# Patient Record
Sex: FEMALE | Race: OTHER | HISPANIC OR LATINO
[De-identification: names, ages, dates, MRNs, and addresses within clinical notes are randomized per-mention and may not be internally consistent; named-entity substitution may affect disease eponyms.]

---

## 2017-03-01 ENCOUNTER — LABORATORY RESULT (OUTPATIENT)
Age: 52
End: 2017-03-01

## 2017-03-01 ENCOUNTER — APPOINTMENT (OUTPATIENT)
Dept: INTERNAL MEDICINE | Facility: CLINIC | Age: 52
End: 2017-03-01

## 2017-03-01 VITALS
HEIGHT: 68 IN | DIASTOLIC BLOOD PRESSURE: 90 MMHG | BODY MASS INDEX: 20 KG/M2 | SYSTOLIC BLOOD PRESSURE: 160 MMHG | WEIGHT: 132 LBS | HEART RATE: 78 BPM | OXYGEN SATURATION: 98 %

## 2017-03-01 DIAGNOSIS — Z87.891 PERSONAL HISTORY OF NICOTINE DEPENDENCE: ICD-10-CM

## 2017-03-01 DIAGNOSIS — Z23 ENCOUNTER FOR IMMUNIZATION: ICD-10-CM

## 2017-03-01 DIAGNOSIS — R23.8 OTHER SKIN CHANGES: ICD-10-CM

## 2017-03-02 LAB
ALBUMIN SERPL ELPH-MCNC: 4.1 G/DL
ALP BLD-CCNC: 110 U/L
ALT SERPL-CCNC: 13 U/L
ANION GAP SERPL CALC-SCNC: 16 MMOL/L
AST SERPL-CCNC: 17 U/L
BILIRUB SERPL-MCNC: 0.3 MG/DL
BUN SERPL-MCNC: 13 MG/DL
CALCIUM SERPL-MCNC: 10 MG/DL
CHLORIDE SERPL-SCNC: 98 MMOL/L
CHOLEST SERPL-MCNC: 145 MG/DL
CHOLEST/HDLC SERPL: 2.6 RATIO
CO2 SERPL-SCNC: 25 MMOL/L
CREAT SERPL-MCNC: 0.63 MG/DL
CREAT SPEC-SCNC: 58 MG/DL
GLUCOSE SERPL-MCNC: 90 MG/DL
HBA1C MFR BLD HPLC: 8.3 %
HDLC SERPL-MCNC: 55 MG/DL
LDLC SERPL CALC-MCNC: 65 MG/DL
MICROALBUMIN 24H UR DL<=1MG/L-MCNC: 3.1 MG/DL
MICROALBUMIN/CREAT 24H UR-RTO: 53 UG/MG
POTASSIUM SERPL-SCNC: 4.3 MMOL/L
PROT SERPL-MCNC: 7.8 G/DL
SODIUM SERPL-SCNC: 139 MMOL/L
TRIGL SERPL-MCNC: 123 MG/DL
TSH SERPL-ACNC: <0.01 UIU/ML

## 2017-04-12 ENCOUNTER — FORM ENCOUNTER (OUTPATIENT)
Age: 52
End: 2017-04-12

## 2017-04-13 ENCOUNTER — APPOINTMENT (OUTPATIENT)
Dept: RHEUMATOLOGY | Facility: CLINIC | Age: 52
End: 2017-04-13

## 2017-04-13 VITALS
TEMPERATURE: 97.9 F | DIASTOLIC BLOOD PRESSURE: 88 MMHG | RESPIRATION RATE: 16 BRPM | HEIGHT: 68 IN | SYSTOLIC BLOOD PRESSURE: 158 MMHG | OXYGEN SATURATION: 98 % | HEART RATE: 76 BPM | BODY MASS INDEX: 19.7 KG/M2 | WEIGHT: 130 LBS

## 2017-04-13 DIAGNOSIS — R21 RASH AND OTHER NONSPECIFIC SKIN ERUPTION: ICD-10-CM

## 2017-04-13 LAB
ALBUMIN SERPL ELPH-MCNC: 4.1 G/DL
ALP BLD-CCNC: 118 U/L
ALT SERPL-CCNC: 20 U/L
ANION GAP SERPL CALC-SCNC: 18 MMOL/L
AST SERPL-CCNC: 13 U/L
BASOPHILS # BLD AUTO: 0.04 K/UL
BASOPHILS NFR BLD AUTO: 0.6 %
BILIRUB SERPL-MCNC: 0.2 MG/DL
BUN SERPL-MCNC: 16 MG/DL
CALCIUM SERPL-MCNC: 10 MG/DL
CHLORIDE SERPL-SCNC: 96 MMOL/L
CO2 SERPL-SCNC: 23 MMOL/L
CREAT SERPL-MCNC: 0.59 MG/DL
CRP SERPL-MCNC: 0.36 MG/DL
EOSINOPHIL # BLD AUTO: 0.12 K/UL
EOSINOPHIL NFR BLD AUTO: 1.9 %
ERYTHROCYTE [SEDIMENTATION RATE] IN BLOOD BY WESTERGREN METHOD: 25 MM/HR
GLUCOSE SERPL-MCNC: 153 MG/DL
HAV IGM SER QL: NONREACTIVE
HBV CORE IGG+IGM SER QL: NONREACTIVE
HBV CORE IGM SER QL: NONREACTIVE
HBV SURFACE AG SER QL: NONREACTIVE
HCT VFR BLD CALC: 39.4 %
HCV AB SER QL: NONREACTIVE
HCV S/CO RATIO: 0.21 S/CO
HGB BLD-MCNC: 13.1 G/DL
IGA SER QL IEP: 443 MG/DL
IGG SER QL IEP: 1060 MG/DL
IGM SER QL IEP: 164 MG/DL
IMM GRANULOCYTES NFR BLD AUTO: 0.2 %
LYMPHOCYTES # BLD AUTO: 2.51 K/UL
LYMPHOCYTES NFR BLD AUTO: 38.7 %
MAN DIFF?: NORMAL
MCHC RBC-ENTMCNC: 28.1 PG
MCHC RBC-ENTMCNC: 33.2 GM/DL
MCV RBC AUTO: 84.5 FL
MONOCYTES # BLD AUTO: 0.43 K/UL
MONOCYTES NFR BLD AUTO: 6.6 %
NEUTROPHILS # BLD AUTO: 3.37 K/UL
NEUTROPHILS NFR BLD AUTO: 52 %
PLATELET # BLD AUTO: 289 K/UL
POTASSIUM SERPL-SCNC: 4.2 MMOL/L
PROT SERPL-MCNC: 7.6 G/DL
RBC # BLD: 4.66 M/UL
RBC # FLD: 13.4 %
SODIUM SERPL-SCNC: 137 MMOL/L
WBC # FLD AUTO: 6.48 K/UL

## 2017-04-17 LAB
ADJUSTED MITOGEN: >10 IU/ML
ADJUSTED TB AG: -0.02 IU/ML
M TB IFN-G BLD-IMP: NEGATIVE
QUANTIFERON GOLD NIL: 0.11 IU/ML

## 2017-04-23 ENCOUNTER — EMERGENCY (EMERGENCY)
Facility: HOSPITAL | Age: 52
LOS: 0 days | Discharge: HOME | End: 2017-04-23
Attending: EMERGENCY MEDICINE
Payer: COMMERCIAL

## 2017-04-23 VITALS
WEIGHT: 132.06 LBS | RESPIRATION RATE: 21 BRPM | OXYGEN SATURATION: 100 % | TEMPERATURE: 103 F | DIASTOLIC BLOOD PRESSURE: 89 MMHG | SYSTOLIC BLOOD PRESSURE: 171 MMHG | HEIGHT: 68 IN | HEART RATE: 108 BPM

## 2017-04-23 VITALS — TEMPERATURE: 99 F

## 2017-04-23 DIAGNOSIS — I21.3 ST ELEVATION (STEMI) MYOCARDIAL INFARCTION OF UNSPECIFIED SITE: ICD-10-CM

## 2017-04-23 DIAGNOSIS — I10 ESSENTIAL (PRIMARY) HYPERTENSION: ICD-10-CM

## 2017-04-23 DIAGNOSIS — J02.0 STREPTOCOCCAL PHARYNGITIS: ICD-10-CM

## 2017-04-23 DIAGNOSIS — Z79.82 LONG TERM (CURRENT) USE OF ASPIRIN: ICD-10-CM

## 2017-04-23 DIAGNOSIS — Z79.4 LONG TERM (CURRENT) USE OF INSULIN: ICD-10-CM

## 2017-04-23 DIAGNOSIS — E11.9 TYPE 2 DIABETES MELLITUS WITHOUT COMPLICATIONS: ICD-10-CM

## 2017-04-23 DIAGNOSIS — Z95.5 PRESENCE OF CORONARY ANGIOPLASTY IMPLANT AND GRAFT: Chronic | ICD-10-CM

## 2017-04-23 DIAGNOSIS — R51 HEADACHE: ICD-10-CM

## 2017-04-23 LAB
ALBUMIN SERPL ELPH-MCNC: 3.2 G/DL — LOW (ref 3.3–5)
ALP SERPL-CCNC: 116 U/L — SIGNIFICANT CHANGE UP (ref 40–120)
ALT FLD-CCNC: 15 U/L — SIGNIFICANT CHANGE UP (ref 12–78)
ANION GAP SERPL CALC-SCNC: 17 MMOL/L — SIGNIFICANT CHANGE UP (ref 5–17)
AST SERPL-CCNC: 12 U/L — LOW (ref 15–37)
BASOPHILS # BLD AUTO: 0.1 K/UL — SIGNIFICANT CHANGE UP (ref 0–0.2)
BASOPHILS NFR BLD AUTO: 0.8 % — SIGNIFICANT CHANGE UP (ref 0–2)
BILIRUB SERPL-MCNC: 0.3 MG/DL — SIGNIFICANT CHANGE UP (ref 0.2–1.2)
BUN SERPL-MCNC: 11 MG/DL — SIGNIFICANT CHANGE UP (ref 7–23)
CALCIUM SERPL-MCNC: 8.8 MG/DL — SIGNIFICANT CHANGE UP (ref 8.5–10.1)
CHLORIDE SERPL-SCNC: 98 MMOL/L — SIGNIFICANT CHANGE UP (ref 96–108)
CO2 SERPL-SCNC: 21 MMOL/L — LOW (ref 22–31)
CREAT SERPL-MCNC: 0.8 MG/DL — SIGNIFICANT CHANGE UP (ref 0.5–1.3)
EOSINOPHIL # BLD AUTO: 0 K/UL — SIGNIFICANT CHANGE UP (ref 0–0.5)
EOSINOPHIL NFR BLD AUTO: 0 % — SIGNIFICANT CHANGE UP (ref 0–6)
GLUCOSE SERPL-MCNC: 149 MG/DL — HIGH (ref 70–99)
HCT VFR BLD CALC: 35.4 % — SIGNIFICANT CHANGE UP (ref 34.5–45)
HGB BLD-MCNC: 12.7 G/DL — SIGNIFICANT CHANGE UP (ref 11.5–15.5)
LYMPHOCYTES # BLD AUTO: 18.9 % — SIGNIFICANT CHANGE UP (ref 13–44)
LYMPHOCYTES # BLD AUTO: 2.8 K/UL — SIGNIFICANT CHANGE UP (ref 1–3.3)
MCHC RBC-ENTMCNC: 28.5 PG — SIGNIFICANT CHANGE UP (ref 27–34)
MCHC RBC-ENTMCNC: 36 GM/DL — SIGNIFICANT CHANGE UP (ref 32–36)
MCV RBC AUTO: 79.2 FL — LOW (ref 80–100)
MONOCYTES # BLD AUTO: 0.7 K/UL — SIGNIFICANT CHANGE UP (ref 0–0.9)
MONOCYTES NFR BLD AUTO: 4.5 % — SIGNIFICANT CHANGE UP (ref 2–14)
NEUTROPHILS # BLD AUTO: 11.4 K/UL — HIGH (ref 1.8–7.4)
NEUTROPHILS NFR BLD AUTO: 75.8 % — SIGNIFICANT CHANGE UP (ref 43–77)
PLATELET # BLD AUTO: 296 K/UL — SIGNIFICANT CHANGE UP (ref 150–400)
POTASSIUM SERPL-MCNC: 3.6 MMOL/L — SIGNIFICANT CHANGE UP (ref 3.5–5.3)
POTASSIUM SERPL-SCNC: 3.6 MMOL/L — SIGNIFICANT CHANGE UP (ref 3.5–5.3)
PROT SERPL-MCNC: 7.6 GM/DL — SIGNIFICANT CHANGE UP (ref 6–8.3)
RBC # BLD: 4.48 M/UL — SIGNIFICANT CHANGE UP (ref 3.8–5.2)
RBC # FLD: 12 % — SIGNIFICANT CHANGE UP (ref 11–15)
SODIUM SERPL-SCNC: 136 MMOL/L — SIGNIFICANT CHANGE UP (ref 135–145)
WBC # BLD: 15 K/UL — HIGH (ref 3.8–10.5)
WBC # FLD AUTO: 15 K/UL — HIGH (ref 3.8–10.5)

## 2017-04-23 PROCEDURE — 99284 EMERGENCY DEPT VISIT MOD MDM: CPT

## 2017-04-23 PROCEDURE — 71010: CPT | Mod: 26

## 2017-04-23 RX ORDER — SODIUM CHLORIDE 9 MG/ML
1000 INJECTION INTRAMUSCULAR; INTRAVENOUS; SUBCUTANEOUS ONCE
Qty: 0 | Refills: 0 | Status: COMPLETED | OUTPATIENT
Start: 2017-04-23 | End: 2017-04-23

## 2017-04-23 RX ORDER — ACETAMINOPHEN 500 MG
975 TABLET ORAL ONCE
Qty: 0 | Refills: 0 | Status: COMPLETED | OUTPATIENT
Start: 2017-04-23 | End: 2017-04-23

## 2017-04-23 RX ORDER — ACETAMINOPHEN 500 MG
2 TABLET ORAL
Qty: 40 | Refills: 0
Start: 2017-04-23 | End: 2017-04-28

## 2017-04-23 RX ADMIN — SODIUM CHLORIDE 1000 MILLILITER(S): 9 INJECTION INTRAMUSCULAR; INTRAVENOUS; SUBCUTANEOUS at 16:18

## 2017-04-23 RX ADMIN — SODIUM CHLORIDE 1000 MILLILITER(S): 9 INJECTION INTRAMUSCULAR; INTRAVENOUS; SUBCUTANEOUS at 17:32

## 2017-04-23 RX ADMIN — Medication 975 MILLIGRAM(S): at 16:18

## 2017-04-23 RX ADMIN — Medication 1 TABLET(S): at 16:18

## 2017-04-23 NOTE — ED ADULT TRIAGE NOTE - CHIEF COMPLAINT QUOTE
pt states, " I have been having chills, and body aches with fever and headache , with cough for several days" pt almost fell in triage area c/o dizziness

## 2017-04-23 NOTE — ED ADULT NURSE NOTE - PMH
Diabetes    Heart attack    History of Tubal Ligation    Hypertension    RA (rheumatoid arthritis)    Tubal Ligation

## 2017-04-23 NOTE — ED PROVIDER NOTE - OBJECTIVE STATEMENT
52 year old female with PMH of DM II, HTN, RA, MI, presenting to ED due to fever, had sore throat body aches x2 days. Slight cough as well. Denies any sick contacts, able to swallow and talk no change in voice.

## 2017-04-23 NOTE — ED PROVIDER NOTE - ENMT, MLM
Airway patent, Nasal mucosa clear. Mouth with normal mucosa. Throat has no vesicles, + Left oropharyngeal exudate and uvula is midline.

## 2017-04-23 NOTE — ED ADULT NURSE NOTE - OBJECTIVE STATEMENT
Patient states that for the past couple of days she has had chills, fevers, feeling weak. Complaints of a headache and pain in her back. Took Advil at home, denies taking any medication at home to alleviate her symptoms. Patient is able to eat and drink, denies nausea/vomiting/diarrhea. Received the flu vacc this year in october.

## 2017-05-23 ENCOUNTER — RX RENEWAL (OUTPATIENT)
Age: 52
End: 2017-05-23

## 2017-06-01 ENCOUNTER — CLINICAL ADVICE (OUTPATIENT)
Age: 52
End: 2017-06-01

## 2017-06-07 ENCOUNTER — APPOINTMENT (OUTPATIENT)
Dept: RHEUMATOLOGY | Facility: CLINIC | Age: 52
End: 2017-06-07

## 2017-06-07 VITALS
DIASTOLIC BLOOD PRESSURE: 83 MMHG | SYSTOLIC BLOOD PRESSURE: 178 MMHG | WEIGHT: 128 LBS | BODY MASS INDEX: 19.4 KG/M2 | OXYGEN SATURATION: 97 % | HEART RATE: 80 BPM | HEIGHT: 68 IN

## 2017-06-07 RX ORDER — CLOBETASOL PROPIONATE 0.5 MG/G
0.05 OINTMENT TOPICAL TWICE DAILY
Qty: 1 | Refills: 0 | Status: DISCONTINUED | COMMUNITY
Start: 2017-04-13 | End: 2017-06-07

## 2017-06-08 LAB
ALBUMIN SERPL ELPH-MCNC: 4.2 G/DL
ALP BLD-CCNC: 101 U/L
ALT SERPL-CCNC: 18 U/L
ANION GAP SERPL CALC-SCNC: 13 MMOL/L
ASO AB SER LA-ACNC: 78 IU/ML
AST SERPL-CCNC: 15 U/L
BASOPHILS # BLD AUTO: 0.05 K/UL
BASOPHILS NFR BLD AUTO: 0.5 %
BILIRUB SERPL-MCNC: 0.3 MG/DL
BUN SERPL-MCNC: 14 MG/DL
CALCIUM SERPL-MCNC: 9.3 MG/DL
CHLORIDE SERPL-SCNC: 95 MMOL/L
CO2 SERPL-SCNC: 26 MMOL/L
CREAT SERPL-MCNC: 0.62 MG/DL
CRP SERPL-MCNC: 0.6 MG/DL
EOSINOPHIL # BLD AUTO: 0.24 K/UL
EOSINOPHIL NFR BLD AUTO: 2.6 %
ERYTHROCYTE [SEDIMENTATION RATE] IN BLOOD BY WESTERGREN METHOD: 26 MM/HR
GLUCOSE SERPL-MCNC: 225 MG/DL
HBA1C MFR BLD HPLC: 6.9 %
HCT VFR BLD CALC: 37.4 %
HGB BLD-MCNC: 11.9 G/DL
IMM GRANULOCYTES NFR BLD AUTO: 0.2 %
LYMPHOCYTES # BLD AUTO: 2.7 K/UL
LYMPHOCYTES NFR BLD AUTO: 28.9 %
MAN DIFF?: NORMAL
MCHC RBC-ENTMCNC: 27.1 PG
MCHC RBC-ENTMCNC: 31.8 GM/DL
MCV RBC AUTO: 85.2 FL
MONOCYTES # BLD AUTO: 0.4 K/UL
MONOCYTES NFR BLD AUTO: 4.3 %
NEUTROPHILS # BLD AUTO: 5.93 K/UL
NEUTROPHILS NFR BLD AUTO: 63.5 %
PLATELET # BLD AUTO: 384 K/UL
POTASSIUM SERPL-SCNC: 4.4 MMOL/L
PROT SERPL-MCNC: 7.7 G/DL
RBC # BLD: 4.39 M/UL
RBC # FLD: 14 %
SODIUM SERPL-SCNC: 134 MMOL/L
T4 FREE SERPL-MCNC: 1.6 NG/DL
TSH SERPL-ACNC: <0.01 UIU/ML
WBC # FLD AUTO: 9.34 K/UL

## 2017-06-29 ENCOUNTER — RX RENEWAL (OUTPATIENT)
Age: 52
End: 2017-06-29

## 2017-06-30 ENCOUNTER — RX RENEWAL (OUTPATIENT)
Age: 52
End: 2017-06-30

## 2017-07-07 ENCOUNTER — APPOINTMENT (OUTPATIENT)
Dept: RHEUMATOLOGY | Facility: CLINIC | Age: 52
End: 2017-07-07

## 2017-08-03 ENCOUNTER — RX RENEWAL (OUTPATIENT)
Age: 52
End: 2017-08-03

## 2017-08-06 ENCOUNTER — RX RENEWAL (OUTPATIENT)
Age: 52
End: 2017-08-06

## 2017-08-18 ENCOUNTER — APPOINTMENT (OUTPATIENT)
Dept: INTERNAL MEDICINE | Facility: CLINIC | Age: 52
End: 2017-08-18

## 2017-10-05 ENCOUNTER — RX RENEWAL (OUTPATIENT)
Age: 52
End: 2017-10-05

## 2017-10-27 ENCOUNTER — CLINICAL ADVICE (OUTPATIENT)
Age: 52
End: 2017-10-27

## 2017-11-01 ENCOUNTER — APPOINTMENT (OUTPATIENT)
Dept: ENDOCRINOLOGY | Facility: CLINIC | Age: 52
End: 2017-11-01
Payer: COMMERCIAL

## 2017-11-01 VITALS
SYSTOLIC BLOOD PRESSURE: 148 MMHG | DIASTOLIC BLOOD PRESSURE: 90 MMHG | WEIGHT: 128 LBS | HEIGHT: 68 IN | BODY MASS INDEX: 19.4 KG/M2

## 2017-11-01 LAB
GLUCOSE BLDC GLUCOMTR-MCNC: 74
HBA1C MFR BLD HPLC: 6.4

## 2017-11-01 PROCEDURE — 83036 HEMOGLOBIN GLYCOSYLATED A1C: CPT | Mod: QW

## 2017-11-01 PROCEDURE — 82962 GLUCOSE BLOOD TEST: CPT

## 2017-11-01 PROCEDURE — 99215 OFFICE O/P EST HI 40 MIN: CPT | Mod: 25

## 2017-11-08 LAB
T4 FREE SERPL-MCNC: 1.7 NG/DL
TSH SERPL-ACNC: 0.01 UIU/ML

## 2017-12-08 ENCOUNTER — APPOINTMENT (OUTPATIENT)
Dept: RHEUMATOLOGY | Facility: CLINIC | Age: 52
End: 2017-12-08

## 2017-12-14 ENCOUNTER — APPOINTMENT (OUTPATIENT)
Dept: NUCLEAR MEDICINE | Facility: HOSPITAL | Age: 52
End: 2017-12-14
Payer: COMMERCIAL

## 2017-12-14 ENCOUNTER — OUTPATIENT (OUTPATIENT)
Dept: OUTPATIENT SERVICES | Facility: HOSPITAL | Age: 52
LOS: 1 days | End: 2017-12-14

## 2017-12-14 ENCOUNTER — APPOINTMENT (OUTPATIENT)
Dept: RHEUMATOLOGY | Facility: CLINIC | Age: 52
End: 2017-12-14

## 2017-12-14 DIAGNOSIS — E04.2 NONTOXIC MULTINODULAR GOITER: ICD-10-CM

## 2017-12-14 DIAGNOSIS — Z95.5 PRESENCE OF CORONARY ANGIOPLASTY IMPLANT AND GRAFT: Chronic | ICD-10-CM

## 2017-12-15 ENCOUNTER — APPOINTMENT (OUTPATIENT)
Dept: NUCLEAR MEDICINE | Facility: HOSPITAL | Age: 52
End: 2017-12-15

## 2017-12-15 PROCEDURE — 78014 THYROID IMAGING W/BLOOD FLOW: CPT | Mod: 26

## 2018-01-01 ENCOUNTER — RX RENEWAL (OUTPATIENT)
Age: 53
End: 2018-01-01

## 2018-01-11 ENCOUNTER — RX RENEWAL (OUTPATIENT)
Age: 53
End: 2018-01-11

## 2018-01-12 ENCOUNTER — RX RENEWAL (OUTPATIENT)
Age: 53
End: 2018-01-12

## 2018-01-18 ENCOUNTER — APPOINTMENT (OUTPATIENT)
Dept: NUCLEAR MEDICINE | Facility: HOSPITAL | Age: 53
End: 2018-01-18

## 2018-01-18 ENCOUNTER — APPOINTMENT (OUTPATIENT)
Dept: NUCLEAR MEDICINE | Facility: HOSPITAL | Age: 53
End: 2018-01-18
Payer: COMMERCIAL

## 2018-01-18 ENCOUNTER — OUTPATIENT (OUTPATIENT)
Dept: OUTPATIENT SERVICES | Facility: HOSPITAL | Age: 53
LOS: 1 days | End: 2018-01-18

## 2018-01-18 DIAGNOSIS — E05.90 THYROTOXICOSIS, UNSPECIFIED WITHOUT THYROTOXIC CRISIS OR STORM: ICD-10-CM

## 2018-01-18 DIAGNOSIS — Z95.5 PRESENCE OF CORONARY ANGIOPLASTY IMPLANT AND GRAFT: Chronic | ICD-10-CM

## 2018-01-18 PROCEDURE — 79005 NUCLEAR RX ORAL ADMIN: CPT | Mod: 26

## 2018-01-19 ENCOUNTER — APPOINTMENT (OUTPATIENT)
Dept: NUCLEAR MEDICINE | Facility: HOSPITAL | Age: 53
End: 2018-01-19

## 2018-02-07 ENCOUNTER — APPOINTMENT (OUTPATIENT)
Dept: ENDOCRINOLOGY | Facility: CLINIC | Age: 53
End: 2018-02-07

## 2018-02-09 ENCOUNTER — APPOINTMENT (OUTPATIENT)
Dept: INTERNAL MEDICINE | Facility: CLINIC | Age: 53
End: 2018-02-09
Payer: COMMERCIAL

## 2018-02-09 VITALS
DIASTOLIC BLOOD PRESSURE: 80 MMHG | SYSTOLIC BLOOD PRESSURE: 140 MMHG | WEIGHT: 132 LBS | HEIGHT: 68 IN | BODY MASS INDEX: 20 KG/M2 | HEART RATE: 86 BPM | OXYGEN SATURATION: 99 %

## 2018-02-09 DIAGNOSIS — Z23 ENCOUNTER FOR IMMUNIZATION: ICD-10-CM

## 2018-02-09 DIAGNOSIS — Z87.891 PERSONAL HISTORY OF NICOTINE DEPENDENCE: ICD-10-CM

## 2018-02-09 PROCEDURE — 90686 IIV4 VACC NO PRSV 0.5 ML IM: CPT

## 2018-02-09 PROCEDURE — 99396 PREV VISIT EST AGE 40-64: CPT | Mod: 25

## 2018-02-09 PROCEDURE — G0008: CPT

## 2018-02-13 LAB
ALBUMIN SERPL ELPH-MCNC: 3.5 G/DL
ALP BLD-CCNC: 91 U/L
ALT SERPL-CCNC: 11 U/L
ANION GAP SERPL CALC-SCNC: 18 MMOL/L
AST SERPL-CCNC: 12 U/L
BASOPHILS # BLD AUTO: 0.06 K/UL
BASOPHILS NFR BLD AUTO: 0.4 %
BILIRUB SERPL-MCNC: 0.4 MG/DL
BUN SERPL-MCNC: 13 MG/DL
CALCIUM SERPL-MCNC: 9.7 MG/DL
CHLORIDE SERPL-SCNC: 93 MMOL/L
CHOLEST SERPL-MCNC: 120 MG/DL
CHOLEST/HDLC SERPL: 3.4 RATIO
CO2 SERPL-SCNC: 23 MMOL/L
CREAT SERPL-MCNC: 0.67 MG/DL
CREAT SPEC-SCNC: 227 MG/DL
EOSINOPHIL # BLD AUTO: 0.69 K/UL
EOSINOPHIL NFR BLD AUTO: 4.7 %
GLUCOSE SERPL-MCNC: 144 MG/DL
HBA1C MFR BLD HPLC: 7.1 %
HCT VFR BLD CALC: 34.8 %
HDLC SERPL-MCNC: 35 MG/DL
HGB BLD-MCNC: 11.5 G/DL
IMM GRANULOCYTES NFR BLD AUTO: 0.4 %
LDLC SERPL CALC-MCNC: 67 MG/DL
LYMPHOCYTES # BLD AUTO: 2.45 K/UL
LYMPHOCYTES NFR BLD AUTO: 16.8 %
MAN DIFF?: NORMAL
MCHC RBC-ENTMCNC: 26.9 PG
MCHC RBC-ENTMCNC: 33 GM/DL
MCV RBC AUTO: 81.5 FL
MICROALBUMIN 24H UR DL<=1MG/L-MCNC: 10 MG/DL
MICROALBUMIN/CREAT 24H UR-RTO: 44 MG/G
MONOCYTES # BLD AUTO: 1.1 K/UL
MONOCYTES NFR BLD AUTO: 7.6 %
NEUTROPHILS # BLD AUTO: 10.2 K/UL
NEUTROPHILS NFR BLD AUTO: 70.1 %
PLATELET # BLD AUTO: 389 K/UL
POTASSIUM SERPL-SCNC: 4.7 MMOL/L
PROT SERPL-MCNC: 8 G/DL
RBC # BLD: 4.27 M/UL
RBC # FLD: 13.5 %
SODIUM SERPL-SCNC: 134 MMOL/L
T3 SERPL-MCNC: 110 NG/DL
T4 FREE SERPL-MCNC: 1.1 NG/DL
TRIGL SERPL-MCNC: 89 MG/DL
TSH SERPL-ACNC: <0.01 UIU/ML
WBC # FLD AUTO: 14.56 K/UL

## 2018-02-26 ENCOUNTER — APPOINTMENT (OUTPATIENT)
Dept: ENDOCRINOLOGY | Facility: CLINIC | Age: 53
End: 2018-02-26
Payer: COMMERCIAL

## 2018-02-26 VITALS — DIASTOLIC BLOOD PRESSURE: 80 MMHG | SYSTOLIC BLOOD PRESSURE: 132 MMHG

## 2018-02-26 VITALS — HEIGHT: 68 IN | BODY MASS INDEX: 20.31 KG/M2 | HEART RATE: 87 BPM | WEIGHT: 134 LBS | OXYGEN SATURATION: 97 %

## 2018-02-26 PROCEDURE — 99214 OFFICE O/P EST MOD 30 MIN: CPT

## 2018-04-17 LAB
T4 FREE SERPL-MCNC: 1.1 NG/DL
TSH SERPL-ACNC: 0.71 UIU/ML

## 2018-04-25 ENCOUNTER — RX RENEWAL (OUTPATIENT)
Age: 53
End: 2018-04-25

## 2018-05-07 ENCOUNTER — APPOINTMENT (OUTPATIENT)
Dept: RHEUMATOLOGY | Facility: CLINIC | Age: 53
End: 2018-05-07
Payer: COMMERCIAL

## 2018-05-07 VITALS
HEART RATE: 80 BPM | BODY MASS INDEX: 20.61 KG/M2 | OXYGEN SATURATION: 96 % | TEMPERATURE: 97.8 F | SYSTOLIC BLOOD PRESSURE: 153 MMHG | RESPIRATION RATE: 16 BRPM | DIASTOLIC BLOOD PRESSURE: 99 MMHG | WEIGHT: 136 LBS | HEIGHT: 68 IN

## 2018-05-07 PROCEDURE — 99215 OFFICE O/P EST HI 40 MIN: CPT

## 2018-05-07 RX ORDER — NAPROXEN 500 MG/1
500 TABLET, DELAYED RELEASE ORAL
Qty: 60 | Refills: 1 | Status: DISCONTINUED | COMMUNITY
Start: 2017-06-07 | End: 2018-05-07

## 2018-05-07 RX ORDER — HYDROCORTISONE VALERATE 2 MG/G
0.2 OINTMENT TOPICAL
Qty: 1 | Refills: 0 | Status: DISCONTINUED | COMMUNITY
Start: 2017-06-07 | End: 2018-05-07

## 2018-05-07 RX ORDER — CLOBETASOL PROPIONATE 0.5 MG/G
0.05 OINTMENT TOPICAL
Refills: 0 | Status: ACTIVE | COMMUNITY
Start: 2018-05-07

## 2018-05-08 LAB
ALBUMIN SERPL ELPH-MCNC: 4.5 G/DL
ALP BLD-CCNC: 84 U/L
ALT SERPL-CCNC: 10 U/L
ANION GAP SERPL CALC-SCNC: 17 MMOL/L
AST SERPL-CCNC: 12 U/L
BASOPHILS # BLD AUTO: 0.04 K/UL
BASOPHILS NFR BLD AUTO: 0.3 %
BILIRUB SERPL-MCNC: 0.3 MG/DL
BUN SERPL-MCNC: 14 MG/DL
CALCIUM SERPL-MCNC: 9.9 MG/DL
CHLORIDE SERPL-SCNC: 98 MMOL/L
CO2 SERPL-SCNC: 22 MMOL/L
CREAT SERPL-MCNC: 0.76 MG/DL
CRP SERPL-MCNC: 1.7 MG/DL
EOSINOPHIL # BLD AUTO: 0.12 K/UL
EOSINOPHIL NFR BLD AUTO: 1 %
GLUCOSE SERPL-MCNC: 144 MG/DL
HAV IGM SER QL: NONREACTIVE
HBV CORE IGG+IGM SER QL: NONREACTIVE
HBV CORE IGM SER QL: NONREACTIVE
HBV SURFACE AG SER QL: NONREACTIVE
HCT VFR BLD CALC: 39.2 %
HCV AB SER QL: NONREACTIVE
HCV S/CO RATIO: 0.29 S/CO
HGB BLD-MCNC: 12.7 G/DL
IMM GRANULOCYTES NFR BLD AUTO: 0.2 %
LYMPHOCYTES # BLD AUTO: 2.56 K/UL
LYMPHOCYTES NFR BLD AUTO: 20.4 %
MAN DIFF?: NORMAL
MCHC RBC-ENTMCNC: 28 PG
MCHC RBC-ENTMCNC: 32.4 GM/DL
MCV RBC AUTO: 86.5 FL
MONOCYTES # BLD AUTO: 0.58 K/UL
MONOCYTES NFR BLD AUTO: 4.6 %
NEUTROPHILS # BLD AUTO: 9.21 K/UL
NEUTROPHILS NFR BLD AUTO: 73.5 %
PLATELET # BLD AUTO: 347 K/UL
POTASSIUM SERPL-SCNC: 4.5 MMOL/L
PROT SERPL-MCNC: 8 G/DL
RBC # BLD: 4.53 M/UL
RBC # FLD: 15.9 %
SODIUM SERPL-SCNC: 137 MMOL/L
T4 FREE SERPL-MCNC: 1.1 NG/DL
TSH SERPL-ACNC: 0.51 UIU/ML
WBC # FLD AUTO: 12.53 K/UL

## 2018-05-09 LAB
ADJUSTED MITOGEN: 1.74 IU/ML
ADJUSTED TB AG: 0.04 IU/ML
M TB IFN-G BLD-IMP: NEGATIVE
QUANTIFERON GOLD NIL: 0.42 IU/ML

## 2018-05-10 ENCOUNTER — EMERGENCY (EMERGENCY)
Facility: HOSPITAL | Age: 53
LOS: 1 days | Discharge: HOME | End: 2018-05-10
Attending: EMERGENCY MEDICINE | Admitting: EMERGENCY MEDICINE
Payer: COMMERCIAL

## 2018-05-10 VITALS
DIASTOLIC BLOOD PRESSURE: 96 MMHG | TEMPERATURE: 98 F | SYSTOLIC BLOOD PRESSURE: 155 MMHG | HEART RATE: 85 BPM | OXYGEN SATURATION: 100 % | RESPIRATION RATE: 20 BRPM

## 2018-05-10 VITALS
SYSTOLIC BLOOD PRESSURE: 150 MMHG | OXYGEN SATURATION: 100 % | DIASTOLIC BLOOD PRESSURE: 95 MMHG | TEMPERATURE: 99 F | RESPIRATION RATE: 16 BRPM

## 2018-05-10 DIAGNOSIS — Z95.5 PRESENCE OF CORONARY ANGIOPLASTY IMPLANT AND GRAFT: Chronic | ICD-10-CM

## 2018-05-10 PROCEDURE — 99284 EMERGENCY DEPT VISIT MOD MDM: CPT

## 2018-05-10 PROCEDURE — 73030 X-RAY EXAM OF SHOULDER: CPT | Mod: 26,LT

## 2018-05-10 RX ADMIN — Medication 20 MILLIGRAM(S): at 13:12

## 2018-05-10 NOTE — ED PROVIDER NOTE - ATTENDING CONTRIBUTION TO CARE
I performed a face-to-face evaluation of the patient and performed a history and physical examination. I agree with the history and physical examination.    Aki: RA flare. Multiple joints. Start steroids. Monitor BG. F/u w/ her Rheum.

## 2018-05-10 NOTE — ED PROVIDER NOTE - OBJECTIVE STATEMENT
Aki: 53 F, RA, DM, h/o MI, p/w progressive RA flare. Per pt.'s Rheum with whom I spoke (Dr. Anabelle Humphries), pt. was on a DMARD, but didn't reliably refill the meds. Her RA flares. She's been put on steroids in the past, which increases her BG, and she's hesitant to take long-term or high-dose steroids. Pt. describes pain and limited ROM in (B) hands, L knee, and now L shoulder. Dr. Humphries prescribed diclofenac a few days ago; pt. got better 1 day, then worse. No F. Dr. Humphries agreed with a suggestion for steroid taper: solumedrol 20 mg IV now, then PO prednisone 7.5 mg daily x 1 week, then 5 mg daily x 1 week, then 2.5 mg daily x 1 week.

## 2018-05-10 NOTE — ED ADULT TRIAGE NOTE - CHIEF COMPLAINT QUOTE
Pt with HX of RA co increased pain 2 days ago with swelling to hands was seen by her doctor given medication, pt states had relief x one day then yesterday evening developed left shoulder pain with difficulty  moving arm. Pt with elevated b/p in triage but states took her medication this morning.

## 2018-05-10 NOTE — ED PROVIDER NOTE - MUSCULOSKELETAL, MLM
L shoulder limited ROM; not red/hot. (B) wrists and multiple MCPs w/ synovitis. L knee w/ synovitis.

## 2018-05-11 NOTE — ED POST DISCHARGE NOTE - REASON FOR FOLLOW-UP
Other pharmacy called to confirm prednisone directions. per Dr. Prabhakar prednisone 2.5 mg 3 tablets once a day for 1 week, then prednisone 2.5 mg 2 tablet once a day for 1 week and then prednisone  2.5 mg 1 tablet once a day for 1 week.

## 2018-05-17 ENCOUNTER — APPOINTMENT (OUTPATIENT)
Dept: ENDOCRINOLOGY | Facility: CLINIC | Age: 53
End: 2018-05-17

## 2018-05-22 ENCOUNTER — RX RENEWAL (OUTPATIENT)
Age: 53
End: 2018-05-22

## 2018-06-15 ENCOUNTER — APPOINTMENT (OUTPATIENT)
Dept: RHEUMATOLOGY | Facility: CLINIC | Age: 53
End: 2018-06-15

## 2018-06-26 ENCOUNTER — MEDICATION RENEWAL (OUTPATIENT)
Age: 53
End: 2018-06-26

## 2018-07-10 ENCOUNTER — RX RENEWAL (OUTPATIENT)
Age: 53
End: 2018-07-10

## 2018-07-11 ENCOUNTER — MEDICATION RENEWAL (OUTPATIENT)
Age: 53
End: 2018-07-11

## 2018-07-31 ENCOUNTER — RX RENEWAL (OUTPATIENT)
Age: 53
End: 2018-07-31

## 2018-08-09 ENCOUNTER — RX RENEWAL (OUTPATIENT)
Age: 53
End: 2018-08-09

## 2018-10-05 ENCOUNTER — APPOINTMENT (OUTPATIENT)
Dept: ENDOCRINOLOGY | Facility: CLINIC | Age: 53
End: 2018-10-05
Payer: COMMERCIAL

## 2018-10-05 VITALS
HEIGHT: 68 IN | OXYGEN SATURATION: 98 % | SYSTOLIC BLOOD PRESSURE: 142 MMHG | HEART RATE: 88 BPM | WEIGHT: 138 LBS | BODY MASS INDEX: 20.92 KG/M2 | DIASTOLIC BLOOD PRESSURE: 88 MMHG

## 2018-10-05 PROBLEM — M06.9 RHEUMATOID ARTHRITIS, UNSPECIFIED: Chronic | Status: ACTIVE | Noted: 2017-04-23

## 2018-10-05 PROBLEM — I21.3 ST ELEVATION (STEMI) MYOCARDIAL INFARCTION OF UNSPECIFIED SITE: Chronic | Status: ACTIVE | Noted: 2017-04-23

## 2018-10-05 PROCEDURE — 99214 OFFICE O/P EST MOD 30 MIN: CPT

## 2018-10-05 PROCEDURE — 83036 HEMOGLOBIN GLYCOSYLATED A1C: CPT | Mod: QW

## 2018-10-11 LAB
CREAT SPEC-SCNC: 133 MG/DL
HBA1C MFR BLD HPLC: 6.9
MICROALBUMIN 24H UR DL<=1MG/L-MCNC: 4.4 MG/DL
MICROALBUMIN/CREAT 24H UR-RTO: 33 MG/G
T4 FREE SERPL-MCNC: 1.1 NG/DL
TSH SERPL-ACNC: 0.68 UIU/ML

## 2018-11-05 ENCOUNTER — RX RENEWAL (OUTPATIENT)
Age: 53
End: 2018-11-05

## 2018-12-14 ENCOUNTER — APPOINTMENT (OUTPATIENT)
Dept: RHEUMATOLOGY | Facility: CLINIC | Age: 53
End: 2018-12-14
Payer: COMMERCIAL

## 2018-12-14 VITALS
HEART RATE: 68 BPM | DIASTOLIC BLOOD PRESSURE: 70 MMHG | HEIGHT: 68 IN | WEIGHT: 139 LBS | SYSTOLIC BLOOD PRESSURE: 130 MMHG | BODY MASS INDEX: 21.07 KG/M2

## 2018-12-14 DIAGNOSIS — L40.3 PUSTULOSIS PALMARIS ET PLANTARIS: ICD-10-CM

## 2018-12-14 DIAGNOSIS — M06.9 RHEUMATOID ARTHRITIS, UNSPECIFIED: ICD-10-CM

## 2018-12-14 PROCEDURE — 99215 OFFICE O/P EST HI 40 MIN: CPT | Mod: 25

## 2018-12-14 PROCEDURE — 36415 COLL VENOUS BLD VENIPUNCTURE: CPT

## 2018-12-17 LAB
25(OH)D3 SERPL-MCNC: 26.7 NG/ML
ALBUMIN SERPL ELPH-MCNC: 4.5 G/DL
ALP BLD-CCNC: 75 U/L
ALT SERPL-CCNC: 11 U/L
ANION GAP SERPL CALC-SCNC: 13 MMOL/L
AST SERPL-CCNC: 23 U/L
BASOPHILS # BLD AUTO: 0.04 K/UL
BASOPHILS NFR BLD AUTO: 0.3 %
BILIRUB SERPL-MCNC: 0.3 MG/DL
BUN SERPL-MCNC: 12 MG/DL
CALCIUM SERPL-MCNC: 10.1 MG/DL
CHLORIDE SERPL-SCNC: 95 MMOL/L
CO2 SERPL-SCNC: 27 MMOL/L
CREAT SERPL-MCNC: 0.87 MG/DL
CRP SERPL-MCNC: 0.71 MG/DL
EOSINOPHIL # BLD AUTO: 0.2 K/UL
EOSINOPHIL NFR BLD AUTO: 1.7 %
ERYTHROCYTE [SEDIMENTATION RATE] IN BLOOD BY WESTERGREN METHOD: 31 MM/HR
GLUCOSE SERPL-MCNC: 159 MG/DL
HCT VFR BLD CALC: 39.5 %
HGB BLD-MCNC: 12.2 G/DL
IMM GRANULOCYTES NFR BLD AUTO: 0.2 %
LYMPHOCYTES # BLD AUTO: 2.17 K/UL
LYMPHOCYTES NFR BLD AUTO: 18.5 %
MAN DIFF?: NORMAL
MCHC RBC-ENTMCNC: 27.8 PG
MCHC RBC-ENTMCNC: 30.9 GM/DL
MCV RBC AUTO: 90 FL
MONOCYTES # BLD AUTO: 0.43 K/UL
MONOCYTES NFR BLD AUTO: 3.7 %
NEUTROPHILS # BLD AUTO: 8.9 K/UL
NEUTROPHILS NFR BLD AUTO: 75.6 %
PLATELET # BLD AUTO: 370 K/UL
POTASSIUM SERPL-SCNC: 4.7 MMOL/L
PROT SERPL-MCNC: 7.5 G/DL
RBC # BLD: 4.39 M/UL
RBC # FLD: 14.2 %
SODIUM SERPL-SCNC: 135 MMOL/L
WBC # FLD AUTO: 11.76 K/UL

## 2019-01-22 ENCOUNTER — RX RENEWAL (OUTPATIENT)
Age: 54
End: 2019-01-22

## 2019-01-30 ENCOUNTER — FORM ENCOUNTER (OUTPATIENT)
Age: 54
End: 2019-01-30

## 2019-01-31 ENCOUNTER — APPOINTMENT (OUTPATIENT)
Dept: RADIOLOGY | Facility: CLINIC | Age: 54
End: 2019-01-31
Payer: COMMERCIAL

## 2019-01-31 ENCOUNTER — OUTPATIENT (OUTPATIENT)
Dept: OUTPATIENT SERVICES | Facility: HOSPITAL | Age: 54
LOS: 1 days | End: 2019-01-31
Payer: COMMERCIAL

## 2019-01-31 DIAGNOSIS — M05.79 RHEUMATOID ARTHRITIS WITH RHEUMATOID FACTOR OF MULTIPLE SITES WITHOUT ORGAN OR SYSTEMS INVOLVEMENT: ICD-10-CM

## 2019-01-31 DIAGNOSIS — Z95.5 PRESENCE OF CORONARY ANGIOPLASTY IMPLANT AND GRAFT: Chronic | ICD-10-CM

## 2019-01-31 DIAGNOSIS — Z00.8 ENCOUNTER FOR OTHER GENERAL EXAMINATION: ICD-10-CM

## 2019-01-31 DIAGNOSIS — E55.9 VITAMIN D DEFICIENCY, UNSPECIFIED: ICD-10-CM

## 2019-01-31 PROCEDURE — 73130 X-RAY EXAM OF HAND: CPT | Mod: 26,50

## 2019-01-31 PROCEDURE — 77080 DXA BONE DENSITY AXIAL: CPT | Mod: 26

## 2019-01-31 PROCEDURE — 73630 X-RAY EXAM OF FOOT: CPT | Mod: 26,50

## 2019-01-31 PROCEDURE — 73130 X-RAY EXAM OF HAND: CPT

## 2019-01-31 PROCEDURE — 73630 X-RAY EXAM OF FOOT: CPT

## 2019-01-31 PROCEDURE — 77080 DXA BONE DENSITY AXIAL: CPT

## 2019-02-08 ENCOUNTER — APPOINTMENT (OUTPATIENT)
Dept: ENDOCRINOLOGY | Facility: CLINIC | Age: 54
End: 2019-02-08
Payer: COMMERCIAL

## 2019-02-14 ENCOUNTER — EMERGENCY (EMERGENCY)
Facility: HOSPITAL | Age: 54
LOS: 0 days | Discharge: LEFT BEFORE DISCHARGE | End: 2019-02-14
Attending: EMERGENCY MEDICINE
Payer: COMMERCIAL

## 2019-02-14 VITALS
DIASTOLIC BLOOD PRESSURE: 120 MMHG | WEIGHT: 138.01 LBS | HEIGHT: 68 IN | RESPIRATION RATE: 19 BRPM | HEART RATE: 110 BPM | TEMPERATURE: 98 F | OXYGEN SATURATION: 100 % | SYSTOLIC BLOOD PRESSURE: 201 MMHG

## 2019-02-14 VITALS
SYSTOLIC BLOOD PRESSURE: 173 MMHG | DIASTOLIC BLOOD PRESSURE: 83 MMHG | OXYGEN SATURATION: 98 % | TEMPERATURE: 97 F | RESPIRATION RATE: 21 BRPM | HEART RATE: 89 BPM

## 2019-02-14 DIAGNOSIS — E78.5 HYPERLIPIDEMIA, UNSPECIFIED: ICD-10-CM
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
## 2019-02-14 DIAGNOSIS — Z79.84 LONG TERM (CURRENT) USE OF ORAL HYPOGLYCEMIC DRUGS: ICD-10-CM

## 2019-02-14 DIAGNOSIS — R51 HEADACHE: ICD-10-CM
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
## 2019-02-14 DIAGNOSIS — Z95.5 PRESENCE OF CORONARY ANGIOPLASTY IMPLANT AND GRAFT: Chronic | ICD-10-CM

## 2019-02-14 DIAGNOSIS — Z79.1 LONG TERM (CURRENT) USE OF NON-STEROIDAL ANTI-INFLAMMATORIES (NSAID): ICD-10-CM

## 2019-02-14 DIAGNOSIS — R55 SYNCOPE AND COLLAPSE: ICD-10-CM

## 2019-02-14 DIAGNOSIS — Z79.899 OTHER LONG TERM (CURRENT) DRUG THERAPY: ICD-10-CM

## 2019-02-14 DIAGNOSIS — I25.10 ATHEROSCLEROTIC HEART DISEASE OF NATIVE CORONARY ARTERY WITHOUT ANGINA PECTORIS: ICD-10-CM

## 2019-02-14 DIAGNOSIS — I10 ESSENTIAL (PRIMARY) HYPERTENSION: ICD-10-CM

## 2019-02-14 LAB
ACETONE SERPL-MCNC: NEGATIVE — SIGNIFICANT CHANGE UP
ALBUMIN SERPL ELPH-MCNC: 3.9 G/DL — SIGNIFICANT CHANGE UP (ref 3.3–5)
ALP SERPL-CCNC: 81 U/L — SIGNIFICANT CHANGE UP (ref 40–120)
ALT FLD-CCNC: 23 U/L — SIGNIFICANT CHANGE UP (ref 12–78)
ANION GAP SERPL CALC-SCNC: 11 MMOL/L — SIGNIFICANT CHANGE UP (ref 5–17)
APPEARANCE UR: CLEAR — SIGNIFICANT CHANGE UP
APTT BLD: 30.2 SEC — SIGNIFICANT CHANGE UP (ref 28.5–37)
AST SERPL-CCNC: 17 U/L — SIGNIFICANT CHANGE UP (ref 15–37)
BILIRUB SERPL-MCNC: 0.5 MG/DL — SIGNIFICANT CHANGE UP (ref 0.2–1.2)
BILIRUB UR-MCNC: NEGATIVE — SIGNIFICANT CHANGE UP
BUN SERPL-MCNC: 16 MG/DL — SIGNIFICANT CHANGE UP (ref 7–23)
CALCIUM SERPL-MCNC: 9 MG/DL — SIGNIFICANT CHANGE UP (ref 8.5–10.1)
CHLORIDE SERPL-SCNC: 99 MMOL/L — SIGNIFICANT CHANGE UP (ref 96–108)
CO2 SERPL-SCNC: 24 MMOL/L — SIGNIFICANT CHANGE UP (ref 22–31)
COLOR SPEC: YELLOW — SIGNIFICANT CHANGE UP
CREAT SERPL-MCNC: 0.76 MG/DL — SIGNIFICANT CHANGE UP (ref 0.5–1.3)
DIFF PNL FLD: NEGATIVE — SIGNIFICANT CHANGE UP
EPI CELLS # UR: SIGNIFICANT CHANGE UP
GLUCOSE BLDC GLUCOMTR-MCNC: 297 MG/DL — HIGH (ref 70–99)
GLUCOSE SERPL-MCNC: 275 MG/DL — HIGH (ref 70–99)
GLUCOSE UR QL: 1000 MG/DL
HCT VFR BLD CALC: 41.3 % — SIGNIFICANT CHANGE UP (ref 34.5–45)
HGB BLD-MCNC: 13.6 G/DL — SIGNIFICANT CHANGE UP (ref 11.5–15.5)
INR BLD: 0.9 RATIO — SIGNIFICANT CHANGE UP (ref 0.88–1.16)
KETONES UR-MCNC: ABNORMAL
LEUKOCYTE ESTERASE UR-ACNC: NEGATIVE — SIGNIFICANT CHANGE UP
MCHC RBC-ENTMCNC: 27.7 PG — SIGNIFICANT CHANGE UP (ref 27–34)
MCHC RBC-ENTMCNC: 32.9 GM/DL — SIGNIFICANT CHANGE UP (ref 32–36)
MCV RBC AUTO: 84.1 FL — SIGNIFICANT CHANGE UP (ref 80–100)
NITRITE UR-MCNC: NEGATIVE — SIGNIFICANT CHANGE UP
NRBC # BLD: 0 /100 WBCS — SIGNIFICANT CHANGE UP (ref 0–0)
PH UR: 6 — SIGNIFICANT CHANGE UP (ref 5–8)
PLATELET # BLD AUTO: 325 K/UL — SIGNIFICANT CHANGE UP (ref 150–400)
POTASSIUM SERPL-MCNC: 4 MMOL/L — SIGNIFICANT CHANGE UP (ref 3.5–5.3)
POTASSIUM SERPL-SCNC: 4 MMOL/L — SIGNIFICANT CHANGE UP (ref 3.5–5.3)
PROT SERPL-MCNC: 8.1 GM/DL — SIGNIFICANT CHANGE UP (ref 6–8.3)
PROT UR-MCNC: 100 MG/DL
PROTHROM AB SERPL-ACNC: 10 SEC — SIGNIFICANT CHANGE UP (ref 10–12.9)
RBC # BLD: 4.91 M/UL — SIGNIFICANT CHANGE UP (ref 3.8–5.2)
RBC # FLD: 13.6 % — SIGNIFICANT CHANGE UP (ref 10.3–14.5)
SODIUM SERPL-SCNC: 134 MMOL/L — LOW (ref 135–145)
SP GR SPEC: 1.01 — SIGNIFICANT CHANGE UP (ref 1.01–1.02)
TROPONIN I SERPL-MCNC: <.015 NG/ML — SIGNIFICANT CHANGE UP (ref 0.01–0.04)
UROBILINOGEN FLD QL: NEGATIVE MG/DL — SIGNIFICANT CHANGE UP
WBC # BLD: 12.35 K/UL — HIGH (ref 3.8–10.5)
WBC # FLD AUTO: 12.35 K/UL — HIGH (ref 3.8–10.5)
WBC UR QL: SIGNIFICANT CHANGE UP

## 2019-02-14 PROCEDURE — 99285 EMERGENCY DEPT VISIT HI MDM: CPT

## 2019-02-14 PROCEDURE — 72125 CT NECK SPINE W/O DYE: CPT | Mod: 26

## 2019-02-14 PROCEDURE — 76377 3D RENDER W/INTRP POSTPROCES: CPT | Mod: 26

## 2019-02-14 PROCEDURE — 70450 CT HEAD/BRAIN W/O DYE: CPT | Mod: 26

## 2019-02-14 PROCEDURE — 71045 X-RAY EXAM CHEST 1 VIEW: CPT | Mod: 26

## 2019-02-14 RX ORDER — SODIUM CHLORIDE 9 MG/ML
1000 INJECTION INTRAMUSCULAR; INTRAVENOUS; SUBCUTANEOUS ONCE
Qty: 0 | Refills: 0 | Status: COMPLETED | OUTPATIENT
Start: 2019-02-14 | End: 2019-02-14

## 2019-02-14 RX ORDER — ACETAMINOPHEN 500 MG
650 TABLET ORAL ONCE
Qty: 0 | Refills: 0 | Status: COMPLETED | OUTPATIENT
Start: 2019-02-14 | End: 2019-02-14

## 2019-02-14 RX ADMIN — SODIUM CHLORIDE 1000 MILLILITER(S): 9 INJECTION INTRAMUSCULAR; INTRAVENOUS; SUBCUTANEOUS at 13:47

## 2019-02-14 RX ADMIN — Medication 650 MILLIGRAM(S): at 13:47

## 2019-02-14 NOTE — ED PROVIDER NOTE - PHYSICAL EXAMINATION
Gen: Alert, Well appearing. NAD  , + ccollar  Head: NC, AT, PERRL, EOMI, normal lids/conjunctiva   ENT: Bilateral TM WNL, normal hearing, patent oropharynx without erythema/exudate, uvula midline  Neck: supple, no tenderness/meningismus/JVD   Pulm: Bilateral clear BS, normal resp effort, no wheeze/stridor/retractions  CV: RRR, no M/R/G, +dist pulses   Abd: soft, NT/ND, +BS, no guarding/rebound tenderness  Mskel: no edema/erythema/cyanosis   Skin: no rash   Neuro: AAOx3, no sensory/motor deficits, CN 2-12 intact

## 2019-02-14 NOTE — ED ADULT NURSE NOTE - NSIMPLEMENTINTERV_GEN_ALL_ED
Implemented All Universal Safety Interventions:  [place] to call system. Call bell, personal items and telephone within reach. Instruct patient to call for assistance. Room bathroom lighting operational. Non-slip footwear when patient is off stretcher. Physically safe environment: no spills, clutter or unnecessary equipment. Stretcher in lowest position, wheels locked, appropriate side rails in place.
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
## 2019-02-14 NOTE — ED PROVIDER NOTE - CLINICAL SUMMARY MEDICAL DECISION MAKING FREE TEXT BOX
Lab values do not require emergent intervention. EKG wnl. CT scan demonstrates no acute pathology. d/w pt she would need to be admitted to hospital for syncope and pt refused. Offered pt to at least stay for repeat CE and pt refuses. States she feels fine. Pt is AOx3, coherent and ambulatory. The patient has decided to leave against medical advice.  We discussed all risks, benefits, and alternatives to the progression of treatment and the potential dangers of leaving including but not limited to permanent disability, injury, and death.  The patient was instructed that they are welcome to change their decision to leave against medical advice and return to the emergency department at any time and for any reason in order to allow us to render care.

## 2019-02-15 LAB
CULTURE RESULTS: SIGNIFICANT CHANGE UP
SPECIMEN SOURCE: SIGNIFICANT CHANGE UP

## 2019-02-20 ENCOUNTER — APPOINTMENT (OUTPATIENT)
Dept: ENDOCRINOLOGY | Facility: CLINIC | Age: 54
End: 2019-02-20
Payer: COMMERCIAL

## 2019-02-20 VITALS
DIASTOLIC BLOOD PRESSURE: 72 MMHG | HEART RATE: 81 BPM | OXYGEN SATURATION: 99 % | SYSTOLIC BLOOD PRESSURE: 130 MMHG | WEIGHT: 137 LBS | BODY MASS INDEX: 20.76 KG/M2 | HEIGHT: 68 IN

## 2019-02-20 PROCEDURE — 83036 HEMOGLOBIN GLYCOSYLATED A1C: CPT | Mod: QW

## 2019-02-20 PROCEDURE — 99215 OFFICE O/P EST HI 40 MIN: CPT

## 2019-02-26 ENCOUNTER — RX RENEWAL (OUTPATIENT)
Age: 54
End: 2019-02-26

## 2019-02-26 LAB
HBA1C MFR BLD HPLC: 10.3
T4 FREE SERPL-MCNC: 1.3 NG/DL
TSH SERPL-ACNC: 0.81 UIU/ML

## 2019-03-08 ENCOUNTER — APPOINTMENT (OUTPATIENT)
Dept: RHEUMATOLOGY | Facility: CLINIC | Age: 54
End: 2019-03-08

## 2019-05-01 ENCOUNTER — APPOINTMENT (OUTPATIENT)
Dept: RHEUMATOLOGY | Facility: CLINIC | Age: 54
End: 2019-05-01
Payer: COMMERCIAL

## 2019-05-01 VITALS
DIASTOLIC BLOOD PRESSURE: 70 MMHG | WEIGHT: 141 LBS | SYSTOLIC BLOOD PRESSURE: 130 MMHG | HEIGHT: 68 IN | BODY MASS INDEX: 21.37 KG/M2 | HEART RATE: 78 BPM

## 2019-05-01 PROCEDURE — 99214 OFFICE O/P EST MOD 30 MIN: CPT | Mod: 25

## 2019-05-01 PROCEDURE — 36415 COLL VENOUS BLD VENIPUNCTURE: CPT

## 2019-05-01 NOTE — ASSESSMENT
[FreeTextEntry1] : 54 yo F w/ RA (+RF, +CCP) - has been well controlled on Arava, but w/ several recent flares after pt ran out of Arava several times in past year.\par 1) Rheumatoid Arthritis (+RF, +CCP) - currently w/ moderate disease activity.  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  \par   - derm f/u.\par 3)  Osteopenia:  Bisphosphonate not indicated based on FRAX (5.2/0.7%)\par   - Cont calcium / vit D.\par   - Discussed importance of weight-bearing exercise.

## 2019-05-01 NOTE — PHYSICAL EXAM
[General Appearance - Alert] : alert [General Appearance - In No Acute Distress] : in no acute distress [Sclera] : the sclera and conjunctiva were normal [Outer Ear] : the ears and nose were normal in appearance [Hearing Threshold Finger Rub Not [place]] : hearing was normal [Oropharynx] : the oropharynx was normal [Neck Appearance] : the appearance of the neck was normal [Neck Cervical Mass (___cm)] : no neck mass was observed [Jugular Venous Distention Increased] : there was no jugular-venous distention [Thyroid Diffuse Enlargement] : the thyroid was not enlarged [Thyroid Nodule] : there were no palpable thyroid nodules [Respiration, Rhythm And Depth] : normal respiratory rhythm and effort [Exaggerated Use Of Accessory Muscles For Inspiration] : no accessory muscle use [Auscultation Breath Sounds / Voice Sounds] : lungs were clear to auscultation bilaterally [Heart Sounds] : normal S1 and S2 [Heart Rate And Rhythm] : heart rate was normal and rhythm regular [Heart Sounds Gallop] : no gallops [Murmurs] : no murmurs [Heart Sounds Pericardial Friction Rub] : no pericardial rub [Edema] : there was no peripheral edema [Bowel Sounds] : normal bowel sounds [Abdomen Soft] : soft [Abdomen Tenderness] : non-tender [Cervical Lymph Nodes Enlarged Posterior Bilaterally] : posterior cervical [Abdomen Mass (___ Cm)] : no abdominal mass palpated [Supraclavicular Lymph Nodes Enlarged Bilaterally] : supraclavicular [Cervical Lymph Nodes Enlarged Anterior Bilaterally] : anterior cervical [No CVA Tenderness] : no ~M costovertebral angle tenderness [No Spinal Tenderness] : no spinal tenderness [Abnormal Walk] : normal gait [Nail Clubbing] : no clubbing  or cyanosis of the fingernails [Skin Color & Pigmentation] : normal skin color and pigmentation [Skin Turgor] : normal skin turgor [] : no rash [No Focal Deficits] : no focal deficits [Oriented To Time, Place, And Person] : oriented to person, place, and time [Affect] : the affect was normal [Impaired Insight] : insight and judgment were intact [Mood] : the mood was normal [FreeTextEntry1] : (+)swelling in B/L 2nd -5th PIP's and B/L 2nd-5th MCP's though no tenderness; (+)multiple B/L Heberden's nodes
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
## 2019-05-01 NOTE — REVIEW OF SYSTEMS
[Joint Pain] : joint pain [Joint Swelling] : joint swelling [Joint Stiffness] : joint stiffness [Eye Pain] : no eye pain [Chills] : no chills [Fever] : no fever [Discharge From Eyes] : no purulent discharge from the eyes [Earache] : no earache [Eyesight Problems] : no eyesight problems [Nosebleeds] : no nosebleeds [Chest Pain] : no chest pain [Palpitations] : no palpitations [Cough] : no cough [SOB on Exertion] : no shortness of breath during exertion [Abdominal Pain] : no abdominal pain [Constipation] : no constipation [Diarrhea] : no diarrhea [Dysuria] : no dysuria [Limb Pain] : no limb pain [Limb Swelling] : no limb swelling [Pelvic Pain] : no pelvic pain [Proptosis] : no proptosis [Easy Bleeding] : no tendency for easy bleeding [Easy Bruising] : no tendency for easy bruising

## 2019-05-02 LAB
ALBUMIN SERPL ELPH-MCNC: 4.6 G/DL
ALP BLD-CCNC: 78 U/L
ALT SERPL-CCNC: 17 U/L
ANION GAP SERPL CALC-SCNC: 16 MMOL/L
AST SERPL-CCNC: 24 U/L
BASOPHILS # BLD AUTO: 0.08 K/UL
BASOPHILS NFR BLD AUTO: 0.7 %
BILIRUB SERPL-MCNC: 0.3 MG/DL
BUN SERPL-MCNC: 16 MG/DL
CALCIUM SERPL-MCNC: 9.8 MG/DL
CHLORIDE SERPL-SCNC: 99 MMOL/L
CO2 SERPL-SCNC: 23 MMOL/L
CREAT SERPL-MCNC: 0.63 MG/DL
CRP SERPL-MCNC: 0.43 MG/DL
EOSINOPHIL # BLD AUTO: 0.23 K/UL
EOSINOPHIL NFR BLD AUTO: 2 %
ERYTHROCYTE [SEDIMENTATION RATE] IN BLOOD BY WESTERGREN METHOD: 40 MM/HR
GLUCOSE SERPL-MCNC: 72 MG/DL
HCT VFR BLD CALC: 40.7 %
HGB BLD-MCNC: 12.6 G/DL
IMM GRANULOCYTES NFR BLD AUTO: 0.2 %
LYMPHOCYTES # BLD AUTO: 3.26 K/UL
LYMPHOCYTES NFR BLD AUTO: 29 %
MAN DIFF?: NORMAL
MCHC RBC-ENTMCNC: 27.6 PG
MCHC RBC-ENTMCNC: 31 GM/DL
MCV RBC AUTO: 89.3 FL
MONOCYTES # BLD AUTO: 0.5 K/UL
MONOCYTES NFR BLD AUTO: 4.5 %
NEUTROPHILS # BLD AUTO: 7.14 K/UL
NEUTROPHILS NFR BLD AUTO: 63.6 %
PLATELET # BLD AUTO: 346 K/UL
POTASSIUM SERPL-SCNC: 4.5 MMOL/L
PROT SERPL-MCNC: 7.8 G/DL
RBC # BLD: 4.56 M/UL
RBC # FLD: 13.8 %
SODIUM SERPL-SCNC: 138 MMOL/L
WBC # FLD AUTO: 11.23 K/UL

## 2019-05-07 NOTE — ED ADULT NURSE NOTE - CAS EDN DISCHARGE INTERVENTIONS
Patient returns to the ED with concerns for worse abdominal pain, diarrhea and vomiting. She states diarrhea had blood in it this morning. Oneyda Field RN   IV discontinued, cath removed intact

## 2019-05-10 ENCOUNTER — RX RENEWAL (OUTPATIENT)
Age: 54
End: 2019-05-10

## 2019-05-13 ENCOUNTER — RX RENEWAL (OUTPATIENT)
Age: 54
End: 2019-05-13

## 2019-06-20 ENCOUNTER — APPOINTMENT (OUTPATIENT)
Dept: ENDOCRINOLOGY | Facility: CLINIC | Age: 54
End: 2019-06-20
Payer: COMMERCIAL

## 2019-06-20 VITALS
SYSTOLIC BLOOD PRESSURE: 160 MMHG | HEIGHT: 68 IN | DIASTOLIC BLOOD PRESSURE: 90 MMHG | OXYGEN SATURATION: 98 % | HEART RATE: 78 BPM | WEIGHT: 138 LBS | BODY MASS INDEX: 20.92 KG/M2

## 2019-06-20 LAB
GLUCOSE BLDC GLUCOMTR-MCNC: 77
HBA1C MFR BLD HPLC: 6.7

## 2019-06-20 PROCEDURE — 99214 OFFICE O/P EST MOD 30 MIN: CPT | Mod: 25

## 2019-06-20 PROCEDURE — 82962 GLUCOSE BLOOD TEST: CPT

## 2019-06-20 PROCEDURE — 83036 HEMOGLOBIN GLYCOSYLATED A1C: CPT | Mod: QW

## 2019-06-25 LAB
APPEARANCE: CLEAR
BACTERIA UR CULT: NORMAL
BACTERIA: NEGATIVE
BILIRUBIN URINE: NEGATIVE
BLOOD URINE: NEGATIVE
COLOR: NORMAL
GLUCOSE QUALITATIVE U: NEGATIVE
HYALINE CASTS: 0 /LPF
KETONES URINE: NEGATIVE
LEUKOCYTE ESTERASE URINE: NEGATIVE
MICROSCOPIC-UA: NORMAL
NITRITE URINE: NEGATIVE
PH URINE: 5.5
PROTEIN URINE: NEGATIVE
RED BLOOD CELLS URINE: 0 /HPF
SPECIFIC GRAVITY URINE: 1.02
SQUAMOUS EPITHELIAL CELLS: 1 /HPF
UROBILINOGEN URINE: NORMAL
WHITE BLOOD CELLS URINE: 0 /HPF

## 2019-07-22 ENCOUNTER — RX RENEWAL (OUTPATIENT)
Age: 54
End: 2019-07-22

## 2019-08-02 ENCOUNTER — APPOINTMENT (OUTPATIENT)
Dept: RHEUMATOLOGY | Facility: CLINIC | Age: 54
End: 2019-08-02
Payer: COMMERCIAL

## 2019-08-02 VITALS
WEIGHT: 139 LBS | SYSTOLIC BLOOD PRESSURE: 190 MMHG | BODY MASS INDEX: 21.07 KG/M2 | DIASTOLIC BLOOD PRESSURE: 90 MMHG | HEART RATE: 78 BPM | HEIGHT: 68 IN

## 2019-08-02 PROCEDURE — 36415 COLL VENOUS BLD VENIPUNCTURE: CPT

## 2019-08-02 PROCEDURE — 99214 OFFICE O/P EST MOD 30 MIN: CPT | Mod: 25

## 2019-08-02 NOTE — ASSESSMENT
[FreeTextEntry1] : 54 yo F w/ RA (+RF, +CCP) - has been well controlled on Arava, but w/ several recent flares after pt ran out of Arava several times in past year.\par 1) Rheumatoid Arthritis (+RF, +CCP) - currently well controlled.  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  \par   - derm f/u.\par 3)  Osteopenia:  Bisphosphonate not indicated based on FRAX (5.2/0.7%)\par   - Cont calcium / vit D.\par   - weight-bearing exercise.

## 2019-08-02 NOTE — PHYSICAL EXAM
[General Appearance - Alert] : alert [General Appearance - In No Acute Distress] : in no acute distress [Sclera] : the sclera and conjunctiva were normal [Outer Ear] : the ears and nose were normal in appearance [Hearing Threshold Finger Rub Not [place]] : hearing was normal [Oropharynx] : the oropharynx was normal [Neck Appearance] : the appearance of the neck was normal [Jugular Venous Distention Increased] : there was no jugular-venous distention [Neck Cervical Mass (___cm)] : no neck mass was observed [Thyroid Diffuse Enlargement] : the thyroid was not enlarged [Thyroid Nodule] : there were no palpable thyroid nodules [Respiration, Rhythm And Depth] : normal respiratory rhythm and effort [Exaggerated Use Of Accessory Muscles For Inspiration] : no accessory muscle use [Auscultation Breath Sounds / Voice Sounds] : lungs were clear to auscultation bilaterally [Heart Rate And Rhythm] : heart rate was normal and rhythm regular [Heart Sounds] : normal S1 and S2 [Murmurs] : no murmurs [Heart Sounds Gallop] : no gallops [Heart Sounds Pericardial Friction Rub] : no pericardial rub [Edema] : there was no peripheral edema [Bowel Sounds] : normal bowel sounds [Abdomen Soft] : soft [Abdomen Tenderness] : non-tender [Abdomen Mass (___ Cm)] : no abdominal mass palpated [Cervical Lymph Nodes Enlarged Posterior Bilaterally] : posterior cervical [Cervical Lymph Nodes Enlarged Anterior Bilaterally] : anterior cervical [Supraclavicular Lymph Nodes Enlarged Bilaterally] : supraclavicular [No CVA Tenderness] : no ~M costovertebral angle tenderness [No Spinal Tenderness] : no spinal tenderness [Abnormal Walk] : normal gait [Nail Clubbing] : no clubbing  or cyanosis of the fingernails [Skin Color & Pigmentation] : normal skin color and pigmentation [Skin Turgor] : normal skin turgor [] : no rash [No Focal Deficits] : no focal deficits [Oriented To Time, Place, And Person] : oriented to person, place, and time [Affect] : the affect was normal [Impaired Insight] : insight and judgment were intact [Mood] : the mood was normal [FreeTextEntry1] : (+)swelling in B/L 2nd -5th PIP's and B/L 2nd-5th MCP's though no tenderness; (+)multiple B/L Heberden's nodes

## 2019-08-02 NOTE — REVIEW OF SYSTEMS
[Joint Pain] : joint pain [Joint Swelling] : joint swelling [Joint Stiffness] : joint stiffness [Fever] : no fever [Chills] : no chills [Eye Pain] : no eye pain [Eyesight Problems] : no eyesight problems [Discharge From Eyes] : no purulent discharge from the eyes [Earache] : no earache [Nosebleeds] : no nosebleeds [Chest Pain] : no chest pain [Palpitations] : no palpitations [Cough] : no cough [SOB on Exertion] : no shortness of breath during exertion [Abdominal Pain] : no abdominal pain [Constipation] : no constipation [Diarrhea] : no diarrhea [Dysuria] : no dysuria [Pelvic Pain] : no pelvic pain [Limb Pain] : no limb pain [Limb Swelling] : no limb swelling [Proptosis] : no proptosis [Easy Bleeding] : no tendency for easy bleeding [Easy Bruising] : no tendency for easy bruising

## 2019-08-05 LAB
ALBUMIN SERPL ELPH-MCNC: 4.6 G/DL
ALP BLD-CCNC: 69 U/L
ALT SERPL-CCNC: 18 U/L
ANION GAP SERPL CALC-SCNC: 12 MMOL/L
AST SERPL-CCNC: 17 U/L
BASOPHILS # BLD AUTO: 0.06 K/UL
BASOPHILS NFR BLD AUTO: 0.6 %
BILIRUB SERPL-MCNC: 0.2 MG/DL
BUN SERPL-MCNC: 15 MG/DL
CALCIUM SERPL-MCNC: 9.5 MG/DL
CHLORIDE SERPL-SCNC: 99 MMOL/L
CO2 SERPL-SCNC: 26 MMOL/L
CREAT SERPL-MCNC: 0.62 MG/DL
CRP SERPL-MCNC: 0.29 MG/DL
EOSINOPHIL # BLD AUTO: 0.18 K/UL
EOSINOPHIL NFR BLD AUTO: 1.9 %
ERYTHROCYTE [SEDIMENTATION RATE] IN BLOOD BY WESTERGREN METHOD: 21 MM/HR
GLUCOSE SERPL-MCNC: 132 MG/DL
HCT VFR BLD CALC: 37 %
HGB BLD-MCNC: 11.8 G/DL
IMM GRANULOCYTES NFR BLD AUTO: 0.3 %
LYMPHOCYTES # BLD AUTO: 2.16 K/UL
LYMPHOCYTES NFR BLD AUTO: 22.7 %
MAN DIFF?: NORMAL
MCHC RBC-ENTMCNC: 28.2 PG
MCHC RBC-ENTMCNC: 31.9 GM/DL
MCV RBC AUTO: 88.5 FL
MONOCYTES # BLD AUTO: 0.47 K/UL
MONOCYTES NFR BLD AUTO: 4.9 %
NEUTROPHILS # BLD AUTO: 6.6 K/UL
NEUTROPHILS NFR BLD AUTO: 69.6 %
PLATELET # BLD AUTO: 269 K/UL
POTASSIUM SERPL-SCNC: 4.5 MMOL/L
PROT SERPL-MCNC: 7.3 G/DL
RBC # BLD: 4.18 M/UL
RBC # FLD: 14.1 %
SODIUM SERPL-SCNC: 137 MMOL/L
WBC # FLD AUTO: 9.5 K/UL

## 2019-08-26 ENCOUNTER — RX RENEWAL (OUTPATIENT)
Age: 54
End: 2019-08-26

## 2019-09-18 ENCOUNTER — APPOINTMENT (OUTPATIENT)
Dept: ENDOCRINOLOGY | Facility: CLINIC | Age: 54
End: 2019-09-18
Payer: COMMERCIAL

## 2019-09-18 VITALS
BODY MASS INDEX: 20.76 KG/M2 | HEART RATE: 84 BPM | WEIGHT: 137 LBS | DIASTOLIC BLOOD PRESSURE: 80 MMHG | OXYGEN SATURATION: 98 % | SYSTOLIC BLOOD PRESSURE: 150 MMHG | HEIGHT: 68 IN

## 2019-09-18 PROCEDURE — 83036 HEMOGLOBIN GLYCOSYLATED A1C: CPT | Mod: QW

## 2019-09-18 PROCEDURE — 99214 OFFICE O/P EST MOD 30 MIN: CPT

## 2019-09-26 LAB
CHOLEST SERPL-MCNC: 176 MG/DL
CHOLEST/HDLC SERPL: 3.2 RATIO
CREAT SPEC-SCNC: 342 MG/DL
HBA1C MFR BLD HPLC: 6.5
HDLC SERPL-MCNC: 55 MG/DL
LDLC SERPL CALC-MCNC: 85 MG/DL
MICROALBUMIN 24H UR DL<=1MG/L-MCNC: 17.1 MG/DL
MICROALBUMIN/CREAT 24H UR-RTO: 50 MG/G
T4 FREE SERPL-MCNC: 1.1 NG/DL
TRIGL SERPL-MCNC: 182 MG/DL
TSH SERPL-ACNC: 0.79 UIU/ML

## 2019-11-07 ENCOUNTER — MEDICATION RENEWAL (OUTPATIENT)
Age: 54
End: 2019-11-07

## 2019-11-15 ENCOUNTER — APPOINTMENT (OUTPATIENT)
Dept: RHEUMATOLOGY | Facility: CLINIC | Age: 54
End: 2019-11-15
Payer: COMMERCIAL

## 2019-11-15 VITALS
HEART RATE: 86 BPM | WEIGHT: 137 LBS | DIASTOLIC BLOOD PRESSURE: 90 MMHG | SYSTOLIC BLOOD PRESSURE: 140 MMHG | HEIGHT: 68 IN | BODY MASS INDEX: 20.76 KG/M2

## 2019-11-15 PROCEDURE — G0008: CPT

## 2019-11-15 PROCEDURE — 90686 IIV4 VACC NO PRSV 0.5 ML IM: CPT

## 2019-11-15 PROCEDURE — 20600 DRAIN/INJ JOINT/BURSA W/O US: CPT | Mod: LT

## 2019-11-15 PROCEDURE — 99214 OFFICE O/P EST MOD 30 MIN: CPT | Mod: 25

## 2019-11-15 NOTE — PROCEDURE
[Today's Date:] : Date: [unfilled] [Arthrocentesis] : arthrocentesis was performed [Patient] : the patient [Risks] : risks [Alternatives] : alternatives [Benefits] : benefits [Consent Obtained] : written consent was obtained prior to the procedure and is detailed in the patient's record [Therapeutic] : therapeutic [#1 Site: ______] : #1 site identified in the [unfilled] [Ethyl Chloride] : ethyl chloride [___ml of fluid] : [unfilled] ml of fluid was aspirated [Tolerated Well] : the patient tolerated the procedure well [No Complications] : there were no complications [Instructions Given] : handouts/patient instructions were given to patient [Patient Instructed to Call] : patient was instructed to call if redness at site, a decrease in range of motion or an increase in pain is noted after procedure. [de-identified] : 18 gauge 1.5 inch

## 2019-11-15 NOTE — HISTORY OF PRESENT ILLNESS
[CCP] : CCP [RF] : RF [Currently Experiencing] : currently experiencing [Joint Swelling] : joint swelling [Morning Stiffness] : morning stiffness [Joint Pain] : joint pain [Rash] : no rash [Ocular Symptoms] : no ocular symptoms [Chest Pain] : no chest pain [Dysphonia] : no dysphonia [Shortness of Breath] : no shortness of breath [FreeTextEntry1] : Continues to feel fine.  No joint pain/swelling/AM stiffness.  No F/C, no CP/SOB/cough, no rashes.  Only current complaint is pain due to a cyst over her left 2nd PIP joint. [Fatigue] : no fatigue [FreeTextEntry3] : 2014 [FreeTextEntry6] : pain and swelling in hands/wrist/elbow and knees. no relieved with advil. no chest pain, no shortness of breath.  [FreeTextEntry4] : methotrexate ([id number])  current: arava

## 2019-11-15 NOTE — ASSESSMENT
[FreeTextEntry1] : 54 yo F with:\par 1) Rheumatoid Arthritis (+RF, +CCP) - well controlled on Arava.  Also w/ concurrent OA, though currently asymptomatic\par   - Cont Arava 20mg daily.\par   - hepatitis panel negative 5/18.\par   - Check labs.\par   - Administered flu vaccine. 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  \par   - derm f/u.\par 3)  Osteopenia:  Bisphosphonate not indicated based on FRAX (5.2/0.7%)\par   - Cont calcium / vit D.\par   - weight-bearing exercise.\par 4)  Mucous cyst over left 2nd PIP:\par   - Attempted aspiration but no fluid was aspirated\par   - Refer to ortho/hand.

## 2019-11-15 NOTE — PHYSICAL EXAM
[General Appearance - Alert] : alert [General Appearance - In No Acute Distress] : in no acute distress [Outer Ear] : the ears and nose were normal in appearance [Sclera] : the sclera and conjunctiva were normal [Hearing Threshold Finger Rub Not [place]] : hearing was normal [Oropharynx] : the oropharynx was normal [Neck Appearance] : the appearance of the neck was normal [Neck Cervical Mass (___cm)] : no neck mass was observed [Jugular Venous Distention Increased] : there was no jugular-venous distention [Thyroid Nodule] : there were no palpable thyroid nodules [Thyroid Diffuse Enlargement] : the thyroid was not enlarged [Exaggerated Use Of Accessory Muscles For Inspiration] : no accessory muscle use [Respiration, Rhythm And Depth] : normal respiratory rhythm and effort [Auscultation Breath Sounds / Voice Sounds] : lungs were clear to auscultation bilaterally [Heart Rate And Rhythm] : heart rate was normal and rhythm regular [Heart Sounds] : normal S1 and S2 [Heart Sounds Gallop] : no gallops [Murmurs] : no murmurs [Edema] : there was no peripheral edema [Heart Sounds Pericardial Friction Rub] : no pericardial rub [Bowel Sounds] : normal bowel sounds [Abdomen Soft] : soft [Abdomen Mass (___ Cm)] : no abdominal mass palpated [Abdomen Tenderness] : non-tender [Cervical Lymph Nodes Enlarged Posterior Bilaterally] : posterior cervical [Cervical Lymph Nodes Enlarged Anterior Bilaterally] : anterior cervical [No CVA Tenderness] : no ~M costovertebral angle tenderness [Supraclavicular Lymph Nodes Enlarged Bilaterally] : supraclavicular [No Spinal Tenderness] : no spinal tenderness [Nail Clubbing] : no clubbing  or cyanosis of the fingernails [Abnormal Walk] : normal gait [Skin Color & Pigmentation] : normal skin color and pigmentation [Skin Turgor] : normal skin turgor [] : no rash [No Focal Deficits] : no focal deficits [Oriented To Time, Place, And Person] : oriented to person, place, and time [Impaired Insight] : insight and judgment were intact [Affect] : the affect was normal [Mood] : the mood was normal [FreeTextEntry1] : (+)multiple B/L Heberden's nodes;  mucoid cyst overlying left 2nd PIP

## 2019-11-15 NOTE — REVIEW OF SYSTEMS
[Joint Pain] : joint pain [Joint Swelling] : joint swelling [Joint Stiffness] : joint stiffness [Chills] : no chills [Fever] : no fever [Eye Pain] : no eye pain [Eyesight Problems] : no eyesight problems [Discharge From Eyes] : no purulent discharge from the eyes [Earache] : no earache [Chest Pain] : no chest pain [Nosebleeds] : no nosebleeds [Palpitations] : no palpitations [Cough] : no cough [SOB on Exertion] : no shortness of breath during exertion [Abdominal Pain] : no abdominal pain [Constipation] : no constipation [Dysuria] : no dysuria [Diarrhea] : no diarrhea [Pelvic Pain] : no pelvic pain [Limb Pain] : no limb pain [Limb Swelling] : no limb swelling [Proptosis] : no proptosis [Easy Bruising] : no tendency for easy bruising [Easy Bleeding] : no tendency for easy bleeding

## 2019-11-22 LAB
ALBUMIN SERPL ELPH-MCNC: 4.7 G/DL
ALP BLD-CCNC: 79 U/L
ALT SERPL-CCNC: 11 U/L
ANION GAP SERPL CALC-SCNC: 12 MMOL/L
AST SERPL-CCNC: 12 U/L
BASOPHILS # BLD AUTO: 0.1 K/UL
BASOPHILS NFR BLD AUTO: 1.2 %
BILIRUB SERPL-MCNC: 0.3 MG/DL
BUN SERPL-MCNC: 10 MG/DL
CALCIUM SERPL-MCNC: 9.9 MG/DL
CHLORIDE SERPL-SCNC: 98 MMOL/L
CO2 SERPL-SCNC: 27 MMOL/L
CREAT SERPL-MCNC: 0.58 MG/DL
CRP SERPL-MCNC: 0.41 MG/DL
EOSINOPHIL # BLD AUTO: 0.12 K/UL
EOSINOPHIL NFR BLD AUTO: 1.4 %
ERYTHROCYTE [SEDIMENTATION RATE] IN BLOOD BY WESTERGREN METHOD: 43 MM/HR
GLUCOSE SERPL-MCNC: 222 MG/DL
HCT VFR BLD CALC: 38 %
HGB BLD-MCNC: 12.3 G/DL
IMM GRANULOCYTES NFR BLD AUTO: 0.2 %
LYMPHOCYTES # BLD AUTO: 2.07 K/UL
LYMPHOCYTES NFR BLD AUTO: 23.9 %
MAN DIFF?: NORMAL
MCHC RBC-ENTMCNC: 27.7 PG
MCHC RBC-ENTMCNC: 32.4 GM/DL
MCV RBC AUTO: 85.6 FL
MONOCYTES # BLD AUTO: 0.4 K/UL
MONOCYTES NFR BLD AUTO: 4.6 %
NEUTROPHILS # BLD AUTO: 5.95 K/UL
NEUTROPHILS NFR BLD AUTO: 68.7 %
PLATELET # BLD AUTO: 284 K/UL
POTASSIUM SERPL-SCNC: 5 MMOL/L
PROT SERPL-MCNC: 7.2 G/DL
RBC # BLD: 4.44 M/UL
RBC # FLD: 13.2 %
SODIUM SERPL-SCNC: 137 MMOL/L
WBC # FLD AUTO: 8.66 K/UL

## 2020-02-12 ENCOUNTER — APPOINTMENT (OUTPATIENT)
Dept: ENDOCRINOLOGY | Facility: CLINIC | Age: 55
End: 2020-02-12
Payer: COMMERCIAL

## 2020-02-12 VITALS
SYSTOLIC BLOOD PRESSURE: 130 MMHG | OXYGEN SATURATION: 99 % | HEART RATE: 84 BPM | DIASTOLIC BLOOD PRESSURE: 82 MMHG | HEIGHT: 68 IN | BODY MASS INDEX: 20.31 KG/M2 | WEIGHT: 134 LBS

## 2020-02-12 PROCEDURE — 95250 CONT GLUC MNTR PHYS/QHP EQP: CPT

## 2020-02-12 PROCEDURE — 95251 CONT GLUC MNTR ANALYSIS I&R: CPT

## 2020-02-12 PROCEDURE — 83036 HEMOGLOBIN GLYCOSYLATED A1C: CPT | Mod: QW

## 2020-02-12 PROCEDURE — 99215 OFFICE O/P EST HI 40 MIN: CPT

## 2020-02-12 NOTE — HISTORY OF PRESENT ILLNESS
[FreeTextEntry1] : This is a 54 year old woman with Type 2 DM, MNG, and subclinical hyperthyroidism s/p CIFUENTES tx  here for f/u for DM2 and thyroid disease.\par \par DM: She is on Metformin 1000 mg BID.  Hba1c 6.5 last visit. It is 9.2 today.\par \par She checks FS's qam and they run b/w .\par \par She eats breakfast and a late dinner. She drinks mainly water. She eats bkfst around 10am. Sometimes she eats a sandwich at work. Sometimes she skips lunch. Dinner is usually her biggest meal. She drinks mainly water. Snack is usually a yogurt, trail mix, or fruit. Rarely she'll have a treat of a cake or cookie.\par \par She does a lot of walking at work. 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 She has to f/u. No sx of neuropathy. \par \par Microvascular complications: no known retinopathy, nephropathy or neuropathy\par Macrovascular complications: + CAD s/p MI and PCI, - CVA, - PVD.\par \par She has hypertension and hyperlipidemia\par \par The patient  also has Subclinical Hyperthyroidism and a Multinodular Goiter. She has TSH-R and TSI antibodies negative.\par \par Thyroid NM scan done 12/2017 showed hot nodule in the R lobe. She is s/p CIFUENTES tx 1/2018 w/ 26.2 mCI CIFUENTES.\par \par She has been feeling overall ok. No weight gain. No tremors or palpitations. No change in BM's. No hair or skin changes. No muscle weakness.  No temp intolerance. No mood changes.\par \par No obstructive sx.\par \par

## 2020-02-12 NOTE — ASSESSMENT
[FreeTextEntry1] : This is a 54 y.o. woman w/:\par  1. Type 2 DM uncontrolled: A1c 9.2, up from 6.5 last visit. She is on Metformin 1g PO BID. Reported FS's win goal, which doesn’t correlate w/ elevated Hba1c. She also reports diet and activity level  has not changed. Will place syed today for further eval. JES MCBRIDE was  counseled on lifestyle modification through diet and exercise. Will recheck Hba1c in 3-4 mths.\par Diabetic foot exam up-to-date (2/2020). Overdue for ophthalmology appointment, last one 7/2017. Microalbumin mildly positive 9/2019. Cont. ACEI. Recheck today.\par 2. Hypertension: BP w/in goal. Cont. enalapril  20mg PO daily.\par 3. Hypercholesterolemia:LDL 85 9/2019, she is on a statin. \par 4. Subclinical Hyperthyroidism/ Multinodular Goiter:  toxic Nodular Goiter. She is s/p CIFUENTES tx 1/2018. TFT's WNL 9/2019. Will recheck today. Reorder thyroid US.\par 5. Osteopenia: DEXA done 1/2019. She was advised to cont. Vit D and Ca supplementation. Weight bearing exercise.\par \par RTC 4 mths

## 2020-02-14 ENCOUNTER — APPOINTMENT (OUTPATIENT)
Dept: RHEUMATOLOGY | Facility: CLINIC | Age: 55
End: 2020-02-14

## 2020-02-20 LAB
CREAT SPEC-SCNC: 146 MG/DL
HBA1C MFR BLD HPLC: 9.2
MICROALBUMIN 24H UR DL<=1MG/L-MCNC: 18.8 MG/DL
MICROALBUMIN/CREAT 24H UR-RTO: 128 MG/G
T4 FREE SERPL-MCNC: 1.1 NG/DL
TSH SERPL-ACNC: 0.57 UIU/ML

## 2020-02-25 ENCOUNTER — RX RENEWAL (OUTPATIENT)
Age: 55
End: 2020-02-25

## 2020-03-09 RX ORDER — GLIPIZIDE 5 MG/1
5 TABLET, EXTENDED RELEASE ORAL
Qty: 180 | Refills: 1 | Status: DISCONTINUED | COMMUNITY
Start: 2017-05-23 | End: 2020-03-09

## 2020-03-18 ENCOUNTER — OUTPATIENT (OUTPATIENT)
Dept: OUTPATIENT SERVICES | Facility: HOSPITAL | Age: 55
LOS: 1 days | End: 2020-03-18
Payer: COMMERCIAL

## 2020-03-18 ENCOUNTER — RESULT REVIEW (OUTPATIENT)
Age: 55
End: 2020-03-18

## 2020-03-18 ENCOUNTER — APPOINTMENT (OUTPATIENT)
Dept: ULTRASOUND IMAGING | Facility: CLINIC | Age: 55
End: 2020-03-18
Payer: COMMERCIAL

## 2020-03-18 DIAGNOSIS — Z00.8 ENCOUNTER FOR OTHER GENERAL EXAMINATION: ICD-10-CM

## 2020-03-18 DIAGNOSIS — Z95.5 PRESENCE OF CORONARY ANGIOPLASTY IMPLANT AND GRAFT: Chronic | ICD-10-CM

## 2020-03-18 PROCEDURE — 76536 US EXAM OF HEAD AND NECK: CPT | Mod: 26

## 2020-03-18 PROCEDURE — 76536 US EXAM OF HEAD AND NECK: CPT

## 2020-05-11 ENCOUNTER — RX RENEWAL (OUTPATIENT)
Age: 55
End: 2020-05-11

## 2020-05-21 ENCOUNTER — APPOINTMENT (OUTPATIENT)
Dept: CARDIOLOGY | Facility: CLINIC | Age: 55
End: 2020-05-21
Payer: COMMERCIAL

## 2020-05-21 ENCOUNTER — NON-APPOINTMENT (OUTPATIENT)
Age: 55
End: 2020-05-21

## 2020-05-21 VITALS
HEIGHT: 68 IN | BODY MASS INDEX: 19.55 KG/M2 | HEART RATE: 83 BPM | OXYGEN SATURATION: 97 % | WEIGHT: 129 LBS | DIASTOLIC BLOOD PRESSURE: 82 MMHG | SYSTOLIC BLOOD PRESSURE: 136 MMHG

## 2020-05-21 PROCEDURE — 99205 OFFICE O/P NEW HI 60 MIN: CPT

## 2020-05-21 PROCEDURE — 93000 ELECTROCARDIOGRAM COMPLETE: CPT

## 2020-05-21 RX ORDER — DICLOFENAC SODIUM 100 MG/1
100 TABLET, FILM COATED, EXTENDED RELEASE ORAL DAILY
Qty: 90 | Refills: 1 | Status: DISCONTINUED | COMMUNITY
Start: 2018-05-07 | End: 2020-05-21

## 2020-05-21 RX ORDER — PREDNISONE 10 MG/1
10 TABLET ORAL
Qty: 30 | Refills: 1 | Status: DISCONTINUED | COMMUNITY
Start: 2018-12-14 | End: 2020-05-21

## 2020-05-21 NOTE — DISCUSSION/SUMMARY
[FreeTextEntry1] : She will have a pharmacological nuclear stress test and see me afterwards.\par Because of her longstanding diabetes it is conceivable that she has multivessel coronary disease

## 2020-05-21 NOTE — REASON FOR VISIT
[Follow-Up - From Hospitalization] : follow-up of a recent hospitalization for [Coronary Artery Disease] : coronary artery disease [Heart Failure] : congestive heart failure [Hyperlipidemia] : hyperlipidemia [Hypertension] : hypertension [FreeTextEntry1] : I saw this 55-year-old woman in cardiac consultation on  05/21/20\par 9 years ago she presented with an anterior wall myocardial infarction and had thrombectomy and stenting performed by me.\par She has a long history of insulin-dependent diabetes, hypertension and hyperlipidemia.\par After her MI she had an EF in the high 30s with mild to moderate mitral regurg.\par I have not seen her since her MI\par She has not seen a cardiologist since then\par Last week she was taken by ambulance for acute onset shortness of breath to [place] and ruled out, had a CT to rule out PE, had a mildly elevated BNP, and was discharged after 2 days.\par An echo performed in the hospital was unchanged

## 2020-05-21 NOTE — HISTORY OF PRESENT ILLNESS
[FreeTextEntry1] : She has no chest pain\par She has no more shortness of breath\par She has no palpitations\par She has no syncope\par She is neurologically intact\par She has no edema\par She has no skin rashes\par

## 2020-05-21 NOTE — CARDIOLOGY SUMMARY
[LVEF ___%] : LVEF [unfilled]% [Moderate] : moderate LV dysfunction [None] : no pulmonary hypertension [Mild] : mild mitral regurgitation [___] : [unfilled]

## 2020-05-27 ENCOUNTER — APPOINTMENT (OUTPATIENT)
Dept: RHEUMATOLOGY | Facility: CLINIC | Age: 55
End: 2020-05-27
Payer: COMMERCIAL

## 2020-05-27 PROCEDURE — 99214 OFFICE O/P EST MOD 30 MIN: CPT | Mod: 95

## 2020-05-27 RX ORDER — ASPIRIN ENTERIC COATED TABLETS 81 MG 81 MG/1
81 TABLET, DELAYED RELEASE ORAL DAILY
Refills: 0 | Status: ACTIVE | COMMUNITY
Start: 2020-05-27

## 2020-05-27 NOTE — HISTORY OF PRESENT ILLNESS
[Home] : at home, [unfilled] , at the time of the visit. [Medical Office: ([facility])___] : at the medical office located in  [Verbal consent obtained from patient] : the patient, [unfilled] [CCP] : CCP [RF] : RF [Currently Experiencing] : currently experiencing [Joint Swelling] : joint swelling [Joint Pain] : joint pain [Morning Stiffness] : morning stiffness [Rash] : no rash [Ocular Symptoms] : no ocular symptoms [Chest Pain] : no chest pain [Shortness of Breath] : no shortness of breath [Dysphonia] : no dysphonia [Fatigue] : no fatigue [FreeTextEntry1] : Pt reports that she was hospitalized 2 weeks ago for PNA.  She was also ruled out for an MI, but is being sent for further cardiac w/u as an outpatient.  No joint pain/swelling/AM stiffness.  No F/C, no CP/SOB/cough, no rashes.  Still w/ a cyst over her left 2nd PIP joint, unchanged.  Has not been able to see hand surgeon yet due to current COVID-19 pandemic.  No new complaints. [FreeTextEntry3] : 2014 [FreeTextEntry4] : methotrexate (3100-0651)  current: arava [FreeTextEntry6] : pain and swelling in hands/wrist/elbow and knees. no relieved with advil. no chest pain, no shortness of breath.

## 2020-05-27 NOTE — PHYSICAL EXAM
[General Appearance - Alert] : alert [General Appearance - In No Acute Distress] : in no acute distress [Sclera] : the sclera and conjunctiva were normal [FreeTextEntry1] : rash on feet [Oriented To Time, Place, And Person] : oriented to person, place, and time [Impaired Insight] : insight and judgment were intact [Affect] : the affect was normal

## 2020-05-27 NOTE — REVIEW OF SYSTEMS
[Fever] : no fever [Chills] : no chills [Eye Pain] : no eye pain [Discharge From Eyes] : no purulent discharge from the eyes [Eyesight Problems] : no eyesight problems [Earache] : no earache [Nosebleeds] : no nosebleeds [Palpitations] : no palpitations [Chest Pain] : no chest pain [SOB on Exertion] : no shortness of breath during exertion [Cough] : no cough [Constipation] : no constipation [Abdominal Pain] : no abdominal pain [Diarrhea] : no diarrhea [Dysuria] : no dysuria [Pelvic Pain] : no pelvic pain [Joint Pain] : joint pain [Joint Swelling] : joint swelling [Joint Stiffness] : joint stiffness [Limb Pain] : no limb pain [Limb Swelling] : no limb swelling [Proptosis] : no proptosis [Easy Bleeding] : no tendency for easy bleeding [Easy Bruising] : no tendency for easy bruising

## 2020-05-27 NOTE — ASSESSMENT
[FreeTextEntry1] : 52 yo F with:\par 1) Rheumatoid Arthritis (+RF, +CCP) - well controlled on Arava.  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  \par   - derm f/u.\par 3)  Osteopenia:  Bisphosphonate not indicated based on FRAX (5.2/0.7%)\par   - Cont calcium / vit D.\par   - weight-bearing exercise.\par 4)  Mucous cyst over left 2nd PIP:\par   - Previously attempted aspiration but no fluid was aspirated\par   - Awaiting ortho/hand eval.

## 2020-06-03 ENCOUNTER — APPOINTMENT (OUTPATIENT)
Dept: CARDIOLOGY | Facility: CLINIC | Age: 55
End: 2020-06-03
Payer: COMMERCIAL

## 2020-06-03 PROCEDURE — 93015 CV STRESS TEST SUPVJ I&R: CPT

## 2020-06-03 PROCEDURE — 78452 HT MUSCLE IMAGE SPECT MULT: CPT

## 2020-06-03 PROCEDURE — A9500: CPT

## 2020-06-19 ENCOUNTER — APPOINTMENT (OUTPATIENT)
Dept: ENDOCRINOLOGY | Facility: CLINIC | Age: 55
End: 2020-06-19
Payer: COMMERCIAL

## 2020-06-19 VITALS
OXYGEN SATURATION: 97 % | SYSTOLIC BLOOD PRESSURE: 142 MMHG | BODY MASS INDEX: 19.55 KG/M2 | TEMPERATURE: 98.1 F | DIASTOLIC BLOOD PRESSURE: 70 MMHG | HEIGHT: 68 IN | WEIGHT: 129 LBS | HEART RATE: 82 BPM

## 2020-06-19 LAB — GLUCOSE BLDC GLUCOMTR-MCNC: 382

## 2020-06-19 PROCEDURE — 82962 GLUCOSE BLOOD TEST: CPT

## 2020-06-19 PROCEDURE — 99215 OFFICE O/P EST HI 40 MIN: CPT | Mod: 25

## 2020-06-25 ENCOUNTER — APPOINTMENT (OUTPATIENT)
Dept: INTERNAL MEDICINE | Facility: CLINIC | Age: 55
End: 2020-06-25
Payer: COMMERCIAL

## 2020-06-25 VITALS
TEMPERATURE: 97.3 F | BODY MASS INDEX: 20.46 KG/M2 | HEIGHT: 68 IN | WEIGHT: 135 LBS | OXYGEN SATURATION: 97 % | HEART RATE: 76 BPM | SYSTOLIC BLOOD PRESSURE: 164 MMHG | DIASTOLIC BLOOD PRESSURE: 89 MMHG

## 2020-06-25 PROCEDURE — G0442 ANNUAL ALCOHOL SCREEN 15 MIN: CPT

## 2020-06-25 PROCEDURE — 99215 OFFICE O/P EST HI 40 MIN: CPT | Mod: 25

## 2020-06-25 PROCEDURE — 99396 PREV VISIT EST AGE 40-64: CPT

## 2020-06-25 RX ORDER — METOPROLOL SUCCINATE 25 MG/1
25 TABLET, EXTENDED RELEASE ORAL DAILY
Refills: 0 | Status: DISCONTINUED | COMMUNITY
Start: 2020-05-27 | End: 2020-06-25

## 2020-06-25 RX ORDER — HALOBETASOL PROPIONATE 0.5 MG/G
0.05 CREAM TOPICAL
Refills: 0 | Status: ACTIVE | COMMUNITY

## 2020-06-25 NOTE — HISTORY OF PRESENT ILLNESS
[de-identified] : \par Preventive visit: pt is up to date with vaccines; she is due for colon cancer screening and is due for mammogram screening; she does not smoke cigarettes and does not misuse alcohol; she feels safe at home and has smoke/CO detectors in the house; she wears seatbelts when in vehicles; she needs to see her GYN for PAP/pelvic exams\par \par Medical Issue 1: Diabetes: unstable and poorly controlled with last A1c above 9.0; she admits to not adhering to low carb diet; she tries to take all her meds as prescribed but may miss a dose here and there; she denies dizziness, episodes of hypoglycemia and polyuria and polydipsia; she follows with her Endo doctor\par \par Medical Issue 2: Hyperlipidemia: unstable and variably controlled with fluctuations in her LDL level; she tries to adhere to low fat diet but admits this is not always the case; this is complicated by heart disease, she follows with her cardiologist and is due to nuclear stress testing as well\par \par Medical Issue 3: Hypertension: unstable and poorly controlled with fluctuations in BP levels; she is on 3 different medications for BP control and yet is still having a hard time keeping it at goal; she eats salty foods at times but tries to limit this too\par \par Medical Issue 4: Osteopenia: unstable and variably controlled; trying to prevent progression to osteoporosis but she is not compliant with light weight-bearing exercise and she does not walk either; she denies bone pain [FreeTextEntry1] : Presents for preventive visit as well as concerns regarding diabetes, hyperlipidemia, hypertension and osteopenia.

## 2020-06-25 NOTE — ASSESSMENT
[FreeTextEntry1] : \par Preventive visit: pt is up to date with vaccines; she is due for colon cancer screening, FIT kit given today and is due for mammogram screening, referral given; she does not smoke cigarettes and does not misuse alcohol; she feels safe at home and has smoke/CO detectors in the house; she wears seatbelts when in vehicles; she needs to see her GYN for PAP/pelvic exams, referral given\[facility] \[facility] Medical Issue 1: Diabetes: unstable and poorly controlled with last A1c above 9.0; she admits to not adhering to low carb diet; she tries to take all her meds as prescribed but may miss a dose here and there; she denies dizziness, episodes of hypoglycemia and polyuria and polydipsia; she follows with her Endo doctor; check A1c via blood draw today; counselled pt on lifestyle change including 30 mins walking daily and low-carb diet\[facility] \par Medical Issue 2: Hyperlipidemia: unstable and variably controlled with fluctuations in her LDL level; she tries to adhere to low fat diet but admits this is not always the case; this is complicated by heart disease, she follows with her cardiologist and is due to nuclear stress testing as well; check lipid panel via blood draw today; prescribed statin medication to be taken every night; coordinated follow-up care with cardiology; ordered nuclear stress testing\[facility] \[facility] Medical Issue 3: Hypertension: unstable and poorly controlled with fluctuations in BP levels; she is on 3 different medications for BP control and yet is still having a hard time keeping it at goal; she eats salty foods at times but tries to limit this too; prescribed ACEi, thiazide diuretic and calcium channel blocker medications; coordinated follow-up care with cardiology\[facility] \[facility] Medical Issue 4: Osteopenia: unstable and variably controlled; trying to prevent progression to osteoporosis but she is not compliant with light weight-bearing exercise and she does not walk either; she denies bone pain; ordered DEXA scan and advised Vitamin D supplementation; check Vit D levels today, currently taking 2000iu daily\par \par Annual alcohol misuse screen, 15 mins, done; negative

## 2020-06-25 NOTE — HEALTH RISK ASSESSMENT
[Good] : ~his/her~ current health as good [1 or 2 (0 pts)] : 1 or 2 (0 points) [No] : No [No falls in past year] : Patient reported no falls in the past year [Never (0 pts)] : Never (0 points) [None] : None [With Family] : lives with family [Employed] : employed [Sexually Active] : sexually active [Feels Safe at Home] : Feels safe at home [Fully functional (bathing, dressing, toileting, transferring, walking, feeding)] : Fully functional (bathing, dressing, toileting, transferring, walking, feeding) [Fully functional (using the telephone, shopping, preparing meals, housekeeping, doing laundry, using] : Fully functional and needs no help or supervision to perform IADLs (using the telephone, shopping, preparing meals, housekeeping, doing laundry, using transportation, managing medications and managing finances) [Reports normal functional visual acuity (ie: able to read med bottle)] : Reports normal functional visual acuity [Smoke Detector] : smoke detector [Carbon Monoxide Detector] : carbon monoxide detector [Safety elements used in home] : safety elements used in home [Seat Belt] :  uses seat belt [Sunscreen] : uses sunscreen [Reviewed no changes] : Reviewed no changes [] : No [Audit-CScore] : 0 [Language] : denies difficulty with language [Behavior] : denies difficulty with behavior [Change in mental status noted] : No change in mental status noted [Handling Complex Tasks] : denies difficulty handling complex tasks [Reasoning] : denies difficulty with reasoning [Learning/Retaining New Information] : denies difficulty learning/retaining new information [High Risk Behavior] : no high risk behavior [Reports changes in hearing] : Reports no changes in hearing [Spatial Ability and Orientation] : denies difficulty with spatial ability and orientation [Guns at Home] : no guns at home [Reports changes in dental health] : Reports no changes in dental health [Reports changes in vision] : Reports no changes in vision [TB Exposure] : is not being exposed to tuberculosis [Travel to Developing Areas] : does not  travel to developing areas [Caregiver Concerns] : does not have caregiver concerns [AdvancecareDate] : 06/20

## 2020-07-02 ENCOUNTER — APPOINTMENT (OUTPATIENT)
Dept: CARDIOLOGY | Facility: CLINIC | Age: 55
End: 2020-07-02
Payer: COMMERCIAL

## 2020-07-02 VITALS
SYSTOLIC BLOOD PRESSURE: 134 MMHG | HEART RATE: 88 BPM | HEIGHT: 68 IN | BODY MASS INDEX: 20.16 KG/M2 | WEIGHT: 133 LBS | DIASTOLIC BLOOD PRESSURE: 80 MMHG | TEMPERATURE: 98.3 F | OXYGEN SATURATION: 98 %

## 2020-07-02 DIAGNOSIS — I21.09 ST ELEVATION (STEMI) MYOCARDIAL INFARCTION INVOLVING OTHER CORONARY ARTERY OF ANTERIOR WALL: ICD-10-CM

## 2020-07-02 DIAGNOSIS — Z95.5 PRESENCE OF CORONARY ANGIOPLASTY IMPLANT AND GRAFT: ICD-10-CM

## 2020-07-02 LAB
25(OH)D3 SERPL-MCNC: 45.1 NG/ML
ALBUMIN SERPL ELPH-MCNC: 4.4 G/DL
ALP BLD-CCNC: 85 U/L
ALT SERPL-CCNC: 15 U/L
ANION GAP SERPL CALC-SCNC: 16 MMOL/L
AST SERPL-CCNC: 10 U/L
BASOPHILS # BLD AUTO: 0.09 K/UL
BASOPHILS NFR BLD AUTO: 1 %
BILIRUB SERPL-MCNC: 0.2 MG/DL
BUN SERPL-MCNC: 12 MG/DL
CALCIUM SERPL-MCNC: 9.9 MG/DL
CHLORIDE SERPL-SCNC: 93 MMOL/L
CHOLEST SERPL-MCNC: 186 MG/DL
CHOLEST/HDLC SERPL: 3.4 RATIO
CO2 SERPL-SCNC: 23 MMOL/L
CREAT SERPL-MCNC: 0.6 MG/DL
EOSINOPHIL # BLD AUTO: 0.15 K/UL
EOSINOPHIL NFR BLD AUTO: 1.7 %
ESTIMATED AVERAGE GLUCOSE: 246 MG/DL
GLUCOSE SERPL-MCNC: 332 MG/DL
HBA1C MFR BLD HPLC: 10.2 %
HCT VFR BLD CALC: 37.7 %
HDLC SERPL-MCNC: 55 MG/DL
HGB BLD-MCNC: 12.2 G/DL
HIV1+2 AB SPEC QL IA.RAPID: NONREACTIVE
IMM GRANULOCYTES NFR BLD AUTO: 0.3 %
LDLC SERPL CALC-MCNC: 99 MG/DL
LYMPHOCYTES # BLD AUTO: 2.12 K/UL
LYMPHOCYTES NFR BLD AUTO: 24.2 %
MAN DIFF?: NORMAL
MCHC RBC-ENTMCNC: 27.5 PG
MCHC RBC-ENTMCNC: 32.4 GM/DL
MCV RBC AUTO: 84.9 FL
MONOCYTES # BLD AUTO: 0.53 K/UL
MONOCYTES NFR BLD AUTO: 6 %
NEUTROPHILS # BLD AUTO: 5.85 K/UL
NEUTROPHILS NFR BLD AUTO: 66.8 %
PLATELET # BLD AUTO: 270 K/UL
POTASSIUM SERPL-SCNC: 4 MMOL/L
PROT SERPL-MCNC: 7.4 G/DL
RBC # BLD: 4.44 M/UL
RBC # FLD: 14 %
SODIUM SERPL-SCNC: 132 MMOL/L
TRIGL SERPL-MCNC: 161 MG/DL
TSH SERPL-ACNC: 0.46 UIU/ML
WBC # FLD AUTO: 8.77 K/UL

## 2020-07-02 PROCEDURE — 99215 OFFICE O/P EST HI 40 MIN: CPT

## 2020-07-02 RX ORDER — ATORVASTATIN CALCIUM 80 MG/1
80 TABLET, FILM COATED ORAL DAILY
Refills: 0 | Status: DISCONTINUED | COMMUNITY
End: 2020-07-02

## 2020-07-02 RX ORDER — ATORVASTATIN CALCIUM 80 MG/1
80 TABLET, FILM COATED ORAL DAILY
Refills: 0 | Status: DISCONTINUED | COMMUNITY
Start: 2020-05-27 | End: 2020-07-02

## 2020-07-02 NOTE — DISCUSSION/SUMMARY
[FreeTextEntry1] : She is on appropriate meds, and her BP allowed me to increase the Ace, Enalapril to 60mg QD\par F/u visit in Dec.20

## 2020-07-02 NOTE — PHYSICAL EXAM
[General Appearance - Well Developed] : well developed [Normal Appearance] : normal appearance [Well Groomed] : well groomed [General Appearance - Well Nourished] : well nourished [No Deformities] : no deformities [General Appearance - In No Acute Distress] : no acute distress [Normal Conjunctiva] : the conjunctiva exhibited no abnormalities [Eyelids - No Xanthelasma] : the eyelids demonstrated no xanthelasmas [Normal Oral Mucosa] : normal oral mucosa [No Oral Pallor] : no oral pallor [No Oral Cyanosis] : no oral cyanosis [Normal Jugular Venous A Waves Present] : normal jugular venous A waves present [Normal Jugular Venous V Waves Present] : normal jugular venous V waves present [No Jugular Venous Blair A Waves] : no jugular venous blair A waves [Respiration, Rhythm And Depth] : normal respiratory rhythm and effort [Exaggerated Use Of Accessory Muscles For Inspiration] : no accessory muscle use [Auscultation Breath Sounds / Voice Sounds] : lungs were clear to auscultation bilaterally [Heart Rate And Rhythm] : heart rate and rhythm were normal [Murmurs] : no murmurs present [Heart Sounds] : normal S1 and S2 [Abdomen Soft] : soft [Abdomen Tenderness] : non-tender [Abdomen Mass (___ Cm)] : no abdominal mass palpated [Abnormal Walk] : normal gait [Gait - Sufficient For Exercise Testing] : the gait was sufficient for exercise testing [Nail Clubbing] : no clubbing of the fingernails [Cyanosis, Localized] : no localized cyanosis [Petechial Hemorrhages (___cm)] : no petechial hemorrhages [Skin Color & Pigmentation] : normal skin color and pigmentation [] : no rash [No Venous Stasis] : no venous stasis [Skin Lesions] : no skin lesions [No Skin Ulcers] : no skin ulcer [No Xanthoma] : no  xanthoma was observed [Oriented To Time, Place, And Person] : oriented to person, place, and time [Affect] : the affect was normal [Mood] : the mood was normal [No Anxiety] : not feeling anxious

## 2020-07-02 NOTE — REASON FOR VISIT
[Follow-Up - Clinic] : a clinic follow-up of [Coronary Artery Disease] : coronary artery disease [Heart Failure] : congestive heart failure [Hyperlipidemia] : hyperlipidemia [Hypertension] : hypertension [FreeTextEntry1] : I saw this 55-year-old woman in f/u cardiac consultation on  07/02/20\par 9 years ago she presented with an anterior wall myocardial infarction and had thrombectomy and stenting performed by me.\par She has a long history of insulin-dependent diabetes, hypertension and hyperlipidemia.\par After her MI she had an EF in the high 30s with mild to moderate mitral regurg.\par I have not seen her since her MI\par She has not seen a cardiologist since then\par Last week she was taken by ambulance for acute onset shortness of breath to [place] and ruled out, had a CT to rule out PE, had a mildly elevated BNP, and was discharged after 2 days.\par An echo performed in the hospital was unchanged\par She had a Pharm Nuclear stress test which showed infarct but no ischemia. An EF of 38%

## 2020-07-02 NOTE — CARDIOLOGY SUMMARY
[LVEF ___%] : LVEF [unfilled]% [None] : no pulmonary hypertension [Moderate] : moderate LV dysfunction [Mild] : mild mitral regurgitation [___] : [unfilled]

## 2020-07-10 LAB — HEMOCCULT STL QL IA: NEGATIVE

## 2020-07-13 ENCOUNTER — RX RENEWAL (OUTPATIENT)
Age: 55
End: 2020-07-13

## 2020-09-16 ENCOUNTER — APPOINTMENT (OUTPATIENT)
Dept: RHEUMATOLOGY | Facility: CLINIC | Age: 55
End: 2020-09-16
Payer: COMMERCIAL

## 2020-09-16 VITALS
DIASTOLIC BLOOD PRESSURE: 53 MMHG | HEART RATE: 84 BPM | WEIGHT: 130 LBS | SYSTOLIC BLOOD PRESSURE: 134 MMHG | BODY MASS INDEX: 19.7 KG/M2 | OXYGEN SATURATION: 97 % | HEIGHT: 68 IN

## 2020-09-16 PROCEDURE — 99214 OFFICE O/P EST MOD 30 MIN: CPT

## 2020-09-16 NOTE — PHYSICAL EXAM
[General Appearance - Alert] : alert [General Appearance - In No Acute Distress] : in no acute distress [Sclera] : the sclera and conjunctiva were normal [Oriented To Time, Place, And Person] : oriented to person, place, and time [Impaired Insight] : insight and judgment were intact [Affect] : the affect was normal [Outer Ear] : the ears and nose were normal in appearance [Hearing Threshold Finger Rub Not Reagan] : hearing was normal [Oropharynx] : the oropharynx was normal [Neck Appearance] : the appearance of the neck was normal [Jugular Venous Distention Increased] : there was no jugular-venous distention [Thyroid Diffuse Enlargement] : the thyroid was not enlarged [Neck Cervical Mass (___cm)] : no neck mass was observed [Thyroid Nodule] : there were no palpable thyroid nodules [Exaggerated Use Of Accessory Muscles For Inspiration] : no accessory muscle use [Respiration, Rhythm And Depth] : normal respiratory rhythm and effort [Auscultation Breath Sounds / Voice Sounds] : lungs were clear to auscultation bilaterally [Heart Sounds Gallop] : no gallops [Heart Rate And Rhythm] : heart rate was normal and rhythm regular [Heart Sounds] : normal S1 and S2 [Murmurs] : no murmurs [Heart Sounds Pericardial Friction Rub] : no pericardial rub [Edema] : there was no peripheral edema [Bowel Sounds] : normal bowel sounds [Abdomen Soft] : soft [Cervical Lymph Nodes Enlarged Posterior Bilaterally] : posterior cervical [Abdomen Tenderness] : non-tender [Abdomen Mass (___ Cm)] : no abdominal mass palpated [] : no hepato-splenomegaly [Cervical Lymph Nodes Enlarged Anterior Bilaterally] : anterior cervical [Supraclavicular Lymph Nodes Enlarged Bilaterally] : supraclavicular [No CVA Tenderness] : no ~M costovertebral angle tenderness [No Spinal Tenderness] : no spinal tenderness [No Focal Deficits] : no focal deficits [FreeTextEntry1] : rash on feet - improved from previous exam

## 2020-09-16 NOTE — HISTORY OF PRESENT ILLNESS
[RF] : RF [CCP] : CCP [Currently Experiencing] : currently experiencing [Joint Swelling] : joint swelling [Joint Pain] : joint pain [Morning Stiffness] : morning stiffness [Rash] : no rash [Ocular Symptoms] : no ocular symptoms [Chest Pain] : no chest pain [Shortness of Breath] : no shortness of breath [Dysphonia] : no dysphonia [Fatigue] : no fatigue [FreeTextEntry1] : Feeling fine since last visit.  No joint pain/swelling/AM stiffness.  No F/C, no CP/SOB/cough, no rashes.  Still w/ a cyst over her left 2nd PIP joint, unchanged.  Has not been able to see hand surgeon yet due to current COVID-19 pandemic.  No new complaints. [FreeTextEntry3] : 2014 [FreeTextEntry4] : methotrexate (3061-5612)  current: arava [FreeTextEntry6] : pain and swelling in hands/wrist/elbow and knees. no relieved with advil. no chest pain, no shortness of breath.

## 2020-09-16 NOTE — ASSESSMENT
[FreeTextEntry1] : 52 yo F with:\par 1) Rheumatoid Arthritis (+RF, +CCP) - well controlled on Arava.  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  Currently much improved.\par   - derm f/u.\par 3)  Osteopenia:  Bisphosphonate not indicated based on FRAX (5.2/0.7%).  Previously w/ vit D deficiency - now resolved.\par   - Cont calcium / vit D.\par   - weight-bearing exercise.\par   - Will plan to repeat DEXA after 1/21.\par 4)  Mucous cyst over left 2nd PIP:\par   - Previously attempted aspiration but no fluid was aspirated\par   - Awaiting ortho/hand eval.\par 5)  OA - pt w/ mild pain in knees primarily with climbing stairs.\par   - Reiterated importance of exercise\par   - ibuprofen and/or Tylenol prn\par   - warm compresses\par   - OTC topical analgesics

## 2020-09-16 NOTE — REVIEW OF SYSTEMS
[Joint Pain] : joint pain [Joint Stiffness] : joint stiffness [Joint Swelling] : joint swelling [Fever] : no fever [Chills] : no chills [Eye Pain] : no eye pain [Eyesight Problems] : no eyesight problems [Discharge From Eyes] : no purulent discharge from the eyes [Earache] : no earache [Nosebleeds] : no nosebleeds [Chest Pain] : no chest pain [Palpitations] : no palpitations [Cough] : no cough [SOB on Exertion] : no shortness of breath during exertion [Abdominal Pain] : no abdominal pain [Constipation] : no constipation [Diarrhea] : no diarrhea [Dysuria] : no dysuria [Pelvic Pain] : no pelvic pain [Limb Pain] : no limb pain [Limb Swelling] : no limb swelling [Proptosis] : no proptosis [Easy Bleeding] : no tendency for easy bleeding [Easy Bruising] : no tendency for easy bruising

## 2020-09-17 LAB
ALBUMIN SERPL ELPH-MCNC: 4.8 G/DL
ALP BLD-CCNC: 67 U/L
ALT SERPL-CCNC: 11 U/L
ANION GAP SERPL CALC-SCNC: 13 MMOL/L
AST SERPL-CCNC: 14 U/L
BASOPHILS # BLD AUTO: 0.09 K/UL
BASOPHILS NFR BLD AUTO: 1.1 %
BILIRUB SERPL-MCNC: 0.3 MG/DL
BUN SERPL-MCNC: 13 MG/DL
CALCIUM SERPL-MCNC: 9.9 MG/DL
CHLORIDE SERPL-SCNC: 97 MMOL/L
CO2 SERPL-SCNC: 28 MMOL/L
CREAT SERPL-MCNC: 0.68 MG/DL
CRP SERPL-MCNC: 0.37 MG/DL
EOSINOPHIL # BLD AUTO: 0.3 K/UL
EOSINOPHIL NFR BLD AUTO: 3.6 %
ERYTHROCYTE [SEDIMENTATION RATE] IN BLOOD BY WESTERGREN METHOD: 42 MM/HR
GLUCOSE SERPL-MCNC: 135 MG/DL
HCT VFR BLD CALC: 36.8 %
HGB BLD-MCNC: 11.8 G/DL
IMM GRANULOCYTES NFR BLD AUTO: 0.2 %
LYMPHOCYTES # BLD AUTO: 2.24 K/UL
LYMPHOCYTES NFR BLD AUTO: 27 %
MAN DIFF?: NORMAL
MCHC RBC-ENTMCNC: 28.4 PG
MCHC RBC-ENTMCNC: 32.1 GM/DL
MCV RBC AUTO: 88.7 FL
MONOCYTES # BLD AUTO: 0.5 K/UL
MONOCYTES NFR BLD AUTO: 6 %
NEUTROPHILS # BLD AUTO: 5.16 K/UL
NEUTROPHILS NFR BLD AUTO: 62.1 %
PLATELET # BLD AUTO: 338 K/UL
POTASSIUM SERPL-SCNC: 4.5 MMOL/L
PROT SERPL-MCNC: 7.6 G/DL
RBC # BLD: 4.15 M/UL
RBC # FLD: 13.6 %
SODIUM SERPL-SCNC: 139 MMOL/L
WBC # FLD AUTO: 8.31 K/UL

## 2020-10-21 ENCOUNTER — MED ADMIN CHARGE (OUTPATIENT)
Age: 55
End: 2020-10-21

## 2020-10-21 ENCOUNTER — APPOINTMENT (OUTPATIENT)
Dept: ENDOCRINOLOGY | Facility: CLINIC | Age: 55
End: 2020-10-21
Payer: COMMERCIAL

## 2020-10-21 VITALS
HEART RATE: 86 BPM | SYSTOLIC BLOOD PRESSURE: 126 MMHG | OXYGEN SATURATION: 98 % | TEMPERATURE: 98.3 F | BODY MASS INDEX: 19.92 KG/M2 | WEIGHT: 131 LBS | DIASTOLIC BLOOD PRESSURE: 60 MMHG

## 2020-10-21 DIAGNOSIS — B35.1 TINEA UNGUIUM: ICD-10-CM

## 2020-10-21 LAB — HBA1C MFR BLD HPLC: 7.6

## 2020-10-21 PROCEDURE — 99215 OFFICE O/P EST HI 40 MIN: CPT | Mod: 25

## 2020-10-21 PROCEDURE — 90686 IIV4 VACC NO PRSV 0.5 ML IM: CPT

## 2020-10-21 PROCEDURE — 99072 ADDL SUPL MATRL&STAF TM PHE: CPT

## 2020-10-21 PROCEDURE — G0008: CPT

## 2020-10-21 PROCEDURE — 83036 HEMOGLOBIN GLYCOSYLATED A1C: CPT | Mod: QW

## 2020-10-21 RX ORDER — CICLOPIROX 80 MG/ML
8 SOLUTION TOPICAL
Qty: 1 | Refills: 1 | Status: ACTIVE | COMMUNITY
Start: 2020-10-21 | End: 1900-01-01

## 2020-10-21 RX ORDER — CICLOPIROX 2.28 G/ML
8 SOLUTION TOPICAL
Qty: 1 | Refills: 1 | Status: DISCONTINUED | COMMUNITY
Start: 2020-10-21 | End: 2020-10-21

## 2020-10-26 LAB
CREAT SPEC-SCNC: 131 MG/DL
MICROALBUMIN 24H UR DL<=1MG/L-MCNC: 3 MG/DL
MICROALBUMIN/CREAT 24H UR-RTO: 23 MG/G

## 2020-10-27 ENCOUNTER — NON-APPOINTMENT (OUTPATIENT)
Age: 55
End: 2020-10-27

## 2020-10-30 ENCOUNTER — INPATIENT (INPATIENT)
Facility: HOSPITAL | Age: 55
LOS: 2 days | Discharge: HOME | End: 2020-11-02
Attending: STUDENT IN AN ORGANIZED HEALTH CARE EDUCATION/TRAINING PROGRAM | Admitting: STUDENT IN AN ORGANIZED HEALTH CARE EDUCATION/TRAINING PROGRAM
Payer: COMMERCIAL

## 2020-10-30 VITALS
OXYGEN SATURATION: 100 % | TEMPERATURE: 98 F | HEART RATE: 80 BPM | HEIGHT: 68 IN | WEIGHT: 130.07 LBS | RESPIRATION RATE: 18 BRPM | DIASTOLIC BLOOD PRESSURE: 81 MMHG | SYSTOLIC BLOOD PRESSURE: 150 MMHG

## 2020-10-30 DIAGNOSIS — Z95.5 PRESENCE OF CORONARY ANGIOPLASTY IMPLANT AND GRAFT: Chronic | ICD-10-CM
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
## 2020-10-30 LAB
ANION GAP SERPL CALC-SCNC: 8 MMOL/L — SIGNIFICANT CHANGE UP (ref 5–17)
APTT BLD: 30 SEC — SIGNIFICANT CHANGE UP (ref 27.5–35.5)
BASE EXCESS BLDA CALC-SCNC: 0.8 MMOL/L — SIGNIFICANT CHANGE UP (ref -2–2)
BLOOD GAS COMMENTS: SIGNIFICANT CHANGE UP
BLOOD GAS COMMENTS: SIGNIFICANT CHANGE UP
BLOOD GAS SOURCE: SIGNIFICANT CHANGE UP
BUN SERPL-MCNC: 12 MG/DL — SIGNIFICANT CHANGE UP (ref 7–23)
CALCIUM SERPL-MCNC: 9.7 MG/DL — SIGNIFICANT CHANGE UP (ref 8.5–10.1)
CHLORIDE SERPL-SCNC: 99 MMOL/L — SIGNIFICANT CHANGE UP (ref 96–108)
CO2 SERPL-SCNC: 27 MMOL/L — SIGNIFICANT CHANGE UP (ref 22–31)
CREAT SERPL-MCNC: 0.8 MG/DL — SIGNIFICANT CHANGE UP (ref 0.5–1.3)
GLUCOSE BLDC GLUCOMTR-MCNC: 278 MG/DL — HIGH (ref 70–99)
GLUCOSE SERPL-MCNC: 186 MG/DL — HIGH (ref 70–99)
HCO3 BLDA-SCNC: 25 MMOL/L — SIGNIFICANT CHANGE UP (ref 21–29)
HCT VFR BLD CALC: 30.1 % — LOW (ref 34.5–45)
HGB BLD-MCNC: 10.2 G/DL — LOW (ref 11.5–15.5)
HOROWITZ INDEX BLDA+IHG-RTO: 40 — SIGNIFICANT CHANGE UP
INR BLD: 0.95 RATIO — SIGNIFICANT CHANGE UP (ref 0.88–1.16)
MCHC RBC-ENTMCNC: 28.9 PG — SIGNIFICANT CHANGE UP (ref 27–34)
MCHC RBC-ENTMCNC: 33.9 GM/DL — SIGNIFICANT CHANGE UP (ref 32–36)
MCV RBC AUTO: 85.3 FL — SIGNIFICANT CHANGE UP (ref 80–100)
NRBC # BLD: 0 /100 WBCS — SIGNIFICANT CHANGE UP (ref 0–0)
PCO2 BLDA: 41 MMHG — SIGNIFICANT CHANGE UP (ref 32–46)
PH BLD: 7.41 — SIGNIFICANT CHANGE UP (ref 7.35–7.45)
PLATELET # BLD AUTO: 312 K/UL — SIGNIFICANT CHANGE UP (ref 150–400)
PO2 BLDA: 170 MMHG — HIGH (ref 74–108)
POTASSIUM SERPL-MCNC: 3.8 MMOL/L — SIGNIFICANT CHANGE UP (ref 3.5–5.3)
POTASSIUM SERPL-SCNC: 3.8 MMOL/L — SIGNIFICANT CHANGE UP (ref 3.5–5.3)
PROTHROM AB SERPL-ACNC: 11 SEC — SIGNIFICANT CHANGE UP (ref 10.6–13.6)
RBC # BLD: 3.53 M/UL — LOW (ref 3.8–5.2)
RBC # FLD: 12.8 % — SIGNIFICANT CHANGE UP (ref 10.3–14.5)
SAO2 % BLDA: 97 % — HIGH (ref 92–96)
SARS-COV-2 RNA SPEC QL NAA+PROBE: SIGNIFICANT CHANGE UP
SODIUM SERPL-SCNC: 134 MMOL/L — LOW (ref 135–145)
WBC # BLD: 8.34 K/UL — SIGNIFICANT CHANGE UP (ref 3.8–10.5)
WBC # FLD AUTO: 8.34 K/UL — SIGNIFICANT CHANGE UP (ref 3.8–10.5)

## 2020-10-30 PROCEDURE — 71045 X-RAY EXAM CHEST 1 VIEW: CPT | Mod: 26

## 2020-10-30 PROCEDURE — 99291 CRITICAL CARE FIRST HOUR: CPT

## 2020-10-30 RX ORDER — FENTANYL CITRATE 50 UG/ML
50 INJECTION INTRAVENOUS ONCE
Refills: 0 | Status: DISCONTINUED | OUTPATIENT
Start: 2020-10-30 | End: 2020-10-30

## 2020-10-30 RX ORDER — PROPOFOL 10 MG/ML
50 INJECTION, EMULSION INTRAVENOUS
Qty: 1000 | Refills: 0 | Status: DISCONTINUED | OUTPATIENT
Start: 2020-10-30 | End: 2020-11-01

## 2020-10-30 RX ORDER — FAMOTIDINE 10 MG/ML
20 INJECTION INTRAVENOUS EVERY 12 HOURS
Refills: 0 | Status: DISCONTINUED | OUTPATIENT
Start: 2020-10-30 | End: 2020-11-02

## 2020-10-30 RX ORDER — DEXMEDETOMIDINE HYDROCHLORIDE IN 0.9% SODIUM CHLORIDE 4 UG/ML
0.4 INJECTION INTRAVENOUS
Qty: 200 | Refills: 0 | Status: DISCONTINUED | OUTPATIENT
Start: 2020-10-30 | End: 2020-11-01

## 2020-10-30 RX ORDER — ENOXAPARIN SODIUM 100 MG/ML
40 INJECTION SUBCUTANEOUS DAILY
Refills: 0 | Status: DISCONTINUED | OUTPATIENT
Start: 2020-10-30 | End: 2020-11-02

## 2020-10-30 RX ORDER — CHLORHEXIDINE GLUCONATE 213 G/1000ML
1 SOLUTION TOPICAL
Refills: 0 | Status: DISCONTINUED | OUTPATIENT
Start: 2020-10-30 | End: 2020-11-02

## 2020-10-30 RX ORDER — SODIUM CHLORIDE 9 MG/ML
1000 INJECTION, SOLUTION INTRAVENOUS
Refills: 0 | Status: DISCONTINUED | OUTPATIENT
Start: 2020-10-30 | End: 2020-11-02

## 2020-10-30 RX ORDER — DIPHENHYDRAMINE HCL 50 MG
25 CAPSULE ORAL EVERY 12 HOURS
Refills: 0 | Status: DISCONTINUED | OUTPATIENT
Start: 2020-10-30 | End: 2020-10-31

## 2020-10-30 RX ORDER — DEXMEDETOMIDINE HYDROCHLORIDE IN 0.9% SODIUM CHLORIDE 4 UG/ML
0.4 INJECTION INTRAVENOUS
Qty: 200 | Refills: 0 | Status: DISCONTINUED | OUTPATIENT
Start: 2020-10-30 | End: 2020-10-30

## 2020-10-30 RX ORDER — EPINEPHRINE 0.3 MG/.3ML
0.3 INJECTION INTRAMUSCULAR; SUBCUTANEOUS ONCE
Refills: 0 | Status: COMPLETED | OUTPATIENT
Start: 2020-10-30 | End: 2020-10-30

## 2020-10-30 RX ORDER — CHLORHEXIDINE GLUCONATE 213 G/1000ML
15 SOLUTION TOPICAL EVERY 12 HOURS
Refills: 0 | Status: DISCONTINUED | OUTPATIENT
Start: 2020-10-30 | End: 2020-11-01

## 2020-10-30 RX ORDER — PROPOFOL 10 MG/ML
50 INJECTION, EMULSION INTRAVENOUS
Qty: 1000 | Refills: 0 | Status: DISCONTINUED | OUTPATIENT
Start: 2020-10-30 | End: 2020-10-30

## 2020-10-30 RX ADMIN — FAMOTIDINE 20 MILLIGRAM(S): 10 INJECTION INTRAVENOUS at 17:20

## 2020-10-30 RX ADMIN — PROPOFOL 17.7 MICROGRAM(S)/KG/MIN: 10 INJECTION, EMULSION INTRAVENOUS at 13:25

## 2020-10-30 RX ADMIN — CHLORHEXIDINE GLUCONATE 15 MILLILITER(S): 213 SOLUTION TOPICAL at 17:19

## 2020-10-30 RX ADMIN — CHLORHEXIDINE GLUCONATE 1 APPLICATION(S): 213 SOLUTION TOPICAL at 13:26

## 2020-10-30 RX ADMIN — DEXMEDETOMIDINE HYDROCHLORIDE IN 0.9% SODIUM CHLORIDE 5.9 MICROGRAM(S)/KG/HR: 4 INJECTION INTRAVENOUS at 18:55

## 2020-10-30 RX ADMIN — ENOXAPARIN SODIUM 40 MILLIGRAM(S): 100 INJECTION SUBCUTANEOUS at 13:32

## 2020-10-30 RX ADMIN — EPINEPHRINE 0.3 MILLIGRAM(S): 0.3 INJECTION INTRAMUSCULAR; SUBCUTANEOUS at 11:15

## 2020-10-30 RX ADMIN — Medication 25 MILLIGRAM(S): at 17:20

## 2020-10-30 RX ADMIN — FENTANYL CITRATE 50 MICROGRAM(S): 50 INJECTION INTRAVENOUS at 12:58

## 2020-10-30 RX ADMIN — DEXMEDETOMIDINE HYDROCHLORIDE IN 0.9% SODIUM CHLORIDE 5.9 MICROGRAM(S)/KG/HR: 4 INJECTION INTRAVENOUS at 22:35

## 2020-10-30 RX ADMIN — PROPOFOL 17.7 MICROGRAM(S)/KG/MIN: 10 INJECTION, EMULSION INTRAVENOUS at 17:20

## 2020-10-30 RX ADMIN — PROPOFOL 17.7 MICROGRAM(S)/KG/MIN: 10 INJECTION, EMULSION INTRAVENOUS at 22:36

## 2020-10-30 RX ADMIN — DEXMEDETOMIDINE HYDROCHLORIDE IN 0.9% SODIUM CHLORIDE 5.9 MICROGRAM(S)/KG/HR: 4 INJECTION INTRAVENOUS at 12:57

## 2020-10-30 RX ADMIN — DEXMEDETOMIDINE HYDROCHLORIDE IN 0.9% SODIUM CHLORIDE 5.9 MICROGRAM(S)/KG/HR: 4 INJECTION INTRAVENOUS at 15:29

## 2020-10-30 RX ADMIN — Medication 40 MILLIGRAM(S): at 13:32

## 2020-10-30 RX ADMIN — Medication 40 MILLIGRAM(S): at 22:36

## 2020-10-30 RX ADMIN — SODIUM CHLORIDE 75 MILLILITER(S): 9 INJECTION, SOLUTION INTRAVENOUS at 18:56

## 2020-10-30 NOTE — ED ADULT NURSE NOTE - OBJECTIVE STATEMENT
The patient is a 55y Female WITH C/C OF waking up with a swollen tongue. upon assessment pt tongue was swollen, garbled voice, and difficulty clearing her secretions. pt denied eat or drinking or using anything new including med.

## 2020-10-30 NOTE — H&P ADULT - ASSESSMENT
52 year old female with PMH of DM II, HTN, RA, MI, admitted with angioedema likely 2/2 lisinporil. Requiring  intubation  in OR for critical airway. Admit to ICU    - critical airway - mechanical ventilation   - abg PRN, respiratory treatment,  - follow up culture, Peridex,  - PPI and dvt prophylaxis,   - sedation - propofol, precedex,   - steriods. pepcid bid and benadryl  - hold all PO for now - 2/2 edema no ngt for now

## 2020-10-30 NOTE — ED PROVIDER NOTE - ENMT NEGATIVE STATEMENT, MLM
Ears: no ear pain and no hearing problems. Nose: no nasal congestion and no nasal drainage. Mouth/Throat: +tongue swelling.

## 2020-10-30 NOTE — ED PROVIDER NOTE - CLINICAL SUMMARY MEDICAL DECISION MAKING FREE TEXT BOX
patient empiricailly treated for anaphylaxis. angioedema 2/2 ACE-i use more likely cause of presentation. difficulty phonating in ED. conern that pateint swelling might compromise her airway, d/w anesthesia and surgery. as patient with normal o2 saturation in ED patient is admitted to OR for controlled intubation with surgery on hand for surgical airway in a sterile environment. will proceed from there to ICU

## 2020-10-30 NOTE — H&P ADULT - ATTENDING COMMENTS
Pt seen and examined on arrival to ICU 10/30    52F PMH HTN on lisinopril, DM II, RA, MI, presented to ER complaining of difficulty breathing and swallowing. Per daughter pt takes her lisinopril at night but this morning was noted to have tongue swelling and hoarse voice. No prior hx of medication allergies. Unsuccessful attempt at intubation in ED. Anesthesia and surgery called for assistance and pt taken to OR and was successfully orally intubated with 7.0 ETT by anesthesia.  Transferred to ICU for angioedema likely secondary to ACE inhibitor, airway obstruction requiring intubation    - mechanical ventilation  - solumedrol, pepcid, benadryl   - daily weaning as tolerates, check leak test prior to extubation  - pt on arrival to ICU bucking and coughing, attempting to reach for ETT: sedated with fentanyl push and started on precedex drip  - will keep pt heavier on sedation scale Rass -2 to -3 due to critical airway  - PPI stress ulcer prophylaxis  - lovenox for DVT prophylaxis  - BP stable at present time, monitor BP off lisinopril  - spoke with daughter at bedside, family updated on pt's condition and plan of care. also educated family that pt should not take any medications in the ACE-I class in future    Critical Care Time: 35 min

## 2020-10-30 NOTE — ED PROVIDER NOTE - OBJECTIVE STATEMENT
56 yo F with h/o MI, HTN, DM and RA presenting for toungue swelling for 1 hour. no Hx of allergic reactions. patient takes lisinopril

## 2020-10-30 NOTE — H&P ADULT - NSICDXPASTMEDICALHX_GEN_ALL_CORE_FT
PAST MEDICAL HISTORY:  Diabetes     Heart attack     History of Tubal Ligation     Hypertension     RA (rheumatoid arthritis)     Tubal Ligation

## 2020-10-30 NOTE — ED PROVIDER NOTE - ENMT, MLM
enlarged tongue protruding from mouth. posterior oropharynx partially visualized without evidence of uvular swelling. left sided neck swelling that is nontender

## 2020-10-30 NOTE — H&P ADULT - HISTORY OF PRESENT ILLNESS
52 year old female with PMH of DM II, HTN, RA, MI, presented to ER complaining of allergic reaction. Reported that she had taken her lisinopril about 10 am . In ER tongue protrudent + voice change as per ER attending. ER unable to intubate surgery called and patient went to OR for intubation. Patient was orally intubated with 7.0 ETT. Transferred to ICU for continued care.

## 2020-10-30 NOTE — H&P ADULT - NSHPLABSRESULTS_GEN_ALL_CORE
CBC Full  -  ( 30 Oct 2020 11:47 )  WBC Count : 8.34 K/uL  RBC Count : 3.53 M/uL  Hemoglobin : 10.2 g/dL  Hematocrit : 30.1 %  Platelet Count - Automated : 312 K/uL  Mean Cell Volume : 85.3 fl  Mean Cell Hemoglobin : 28.9 pg  Mean Cell Hemoglobin Concentration : 33.9 gm/dL  Auto Neutrophil # : x  Auto Lymphocyte # : x  Auto Monocyte # : x  Auto Eosinophil # : x  Auto Basophil # : x  Auto Neutrophil % : x  Auto Lymphocyte % : x  Auto Monocyte % : x  Auto Eosinophil % : x  Auto Basophil % : x  10-30    134<L>  |  99  |  12  ----------------------------<  186<H>  3.8   |  27  |  0.80    Ca    9.7      30 Oct 2020 11:47      CAPILLARY BLOOD GLUCOSE    POCT Blood Glucose.: 278 mg/dL (30 Oct 2020 17:37)    PT/INR - ( 30 Oct 2020 11:47 )   PT: 11.0 sec;   INR: 0.95 ratio    PTT - ( 30 Oct 2020 11:47 )  PTT:30.0 sec    ABG - ( 30 Oct 2020 14:15 )  pH, Arterial: x     pH, Blood: 7.41  /  pCO2: 41    /  pO2: 170   / HCO3: 25    / Base Excess: 0.8   /  SaO2: 97        < from: Xray Chest 1 View-PORTABLE IMMEDIATE (Xray Chest 1 View-PORTABLE IMMEDIATE .) (10.30.20 @ 12:26) >    Indication: Intubation  Comparison: 2/14/2019    Portable chest x-ray is acquired for evaluation.  Impression: New ET tube terminates at 2.0 cm above the yunier.  No evidence for acute pulmonary infiltrate, pleural effusion, or pneumothorax.  Stable cardiac silhouette.  No pulmonary vascular congestion.    < end of copied text >

## 2020-10-30 NOTE — H&P ADULT - NSHPPHYSICALEXAM_GEN_ALL_CORE
GENERAL:  well-groomed, well-developed.  intubated  HEAD:  Atraumatic, Normocephalic  EYES: EOMI, PERRLA, conjunctiva and sclera clear  ENMT: ETT # 7 @ 22 lip Moist mucous membranes, Good dentition, No lesions  NECK: Supple, No JVD, Normal thyroid  NERVOUS SYSTEM: CORNEJO's  Motor Strength 5/5 B/L upper and lower extremities; DTRs 2+ intact and symmetric  CHEST/LUNG:  ++ Rhonchi bilat no wheezing, or rubs  HEART: Regular rate and rhythm; No murmurs, rubs, or gallops  ABDOMEN: Soft, Nontender, Nondistended; Bowel sounds present  EXTREMITIES:  2+ Peripheral Pulses, No clubbing, cyanosis, or edema  LYMPH: No lymphadenopathy noted  SKIN: No rashes or lesions

## 2020-10-30 NOTE — ED ADULT TRIAGE NOTE - CHIEF COMPLAINT QUOTE
patient c/o of difficulty breathing with angio edema noted ,unable to swallow directed to bed 2 Md and nurse by bed side

## 2020-10-31 LAB
ALBUMIN SERPL ELPH-MCNC: 3.3 G/DL — SIGNIFICANT CHANGE UP (ref 3.3–5)
ALP SERPL-CCNC: 66 U/L — SIGNIFICANT CHANGE UP (ref 40–120)
ALT FLD-CCNC: 14 U/L — SIGNIFICANT CHANGE UP (ref 12–78)
ANION GAP SERPL CALC-SCNC: 7 MMOL/L — SIGNIFICANT CHANGE UP (ref 5–17)
AST SERPL-CCNC: 9 U/L — LOW (ref 15–37)
BILIRUB SERPL-MCNC: 0.2 MG/DL — SIGNIFICANT CHANGE UP (ref 0.2–1.2)
BUN SERPL-MCNC: 13 MG/DL — SIGNIFICANT CHANGE UP (ref 7–23)
CALCIUM SERPL-MCNC: 9 MG/DL — SIGNIFICANT CHANGE UP (ref 8.5–10.1)
CHLORIDE SERPL-SCNC: 103 MMOL/L — SIGNIFICANT CHANGE UP (ref 96–108)
CO2 SERPL-SCNC: 26 MMOL/L — SIGNIFICANT CHANGE UP (ref 22–31)
CREAT SERPL-MCNC: 0.68 MG/DL — SIGNIFICANT CHANGE UP (ref 0.5–1.3)
GLUCOSE BLDC GLUCOMTR-MCNC: 135 MG/DL — HIGH (ref 70–99)
GLUCOSE BLDC GLUCOMTR-MCNC: 173 MG/DL — HIGH (ref 70–99)
GLUCOSE BLDC GLUCOMTR-MCNC: 184 MG/DL — HIGH (ref 70–99)
GLUCOSE BLDC GLUCOMTR-MCNC: 211 MG/DL — HIGH (ref 70–99)
GLUCOSE BLDC GLUCOMTR-MCNC: 301 MG/DL — HIGH (ref 70–99)
GLUCOSE SERPL-MCNC: 292 MG/DL — HIGH (ref 70–99)
HCT VFR BLD CALC: 34.7 % — SIGNIFICANT CHANGE UP (ref 34.5–45)
HCV AB S/CO SERPL IA: 0.16 S/CO — SIGNIFICANT CHANGE UP (ref 0–0.99)
HCV AB SERPL-IMP: SIGNIFICANT CHANGE UP
HGB BLD-MCNC: 11.4 G/DL — LOW (ref 11.5–15.5)
MAGNESIUM SERPL-MCNC: 2 MG/DL — SIGNIFICANT CHANGE UP (ref 1.6–2.6)
MCHC RBC-ENTMCNC: 28.1 PG — SIGNIFICANT CHANGE UP (ref 27–34)
MCHC RBC-ENTMCNC: 32.9 GM/DL — SIGNIFICANT CHANGE UP (ref 32–36)
MCV RBC AUTO: 85.5 FL — SIGNIFICANT CHANGE UP (ref 80–100)
NRBC # BLD: 0 /100 WBCS — SIGNIFICANT CHANGE UP (ref 0–0)
PHOSPHATE SERPL-MCNC: 3.3 MG/DL — SIGNIFICANT CHANGE UP (ref 2.5–4.5)
PLATELET # BLD AUTO: 338 K/UL — SIGNIFICANT CHANGE UP (ref 150–400)
POTASSIUM SERPL-MCNC: 3.9 MMOL/L — SIGNIFICANT CHANGE UP (ref 3.5–5.3)
POTASSIUM SERPL-SCNC: 3.9 MMOL/L — SIGNIFICANT CHANGE UP (ref 3.5–5.3)
PROT SERPL-MCNC: 7.3 GM/DL — SIGNIFICANT CHANGE UP (ref 6–8.3)
RBC # BLD: 4.06 M/UL — SIGNIFICANT CHANGE UP (ref 3.8–5.2)
RBC # FLD: 12.5 % — SIGNIFICANT CHANGE UP (ref 10.3–14.5)
SARS-COV-2 IGG SERPL QL IA: POSITIVE
SARS-COV-2 IGM SERPL IA-ACNC: 67.2 INDEX — HIGH
SODIUM SERPL-SCNC: 136 MMOL/L — SIGNIFICANT CHANGE UP (ref 135–145)
WBC # BLD: 9.6 K/UL — SIGNIFICANT CHANGE UP (ref 3.8–10.5)
WBC # FLD AUTO: 9.6 K/UL — SIGNIFICANT CHANGE UP (ref 3.8–10.5)

## 2020-10-31 PROCEDURE — 99291 CRITICAL CARE FIRST HOUR: CPT

## 2020-10-31 RX ORDER — DEXTROSE 50 % IN WATER 50 %
25 SYRINGE (ML) INTRAVENOUS ONCE
Refills: 0 | Status: DISCONTINUED | OUTPATIENT
Start: 2020-10-31 | End: 2020-11-02

## 2020-10-31 RX ORDER — MIDAZOLAM HYDROCHLORIDE 1 MG/ML
2 INJECTION, SOLUTION INTRAMUSCULAR; INTRAVENOUS
Refills: 0 | Status: DISCONTINUED | OUTPATIENT
Start: 2020-10-31 | End: 2020-11-01

## 2020-10-31 RX ORDER — GLUCAGON INJECTION, SOLUTION 0.5 MG/.1ML
1 INJECTION, SOLUTION SUBCUTANEOUS ONCE
Refills: 0 | Status: DISCONTINUED | OUTPATIENT
Start: 2020-10-31 | End: 2020-11-02

## 2020-10-31 RX ORDER — DEXTROSE 50 % IN WATER 50 %
12.5 SYRINGE (ML) INTRAVENOUS ONCE
Refills: 0 | Status: DISCONTINUED | OUTPATIENT
Start: 2020-10-31 | End: 2020-11-02

## 2020-10-31 RX ORDER — DEXTROSE 50 % IN WATER 50 %
15 SYRINGE (ML) INTRAVENOUS ONCE
Refills: 0 | Status: DISCONTINUED | OUTPATIENT
Start: 2020-10-31 | End: 2020-11-02

## 2020-10-31 RX ORDER — INSULIN LISPRO 100/ML
VIAL (ML) SUBCUTANEOUS EVERY 6 HOURS
Refills: 0 | Status: DISCONTINUED | OUTPATIENT
Start: 2020-10-31 | End: 2020-11-01

## 2020-10-31 RX ORDER — FENTANYL CITRATE 50 UG/ML
0.5 INJECTION INTRAVENOUS
Qty: 2500 | Refills: 0 | Status: DISCONTINUED | OUTPATIENT
Start: 2020-10-31 | End: 2020-11-01

## 2020-10-31 RX ORDER — MIDAZOLAM HYDROCHLORIDE 1 MG/ML
2 INJECTION, SOLUTION INTRAMUSCULAR; INTRAVENOUS ONCE
Refills: 0 | Status: DISCONTINUED | OUTPATIENT
Start: 2020-10-31 | End: 2020-10-31

## 2020-10-31 RX ORDER — SODIUM CHLORIDE 9 MG/ML
1000 INJECTION, SOLUTION INTRAVENOUS
Refills: 0 | Status: DISCONTINUED | OUTPATIENT
Start: 2020-10-31 | End: 2020-11-02

## 2020-10-31 RX ORDER — FENTANYL CITRATE 50 UG/ML
50 INJECTION INTRAVENOUS ONCE
Refills: 0 | Status: DISCONTINUED | OUTPATIENT
Start: 2020-10-31 | End: 2020-10-31

## 2020-10-31 RX ORDER — INSULIN LISPRO 100/ML
VIAL (ML) SUBCUTANEOUS EVERY 6 HOURS
Refills: 0 | Status: DISCONTINUED | OUTPATIENT
Start: 2020-10-31 | End: 2020-10-31

## 2020-10-31 RX ORDER — DIPHENHYDRAMINE HCL 50 MG
50 CAPSULE ORAL EVERY 6 HOURS
Refills: 0 | Status: DISCONTINUED | OUTPATIENT
Start: 2020-10-31 | End: 2020-11-02

## 2020-10-31 RX ADMIN — FENTANYL CITRATE 2.95 MICROGRAM(S)/KG/HR: 50 INJECTION INTRAVENOUS at 22:19

## 2020-10-31 RX ADMIN — CHLORHEXIDINE GLUCONATE 15 MILLILITER(S): 213 SOLUTION TOPICAL at 05:19

## 2020-10-31 RX ADMIN — FENTANYL CITRATE 50 MICROGRAM(S): 50 INJECTION INTRAVENOUS at 12:57

## 2020-10-31 RX ADMIN — PROPOFOL 17.7 MICROGRAM(S)/KG/MIN: 10 INJECTION, EMULSION INTRAVENOUS at 09:20

## 2020-10-31 RX ADMIN — Medication 50 MILLIGRAM(S): at 23:19

## 2020-10-31 RX ADMIN — ENOXAPARIN SODIUM 40 MILLIGRAM(S): 100 INJECTION SUBCUTANEOUS at 11:50

## 2020-10-31 RX ADMIN — Medication 8: at 05:32

## 2020-10-31 RX ADMIN — CHLORHEXIDINE GLUCONATE 15 MILLILITER(S): 213 SOLUTION TOPICAL at 17:20

## 2020-10-31 RX ADMIN — Medication 2: at 11:58

## 2020-10-31 RX ADMIN — Medication 40 MILLIGRAM(S): at 13:42

## 2020-10-31 RX ADMIN — FENTANYL CITRATE 50 MICROGRAM(S): 50 INJECTION INTRAVENOUS at 13:12

## 2020-10-31 RX ADMIN — Medication 50 MILLIGRAM(S): at 17:21

## 2020-10-31 RX ADMIN — Medication 4: at 23:24

## 2020-10-31 RX ADMIN — PROPOFOL 17.7 MICROGRAM(S)/KG/MIN: 10 INJECTION, EMULSION INTRAVENOUS at 14:58

## 2020-10-31 RX ADMIN — MIDAZOLAM HYDROCHLORIDE 2 MILLIGRAM(S): 1 INJECTION, SOLUTION INTRAMUSCULAR; INTRAVENOUS at 18:08

## 2020-10-31 RX ADMIN — DEXMEDETOMIDINE HYDROCHLORIDE IN 0.9% SODIUM CHLORIDE 5.9 MICROGRAM(S)/KG/HR: 4 INJECTION INTRAVENOUS at 05:37

## 2020-10-31 RX ADMIN — PROPOFOL 17.7 MICROGRAM(S)/KG/MIN: 10 INJECTION, EMULSION INTRAVENOUS at 04:42

## 2020-10-31 RX ADMIN — DEXMEDETOMIDINE HYDROCHLORIDE IN 0.9% SODIUM CHLORIDE 5.9 MICROGRAM(S)/KG/HR: 4 INJECTION INTRAVENOUS at 01:05

## 2020-10-31 RX ADMIN — SODIUM CHLORIDE 75 MILLILITER(S): 9 INJECTION, SOLUTION INTRAVENOUS at 17:44

## 2020-10-31 RX ADMIN — SODIUM CHLORIDE 75 MILLILITER(S): 9 INJECTION, SOLUTION INTRAVENOUS at 05:19

## 2020-10-31 RX ADMIN — Medication 40 MILLIGRAM(S): at 21:19

## 2020-10-31 RX ADMIN — Medication 40 MILLIGRAM(S): at 05:18

## 2020-10-31 RX ADMIN — CHLORHEXIDINE GLUCONATE 1 APPLICATION(S): 213 SOLUTION TOPICAL at 05:22

## 2020-10-31 RX ADMIN — FAMOTIDINE 20 MILLIGRAM(S): 10 INJECTION INTRAVENOUS at 05:19

## 2020-10-31 RX ADMIN — DEXMEDETOMIDINE HYDROCHLORIDE IN 0.9% SODIUM CHLORIDE 5.9 MICROGRAM(S)/KG/HR: 4 INJECTION INTRAVENOUS at 11:50

## 2020-10-31 RX ADMIN — FAMOTIDINE 20 MILLIGRAM(S): 10 INJECTION INTRAVENOUS at 17:21

## 2020-10-31 RX ADMIN — FENTANYL CITRATE 2.95 MICROGRAM(S)/KG/HR: 50 INJECTION INTRAVENOUS at 13:42

## 2020-10-31 RX ADMIN — Medication 25 MILLIGRAM(S): at 05:18

## 2020-10-31 NOTE — DIETITIAN INITIAL EVALUATION ADULT. - PERTINENT LABORATORY DATA
10-31 Na136 mmol/L Glu 292 mg/dL<H> K+ 3.9 mmol/L Cr  0.68 mg/dL BUN 13 mg/dL 10-31 Phos 3.3 mg/dL 10-31 Alb 3.3 g/dL10-31 ALT 14 U/L AST 9 U/L<L> Alkaline Phosphatase 66 U/L

## 2020-10-31 NOTE — DIETITIAN INITIAL EVALUATION ADULT. - PHYSCIAL ASSESSMENT
other (specify)/BMI=19.8(10/30), 10/30, 3+ tongue edema noted/underweight Nutrition focus physical exam is limited due to vent status

## 2020-10-31 NOTE — PROGRESS NOTE ADULT - ASSESSMENT
Pt is a 53 yo F with h/o MI, HTN, DM and RA presented to ER 10/30 complaining of allergic reaction. Pt reported that she had taken her Lisinopril and in the  ER tongue protrudent + voice change as per ER attending. ER unable to intubate; surgery called and pt went to OR for intubation. Uncomplicated intubation by Anesth and pt transferred to the ICU. ICU admitting dx: Angioedema  2 to ACE (-) requiring intubation    A/P  Resp:  ID:  CVS:  HEME:  FEN:  GI:  Neuro:      Pt is a 51 yo F with h/o MI, HTN, DM and RA presented to ER 10/30 complaining of allergic reaction. Pt reported that she had taken her Lisinopril and in the  ER tongue protrudent + voice change as per ER attending. ER unable to intubate; surgery called and pt went to OR for intubation. Uncomplicated intubation by Anesth and pt transferred to the ICU. ICU admitting dx: 1) Angioedema  2 to ACE (-) requiring intubation 2) Prior Covid-19 infection (PCR neg Ab pos)    Resp: Cont Steroids + H1+2 blockers/ Pt with cuff leak but tongue still enlarged; will reassess for extubation later today  CVS: No ACE (-)/ Hold off from pt's BB as pt is doni 2 to Precedex  FEN:If not extubated today may start enteral feeds   Endo: Follow FS and cover with Lispro  Neuro: Cont sedation with Propofol + Precedex

## 2020-10-31 NOTE — DIETITIAN INITIAL EVALUATION ADULT. - PERTINENT MEDS FT
MEDICATIONS  (STANDING):  chlorhexidine 0.12% Liquid 15 milliLiter(s) Oral Mucosa every 12 hours  chlorhexidine 4% Liquid 1 Application(s) Topical <User Schedule>  dexMEDEtomidine Infusion 0.4 MICROgram(s)/kG/Hr (5.9 mL/Hr) IV Continuous <Continuous>  dextrose 5%. 1000 milliLiter(s) (50 mL/Hr) IV Continuous <Continuous>  dextrose 50% Injectable 12.5 Gram(s) IV Push once  dextrose 50% Injectable 25 Gram(s) IV Push once  dextrose 50% Injectable 25 Gram(s) IV Push once  diphenhydrAMINE   Injectable 50 milliGRAM(s) IV Push every 6 hours  enoxaparin Injectable 40 milliGRAM(s) SubCutaneous daily  famotidine Injectable 20 milliGRAM(s) IV Push every 12 hours  fentaNYL   Infusion. 0.5 MICROgram(s)/kG/Hr (2.95 mL/Hr) IV Continuous <Continuous>  insulin lispro (ADMELOG) corrective regimen sliding scale   SubCutaneous every 6 hours  lactated ringers. 1000 milliLiter(s) (75 mL/Hr) IV Continuous <Continuous>  methylPREDNISolone sodium succinate Injectable 40 milliGRAM(s) IV Push every 8 hours  propofol Infusion 50 MICROgram(s)/kG/Min (17.7 mL/Hr) IV Continuous <Continuous>=184.4 ml(10/30)=221 calories     MEDICATIONS  (PRN):  dextrose 40% Gel 15 Gram(s) Oral once PRN Blood Glucose LESS THAN 70 milliGRAM(s)/deciliter  glucagon  Injectable 1 milliGRAM(s) IntraMuscular once PRN Glucose LESS THAN 70 milligrams/deciliter

## 2020-10-31 NOTE — PROGRESS NOTE ADULT - SUBJECTIVE AND OBJECTIVE BOX
HPI:  52 year old female with PMH of DM II, HTN, RA, MI, presented to ER complaining of allergic reaction. Reported that she had taken her lisinopril about 10 am . In ER tongue protrudent + voice change as per ER attending. ER unable to intubate surgery called and patient went to OR for intubation. Patient was orally intubated with 7.0 ETT. Transferred to ICU for continued care.  (30 Oct 2020 13:03)      24 hr events:      ## Labs:  CBC:                        11.4   9.60  )-----------( 338      ( 31 Oct 2020 03:14 )             34.7     Chem:  10-31    136  |  103  |  13  ----------------------------<  292<H>  3.9   |  26  |  0.68    Ca    9.0      31 Oct 2020 03:14  Phos  3.3     10-31  Mg     2.0     10-31    TPro  7.3  /  Alb  3.3  /  TBili  0.2  /  DBili  x   /  AST  9<L>  /  ALT  14  /  AlkPhos  66  10-31    Coags:  PT/INR - ( 30 Oct 2020 11:47 )   PT: 11.0 sec;   INR: 0.95 ratio         PTT - ( 30 Oct 2020 11:47 )  PTT:30.0 sec        ## Imaging:    ## Medications:      diphenhydrAMINE   Injectable 25 milliGRAM(s) IV Push every 12 hours    dextrose 40% Gel 15 Gram(s) Oral once PRN  dextrose 50% Injectable 12.5 Gram(s) IV Push once  dextrose 50% Injectable 25 Gram(s) IV Push once  dextrose 50% Injectable 25 Gram(s) IV Push once  glucagon  Injectable 1 milliGRAM(s) IntraMuscular once PRN  insulin lispro (ADMELOG) corrective regimen sliding scale   SubCutaneous every 6 hours  methylPREDNISolone sodium succinate Injectable 40 milliGRAM(s) IV Push every 8 hours    enoxaparin Injectable 40 milliGRAM(s) SubCutaneous daily    famotidine Injectable 20 milliGRAM(s) IV Push every 12 hours    dexMEDEtomidine Infusion 0.4 MICROgram(s)/kG/Hr IV Continuous <Continuous>  propofol Infusion 50 MICROgram(s)/kG/Min IV Continuous <Continuous>      ## Vitals:  T(C): 36.3 (10-31-20 @ 11:30), Max: 36.7 (10-31-20 @ 07:18)  HR: 46 (10-31-20 @ 11:30) (46 - 96)  BP: 132/73 (10-31-20 @ 11:30) (85/53 - 150/78)  BP(mean): 90 (10-31-20 @ 11:30) (63 - 100)  RR: 14 (10-31-20 @ 11:30) (12 - 16)  SpO2: 100% (10-31-20 @ 11:30) (98% - 100%)  Wt(kg): --  Vent: Mode: AC/ CMV (Assist Control/ Continuous Mandatory Ventilation), RR (machine): 14, RR (patient): 22, TV (machine): 400, FiO2: 30, PEEP: 5, PIP: 26  ABG: ABG - ( 30 Oct 2020 14:15 )  pH, Arterial: x     pH, Blood: 7.41  /  pCO2: 41    /  pO2: 170   / HCO3: 25    / Base Excess: 0.8   /  SaO2: 97                    10-30 @ 07:01  -  10-31 @ 07:00  --------------------------------------------------------  IN: 1523.2 mL / OUT: 650 mL / NET: 873.2 mL    10-31 @ 08:01  -  10-31 @ 11:50  --------------------------------------------------------  IN: 294.6 mL / OUT: 0 mL / NET: 294.6 mL          ## P/E:  Gen: lying comfortably in bed in no apparent distress  Mouth: ETT (+)/ enlarged tongue  Lungs: CTA  Heart: Jorge  Abd: Soft/+BS  Ext: No edema  Neuro: Sedated    CENTRAL LINE: [ ] YES [ ] NO  LOCATION:   DATE INSERTED:  REMOVE: [ ] YES [ ] NO      ROSALEE: [ ] YES [ ] NO    DATE INSERTED:  REMOVE:  [ ] YES [ ] NO      A-LINE:  [ ] YES [ ] NO  LOCATION:   DATE INSERTED:  REMOVE:  [ ] YES [ ] NO  EXPLAIN:    GLOBAL ISSUE/BEST PRACTICE:  Analgesia:  Sedation:  HOB elevation: yes  Stress ulcer prophylaxis:  VTE prophylaxis:  Oral Care:  Glycemic control:  Nutrition:    CODE STATUS: [ ] full code  [ ] DNR  [ ] DNI  [ ] MOLST  Goals of care discussion: [ ] yes HPI:  Pt is a 53 yo F with h/o MI, HTN, DM and RA presented to ER 10/30 complaining of allergic reaction. Pt reported that she had taken her Lisinopril and in the  ER tongue protrudent + voice change as per ER attending. ER unable to intubate; surgery called and pt went to OR for intubation. Uncomplicated intubation by Anesth and pt transferred to the ICU. ICU admitting dx: Angioedema  2 to ACE (-) requiring intubation      ## Labs:  CBC:                        11.4   9.60  )-----------( 338      ( 31 Oct 2020 03:14 )             34.7     Chem:  10-31    136  |  103  |  13  ----------------------------<  292<H>  3.9   |  26  |  0.68    Ca    9.0      31 Oct 2020 03:14  Phos  3.3     10-31  Mg     2.0     10-31    TPro  7.3  /  Alb  3.3  /  TBili  0.2  /  DBili  x   /  AST  9<L>  /  ALT  14  /  AlkPhos  66  10-31    Coags:  PT/INR - ( 30 Oct 2020 11:47 )   PT: 11.0 sec;   INR: 0.95 ratio         PTT - ( 30 Oct 2020 11:47 )  PTT:30.0 sec        ## Imaging:    ## Medications:      diphenhydrAMINE   Injectable 25 milliGRAM(s) IV Push every 12 hours    dextrose 40% Gel 15 Gram(s) Oral once PRN  dextrose 50% Injectable 12.5 Gram(s) IV Push once  dextrose 50% Injectable 25 Gram(s) IV Push once  dextrose 50% Injectable 25 Gram(s) IV Push once  glucagon  Injectable 1 milliGRAM(s) IntraMuscular once PRN  insulin lispro (ADMELOG) corrective regimen sliding scale   SubCutaneous every 6 hours  methylPREDNISolone sodium succinate Injectable 40 milliGRAM(s) IV Push every 8 hours    enoxaparin Injectable 40 milliGRAM(s) SubCutaneous daily    famotidine Injectable 20 milliGRAM(s) IV Push every 12 hours    dexMEDEtomidine Infusion 0.4 MICROgram(s)/kG/Hr IV Continuous <Continuous>  propofol Infusion 50 MICROgram(s)/kG/Min IV Continuous <Continuous>      ## Vitals:  T(C): 36.3 (10-31-20 @ 11:30), Max: 36.7 (10-31-20 @ 07:18)  HR: 46 (10-31-20 @ 11:30) (46 - 96)  BP: 132/73 (10-31-20 @ 11:30) (85/53 - 150/78)  BP(mean): 90 (10-31-20 @ 11:30) (63 - 100)  RR: 14 (10-31-20 @ 11:30) (12 - 16)  SpO2: 100% (10-31-20 @ 11:30) (98% - 100%)  Wt(kg): --  Vent: Mode: AC/ CMV (Assist Control/ Continuous Mandatory Ventilation), RR (machine): 14, RR (patient): 22, TV (machine): 400, FiO2: 30, PEEP: 5, PIP: 26  ABG: ABG - ( 30 Oct 2020 14:15 )  pH, Arterial: x     pH, Blood: 7.41  /  pCO2: 41    /  pO2: 170   / HCO3: 25    / Base Excess: 0.8   /  SaO2: 97                    10-30 @ 07:01  -  10-31 @ 07:00  --------------------------------------------------------  IN: 1523.2 mL / OUT: 650 mL / NET: 873.2 mL    10-31 @ 08:01  -  10-31 @ 11:50  --------------------------------------------------------  IN: 294.6 mL / OUT: 0 mL / NET: 294.6 mL          ## P/E:  Gen: lying comfortably in bed in no apparent distress  Mouth: ETT (+)/ Enlarged tongue  Lungs: CTA  Heart: Jorge  Abd: Soft/+BS  Ext: No edema  Neuro: Sedated    CENTRAL LINE: [ ] YES [ ] NO  LOCATION:   DATE INSERTED:  REMOVE: [ ] YES [ ] NO      TURK: [ ] YES [ ] NO    DATE INSERTED:  REMOVE:  [ ] YES [ ] NO      A-LINE:  [ ] YES [ ] NO  LOCATION:   DATE INSERTED:  REMOVE:  [ ] YES [ ] NO  EXPLAIN:      CODE STATUS: [ x] full code  [ ] DNR  [ ] DNI  [ ] MOLST  Goals of care discussion: [ ] yes

## 2020-11-01 LAB
ANION GAP SERPL CALC-SCNC: 8 MMOL/L — SIGNIFICANT CHANGE UP (ref 5–17)
BUN SERPL-MCNC: 18 MG/DL — SIGNIFICANT CHANGE UP (ref 7–23)
CALCIUM SERPL-MCNC: 8.5 MG/DL — SIGNIFICANT CHANGE UP (ref 8.5–10.1)
CHLORIDE SERPL-SCNC: 104 MMOL/L — SIGNIFICANT CHANGE UP (ref 96–108)
CO2 SERPL-SCNC: 25 MMOL/L — SIGNIFICANT CHANGE UP (ref 22–31)
CREAT SERPL-MCNC: 0.77 MG/DL — SIGNIFICANT CHANGE UP (ref 0.5–1.3)
GLUCOSE BLDC GLUCOMTR-MCNC: 130 MG/DL — HIGH (ref 70–99)
GLUCOSE BLDC GLUCOMTR-MCNC: 177 MG/DL — HIGH (ref 70–99)
GLUCOSE BLDC GLUCOMTR-MCNC: 242 MG/DL — HIGH (ref 70–99)
GLUCOSE BLDC GLUCOMTR-MCNC: 354 MG/DL — HIGH (ref 70–99)
GLUCOSE SERPL-MCNC: 178 MG/DL — HIGH (ref 70–99)
HCT VFR BLD CALC: 29.7 % — LOW (ref 34.5–45)
HGB BLD-MCNC: 10 G/DL — LOW (ref 11.5–15.5)
MAGNESIUM SERPL-MCNC: 2.1 MG/DL — SIGNIFICANT CHANGE UP (ref 1.6–2.6)
MCHC RBC-ENTMCNC: 28.7 PG — SIGNIFICANT CHANGE UP (ref 27–34)
MCHC RBC-ENTMCNC: 33.7 GM/DL — SIGNIFICANT CHANGE UP (ref 32–36)
MCV RBC AUTO: 85.3 FL — SIGNIFICANT CHANGE UP (ref 80–100)
NRBC # BLD: 0 /100 WBCS — SIGNIFICANT CHANGE UP (ref 0–0)
PHOSPHATE SERPL-MCNC: 2.6 MG/DL — SIGNIFICANT CHANGE UP (ref 2.5–4.5)
PLATELET # BLD AUTO: 309 K/UL — SIGNIFICANT CHANGE UP (ref 150–400)
POTASSIUM SERPL-MCNC: 3.7 MMOL/L — SIGNIFICANT CHANGE UP (ref 3.5–5.3)
POTASSIUM SERPL-SCNC: 3.7 MMOL/L — SIGNIFICANT CHANGE UP (ref 3.5–5.3)
RBC # BLD: 3.48 M/UL — LOW (ref 3.8–5.2)
RBC # FLD: 13.1 % — SIGNIFICANT CHANGE UP (ref 10.3–14.5)
SODIUM SERPL-SCNC: 137 MMOL/L — SIGNIFICANT CHANGE UP (ref 135–145)
WBC # BLD: 16.79 K/UL — HIGH (ref 3.8–10.5)
WBC # FLD AUTO: 16.79 K/UL — HIGH (ref 3.8–10.5)

## 2020-11-01 PROCEDURE — 99291 CRITICAL CARE FIRST HOUR: CPT

## 2020-11-01 RX ORDER — INSULIN LISPRO 100/ML
VIAL (ML) SUBCUTANEOUS
Refills: 0 | Status: DISCONTINUED | OUTPATIENT
Start: 2020-11-01 | End: 2020-11-02

## 2020-11-01 RX ORDER — HYDRALAZINE HCL 50 MG
5 TABLET ORAL ONCE
Refills: 0 | Status: COMPLETED | OUTPATIENT
Start: 2020-11-01 | End: 2020-11-01

## 2020-11-01 RX ADMIN — SODIUM CHLORIDE 75 MILLILITER(S): 9 INJECTION, SOLUTION INTRAVENOUS at 06:29

## 2020-11-01 RX ADMIN — Medication 50 MILLIGRAM(S): at 17:19

## 2020-11-01 RX ADMIN — Medication 40 MILLIGRAM(S): at 21:26

## 2020-11-01 RX ADMIN — Medication 40 MILLIGRAM(S): at 05:14

## 2020-11-01 RX ADMIN — Medication 2: at 05:24

## 2020-11-01 RX ADMIN — Medication 40 MILLIGRAM(S): at 14:40

## 2020-11-01 RX ADMIN — ENOXAPARIN SODIUM 40 MILLIGRAM(S): 100 INJECTION SUBCUTANEOUS at 11:22

## 2020-11-01 RX ADMIN — Medication 50 MILLIGRAM(S): at 23:51

## 2020-11-01 RX ADMIN — Medication 4: at 11:23

## 2020-11-01 RX ADMIN — CHLORHEXIDINE GLUCONATE 1 APPLICATION(S): 213 SOLUTION TOPICAL at 06:30

## 2020-11-01 RX ADMIN — Medication 10: at 17:20

## 2020-11-01 RX ADMIN — Medication 5 MILLIGRAM(S): at 14:30

## 2020-11-01 RX ADMIN — Medication 50 MILLIGRAM(S): at 05:15

## 2020-11-01 RX ADMIN — CHLORHEXIDINE GLUCONATE 15 MILLILITER(S): 213 SOLUTION TOPICAL at 05:14

## 2020-11-01 RX ADMIN — FENTANYL CITRATE 2.95 MICROGRAM(S)/KG/HR: 50 INJECTION INTRAVENOUS at 04:54

## 2020-11-01 RX ADMIN — FAMOTIDINE 20 MILLIGRAM(S): 10 INJECTION INTRAVENOUS at 17:19

## 2020-11-01 RX ADMIN — Medication 50 MILLIGRAM(S): at 11:22

## 2020-11-01 RX ADMIN — FAMOTIDINE 20 MILLIGRAM(S): 10 INJECTION INTRAVENOUS at 05:15

## 2020-11-01 RX ADMIN — SODIUM CHLORIDE 75 MILLILITER(S): 9 INJECTION, SOLUTION INTRAVENOUS at 18:42

## 2020-11-01 NOTE — PROGRESS NOTE ADULT - SUBJECTIVE AND OBJECTIVE BOX
Patient is a 55y old  Female who presents with a chief complaint of angioedema (31 Oct 2020 16:31)      BRIEF HOSPITAL COURSE: ***    Events last 24 hours: ***    PAST MEDICAL & SURGICAL HISTORY:  Heart attack    RA (rheumatoid arthritis)    Tubal Ligation    History of Tubal Ligation    Diabetes    Hypertension    H/O heart artery stent    S/P Tubal Ligation          Medications:      diphenhydrAMINE   Injectable 50 milliGRAM(s) IV Push every 6 hours    dexMEDEtomidine Infusion 0.4 MICROgram(s)/kG/Hr IV Continuous <Continuous>  fentaNYL   Infusion. 0.5 MICROgram(s)/kG/Hr IV Continuous <Continuous>  midazolam Injectable 2 milliGRAM(s) IV Push every 2 hours PRN  propofol Infusion 50 MICROgram(s)/kG/Min IV Continuous <Continuous>      enoxaparin Injectable 40 milliGRAM(s) SubCutaneous daily    famotidine Injectable 20 milliGRAM(s) IV Push every 12 hours      dextrose 40% Gel 15 Gram(s) Oral once PRN  dextrose 50% Injectable 12.5 Gram(s) IV Push once  dextrose 50% Injectable 25 Gram(s) IV Push once  dextrose 50% Injectable 25 Gram(s) IV Push once  glucagon  Injectable 1 milliGRAM(s) IntraMuscular once PRN  insulin lispro (ADMELOG) corrective regimen sliding scale   SubCutaneous every 6 hours  methylPREDNISolone sodium succinate Injectable 40 milliGRAM(s) IV Push every 8 hours    dextrose 5%. 1000 milliLiter(s) IV Continuous <Continuous>  lactated ringers. 1000 milliLiter(s) IV Continuous <Continuous>      chlorhexidine 0.12% Liquid 15 milliLiter(s) Oral Mucosa every 12 hours  chlorhexidine 4% Liquid 1 Application(s) Topical <User Schedule>        Mode: CPAP with PS  FiO2: 30  PEEP: 5  PS: 5  MAP: 7  PIP: 10      ICU Vital Signs Last 24 Hrs  T(C): 37.2 (01 Nov 2020 07:08), Max: 37.7 (01 Nov 2020 03:00)  T(F): 99 (01 Nov 2020 07:08), Max: 99.9 (01 Nov 2020 03:00)  HR: 99 (01 Nov 2020 08:30) (36 - 99)  BP: 182/65 (01 Nov 2020 08:30) (111/83 - 182/65)  BP(mean): 94 (01 Nov 2020 08:30) (83 - 121)  ABP: --  ABP(mean): --  RR: 11 (01 Nov 2020 08:30) (11 - 19)  SpO2: 100% (01 Nov 2020 08:30) (82% - 100%)      ABG - ( 30 Oct 2020 14:15 )  pH, Arterial: x     pH, Blood: 7.41  /  pCO2: 41    /  pO2: 170   / HCO3: 25    / Base Excess: 0.8   /  SaO2: 97                  I&O's Detail    31 Oct 2020 08:01  -  01 Nov 2020 07:00  --------------------------------------------------------  IN:    Dexmedetomidine: 51.3 mL    FentaNYL: 548.7 mL    Lactated Ringers: 1800 mL    Propofol: 142.1 mL  Total IN: 2542 mL    OUT:    Voided (mL): 850 mL  Total OUT: 850 mL    Total NET: 1692 mL      01 Nov 2020 07:01  -  01 Nov 2020 08:58  --------------------------------------------------------  IN:    FentaNYL: 32.5 mL    Lactated Ringers: 75 mL  Total IN: 107.5 mL    OUT:  Total OUT: 0 mL    Total NET: 107.5 mL            LABS:                        10.0   16.79 )-----------( 309      ( 01 Nov 2020 03:06 )             29.7     11-01    137  |  104  |  18  ----------------------------<  178<H>  3.7   |  25  |  0.77    Ca    8.5      01 Nov 2020 03:06  Phos  2.6     11-01  Mg     2.1     11-01    TPro  7.3  /  Alb  3.3  /  TBili  0.2  /  DBili  x   /  AST  9<L>  /  ALT  14  /  AlkPhos  66  10-31          CAPILLARY BLOOD GLUCOSE      POCT Blood Glucose.: 177 mg/dL (01 Nov 2020 05:20)    PT/INR - ( 30 Oct 2020 11:47 )   PT: 11.0 sec;   INR: 0.95 ratio         PTT - ( 30 Oct 2020 11:47 )  PTT:30.0 sec    CULTURES:      Physical Examination:    General: No acute distress.      HEENT: Pupils equal, reactive to light.  Symmetric.    PULM: Clear to auscultation bilaterally, no significant sputum production    NECK: Supple, no lymphadenopathy, trachea midline    CVS: Regular rate and rhythm, no murmurs, rubs, or gallops    GI: Soft, nondistended, nontender, normoactive bowel sounds, no masses    EXT: No edema, nontender    SKIN: Warm and well perfused, no rashes noted.    NEURO: Alert, oriented, interactive, nonfocal    DEVICES:     RADIOLOGY:   < from: Xray Chest 1 View-PORTABLE IMMEDIATE (Xray Chest 1 View-PORTABLE IMMEDIATE .) (10.30.20 @ 12:26) >    EXAM:  XR CHEST PORTABLE IMMED 1V                            PROCEDURE DATE:  10/30/2020          INTERPRETATION:  Portable chest x-ray    Indication: Intubation    Comparison: 2/14/2019    Portable chest x-ray is acquired for evaluation.    Impression: New ET tube terminates at 2.0 cm above the yunier.    No evidence for acute pulmonary infiltrate, pleural effusion, or pneumothorax.    Stable cardiac silhouette.    No pulmonary vascular congestion.              NIKHIL CALHOUN MD; Attending Radiologist  This document has been electronically signed. Oct 30 2020 12:36PM    < end of copied text >    CRITICAL CARE TIME SPENT:    Patient is a 55y old  Female who presents with a chief complaint of angioedema (31 Oct 2020 16:31)      BRIEF HOSPITAL COURSE: ***    Events last 24 hours: + cuff leak PS 5/5 with good resp mechanics- extubated on rounds   PAST MEDICAL & SURGICAL HISTORY:  Heart attack    RA (rheumatoid arthritis)    Tubal Ligation    History of Tubal Ligation    Diabetes    Hypertension    H/O heart artery stent    S/P Tubal Ligation          Medications:      diphenhydrAMINE   Injectable 50 milliGRAM(s) IV Push every 6 hours    dexMEDEtomidine Infusion 0.4 MICROgram(s)/kG/Hr IV Continuous <Continuous>  fentaNYL   Infusion. 0.5 MICROgram(s)/kG/Hr IV Continuous <Continuous>  midazolam Injectable 2 milliGRAM(s) IV Push every 2 hours PRN  propofol Infusion 50 MICROgram(s)/kG/Min IV Continuous <Continuous>      enoxaparin Injectable 40 milliGRAM(s) SubCutaneous daily    famotidine Injectable 20 milliGRAM(s) IV Push every 12 hours      dextrose 40% Gel 15 Gram(s) Oral once PRN  dextrose 50% Injectable 12.5 Gram(s) IV Push once  dextrose 50% Injectable 25 Gram(s) IV Push once  dextrose 50% Injectable 25 Gram(s) IV Push once  glucagon  Injectable 1 milliGRAM(s) IntraMuscular once PRN  insulin lispro (ADMELOG) corrective regimen sliding scale   SubCutaneous every 6 hours  methylPREDNISolone sodium succinate Injectable 40 milliGRAM(s) IV Push every 8 hours    dextrose 5%. 1000 milliLiter(s) IV Continuous <Continuous>  lactated ringers. 1000 milliLiter(s) IV Continuous <Continuous>      chlorhexidine 0.12% Liquid 15 milliLiter(s) Oral Mucosa every 12 hours  chlorhexidine 4% Liquid 1 Application(s) Topical <User Schedule>        Mode: CPAP with PS  FiO2: 30  PEEP: 5  PS: 5  MAP: 7  PIP: 10      ICU Vital Signs Last 24 Hrs  T(C): 37.2 (01 Nov 2020 07:08), Max: 37.7 (01 Nov 2020 03:00)  T(F): 99 (01 Nov 2020 07:08), Max: 99.9 (01 Nov 2020 03:00)  HR: 99 (01 Nov 2020 08:30) (36 - 99)  BP: 182/65 (01 Nov 2020 08:30) (111/83 - 182/65)  BP(mean): 94 (01 Nov 2020 08:30) (83 - 121)  ABP: --  ABP(mean): --  RR: 11 (01 Nov 2020 08:30) (11 - 19)  SpO2: 100% (01 Nov 2020 08:30) (82% - 100%)      ABG - ( 30 Oct 2020 14:15 )  pH, Arterial: x     pH, Blood: 7.41  /  pCO2: 41    /  pO2: 170   / HCO3: 25    / Base Excess: 0.8   /  SaO2: 97                  I&O's Detail    31 Oct 2020 08:01  -  01 Nov 2020 07:00  --------------------------------------------------------  IN:    Dexmedetomidine: 51.3 mL    FentaNYL: 548.7 mL    Lactated Ringers: 1800 mL    Propofol: 142.1 mL  Total IN: 2542 mL    OUT:    Voided (mL): 850 mL  Total OUT: 850 mL    Total NET: 1692 mL      01 Nov 2020 07:01  -  01 Nov 2020 08:58  --------------------------------------------------------  IN:    FentaNYL: 32.5 mL    Lactated Ringers: 75 mL  Total IN: 107.5 mL    OUT:  Total OUT: 0 mL    Total NET: 107.5 mL            LABS:                        10.0   16.79 )-----------( 309      ( 01 Nov 2020 03:06 )             29.7     11-01    137  |  104  |  18  ----------------------------<  178<H>  3.7   |  25  |  0.77    Ca    8.5      01 Nov 2020 03:06  Phos  2.6     11-01  Mg     2.1     11-01    TPro  7.3  /  Alb  3.3  /  TBili  0.2  /  DBili  x   /  AST  9<L>  /  ALT  14  /  AlkPhos  66  10-31          CAPILLARY BLOOD GLUCOSE      POCT Blood Glucose.: 177 mg/dL (01 Nov 2020 05:20)    PT/INR - ( 30 Oct 2020 11:47 )   PT: 11.0 sec;   INR: 0.95 ratio         PTT - ( 30 Oct 2020 11:47 )  PTT:30.0 sec    CULTURES:      Physical Examination:    General: No acute distress.      HEENT: Pupils equal, reactive to light.  Symmetric.    PULM: Clear to auscultation bilaterally, no significant sputum production    NECK: Supple, no lymphadenopathy, trachea midline    CVS: Regular rate and rhythm, no murmurs, rubs, or gallops    GI: Soft, nondistended, nontender, normoactive bowel sounds, no masses    EXT: No edema, nontender    SKIN: Warm and well perfused, no rashes noted.    NEURO: Alert, oriented, interactive, nonfocal    DEVICES:     RADIOLOGY:   < from: Xray Chest 1 View-PORTABLE IMMEDIATE (Xray Chest 1 View-PORTABLE IMMEDIATE .) (10.30.20 @ 12:26) >    EXAM:  XR CHEST PORTABLE IMMED 1V                            PROCEDURE DATE:  10/30/2020          INTERPRETATION:  Portable chest x-ray    Indication: Intubation    Comparison: 2/14/2019    Portable chest x-ray is acquired for evaluation.    Impression: New ET tube terminates at 2.0 cm above the yunier.    No evidence for acute pulmonary infiltrate, pleural effusion, or pneumothorax.    Stable cardiac silhouette.    No pulmonary vascular congestion.              NIKHIL CALHOUN MD; Attending Radiologist  This document has been electronically signed. Oct 30 2020 12:36PM    < end of copied text >    CRITICAL CARE TIME SPENT:    Patient is a 55y old  Female who presents with a chief complaint of angioedema (31 Oct 2020 16:31)      BRIEF HOSPITAL COURSE: 51 yo F with h/o MI, HTN, DM and RA presented to ER 10/30 complaining of allergic reaction. Pt reported that she had taken her Lisinopril and in the  ER tongue protrudent + voice change as per ER attending. ER unable to intubate; surgery called and pt went to OR for intubation. Uncomplicated intubation by Anesth and pt transferred to the ICU. ICU admitting dx: Angioedema  2 to ACE (-) requiring intubation.     Events last 24 hours: + cuff leak PS 5/5 with good resp mechanics- extubated on rounds, periods of bradycardia overnight reported by nursing staff   PAST MEDICAL & SURGICAL HISTORY:  Heart attack    RA (rheumatoid arthritis)    Tubal Ligation    History of Tubal Ligation    Diabetes    Hypertension    H/O heart artery stent    S/P Tubal Ligation          Medications:      diphenhydrAMINE   Injectable 50 milliGRAM(s) IV Push every 6 hours    dexMEDEtomidine Infusion 0.4 MICROgram(s)/kG/Hr IV Continuous <Continuous>  fentaNYL   Infusion. 0.5 MICROgram(s)/kG/Hr IV Continuous <Continuous>  midazolam Injectable 2 milliGRAM(s) IV Push every 2 hours PRN  propofol Infusion 50 MICROgram(s)/kG/Min IV Continuous <Continuous>      enoxaparin Injectable 40 milliGRAM(s) SubCutaneous daily    famotidine Injectable 20 milliGRAM(s) IV Push every 12 hours      dextrose 40% Gel 15 Gram(s) Oral once PRN  dextrose 50% Injectable 12.5 Gram(s) IV Push once  dextrose 50% Injectable 25 Gram(s) IV Push once  dextrose 50% Injectable 25 Gram(s) IV Push once  glucagon  Injectable 1 milliGRAM(s) IntraMuscular once PRN  insulin lispro (ADMELOG) corrective regimen sliding scale   SubCutaneous every 6 hours  methylPREDNISolone sodium succinate Injectable 40 milliGRAM(s) IV Push every 8 hours    dextrose 5%. 1000 milliLiter(s) IV Continuous <Continuous>  lactated ringers. 1000 milliLiter(s) IV Continuous <Continuous>      chlorhexidine 0.12% Liquid 15 milliLiter(s) Oral Mucosa every 12 hours  chlorhexidine 4% Liquid 1 Application(s) Topical <User Schedule>        Mode: CPAP with PS  FiO2: 30  PEEP: 5  PS: 5  MAP: 7  PIP: 10      ICU Vital Signs Last 24 Hrs  T(C): 37.2 (01 Nov 2020 07:08), Max: 37.7 (01 Nov 2020 03:00)  T(F): 99 (01 Nov 2020 07:08), Max: 99.9 (01 Nov 2020 03:00)  HR: 99 (01 Nov 2020 08:30) (36 - 99)  BP: 182/65 (01 Nov 2020 08:30) (111/83 - 182/65)  BP(mean): 94 (01 Nov 2020 08:30) (83 - 121)  ABP: --  ABP(mean): --  RR: 11 (01 Nov 2020 08:30) (11 - 19)  SpO2: 100% (01 Nov 2020 08:30) (82% - 100%)      ABG - ( 30 Oct 2020 14:15 )  pH, Arterial: x     pH, Blood: 7.41  /  pCO2: 41    /  pO2: 170   / HCO3: 25    / Base Excess: 0.8   /  SaO2: 97                  I&O's Detail    31 Oct 2020 08:01  -  01 Nov 2020 07:00  --------------------------------------------------------  IN:    Dexmedetomidine: 51.3 mL    FentaNYL: 548.7 mL    Lactated Ringers: 1800 mL    Propofol: 142.1 mL  Total IN: 2542 mL    OUT:    Voided (mL): 850 mL  Total OUT: 850 mL    Total NET: 1692 mL      01 Nov 2020 07:01  -  01 Nov 2020 08:58  --------------------------------------------------------  IN:    FentaNYL: 32.5 mL    Lactated Ringers: 75 mL  Total IN: 107.5 mL    OUT:  Total OUT: 0 mL    Total NET: 107.5 mL            LABS:                        10.0   16.79 )-----------( 309      ( 01 Nov 2020 03:06 )             29.7     11-01    137  |  104  |  18  ----------------------------<  178<H>  3.7   |  25  |  0.77    Ca    8.5      01 Nov 2020 03:06  Phos  2.6     11-01  Mg     2.1     11-01    TPro  7.3  /  Alb  3.3  /  TBili  0.2  /  DBili  x   /  AST  9<L>  /  ALT  14  /  AlkPhos  66  10-31          CAPILLARY BLOOD GLUCOSE      POCT Blood Glucose.: 177 mg/dL (01 Nov 2020 05:20)    PT/INR - ( 30 Oct 2020 11:47 )   PT: 11.0 sec;   INR: 0.95 ratio         PTT - ( 30 Oct 2020 11:47 )  PTT:30.0 sec    CULTURES:      Physical Examination:    General: No acute distress.      HEENT: Pupils equal, reactive to light.  Symmetric. no edema of tongue, no stridor noted    PULM: Clear to auscultation bilaterally, no significant sputum production    NECK: Supple, no lymphadenopathy, trachea midline    CVS: Regular rate and rhythm, no murmurs, rubs, or gallops    GI: Soft, nondistended, nontender, normoactive bowel sounds, no masses    EXT: No edema, nontender    SKIN: Warm and well perfused, no rashes noted.    NEURO: Alert, oriented, interactive, nonfocal    DEVICES: none    RADIOLOGY:   < from: Xray Chest 1 View-PORTABLE IMMEDIATE (Xray Chest 1 View-PORTABLE IMMEDIATE .) (10.30.20 @ 12:26) >    EXAM:  XR CHEST PORTABLE IMMED 1V                            PROCEDURE DATE:  10/30/2020          INTERPRETATION:  Portable chest x-ray    Indication: Intubation    Comparison: 2/14/2019    Portable chest x-ray is acquired for evaluation.    Impression: New ET tube terminates at 2.0 cm above the yunier.    No evidence for acute pulmonary infiltrate, pleural effusion, or pneumothorax.    Stable cardiac silhouette.    No pulmonary vascular congestion.              NIKHIL CALHOUN MD; Attending Radiologist  This document has been electronically signed. Oct 30 2020 12:36PM    < end of copied text >    CRITICAL CARE TIME SPENT: 35

## 2020-11-01 NOTE — PROGRESS NOTE ADULT - ASSESSMENT
ASSESSMENT/PLAN:53 yo F with h/o MI, HTN, DM and RA presented to ER 10/30 complaining of allergic reaction. Pt reported that she had taken her Lisinopril and in the  ER tongue protruding, + voice change as per ER attending. ER unable to intubate; surgery called and pt went to OR for intubation. Uncomplicated intubation by Anesthesia and pt transferred to the ICU. ICU admitting dx: 1) Angioedema due to ACEi requiring intubation 2) Prior Covid-19 infection (PCR neg Ab pos)- pt extubated on rounds .  NEURO: sleepy but appropriate, likely some residual from sedative/analgesics used while intubated- monitor mental status    Activity: [x] mobilize as tolerated [] Bedrest [] PT [] OT [] PMNR    PULM: no stridor, no active issues after extubation, continue nasal oxygen wean to off as tolerated, use incentive spirometry     CV: periods of bradycardia and tachycardia - maybe related to stress/anxiety of events, increased vagal tone, beta blocker use. currently resting with HR 50s   SBP goal: maintain 120-140  can use hydralazine if BP high in setting of bradycardia, will look to restart home beta blocker as tolerated if bradycardia ceases  place allergies as ACEi    RENAL: normal creat, voiding freely  Fluids: LR @ 75ml/h can d/c when diet started     GI: will assess bedside dysphagia screen and start diet if negative  Diet: NPO for now   GI prophylaxis [] not indicated [x] PPI [] other:  Bowel regimen [] colace [] senna [x] other:n/a    ENDO: FS monitoring with SSI pt on steroids for now will rapid taper over the next few days, on chronic prednisone at home for RA will not taper to d/c will need follow up in community for cessation of steroids   Goal euglycemia (-180)    HEME/ONC:  VTE prophylaxis: [x] SCDs [x] chemoprophylaxis [] high risk of DVT/PE on admission due to:    ID: WBC likely reactive/demargination from steroid use     MISC:    SOCIAL/FAMILY:  [x] awaiting [] updated at bedside [] family meeting    CODE STATUS:  [x] Full Code [] DNR [] DNI [] Palliative/Comfort Care    DISPOSITION:  [x] ICU [] monitored Unit [] Floor     [] Patient is at high risk of neurologic deterioration/death due to:     Time seen: 55 min  Time spent: __35_ [x] critical care minutes

## 2020-11-02 ENCOUNTER — TRANSCRIPTION ENCOUNTER (OUTPATIENT)
Age: 55
End: 2020-11-02

## 2020-11-02 VITALS
HEART RATE: 67 BPM | DIASTOLIC BLOOD PRESSURE: 80 MMHG | OXYGEN SATURATION: 100 % | SYSTOLIC BLOOD PRESSURE: 178 MMHG | RESPIRATION RATE: 14 BRPM

## 2020-11-02 LAB
ANION GAP SERPL CALC-SCNC: 5 MMOL/L — SIGNIFICANT CHANGE UP (ref 5–17)
BUN SERPL-MCNC: 14 MG/DL — SIGNIFICANT CHANGE UP (ref 7–23)
CALCIUM SERPL-MCNC: 8.9 MG/DL — SIGNIFICANT CHANGE UP (ref 8.5–10.1)
CHLORIDE SERPL-SCNC: 98 MMOL/L — SIGNIFICANT CHANGE UP (ref 96–108)
CO2 SERPL-SCNC: 30 MMOL/L — SIGNIFICANT CHANGE UP (ref 22–31)
CREAT SERPL-MCNC: 0.72 MG/DL — SIGNIFICANT CHANGE UP (ref 0.5–1.3)
GLUCOSE BLDC GLUCOMTR-MCNC: 257 MG/DL — HIGH (ref 70–99)
GLUCOSE BLDC GLUCOMTR-MCNC: 258 MG/DL — HIGH (ref 70–99)
GLUCOSE SERPL-MCNC: 235 MG/DL — HIGH (ref 70–99)
HCT VFR BLD CALC: 36.1 % — SIGNIFICANT CHANGE UP (ref 34.5–45)
HGB BLD-MCNC: 11.5 G/DL — SIGNIFICANT CHANGE UP (ref 11.5–15.5)
MAGNESIUM SERPL-MCNC: 2 MG/DL — SIGNIFICANT CHANGE UP (ref 1.6–2.6)
MCHC RBC-ENTMCNC: 28 PG — SIGNIFICANT CHANGE UP (ref 27–34)
MCHC RBC-ENTMCNC: 31.9 GM/DL — LOW (ref 32–36)
MCV RBC AUTO: 88 FL — SIGNIFICANT CHANGE UP (ref 80–100)
NRBC # BLD: 0 /100 WBCS — SIGNIFICANT CHANGE UP (ref 0–0)
PHOSPHATE SERPL-MCNC: 2.4 MG/DL — LOW (ref 2.5–4.5)
PLATELET # BLD AUTO: 311 K/UL — SIGNIFICANT CHANGE UP (ref 150–400)
POTASSIUM SERPL-MCNC: 3.9 MMOL/L — SIGNIFICANT CHANGE UP (ref 3.5–5.3)
POTASSIUM SERPL-SCNC: 3.9 MMOL/L — SIGNIFICANT CHANGE UP (ref 3.5–5.3)
RBC # BLD: 4.1 M/UL — SIGNIFICANT CHANGE UP (ref 3.8–5.2)
RBC # FLD: 13.1 % — SIGNIFICANT CHANGE UP (ref 10.3–14.5)
SODIUM SERPL-SCNC: 133 MMOL/L — LOW (ref 135–145)
WBC # BLD: 12.63 K/UL — HIGH (ref 3.8–10.5)
WBC # FLD AUTO: 12.63 K/UL — HIGH (ref 3.8–10.5)

## 2020-11-02 PROCEDURE — 99239 HOSP IP/OBS DSCHRG MGMT >30: CPT

## 2020-11-02 RX ORDER — HYDRALAZINE HCL 50 MG
5 TABLET ORAL ONCE
Refills: 0 | Status: COMPLETED | OUTPATIENT
Start: 2020-11-02 | End: 2020-11-02

## 2020-11-02 RX ORDER — LEFLUNOMIDE 10 MG/1
1 TABLET ORAL
Qty: 0 | Refills: 0 | DISCHARGE

## 2020-11-02 RX ORDER — LINEZOLID 600 MG/300ML
600 INJECTION, SOLUTION INTRAVENOUS ONCE
Refills: 0 | Status: DISCONTINUED | OUTPATIENT
Start: 2020-11-02 | End: 2020-11-02

## 2020-11-02 RX ORDER — FOLIC ACID 0.8 MG
1 TABLET ORAL
Qty: 0 | Refills: 0 | DISCHARGE

## 2020-11-02 RX ORDER — AMLODIPINE BESYLATE 2.5 MG/1
1 TABLET ORAL
Qty: 14 | Refills: 0
Start: 2020-11-02

## 2020-11-02 RX ORDER — METFORMIN HYDROCHLORIDE 850 MG/1
1 TABLET ORAL
Qty: 0 | Refills: 0 | DISCHARGE

## 2020-11-02 RX ORDER — ASPIRIN/CALCIUM CARB/MAGNESIUM 324 MG
1 TABLET ORAL
Qty: 0 | Refills: 0 | DISCHARGE

## 2020-11-02 RX ORDER — AMLODIPINE BESYLATE 2.5 MG/1
5 TABLET ORAL ONCE
Refills: 0 | Status: COMPLETED | OUTPATIENT
Start: 2020-11-02 | End: 2020-11-02

## 2020-11-02 RX ORDER — FAMOTIDINE 10 MG/ML
1 INJECTION INTRAVENOUS
Qty: 3 | Refills: 0
Start: 2020-11-02

## 2020-11-02 RX ORDER — AMLODIPINE BESYLATE 2.5 MG/1
5 TABLET ORAL ONCE
Refills: 0 | Status: DISCONTINUED | OUTPATIENT
Start: 2020-11-02 | End: 2020-11-02

## 2020-11-02 RX ORDER — METOPROLOL TARTRATE 50 MG
1 TABLET ORAL
Qty: 0 | Refills: 0 | DISCHARGE

## 2020-11-02 RX ORDER — METOPROLOL TARTRATE 50 MG
25 TABLET ORAL EVERY 12 HOURS
Refills: 0 | Status: DISCONTINUED | OUTPATIENT
Start: 2020-11-02 | End: 2020-11-02

## 2020-11-02 RX ORDER — CHOLECALCIFEROL (VITAMIN D3) 125 MCG
0 CAPSULE ORAL
Qty: 0 | Refills: 0 | DISCHARGE

## 2020-11-02 RX ORDER — DIPHENHYDRAMINE HCL 50 MG
1 CAPSULE ORAL
Qty: 3 | Refills: 0
Start: 2020-11-02

## 2020-11-02 RX ADMIN — Medication 6: at 12:09

## 2020-11-02 RX ADMIN — Medication 5 MILLIGRAM(S): at 06:59

## 2020-11-02 RX ADMIN — Medication 5 MILLIGRAM(S): at 00:28

## 2020-11-02 RX ADMIN — FAMOTIDINE 20 MILLIGRAM(S): 10 INJECTION INTRAVENOUS at 05:53

## 2020-11-02 RX ADMIN — AMLODIPINE BESYLATE 5 MILLIGRAM(S): 2.5 TABLET ORAL at 13:33

## 2020-11-02 RX ADMIN — CHLORHEXIDINE GLUCONATE 1 APPLICATION(S): 213 SOLUTION TOPICAL at 07:14

## 2020-11-02 RX ADMIN — Medication 25 MILLIGRAM(S): at 10:09

## 2020-11-02 RX ADMIN — Medication 40 MILLIGRAM(S): at 05:53

## 2020-11-02 RX ADMIN — ENOXAPARIN SODIUM 40 MILLIGRAM(S): 100 INJECTION SUBCUTANEOUS at 12:13

## 2020-11-02 RX ADMIN — Medication 6: at 08:21

## 2020-11-02 RX ADMIN — Medication 50 MILLIGRAM(S): at 05:53

## 2020-11-02 RX ADMIN — Medication 50 MILLIGRAM(S): at 12:13

## 2020-11-02 RX ADMIN — Medication 62.5 MILLIMOLE(S): at 05:53

## 2020-11-02 NOTE — DISCHARGE NOTE PROVIDER - NSDCCPCAREPLAN_GEN_ALL_CORE_FT
PRINCIPAL DISCHARGE DIAGNOSIS  Diagnosis: Angioedema, initial encounter  Assessment and Plan of Treatment:

## 2020-11-02 NOTE — DISCHARGE NOTE PROVIDER - NSDCMRMEDTOKEN_GEN_ALL_CORE_FT
Benadryl 25 mg oral capsule: 1 cap(s) orally 2 times a day, one dose to be taken tonight 11/2 and both doses (AM/PM) tomorrow 11/3.   Norvasc 5 mg oral tablet: 1 tab(s) orally once a day starting 11/3  Pepcid 20 mg oral tablet: 1 tab(s) orally 2 times a day, one dose to be taken tonight 11/2 and both doses (AM/PM) tomorrow 11/3.   predniSONE 20 mg oral tablet: 2 tab(s) orally once on the morning of 11/3 and follow up with primary care physician office as scheduled on 11/4. Thank you.

## 2020-11-02 NOTE — PROGRESS NOTE ADULT - SUBJECTIVE AND OBJECTIVE BOX
24 hr events:  extubated yesterday  doing well on RA  no dysphagia, tolerating PO diet  complete resolution of tongue and lip swelling  hypertensive norvasc 5mg added     ## ROS:  CONSTITUTIONAL: No fever, no chills, no fatigue  EYES: No eye pain, visual disturbances  ENMT:  No difficulty hearing, tinnitus, vertigo; No sinus or throat pain  NECK: No pain or stiffness  RESPIRATORY: No cough, No shortness of breath  CARDIOVASCULAR: No chest pain, palpitations  GASTROINTESTINAL: No abdominal or epigastric pain. No nausea, vomiting, No diarrhea, No melena or hematochezia.  GENITOURINARY: No dysuria, frequency, hematuria, or incontinence  NEUROLOGICAL: No headaches, loss of strength, numbness, or tremors  SKIN: No itching, burning, rashes, or lesions   LYMPH NODES: No enlarged glands  MUSCULOSKELETAL: arthritis with joint pain from time to time      ## Labs:  CBC:                        11.5   12.63 )-----------( 311      ( 02 Nov 2020 03:39 )             36.1     Chem:  11-02    133<L>  |  98  |  14  ----------------------------<  235<H>  3.9          |  30  |  0.72    Ca    8.9      02 Nov 2020 03:39  Phos  2.4     11-02  Mg     2.0     11-02        ## Medications:    metoprolol tartrate 25 milliGRAM(s) Oral every 12 hours    diphenhydrAMINE   Injectable 50 milliGRAM(s) IV Push every 6 hours    dextrose 40% Gel 15 Gram(s) Oral once PRN  dextrose 50% Injectable 12.5 Gram(s) IV Push once  dextrose 50% Injectable 25 Gram(s) IV Push once  dextrose 50% Injectable 25 Gram(s) IV Push once  glucagon  Injectable 1 milliGRAM(s) IntraMuscular once PRN  insulin lispro (ADMELOG) corrective regimen sliding scale   SubCutaneous Before meals and at bedtime    enoxaparin Injectable 40 milliGRAM(s) SubCutaneous daily    famotidine Injectable 20 milliGRAM(s) IV Push every 12 hours        ## Vitals:  T(C): 36.7 (11-02-20 @ 11:53), Max: 36.7 (11-02-20 @ 11:53)  HR: 67 (11-02-20 @ 14:00) (55 - 113)  BP: 178/80 (11-02-20 @ 14:00) (145/86 - 187/92)  BP(mean): 109 (11-02-20 @ 14:00) (89 - 151)  RR: 14 (11-02-20 @ 14:00) (10 - 20)  SpO2: 100% (11-02-20 @ 14:00) (95% - 100%)      11-01 @ 07:01  -  11-02 @ 07:00  --------------------------------------------------------  IN: 2422.5 mL / OUT: 3800 mL / NET: -1377.5 mL    11-02 @ 07:01  -  11-02 @ 18:18  --------------------------------------------------------  IN: 700 mL / OUT: 1000 mL / NET: -300 mL          ## P/E:  Gen: sitting in chair, in no apparent distress  HEENT: PERRL, EOMI  Resp: CTA B/L   CVS: RRR  Abd: soft NT/ND +BS  Ext: no c/c/e  Neuro: A&Ox3    CENTRAL LINE: [ ] YES [x ] NO  LOCATION:   DATE INSERTED:  REMOVE: [ ] YES [ ] NO      TURK: [ ] YES [x ] NO    DATE INSERTED:  REMOVE:  [ ] YES [ ] NO      A-LINE:  [ ] YES [x ] NO  LOCATION:   DATE INSERTED:  REMOVE:  [ ] YES [ ] NO  EXPLAIN:    GLOBAL ISSUE/BEST PRACTICE:  Analgesia: n/a  Sedation: n/a  HOB elevation: yes  Stress ulcer prophylaxis: pepcid  VTE prophylaxis: lovenox  Oral Care: n/a  Glycemic control: sliding scale coverage  Nutrition: diabetic low salt diet    CODE STATUS: [x ] full code  [ ] DNR  [ ] DNI  [ ] MOLST  Goals of care discussion: [ ] yes

## 2020-11-02 NOTE — DISCHARGE NOTE PROVIDER - NSDCFUSCHEDAPPT_GEN_ALL_CORE_FT
JES MCBRIDE ; 01/21/2021 ; [facility] Med Endocr [address] [facility] JES MCBRIDE ; 11/04/2020 ; NPP Internal Med [address] [facility]  JES MCBRIDE ; 01/21/2021 ; NPP Med Endocr [address] [facility]

## 2020-11-02 NOTE — DISCHARGE NOTE PROVIDER - HOSPITAL COURSE
Ms. Welsh is a 55 year old female with a PMH of HTN, MI s/p stent, DM and RA who presented to [facility] ED on 10/30 with c/o "allergic reaction," found to have angioedema likely 2/2 to lisinopril/ACEI. In ED pt noted with protruding tongue, voice changes and was found to be a difficult airway requiring anesthesia in controlled OR setting for placement of ETT (7.0). Pt was tx in ED with regimen of  epi/soumedrol/pepcid/benadryl. She was successfully extubated on the morning of 11/1 and her respiratory status and airway has been stable since and she is tolerating a PO diet. Pt has been noted to have some hypertensive episodes, which have initially been tx with low dose Hydralazine IVP, however, this AM we have reinitiated back her home Lopressor dose (25 mg BID), and added Norvasc 5 mg daily. Please follow up with this regimen as noted appropriate by her PCP.     Today, pt has been seen/examined by the ICU attending physician and is deemed stable for discharge home with the following instructions:   -One additional dose of Prednisone 40 mg orally to be taken tomorrow morning  -Benadryl 25 mg orally twice a day (one dose tonight and two tomorrow)  -Pepcid 20 mg orally twice a day (one dose tonight and two tomorrow)  -Follow up with PCP physician office, Appointment made on her behalf for Wednesday at 1:20 PM with Dr. Bennett at same office ([address] [place] [address], [address])  -continue home Metoprolol dose as before; and adding Norvasc 5 mg oral one daily until further discussion with primary care physician team  -DO NOT take Lisinopril or other ace inhibitor mediation again  -please follow up with endocrinologist for blood glucose management      Pt has been instructed to please return to the closest hospital should she have any reoccurrence of symptoms or concern for anything similar she should immediately return to the emergency department. Pt has been instructed that her airway was deemed "critical/difficult," and she has expressed verbal understanding. Any questions please do not hesitated to call us at [facility] ICU at any time. Thank you. [phone]

## 2020-11-02 NOTE — PROGRESS NOTE ADULT - ASSESSMENT
52F PMH HTN on lisinopril, DM II (on metformin), RA, CAD/MI s/p stent, presented to ER complaining of difficulty breathing and swallowing. Pt takes lisinopril twice daily and last dose was night prior to admission. No prior hx of medication allergies. On day of presentation pt noted progressive lip swelling and numbness along with dysphagia and difficulty swallowing water. Unsuccessful attempt at intubation in ED. Anesthesia and surgery called for assistance and pt taken to OR and was successfully orally intubated with 7.0 ETT by anesthesia. Transferred to ICU for angioedema likely secondary to ACE inhibitor, airway obstruction requiring intubation. Extubated 11/1, HTN, DM with hyperglycemia.    1. PULM  - doing well on room air  - d/c solumedrol  - change to prednisone 40mg daily to complete tomorrow for 5 day course of steroids  - cont with benadryl and pepcid until tomorrow  - full resolution of liip and tongue swelling    2. CV  - HTN: remains hypertensive on home beta blocker   - add Norvasc 5mg daily for BP control  - cont with asa 81mg for underlying CAD with stent    3. ENDO  - elevated blood sugar in setting of steroid use  - anticipate blood sugars to improve s/p last dose of steroids tomorrow  - pt informed and instructed on resuming metformin at home and to continue checking finger sticks  - she will make an appointment to see her endocrinologist for followup of her blood sugar    4. RHEUM  - spoke with pt regarding her RA: she states she is not currently on prednisone and is not on steroids regularly    5. GEN  - pt stable for respiratory standpoint for discharge home  - appointment with PMD office made for pt to be seen in office on Wed 11/4 for BP check and blood sugar check  - daughter picking up pt for discharge home

## 2020-11-02 NOTE — DISCHARGE NOTE NURSING/CASE MANAGEMENT/SOCIAL WORK - PATIENT PORTAL LINK FT
You can access the FollowMyHealth Patient Portal offered by [facility] by registering at the following website: [email or web address]. By joining [facility]’s FollowMyHealth portal, you will also be able to view your health information using other applications (apps) compatible with our system.

## 2020-11-02 NOTE — DISCHARGE NOTE PROVIDER - CARE PROVIDER_API CALL
Dr. Donald  [address] [place] [address]  [address]  Phone: (   )    -  Fax: (   )    -  Scheduled Appointment: 11/04/2020 01:20 PM

## 2020-11-02 NOTE — DISCHARGE NOTE PROVIDER - PROVIDER TOKENS
FREE:[LAST:[Donald],FIRST:[],PHONE:[(   )    -],FAX:[(   )    -],ADDRESS:[[address] [place] [address]  [address]],SCHEDULEDAPPT:[11/04/2020],SCHEDULEDAPPTTIME:[01:20 PM]]

## 2020-11-04 ENCOUNTER — RESULT CHARGE (OUTPATIENT)
Age: 55
End: 2020-11-04

## 2020-11-04 ENCOUNTER — APPOINTMENT (OUTPATIENT)
Dept: INTERNAL MEDICINE | Facility: CLINIC | Age: 55
End: 2020-11-04
Payer: COMMERCIAL

## 2020-11-04 ENCOUNTER — NON-APPOINTMENT (OUTPATIENT)
Age: 55
End: 2020-11-04

## 2020-11-04 VITALS
DIASTOLIC BLOOD PRESSURE: 92 MMHG | HEART RATE: 107 BPM | SYSTOLIC BLOOD PRESSURE: 160 MMHG | HEIGHT: 68 IN | OXYGEN SATURATION: 100 % | WEIGHT: 131 LBS | BODY MASS INDEX: 19.85 KG/M2

## 2020-11-04 DIAGNOSIS — T88.4XXS: ICD-10-CM

## 2020-11-04 LAB — HBA1C MFR BLD HPLC: 7.5

## 2020-11-04 PROCEDURE — 99072 ADDL SUPL MATRL&STAF TM PHE: CPT

## 2020-11-04 PROCEDURE — 99214 OFFICE O/P EST MOD 30 MIN: CPT | Mod: 25

## 2020-11-04 PROCEDURE — 83036 HEMOGLOBIN GLYCOSYLATED A1C: CPT | Mod: QW

## 2020-11-04 RX ORDER — BLOOD-GLUCOSE METER
KIT MISCELLANEOUS TWICE DAILY
Qty: 1 | Refills: 4 | Status: ACTIVE | COMMUNITY
Start: 2020-11-04 | End: 1900-01-01

## 2020-11-04 RX ORDER — ENALAPRIL MALEATE 20 MG/1
20 TABLET ORAL DAILY
Qty: 45 | Refills: 5 | Status: COMPLETED | COMMUNITY
End: 2020-10-30

## 2020-11-05 RX ORDER — BLOOD-GLUCOSE METER
W/DEVICE EACH MISCELLANEOUS
Qty: 1 | Refills: 0 | Status: ACTIVE | COMMUNITY
Start: 2020-11-04 | End: 1900-01-01

## 2020-11-05 RX ORDER — METOPROLOL TARTRATE 25 MG/1
25 TABLET, FILM COATED ORAL TWICE DAILY
Refills: 0 | Status: COMPLETED | COMMUNITY
End: 2020-11-01

## 2020-11-05 NOTE — HISTORY OF PRESENT ILLNESS
[FreeTextEntry8] : 56 yo  RA DM2  HTN noted tongue protrusion  intubated via OR  [id number]  D/C lisinopril\par \par Was on Amlodipine 10mg but given 5mg Hosp\par Never started metoprolol 25 BID as outpatient\par Didn't continue HCTZ, metformin, Januvia  and on only Amlodipine 5mg\par \par C/O R neck pain post intubation

## 2020-11-05 NOTE — ADDENDUM
[FreeTextEntry1] : \par History and Physical:\par Source of Information Chart(s)\par \par Patient Identity:\par - Birth Sex Female\par \par \par History of Present Illness:\par Reason for Admission: angioedema\par History of Present Illness:\par 52 year old female with PMH of DM II, HTN, RA, MI, presented to ER complaining\par of allergic reaction. Reported that she had taken her lisinopril about 10 am .\par In ER tongue protrudent + voice change as per ER attending. ER unable to\par intubate surgery called and patient went to OR for intubation. Patient was\par orally intubated with 7.0 ETT. Transferred to ICU for continued care.\par \par Review of Systems:\par Review of Systems: unable to assess patient sedated and intubated\par \par \par Allergies and Intolerances:\par  Allergies:\par 	lisinopril: Drug, Anaphylaxis, Angioedema\par \par Home Medications:\par * Incomplete Medication History as of 30-Oct-2020 12:28 documented in\par Structured Notes\par - predniSONE 2.5 mg oral tablet: Last Dose Taken: , 1 tab(s) orally once a\par day 3 PO daily at night x 1 week then 2 PO daily x 1 week then 1 PO daily x 1\par week\par - acetaminophen 500 mg oral tablet: Last Dose Taken: , 2 tab(s) orally every\par 6 hours\par - metFORMIN 1000 mg oral tablet: Last Dose Taken: , 1 tab(s) orally 2 times a\par day\par - metoprolol tartrate 25 mg oral tablet: Last Dose Taken: , 1 tab(s) orally 2\par times a day\par - folic acid 1 mg oral tablet: Last Dose Taken: , 1 tab(s) orally once a day\par - aspirin 325 mg oral tablet: Last Dose Taken: , 1 tab(s) orally once a day\par - enalapril 10 mg oral tablet: Last Dose Taken: , 1 tab(s) orally once a day\par - pravastatin 40 mg oral tablet: Last Dose Taken: , 1 tab(s) orally once a\par day\par - leflunomide 20 mg oral tablet: Last Dose Taken: , 1 tab(s) orally once a\par day\par - Vitamin D3: Last Dose Taken: , orally\par \par .\par \par Patient History:\par Past Medical, Past Surgical, and Family History:\par PAST MEDICAL HISTORY:\par Diabetes\par \par Heart attack\par \par History of Tubal Ligation\par \par Hypertension\par \par RA (rheumatoid arthritis)\par \par Tubal Ligation.\par \par PAST SURGICAL HISTORY:\par H/O heart artery stent\par \par S/P Tubal Ligation.\par \par Social History:\par Social History (marital status, living situation, occupation, tobacco use,\par alcohol and drug use, and sexual history): unable to assess 2/2 clinical\par condition patient intubated and sedated with critical airway\par \par Tobacco Screening:\par - Core Measure Site Yes\par - Has the patient used tobacco in the past 30 days? Unable to assess due to\par patient's cognitive impairment\par \par Risk Assessment:\par Present on Admission:\par Deep Venous Thrombosis no\par Pulmonary Embolus no\par Urinary Catheter no\par Central Venous Catheter/PICC Line no\par Surgical Site Incision no\par Pressure Ulcer(s) no\par \par Heart Failure:\par Does this patient have a history of or has been diagnosed with heart failure?\par no.\par \par HIV Screen (per [facility] Department of Health, HIV screening must be offered to\par every individual between ages 13 and 64) Unable to offer due to clinical\par condition\par \par Physical Exam:\par Reference Recent Physical Exam:\par - In accordance with current standards limiting patient contact please refer to\par the recent: Inpatient Physical Exam\par \par Physical Exam: GENERAL: well-groomed, well-developed. intubated\par HEAD: Atraumatic, Normocephalic\par EYES: EOMI, PERRLA, conjunctiva and sclera clear\par ENMT: ETT # 7 @ 22 lip Moist mucous membranes, Good dentition, No lesions\par NECK: Supple, No JVD, Normal thyroid\par NERVOUS SYSTEM: CORNEJO's Motor Strength 5/5 B/L upper and lower extremities; DTRs\par 2+ intact and symmetric\par CHEST/LUNG: ++ Rhonchi bilat no wheezing, or rubs\par HEART: Regular rate and rhythm; No murmurs, rubs, or gallops\par ABDOMEN: Soft, Nontender, Nondistended; Bowel sounds present\par EXTREMITIES: 2+ Peripheral Pulses, No clubbing, cyanosis, or edema\par LYMPH: No lymphadenopathy noted\par SKIN: No rashes or lesions\par \par \par Labs and Results:\par Labs, Radiology, Cardiology, and Other Results: CBC Full - ( 30 Oct 2020\par 11:47 )\par WBC Count : 8.34 K/uL\par RBC Count : 3.53 M/uL\par Hemoglobin : 10.2 g/dL\par Hematocrit : 30.1 %\par Platelet Count - Automated : 312 K/uL\par Mean Cell Volume : 85.3 fl\par Mean Cell Hemoglobin : 28.9 pg\par Mean Cell Hemoglobin Concentration : 33.9 gm/dL\par Auto Neutrophil # : x\par Auto Lymphocyte # : x\par Auto Monocyte # : x\par Auto Eosinophil # : x\par Auto Basophil # : x\par Auto Neutrophil % : x\par Auto Lymphocyte % : x\par Auto Monocyte % : x\par Auto Eosinophil % : x\par Auto Basophil % : x\par 10-30\par \par 134<L> | 99 | 12\par ----------------------------< 186<H>\par 3.8 | 27 | 0.80\par \par Ca 9.7 30 Oct 2020 11:47\par \par \par CAPILLARY BLOOD GLUCOSE\par \par POCT Blood Glucose.: 278 mg/dL (30 Oct 2020 17:37)\par \par PT/INR - ( 30 Oct 2020 11:47 ) PT: 11.0 sec; INR: 0.95 ratio\par PTT - ( 30 Oct 2020 11:47 ) PTT:30.0 sec\par \par ABG - ( 30 Oct 2020 14:15 )\par pH, Arterial: x pH, Blood: 7.41 / pCO2: 41 / pO2: 170 / HCO3: 25\par / Base Excess: 0.8 / SaO2: 97\par \par < from: Xray Chest 1 View-PORTABLE IMMEDIATE (Xray Chest 1 View-PORTABLE\par IMMEDIATE .) (10.30.20 @ 12:26) >\par \par Indication: Intubation\par Comparison: 2/14/2019\par \par Portable chest x-ray is acquired for evaluation.\par Impression: New ET tube terminates at 2.0 cm above the yunier.\par No evidence for acute pulmonary infiltrate, pleural effusion, or pneumothorax.\par Stable cardiac silhouette.\par No pulmonary vascular congestion.\par \par < end of copied text >\par \par Assessment and Plan:\par Assessment:\par - Assessment \par 52 year old female with PMH of DM II, HTN, RA, MI, admitted with angioedema\par likely 2/2 lisinporil. Requiring intubation in OR for critical airway. Admit\par to ICU\par \par - critical airway - mechanical ventilation\par - abg PRN, respiratory treatment,\par - follow up culture, Peridex,\par - PPI and dvt prophylaxis,\par - sedation - propofol, precedex,\par - steriods. pepcid bid and benadryl\par - hold all PO for now - 2/2 edema no ngt for now\par \par \par \par Attending Statement:\par Pt seen and examined on arrival to ICU 10/30\par \par 52F PMH HTN on lisinopril, DM II, RA, MI, presented to ER complaining of\par difficulty breathing and swallowing. Per daughter pt takes her lisinopril at\par night but this morning was noted to have tongue swelling and hoarse voice. No\par prior hx of medication allergies. Unsuccessful attempt at intubation in ED.\par Anesthesia and surgery called for assistance and pt taken to OR and was\par successfully orally intubated with 7.0 ETT by anesthesia. Transferred to ICU\par for angioedema likely secondary to ACE inhibitor, airway obstruction requiring\par intubation\par \par - mechanical ventilation\par - solumedrol, pepcid, benadryl\par - daily weaning as tolerates, check leak test prior to extubation\par - pt on arrival to ICU bucking and coughing, attempting to reach for ETT:\par sedated with fentanyl push and started on precedex drip\par - will keep pt heavier on sedation scale Rass -2 to -3 due to critical airway\par - PPI stress ulcer prophylaxis\par - lovenox for DVT prophylaxis\par - BP stable at present time, monitor BP off lisinopril\par - spoke with daughter at bedside, family updated on pt's condition and plan of\par care. also educated family that pt should not take any medications in the ACE-I\par class in future\par \par Critical Care Time: 35 min .\par \par \par Electronic Signatures:\par Briseida Staples () (Signed 01-Nov-2020 22:31)\par 	Authored: Attending Statement\par 	Co-Signer: History and Physical, History of Present Illness,\par Allergies/Medications, Patient History\par Sandie Palm (NP) (Signed 30-Oct-2020 18:27)\par 	Authored: History and Physical, History of Present Illness,\par Allergies/Medications, Patient History, Risk Assessment, Physical Exam, Labs\par and Results, Assessment and Plan\par \par \par Last Updated: 01-Nov-2020 22:31 by Briseida Staples ()\par       \par     \par  \par \par Progress Note:\par - Provider Specialty Critical Care\par \par Reason for Admission:\par Reason for Admission:\par - Reason for Admission angioedema\par \par \par - Subjective and Objective:\par 24 hr events:\par extubated yesterday\par doing well on RA\par no dysphagia, tolerating PO diet\par complete resolution of tongue and lip swelling\par hypertensive norvasc 5mg added\par \par ## ROS:\par CONSTITUTIONAL: No fever, no chills, no fatigue\par EYES: No eye pain, visual disturbances\par ENMT: No difficulty hearing, tinnitus, vertigo; No sinus or throat pain\par NECK: No pain or stiffness\par RESPIRATORY: No cough, No shortness of breath\par CARDIOVASCULAR: No chest pain, palpitations\par GASTROINTESTINAL: No abdominal or epigastric pain. No nausea, vomiting, No\par diarrhea, No melena or hematochezia.\par GENITOURINARY: No dysuria, frequency, hematuria, or incontinence\par NEUROLOGICAL: No headaches, loss of strength, numbness, or tremors\par SKIN: No itching, burning, rashes, or lesions\par LYMPH NODES: No enlarged glands\par MUSCULOSKELETAL: arthritis with joint pain from time to time\par \par \par ## Labs:\par CBC:\par \par  11.5\par 12.63 )-----------( 311 ( 02 Nov 2020 03:39 )\par  36.1\par \par Chem:\par 11-02\par \par 133<L> | 98 | 14\par ----------------------------< 235<H>\par 3.9 | 30 | 0.72\par \par Ca 8.9 02 Nov 2020 03:39\par Phos 2.4 11-02\par Mg 2.0 11-02\par \par \par \par ## Medications:\par \par metoprolol tartrate 25 milliGRAM(s) Oral every 12 hours\par \par diphenhydrAMINE Injectable 50 milliGRAM(s) IV Push every 6 hours\par \par dextrose 40% Gel 15 Gram(s) Oral once PRN\par dextrose 50% Injectable 12.5 Gram(s) IV Push once\par dextrose 50% Injectable 25 Gram(s) IV Push once\par dextrose 50% Injectable 25 Gram(s) IV Push once\par glucagon Injectable 1 milliGRAM(s) IntraMuscular once PRN\par insulin lispro (ADMELOG) corrective regimen sliding scale SubCutaneous Before\par meals and at bedtime\par \par enoxaparin Injectable 40 milliGRAM(s) SubCutaneous daily\par \par famotidine Injectable 20 milliGRAM(s) IV Push every 12 hours\par \par \par \par ## Vitals:\par T(C): 36.7 (11-02-20 @ 11:53), Max: 36.7 (11-02-20 @ 11:53)\par HR: 67 (11-02-20 @ 14:00) (55 - 113)\par BP: 178/80 (11-02-20 @ 14:00) (145/86 - 187/92)\par BP(mean): 109 (11-02-20 @ 14:00) (89 - 151)\par RR: 14 (11-02-20 @ 14:00) (10 - 20)\par SpO2: 100% (11-02-20 @ 14:00) (95% - 100%)\par \par \par 11-01 @ 07:01 - 11-02 @ 07:00\par --------------------------------------------------------\par IN: 2422.5 mL / OUT: 3800 mL / NET: -1377.5 mL\par \par 11-02 @ 07:01 - 11-02 @ 18:18\par --------------------------------------------------------\par IN: 700 mL / OUT: 1000 mL / NET: -300 mL\par \par \par \par \par ## P/E:\par Gen: sitting in chair, in no apparent distress\par HEENT: PERRL, EOMI\par Resp: CTA B/L\par CVS: RRR\par Abd: soft NT/ND +BS\par Ext: no c/c/e\par Neuro: A&Ox3\par \par CENTRAL LINE: [ ] YES [x ] NO LOCATION: DATE INSERTED:\par REMOVE: [ ] YES [ ] NO\par \par TURK: [ ] YES [x ] NO DATE INSERTED:\par REMOVE: [ ] YES [ ] NO\par \par A-LINE: [ ] YES [x ] NO LOCATION: DATE INSERTED:\par REMOVE: [ ] YES [ ] NO EXPLAIN:\par \par GLOBAL ISSUE/BEST PRACTICE:\par Analgesia: n/a\par Sedation: n/a\par HOB elevation: yes\par Stress ulcer prophylaxis: pepcid\par VTE prophylaxis: lovenox\par Oral Care: n/a\par Glycemic control: sliding scale coverage\par Nutrition: diabetic low salt diet\par \par CODE STATUS: [x ] full code [ ] DNR [ ] DNI [ ] MOLST\par Goals of care discussion: [ ] yes\par \par Assessment and Plan:\par - Assessment \par 52F PMH HTN on lisinopril, DM II (on metformin), RA, CAD/MI s/p stent,\par presented to ER complaining of difficulty breathing and swallowing. Pt takes\par lisinopril twice daily and last dose was night prior to admission. No prior hx\par of medication allergies. On day of presentation pt noted progressive lip\par swelling and numbness along with dysphagia and difficulty swallowing water.\par Unsuccessful attempt at intubation in ED. Anesthesia and surgery called for\par assistance and pt taken to OR and was successfully orally intubated with 7.0\par ETT by anesthesia. Transferred to ICU for angioedema likely secondary to ACE\par inhibitor, airway obstruction requiring intubation. Extubated 11/1, HTN, DM\par with hyperglycemia.\par \par 1. PULM\par - doing well on room air\par - d/c solumedrol\par - change to prednisone 40mg daily to complete tomorrow for 5 day course of\par steroids\par - cont with benadryl and pepcid until tomorrow\par - full resolution of liip and tongue swelling\par \par 2. CV\par - HTN: remains hypertensive on home beta blocker\par - add Norvasc 5mg daily for BP control\par - cont with asa 81mg for underlying CAD with stent\par \par 3. ENDO\par - elevated blood sugar in setting of steroid use\par - anticipate blood sugars to improve s/p last dose of steroids tomorrow\par - pt informed and instructed on resuming metformin at home and to continue\par checking finger sticks\par - she will make an appointment to see her endocrinologist for followup of her\par blood sugar\par \par 4. RHEUM\par - spoke with pt regarding her RA: she states she is not currently on prednisone\par and is not on steroids regularly\par \par 5. GEN\par - pt stable for respiratory standpoint for discharge home\par - appointment with PMD office made for pt to be seen in office on Wed 11/4 for\par BP check and blood sugar check\par - daughter picking up pt for discharge home\par \par \par \par \par Electronic Signatures:\par Briseida Staples (DO) (Signed 02-Nov-2020 18:33)\par 	Authored: Progress Note, Reason for Admission, Subjective and Objective,\par Assessment and Plan\par \par \par Last Updated: 02-Nov-2020 18:33 by Briseida Staples (DO)\par \par \par Ms. Welsh is a 55 year old female with a PMH of HTN, MI s/p stent, DM and RA\par who presented to [facility] ED on 10/30 with c/o "allergic reaction," found to have\par angioedema likely 2/2 to lisinopril/ACEI. In ED pt noted with protruding\par tongue, voice changes and was found to be a difficult airway requiring\par anesthesia in controlled OR setting for placement of ETT (7.0). Pt was tx in ED\par with regimen of epi/soumedrol/pepcid/benadryl. She was successfully extubated\par on the morning of 11/1 and her respiratory status and airway has been stable\par since and she is tolerating a PO diet. Pt has been noted to have some\par hypertensive episodes, which have initially been tx with low dose Hydralazine\par IVP, however, this AM we have reinitiated back her home Lopressor dose (25 mg\par BID), and added Norvasc 5 mg daily. Please follow up with this regimen as noted\par appropriate by her PCP.\par \par Today, pt has been seen/examined by the ICU attending physician and is deemed\par stable for discharge home with the following instructions:\par -One additional dose of Prednisone 40 mg orally to be taken tomorrow morning\par -Benadryl 25 mg orally twice a day (one dose tonight and two tomorrow)\par -Pepcid 20 mg orally twice a day (one dose tonight and two tomorrow)\par -Follow up with PCP physician office, Appointment made on her behalf for\par Wednesday at 1:[address] Bennett at same office ([address] [place] [address], [facility], [address])\par -continue home Metoprolol dose as before; and adding Norvasc 5 mg oral one\par daily until further discussion with primary care physician team\par -DO NOT take Lisinopril or other ace inhibitor mediation again\par -please follow up with endocrinologist for blood glucose management\par \par \par Pt has been instructed to please return to the closest hospital should she have\par any reoccurrence of symptoms or concern for anything similar she should\par immediately return to the emergency department. Pt has been instructed that her\par airway was deemed "critical/difficult," and she has expressed verbal\par understanding. Any questions please do not hesitated to call us at [facility] ICU at\par any time. Thank you. [phone]\par \par Med Reconciliation:\par Medication Reconciliation Status Admission Reconciliation is Completed\par Discharge Reconciliation is Completed\par Discharge Medications Benadryl 25 mg oral capsule: 1 cap(s) orally 2 times a\par day, one dose to be taken tonight 11/2 and both doses (AM/PM) tomorrow 11/3.\par Norvasc 5 mg oral tablet: 1 tab(s) orally once a day starting 11/3\par Pepcid 20 mg oral tablet: 1 tab(s) orally 2 times a day, one dose to be taken\par tonight 11/2 and both doses (AM/PM) tomorrow 11/3.\par predniSONE 20 mg oral tablet: 2 tab(s) orally once on the morning of 11/3 and\par follow up with primary care physician office as scheduled on 11/4. Thank you.\par ,\par ,\par \par Care Plan/Procedures:\par Goal(s) To get better and follow your care plan as instructed.\par Discharge Diagnoses, Assessment and Plan of Treatment PRINCIPAL DISCHARGE\par DIAGNOSIS\par Diagnosis: Angioedema, initial encounter\par Assessment and Plan of Treatment:\par \par Follow Up:\par Care Providers for Follow up (PCP/Outpatient Provider) Dr. Donald\par [address] [place] [address]\par [address]\par Phone: ( ) -\par Fax: ( ) -\par Scheduled Appointment: 11/04/2020 01:20 PM\par Patient's Scheduled Appointments JES WELSH ; 11/04/2020 ; [facility] Internal\par Med [address] [facility] JES WELSH ; 01/21/2021 ; [facility] Med Endocr [facility]\par \par Diet Instructions As tolerated, please be mindful of prior edematous/swollen\par airway.\par Activity No restrictions\par Additional Instructions Please follow up with primary care physician office (as\par scheduled) and endocrinologist. Thank you!\par \par Quality Measures:\par Does the patient have difficulty running errands alone like visiting a doctors\par office or shopping? No\par Does the patient have difficulty climbing stairs? No\par Patient Condition Stable\par Hospice Patient No\par Cognition: The patient has No difficulties\par Did the patient present with or suffer from an ischemic stroke, hemorrhagic\par stroke or TIA during this admission? No\par Has the patient had an Acute Myocardial Infarction? No\par Has the patient had a Percutaneous Coronary Intervention? No\par Tobacco Usage No\par \par Document Complete:\par Care Provider Seen in Hospital Briseida Staples\par Physician Section Complete This document is complete and the patient is ready\par for discharge.\par For questions about your prescriptions, please call: [phone]\par Is this contact telephone number correct? Yes\par \par \par \par Electronic Signatures:\par Briseida Staples (DO) (Signed 02-Nov-2020 18:36)\par 	Authored: Follow Up\par 	Co-Signer: Hospital Course, Med Reconciliation, Care Plan/Procedures, Follow\par Up, Quality Measures, Document Complete\par Pat Stahl (NP) (Signed 02-Nov-2020 14:18)\par 	Authored: Hospital Course, Med Reconciliation, Care Plan/Procedures, Follow\par Up, Quality Measures, Document Complete\par \par \par Last Updated: 02-Nov-2020 18:36 by Briseida Staples ()\par       \par     \par  \par \par       \par     \par  \par

## 2020-11-05 NOTE — PHYSICAL EXAM
[No Acute Distress] : no acute distress [Well Nourished] : well nourished [Well Developed] : well developed [Well-Appearing] : well-appearing [Normal Sclera/Conjunctiva] : normal sclera/conjunctiva [PERRL] : pupils equal round and reactive to light [EOMI] : extraocular movements intact [Normal Outer Ear/Nose] : the outer ears and nose were normal in appearance [Normal Oropharynx] : the oropharynx was normal [No JVD] : no jugular venous distention [No Lymphadenopathy] : no lymphadenopathy [Supple] : supple [Thyroid Normal, No Nodules] : the thyroid was normal and there were no nodules present [No Respiratory Distress] : no respiratory distress  [No Accessory Muscle Use] : no accessory muscle use [Clear to Auscultation] : lungs were clear to auscultation bilaterally [Normal Rate] : normal rate  [Regular Rhythm] : with a regular rhythm [Normal S1, S2] : normal S1 and S2 [No Murmur] : no murmur heard [No Carotid Bruits] : no carotid bruits [No Abdominal Bruit] : a ~M bruit was not heard ~T in the abdomen [No Varicosities] : no varicosities [Pedal Pulses Present] : the pedal pulses are present [No Edema] : there was no peripheral edema [No Palpable Aorta] : no palpable aorta [No Extremity Clubbing/Cyanosis] : no extremity clubbing/cyanosis [Soft] : abdomen soft [Non Tender] : non-tender [Non-distended] : non-distended [No Masses] : no abdominal mass palpated [No HSM] : no HSM [Normal Bowel Sounds] : normal bowel sounds [Normal Posterior Cervical Nodes] : no posterior cervical lymphadenopathy [Normal Anterior Cervical Nodes] : no anterior cervical lymphadenopathy [No CVA Tenderness] : no CVA  tenderness [No Spinal Tenderness] : no spinal tenderness [No Joint Swelling] : no joint swelling [Grossly Normal Strength/Tone] : grossly normal strength/tone [No Rash] : no rash [Coordination Grossly Intact] : coordination grossly intact [No Focal Deficits] : no focal deficits [Normal Gait] : normal gait [Normal Affect] : the affect was normal [Normal Insight/Judgement] : insight and judgment were intact [de-identified] : mild tender R side neck  No mass

## 2020-11-05 NOTE — REVIEW OF SYSTEMS
[Chest Pain] : no chest pain [Palpitations] : no palpitations [Orthopnea] : no orthopnea [Shortness Of Breath] : no shortness of breath [Wheezing] : no wheezing

## 2020-11-09 DIAGNOSIS — I10 ESSENTIAL (PRIMARY) HYPERTENSION: ICD-10-CM

## 2020-11-09 DIAGNOSIS — Z98.51 TUBAL LIGATION STATUS: ICD-10-CM

## 2020-11-09 DIAGNOSIS — Z79.82 LONG TERM (CURRENT) USE OF ASPIRIN: ICD-10-CM

## 2020-11-09 DIAGNOSIS — T88.4XXA FAILED OR DIFFICULT INTUBATION, INITIAL ENCOUNTER: ICD-10-CM

## 2020-11-09 DIAGNOSIS — Z78.1 PHYSICAL RESTRAINT STATUS: ICD-10-CM

## 2020-11-09 DIAGNOSIS — E11.65 TYPE 2 DIABETES MELLITUS WITH HYPERGLYCEMIA: ICD-10-CM

## 2020-11-09 DIAGNOSIS — Z95.5 PRESENCE OF CORONARY ANGIOPLASTY IMPLANT AND GRAFT: ICD-10-CM

## 2020-11-09 DIAGNOSIS — J98.8 OTHER SPECIFIED RESPIRATORY DISORDERS: ICD-10-CM

## 2020-11-09 DIAGNOSIS — T78.3XXA ANGIONEUROTIC EDEMA, INITIAL ENCOUNTER: ICD-10-CM

## 2020-11-09 DIAGNOSIS — T46.4X5A ADVERSE EFFECT OF ANGIOTENSIN-CONVERTING-ENZYME INHIBITORS, INITIAL ENCOUNTER: ICD-10-CM

## 2020-11-09 DIAGNOSIS — I25.10 ATHEROSCLEROTIC HEART DISEASE OF NATIVE CORONARY ARTERY WITHOUT ANGINA PECTORIS: ICD-10-CM

## 2020-11-09 DIAGNOSIS — Z79.84 LONG TERM (CURRENT) USE OF ORAL HYPOGLYCEMIC DRUGS: ICD-10-CM

## 2020-11-09 DIAGNOSIS — Z86.19 PERSONAL HISTORY OF OTHER INFECTIOUS AND PARASITIC DISEASES: ICD-10-CM

## 2020-11-09 DIAGNOSIS — M06.9 RHEUMATOID ARTHRITIS, UNSPECIFIED: ICD-10-CM

## 2020-11-09 DIAGNOSIS — R00.1 BRADYCARDIA, UNSPECIFIED: ICD-10-CM

## 2020-11-09 DIAGNOSIS — R13.10 DYSPHAGIA, UNSPECIFIED: ICD-10-CM

## 2020-11-09 DIAGNOSIS — I25.2 OLD MYOCARDIAL INFARCTION: ICD-10-CM

## 2020-11-20 DIAGNOSIS — M25.569 PAIN IN UNSPECIFIED KNEE: ICD-10-CM

## 2020-11-20 RX ORDER — MELOXICAM 7.5 MG/1
7.5 TABLET ORAL
Qty: 10 | Refills: 0 | Status: COMPLETED | COMMUNITY
Start: 2020-11-20 | End: 2020-11-30

## 2020-11-20 RX ORDER — DICLOFENAC SODIUM 1% 10 MG/G
1 GEL TOPICAL TWICE DAILY
Qty: 1 | Refills: 0 | Status: ACTIVE | COMMUNITY
Start: 2020-11-20 | End: 1900-01-01

## 2020-11-21 ENCOUNTER — EMERGENCY (EMERGENCY)
Facility: HOSPITAL | Age: 55
LOS: 0 days | Discharge: HOME | End: 2020-11-21
Attending: EMERGENCY MEDICINE
Payer: COMMERCIAL

## 2020-11-21 VITALS
HEART RATE: 105 BPM | TEMPERATURE: 98 F | OXYGEN SATURATION: 99 % | RESPIRATION RATE: 16 BRPM | WEIGHT: 130.07 LBS | DIASTOLIC BLOOD PRESSURE: 73 MMHG | HEIGHT: 68 IN | SYSTOLIC BLOOD PRESSURE: 101 MMHG

## 2020-11-21 DIAGNOSIS — Z95.5 PRESENCE OF CORONARY ANGIOPLASTY IMPLANT AND GRAFT: Chronic | ICD-10-CM

## 2020-11-21 DIAGNOSIS — M06.9 RHEUMATOID ARTHRITIS, UNSPECIFIED: ICD-10-CM

## 2020-11-21 DIAGNOSIS — E11.9 TYPE 2 DIABETES MELLITUS WITHOUT COMPLICATIONS: ICD-10-CM

## 2020-11-21 DIAGNOSIS — I10 ESSENTIAL (PRIMARY) HYPERTENSION: ICD-10-CM

## 2020-11-21 DIAGNOSIS — M54.32 SCIATICA, LEFT SIDE: ICD-10-CM

## 2020-11-21 DIAGNOSIS — M54.9 DORSALGIA, UNSPECIFIED: ICD-10-CM

## 2020-11-21 LAB
ALBUMIN SERPL ELPH-MCNC: 3.6 G/DL — SIGNIFICANT CHANGE UP (ref 3.3–5)
ALP SERPL-CCNC: 69 U/L — SIGNIFICANT CHANGE UP (ref 40–120)
ALT FLD-CCNC: 25 U/L — SIGNIFICANT CHANGE UP (ref 12–78)
ANION GAP SERPL CALC-SCNC: 12 MMOL/L — SIGNIFICANT CHANGE UP (ref 5–17)
AST SERPL-CCNC: 17 U/L — SIGNIFICANT CHANGE UP (ref 15–37)
BASOPHILS # BLD AUTO: 0.06 K/UL — SIGNIFICANT CHANGE UP (ref 0–0.2)
BASOPHILS NFR BLD AUTO: 0.6 % — SIGNIFICANT CHANGE UP (ref 0–2)
BILIRUB SERPL-MCNC: 0.3 MG/DL — SIGNIFICANT CHANGE UP (ref 0.2–1.2)
BUN SERPL-MCNC: 15 MG/DL — SIGNIFICANT CHANGE UP (ref 7–23)
CALCIUM SERPL-MCNC: 9.2 MG/DL — SIGNIFICANT CHANGE UP (ref 8.5–10.1)
CHLORIDE SERPL-SCNC: 96 MMOL/L — SIGNIFICANT CHANGE UP (ref 96–108)
CO2 SERPL-SCNC: 24 MMOL/L — SIGNIFICANT CHANGE UP (ref 22–31)
CREAT SERPL-MCNC: 0.71 MG/DL — SIGNIFICANT CHANGE UP (ref 0.5–1.3)
EOSINOPHIL # BLD AUTO: 0.14 K/UL — SIGNIFICANT CHANGE UP (ref 0–0.5)
EOSINOPHIL NFR BLD AUTO: 1.5 % — SIGNIFICANT CHANGE UP (ref 0–6)
GLUCOSE SERPL-MCNC: 190 MG/DL — HIGH (ref 70–99)
HCT VFR BLD CALC: 36.1 % — SIGNIFICANT CHANGE UP (ref 34.5–45)
HGB BLD-MCNC: 12.5 G/DL — SIGNIFICANT CHANGE UP (ref 11.5–15.5)
IMM GRANULOCYTES NFR BLD AUTO: 0.3 % — SIGNIFICANT CHANGE UP (ref 0–1.5)
LYMPHOCYTES # BLD AUTO: 2.53 K/UL — SIGNIFICANT CHANGE UP (ref 1–3.3)
LYMPHOCYTES # BLD AUTO: 27.3 % — SIGNIFICANT CHANGE UP (ref 13–44)
MAGNESIUM SERPL-MCNC: 1.8 MG/DL — SIGNIFICANT CHANGE UP (ref 1.6–2.6)
MCHC RBC-ENTMCNC: 28.2 PG — SIGNIFICANT CHANGE UP (ref 27–34)
MCHC RBC-ENTMCNC: 34.6 GM/DL — SIGNIFICANT CHANGE UP (ref 32–36)
MCV RBC AUTO: 81.5 FL — SIGNIFICANT CHANGE UP (ref 80–100)
MONOCYTES # BLD AUTO: 0.53 K/UL — SIGNIFICANT CHANGE UP (ref 0–0.9)
MONOCYTES NFR BLD AUTO: 5.7 % — SIGNIFICANT CHANGE UP (ref 2–14)
NEUTROPHILS # BLD AUTO: 5.97 K/UL — SIGNIFICANT CHANGE UP (ref 1.8–7.4)
NEUTROPHILS NFR BLD AUTO: 64.6 % — SIGNIFICANT CHANGE UP (ref 43–77)
NRBC # BLD: 0 /100 WBCS — SIGNIFICANT CHANGE UP (ref 0–0)
PLATELET # BLD AUTO: 398 K/UL — SIGNIFICANT CHANGE UP (ref 150–400)
POTASSIUM SERPL-MCNC: 3.3 MMOL/L — LOW (ref 3.5–5.3)
POTASSIUM SERPL-SCNC: 3.3 MMOL/L — LOW (ref 3.5–5.3)
PROT SERPL-MCNC: 8 GM/DL — SIGNIFICANT CHANGE UP (ref 6–8.3)
RBC # BLD: 4.43 M/UL — SIGNIFICANT CHANGE UP (ref 3.8–5.2)
RBC # FLD: 12.8 % — SIGNIFICANT CHANGE UP (ref 10.3–14.5)
SODIUM SERPL-SCNC: 132 MMOL/L — LOW (ref 135–145)
WBC # BLD: 9.26 K/UL — SIGNIFICANT CHANGE UP (ref 3.8–10.5)
WBC # FLD AUTO: 9.26 K/UL — SIGNIFICANT CHANGE UP (ref 3.8–10.5)

## 2020-11-21 PROCEDURE — 99285 EMERGENCY DEPT VISIT HI MDM: CPT

## 2020-11-21 PROCEDURE — 72131 CT LUMBAR SPINE W/O DYE: CPT | Mod: 26,MA

## 2020-11-21 RX ORDER — HYDROMORPHONE HYDROCHLORIDE 2 MG/ML
1 INJECTION INTRAMUSCULAR; INTRAVENOUS; SUBCUTANEOUS ONCE
Refills: 0 | Status: DISCONTINUED | OUTPATIENT
Start: 2020-11-21 | End: 2020-11-21

## 2020-11-21 RX ORDER — POTASSIUM CHLORIDE 20 MEQ
40 PACKET (EA) ORAL ONCE
Refills: 0 | Status: COMPLETED | OUTPATIENT
Start: 2020-11-21 | End: 2020-11-21

## 2020-11-21 RX ORDER — KETOROLAC TROMETHAMINE 30 MG/ML
30 SYRINGE (ML) INJECTION ONCE
Refills: 0 | Status: DISCONTINUED | OUTPATIENT
Start: 2020-11-21 | End: 2020-11-21

## 2020-11-21 RX ORDER — HYDROMORPHONE HYDROCHLORIDE 2 MG/ML
0.5 INJECTION INTRAMUSCULAR; INTRAVENOUS; SUBCUTANEOUS ONCE
Refills: 0 | Status: DISCONTINUED | OUTPATIENT
Start: 2020-11-21 | End: 2020-11-21

## 2020-11-21 RX ORDER — IBUPROFEN 200 MG
1 TABLET ORAL
Qty: 15 | Refills: 0
Start: 2020-11-21 | End: 2020-11-25

## 2020-11-21 RX ORDER — GABAPENTIN 400 MG/1
1 CAPSULE ORAL
Qty: 90 | Refills: 0
Start: 2020-11-21 | End: 2020-12-20

## 2020-11-21 RX ADMIN — Medication 30 MILLIGRAM(S): at 18:22

## 2020-11-21 RX ADMIN — Medication 40 MILLIEQUIVALENT(S): at 20:05

## 2020-11-21 RX ADMIN — Medication 30 MILLIGRAM(S): at 20:06

## 2020-11-21 RX ADMIN — HYDROMORPHONE HYDROCHLORIDE 0.5 MILLIGRAM(S): 2 INJECTION INTRAMUSCULAR; INTRAVENOUS; SUBCUTANEOUS at 20:06

## 2020-11-21 RX ADMIN — HYDROMORPHONE HYDROCHLORIDE 0.5 MILLIGRAM(S): 2 INJECTION INTRAMUSCULAR; INTRAVENOUS; SUBCUTANEOUS at 18:27

## 2020-11-21 NOTE — ED PROVIDER NOTE - PHYSICAL EXAMINATION
Gen: Alert, NAD  Head: NC, AT   Eyes: PERRL, EOMI, normal lids/conjunctiva  ENT: normal hearing, patent oropharynx without erythema/exudate, uvula midline, raspy voice, (secondary to intubation)  Neck: supple, no tenderness, Trachea midline  Pulm: Bilateral BS, normal resp effort, no wheeze/stridor/retractions  CV: RRR, no M/R/G, 2+ radial and dp pulses bl, no edema  Abd: soft, NT/ND, +BS, no hepatosplenomegaly  Mskel: extremities x4 with normal ROM and no joint effusions. no ctl spine ttp.   Skin: no rash, no bruising   Neuro: AAOx3, no sensory/motor deficits, CN 2-12 intact, sensory decreased sensation of left foot, (toe 3,4,5)

## 2020-11-21 NOTE — ED ADULT TRIAGE NOTE - CHIEF COMPLAINT QUOTE
56 y/o female with PMH of RA, HTN, T2DM, 1 STENT PLACED, PNA, HEAT ATTACK 5/2020. FOR THE PAST 3 DAYS PT HAS BEEN WITH EXCRUCIATING LOWER BACK PAIN THAT RADIATES DOWN THE LEFT LEG AND TOES. PT DENIES ANY FEVER OR CHILLS.

## 2020-11-21 NOTE — ED ADULT NURSE NOTE - CHIEF COMPLAINT QUOTE
54 y/o female with PMH of RA, HTN, T2DM, 1 STENT PLACED, PNA, HEAT ATTACK 5/2020. FOR THE PAST 3 DAYS PT HAS BEEN WITH EXCRUCIATING LOWER BACK PAIN THAT RADIATES DOWN THE LEFT LEG AND TOES. PT DENIES ANY FEVER OR CHILLS.

## 2020-11-21 NOTE — ED PROVIDER NOTE - CARE PROVIDER_API CALL
Jm Torres  ORTHOPAEDIC SURGERY  [address], [place] [address]  Phone: [phone]  Fax: [phone]  Follow Up Time:

## 2020-11-21 NOTE — ED PROVIDER NOTE - PATIENT PORTAL LINK FT
You can access the FollowMyHealth Patient Portal offered by [facility] by registering at the following website: [email or web address]. By joining [employer]’s FollowMyHealth portal, you will also be able to view your health information using other applications (apps) compatible with our system.

## 2020-11-21 NOTE — ED PROVIDER NOTE - NSFOLLOWUPINSTRUCTIONS_ED_ALL_ED_FT
Take the GABAPENTIN 3 times daily as prescribed to help with pain over time.     Take the PERCOCET and IBUPROFEN as needed for severe pain.      Call the SPINAL SURGEON for a follow up appointment.

## 2020-11-21 NOTE — ED PROVIDER NOTE - CLINICAL SUMMARY MEDICAL DECISION MAKING FREE TEXT BOX
Likely sciatic, will obtain, CT and give analgesia Likely sciatic, will obtain, CT and give analgesia  ct shows degenerative changes, no mass or concerning fractures. dc home with spine follow up

## 2020-11-21 NOTE — ED PROVIDER NOTE - OBJECTIVE STATEMENT
Pt is a 55 year old DM, HTN, arthritis, who presents to the ED today for back pain. Pt was intubated 3 weeks ago for an allergic reaction to a medication. Pt states she lost her voice due to intubation. For the last 3 days she was having pain of her left leg. Pt took Tylenol with no relief. Pt c/o numbness and a cold feeling in her last 3 toes. Denies headaches nausea, vomit diarrhea, body aches, fevers, generalized weakness, or neck pain. Patient denies EtOH/tobacco/illicit substance use. Pt is a 55 year old DM, HTN, arthritis, who presents to the ED today for back pain. Pt was intubated 3 weeks ago for an allergic reaction to a medication. Pt states she lost her voice due to intubation. For the last 3 days she was having pain of her left leg.  Pt c/o numbness and a cold feeling in her last 3 toes. Denies headaches nausea, vomit diarrhea, body aches, fevers, generalized weakness, or neck pain. Pt took Tylenol with no relief. Patient denies EtOH/tobacco/illicit substance use.

## 2020-11-24 ENCOUNTER — APPOINTMENT (OUTPATIENT)
Dept: OTOLARYNGOLOGY | Facility: CLINIC | Age: 55
End: 2020-11-24
Payer: COMMERCIAL

## 2020-11-24 VITALS
HEART RATE: 80 BPM | TEMPERATURE: 97.6 F | HEIGHT: 68 IN | WEIGHT: 130 LBS | SYSTOLIC BLOOD PRESSURE: 142 MMHG | DIASTOLIC BLOOD PRESSURE: 87 MMHG | BODY MASS INDEX: 19.7 KG/M2

## 2020-11-24 DIAGNOSIS — Z91.89 OTHER SPECIFIED PERSONAL RISK FACTORS, NOT ELSEWHERE CLASSIFIED: ICD-10-CM

## 2020-11-24 DIAGNOSIS — R49.0 DYSPHONIA: ICD-10-CM

## 2020-11-24 DIAGNOSIS — R07.0 PAIN IN THROAT: ICD-10-CM

## 2020-11-24 PROCEDURE — 31575 DIAGNOSTIC LARYNGOSCOPY: CPT

## 2020-11-24 PROCEDURE — 99203 OFFICE O/P NEW LOW 30 MIN: CPT | Mod: 25

## 2020-11-24 NOTE — CONSULT LETTER
[Dear  ___] : Dear  [unfilled], [Consult Letter:] : I had the pleasure of evaluating your patient, [unfilled]. [Please see my note below.] : Please see my note below. [Consult Closing:] : Thank you very much for allowing me to participate in the care of this patient.  If you have any questions, please do not hesitate to contact me. [Sincerely,] : Sincerely, [FreeTextEntry3] : Sincerely, \par \par Zohra Hannah MD\par Otolaryngology- Facial Plastics \par [address]\[place] [address]\par (P) - [phone]\par (F) - [phone]\par \par

## 2020-11-24 NOTE — REVIEW OF SYSTEMS
[As Noted in HPI] : as noted in HPI [Hoarseness] : hoarseness [Throat Pain] : throat pain [Negative] : Heme/Lymph

## 2020-11-24 NOTE — HISTORY OF PRESENT ILLNESS
[de-identified] : Patient is a 55 year old female who presents with throat discomfort. She was in the ED for a hypersensitivity reaction to lisinopril and was intubated. She reports difficulty drinking and eating. She also reports pain on the right side of her throat. She has used acetaminophen, ibuprofen, Cepacol drops, and lemon and honey tea. It soothes her throat but does not relieve her symptoms completely. She reports her voice gets hoarse as the day goes on.

## 2020-11-24 NOTE — END OF VISIT
[FreeTextEntry3] : I personally saw and examined JES MCBRIDE in detail.  I spoke to RADHA Sinha regarding the assessment and plan of care. I performed the procedures and relevant physical exam.  I have reviewed the above assessment and plan of care and I agree.  I have made changes to the body of the note wherever necessary and appropriate.

## 2020-11-24 NOTE — ASSESSMENT
[FreeTextEntry1] : Patient is a 55 year old female who presents with a history of difficult intubation, throat pain, throat discomfort, and voice hoarseness since her intubation d/t reaction to lisinopril.  Family notes she tried to self-extubate a few times while intubated, likely trauma during intubated time. Laryngoscopy notable for acid reflux, suspect sulcus - appears to have possible anterior web\par \par Plan\par - referral to voice specialist\par - rx for omeprazole\par - f/u in 3 months

## 2020-11-24 NOTE — PROCEDURE
[de-identified] : reason for laryngoscopy: unable to cooperate with mirror \par \par Fiberoptic laryngoscopy was performed on the patient.  R/b/a was explained to the patient and they agreed to proceed with procedure. Nasal cavities: clear b/l, Nasopharynx: mild erythema and swelling, Oropharynx/Hypopharynx: + lingual tonsillar hypertrophy no masses or lesions, posterior pharyngeal wall with cobblestoning.  No BOT hypertrophy, vallecula is clear of any masses or cysts, Supraglottis: epiglottis sharp with normal bilateral arytenoids, aryepiglottic folds, ventricles but with + interarytenoid edema consistent with reflux, Glottis: ?anterior web, b/l cords are mobile  Subglottis clear  No pooling of secretions in the pyriform sinus.  Airway patent\par \par Scope #: 28\par \par \par

## 2020-11-24 NOTE — REASON FOR VISIT
[Subsequent Evaluation] : a subsequent evaluation for [FreeTextEntry2] : allergic reaction to lisinopril and went to ED and was intubated

## 2020-12-10 ENCOUNTER — APPOINTMENT (OUTPATIENT)
Dept: PEDIATRIC ALLERGY IMMUNOLOGY | Facility: CLINIC | Age: 55
End: 2020-12-10
Payer: COMMERCIAL

## 2020-12-10 VITALS — HEART RATE: 99 BPM

## 2020-12-10 DIAGNOSIS — T50.905A ADVERSE EFFECT OF UNSPECIFIED DRUGS, MEDICAMENTS AND BIOLOGICAL SUBSTANCES, INITIAL ENCOUNTER: ICD-10-CM

## 2020-12-10 DIAGNOSIS — T78.3XXA ANGIONEUROTIC EDEMA, INITIAL ENCOUNTER: ICD-10-CM

## 2020-12-10 PROCEDURE — 99072 ADDL SUPL MATRL&STAF TM PHE: CPT

## 2020-12-10 PROCEDURE — 99244 OFF/OP CNSLTJ NEW/EST MOD 40: CPT

## 2020-12-10 NOTE — REVIEW OF SYSTEMS
[Nl] : Genitourinary [Immunizations are up to date] : Immunizations are up to date [Received Influenza Vaccine this Past Year] : patient has received the Influenza vaccine this past year [Swelling] : swelling

## 2020-12-10 NOTE — PHYSICAL EXAM
[Alert] : alert [Well Nourished] : well nourished [Healthy Appearance] : healthy appearance [No Acute Distress] : no acute distress [Well Developed] : well developed [Normal Pupil & Iris Size/Symmetry] : normal pupil and iris size and symmetry [No Discharge] : no discharge [No Photophobia] : no photophobia [Sclera Not Icteric] : sclera not icteric [Normal Outer Ear/Nose] : the ears and nose were normal in appearance [Supple] : the neck was supple [Normal Rate and Effort] : normal respiratory rhythm and effort [No Crackles] : no crackles [No Retractions] : no retractions [Bilateral Audible Breath Sounds] : bilateral audible breath sounds [Normal Rate] : heart rate was normal  [Normal S1, S2] : normal S1 and S2 [No murmur] : no murmur [Regular Rhythm] : with a regular rhythm [Normal Cervical Lymph Nodes] : cervical [Skin Intact] : skin intact  [No Rash] : no rash [No Skin Lesions] : no skin lesions [No Joint Swelling or Erythema] : no joint swelling or erythema [No clubbing] : no clubbing [No Edema] : no edema [No Cyanosis] : no cyanosis [Normal Mood] : mood was normal [Normal Affect] : affect was normal [Alert, Awake, Oriented as Age-Appropriate] : alert, awake, oriented as age appropriate [Wheezing] : no wheezing was heard [Not Distended] : not distended

## 2020-12-10 NOTE — REASON FOR VISIT
[Initial Consultation] : an initial consultation for [FreeTextEntry2] : adverse drug reaction/angioedema [Family Member] : family member

## 2020-12-10 NOTE — CONSULT LETTER
[Dear  ___] : Dear  [unfilled], [Consult Letter:] : I had the pleasure of evaluating your patient, [unfilled]. [Please see my note below.] : Please see my note below. [Consult Closing:] : Thank you very much for allowing me to participate in the care of this patient.  If you have any questions, please do not hesitate to contact me. [Sincerely,] : Sincerely, [FreeTextEntry2] : Dr. Scotty Bennett [FreeTextEntry3] : Elizabeth Casiano MD\par Attending Physician, Division of Allergy and Immunology\par , Departments of Medicine and Pediatrics\par [employer] [facility]\par Kamla Sadler [facility] \par [facility]

## 2020-12-10 NOTE — HISTORY OF PRESENT ILLNESS
[Asthma] : asthma [Allergic Rhinitis] : allergic rhinitis [Eczematous rashes] : eczematous rashes [Food Allergies] : food allergies [de-identified] : 55 year old female with h/o RA, HTN, CAD, DM2, presents in initial consultation for h/o adverse drug reaction/angioedema:\par \par Patient developed tongue swelling and throat tightness on 10/30/20, seen in the ED and intubated x 3 days. Patient used to be on Enalapril which was discontinued then. Patient hasn't had any angioedema since then or prior to then.\par Patient had been on Enalapril for many years, also started on Januvia 1 year ago.\par \par Patient reports h/o large local reaction with a bee sting many years ago.

## 2020-12-11 ENCOUNTER — NON-APPOINTMENT (OUTPATIENT)
Age: 55
End: 2020-12-11

## 2020-12-11 ENCOUNTER — APPOINTMENT (OUTPATIENT)
Dept: INTERNAL MEDICINE | Facility: CLINIC | Age: 55
End: 2020-12-11
Payer: COMMERCIAL

## 2020-12-11 VITALS
TEMPERATURE: 97 F | DIASTOLIC BLOOD PRESSURE: 85 MMHG | HEART RATE: 76 BPM | WEIGHT: 130 LBS | HEIGHT: 68 IN | OXYGEN SATURATION: 96 % | SYSTOLIC BLOOD PRESSURE: 142 MMHG | BODY MASS INDEX: 19.7 KG/M2

## 2020-12-11 VITALS — DIASTOLIC BLOOD PRESSURE: 90 MMHG | SYSTOLIC BLOOD PRESSURE: 150 MMHG

## 2020-12-11 DIAGNOSIS — S11.032S: ICD-10-CM

## 2020-12-11 PROCEDURE — 99214 OFFICE O/P EST MOD 30 MIN: CPT

## 2020-12-11 PROCEDURE — 99072 ADDL SUPL MATRL&STAF TM PHE: CPT

## 2020-12-11 RX ORDER — ALCOHOL ANTISEPTIC PADS
70 PADS, MEDICATED (EA) TOPICAL
Qty: 1 | Refills: 3 | Status: ACTIVE | COMMUNITY
Start: 2020-12-11 | End: 1900-01-01

## 2020-12-11 RX ORDER — IBUPROFEN 200 MG/1
200 TABLET ORAL 3 TIMES DAILY
Qty: 60 | Refills: 0 | Status: COMPLETED | COMMUNITY
Start: 2020-12-02 | End: 2020-12-11

## 2020-12-11 NOTE — HISTORY OF PRESENT ILLNESS
[FreeTextEntry1] : HTN [de-identified] : 54 yo angioedema on ACEI\par Also on Januvia\par Risk of bradykinin induced angioedema so NO ARB\par \par Seen on only amlodipine  and no HCTZ\par \par Spina stenosis  T4 T5 pinched nerve narrow L side\par \par percocet 15mg\par YANIRA 300 TID\par Motrin  D/C TODAY \par cortisone for sciatica   epidural in future\par oxycodone 1/2\par \par Pain from 5/10 to 10/10

## 2020-12-11 NOTE — ASSESSMENT
[FreeTextEntry1] : In case of hyperglycemia from injections Humulin R insulin\par Check FS 2 hours post prandial\par \par Increase HCTZ 37.5\par Change amlodipine to felodipine  C/O swelling amlodipin\par metoprolol D/C correctly by ENDO\par Consider D/C Januvia\par NO SLEEPING PILL  already oxy and YANIRA  mixture w sleeping pill may suppress respiratory drive

## 2020-12-14 ENCOUNTER — NON-APPOINTMENT (OUTPATIENT)
Age: 55
End: 2020-12-14

## 2020-12-17 ENCOUNTER — APPOINTMENT (OUTPATIENT)
Dept: ENDOCRINOLOGY | Facility: CLINIC | Age: 55
End: 2020-12-17
Payer: COMMERCIAL

## 2020-12-17 PROCEDURE — 99443: CPT

## 2020-12-17 RX ORDER — SITAGLIPTIN 100 MG/1
100 TABLET, FILM COATED ORAL DAILY
Qty: 1 | Refills: 0 | Status: DISCONTINUED | COMMUNITY
Start: 2020-06-19 | End: 2020-12-17

## 2021-01-05 ENCOUNTER — EMERGENCY (EMERGENCY)
Facility: HOSPITAL | Age: 56
LOS: 0 days | Discharge: HOME | End: 2021-01-05
Attending: EMERGENCY MEDICINE
Payer: COMMERCIAL

## 2021-01-05 VITALS
DIASTOLIC BLOOD PRESSURE: 80 MMHG | HEIGHT: 68 IN | OXYGEN SATURATION: 97 % | RESPIRATION RATE: 20 BRPM | SYSTOLIC BLOOD PRESSURE: 151 MMHG | WEIGHT: 125 LBS | HEART RATE: 113 BPM | TEMPERATURE: 99 F

## 2021-01-05 VITALS
SYSTOLIC BLOOD PRESSURE: 136 MMHG | RESPIRATION RATE: 20 BRPM | HEART RATE: 98 BPM | DIASTOLIC BLOOD PRESSURE: 79 MMHG | OXYGEN SATURATION: 96 %

## 2021-01-05 DIAGNOSIS — J18.9 PNEUMONIA, UNSPECIFIED ORGANISM: ICD-10-CM

## 2021-01-05 DIAGNOSIS — I21.9 ACUTE MYOCARDIAL INFARCTION, UNSPECIFIED: ICD-10-CM

## 2021-01-05 DIAGNOSIS — E11.9 TYPE 2 DIABETES MELLITUS WITHOUT COMPLICATIONS: ICD-10-CM

## 2021-01-05 DIAGNOSIS — I10 ESSENTIAL (PRIMARY) HYPERTENSION: ICD-10-CM

## 2021-01-05 DIAGNOSIS — Z98.51 TUBAL LIGATION STATUS: ICD-10-CM

## 2021-01-05 DIAGNOSIS — Z95.5 PRESENCE OF CORONARY ANGIOPLASTY IMPLANT AND GRAFT: Chronic | ICD-10-CM

## 2021-01-05 DIAGNOSIS — M06.9 RHEUMATOID ARTHRITIS, UNSPECIFIED: ICD-10-CM

## 2021-01-05 DIAGNOSIS — R05 COUGH: ICD-10-CM

## 2021-01-05 LAB
ALBUMIN SERPL ELPH-MCNC: 3.2 G/DL — LOW (ref 3.3–5)
ALP SERPL-CCNC: 111 U/L — SIGNIFICANT CHANGE UP (ref 40–120)
ALT FLD-CCNC: 12 U/L — SIGNIFICANT CHANGE UP (ref 12–78)
ANION GAP SERPL CALC-SCNC: 9 MMOL/L — SIGNIFICANT CHANGE UP (ref 5–17)
APTT BLD: 31.9 SEC — SIGNIFICANT CHANGE UP (ref 27.5–35.5)
AST SERPL-CCNC: 12 U/L — LOW (ref 15–37)
BASOPHILS # BLD AUTO: 0 K/UL — SIGNIFICANT CHANGE UP (ref 0–0.2)
BASOPHILS NFR BLD AUTO: 0 % — SIGNIFICANT CHANGE UP (ref 0–2)
BILIRUB SERPL-MCNC: 0.4 MG/DL — SIGNIFICANT CHANGE UP (ref 0.2–1.2)
BUN SERPL-MCNC: 12 MG/DL — SIGNIFICANT CHANGE UP (ref 7–23)
CALCIUM SERPL-MCNC: 9.6 MG/DL — SIGNIFICANT CHANGE UP (ref 8.5–10.1)
CHLORIDE SERPL-SCNC: 97 MMOL/L — SIGNIFICANT CHANGE UP (ref 96–108)
CO2 SERPL-SCNC: 26 MMOL/L — SIGNIFICANT CHANGE UP (ref 22–31)
CREAT SERPL-MCNC: 0.75 MG/DL — SIGNIFICANT CHANGE UP (ref 0.5–1.3)
EOSINOPHIL # BLD AUTO: 2.4 K/UL — HIGH (ref 0–0.5)
EOSINOPHIL NFR BLD AUTO: 15 % — HIGH (ref 0–6)
GLUCOSE SERPL-MCNC: 221 MG/DL — HIGH (ref 70–99)
HCT VFR BLD CALC: 40.4 % — SIGNIFICANT CHANGE UP (ref 34.5–45)
HGB BLD-MCNC: 13.6 G/DL — SIGNIFICANT CHANGE UP (ref 11.5–15.5)
INR BLD: 1.18 RATIO — HIGH (ref 0.88–1.16)
LYMPHOCYTES # BLD AUTO: 1.92 K/UL — SIGNIFICANT CHANGE UP (ref 1–3.3)
LYMPHOCYTES # BLD AUTO: 12 % — LOW (ref 13–44)
MANUAL SMEAR VERIFICATION: SIGNIFICANT CHANGE UP
MCHC RBC-ENTMCNC: 28.4 PG — SIGNIFICANT CHANGE UP (ref 27–34)
MCHC RBC-ENTMCNC: 33.7 GM/DL — SIGNIFICANT CHANGE UP (ref 32–36)
MCV RBC AUTO: 84.3 FL — SIGNIFICANT CHANGE UP (ref 80–100)
MONOCYTES # BLD AUTO: 0.32 K/UL — SIGNIFICANT CHANGE UP (ref 0–0.9)
MONOCYTES NFR BLD AUTO: 2 % — SIGNIFICANT CHANGE UP (ref 2–14)
NEUTROPHILS # BLD AUTO: 11.37 K/UL — HIGH (ref 1.8–7.4)
NEUTROPHILS NFR BLD AUTO: 65 % — SIGNIFICANT CHANGE UP (ref 43–77)
NEUTS BAND # BLD: 6 % — SIGNIFICANT CHANGE UP (ref 0–8)
NRBC # BLD: 0 /100 — SIGNIFICANT CHANGE UP (ref 0–0)
NRBC # BLD: SIGNIFICANT CHANGE UP /100 WBCS (ref 0–0)
NT-PROBNP SERPL-SCNC: 215 PG/ML — HIGH (ref 0–125)
PLAT MORPH BLD: NORMAL — SIGNIFICANT CHANGE UP
PLATELET # BLD AUTO: 409 K/UL — HIGH (ref 150–400)
POTASSIUM SERPL-MCNC: 3.9 MMOL/L — SIGNIFICANT CHANGE UP (ref 3.5–5.3)
POTASSIUM SERPL-SCNC: 3.9 MMOL/L — SIGNIFICANT CHANGE UP (ref 3.5–5.3)
PROT SERPL-MCNC: 8.6 GM/DL — HIGH (ref 6–8.3)
PROTHROM AB SERPL-ACNC: 13.6 SEC — SIGNIFICANT CHANGE UP (ref 10.6–13.6)
RBC # BLD: 4.79 M/UL — SIGNIFICANT CHANGE UP (ref 3.8–5.2)
RBC # FLD: 13.5 % — SIGNIFICANT CHANGE UP (ref 10.3–14.5)
RBC BLD AUTO: NORMAL — SIGNIFICANT CHANGE UP
SODIUM SERPL-SCNC: 132 MMOL/L — LOW (ref 135–145)
WBC # BLD: 16.02 K/UL — HIGH (ref 3.8–10.5)
WBC # FLD AUTO: 16.02 K/UL — HIGH (ref 3.8–10.5)

## 2021-01-05 PROCEDURE — 71045 X-RAY EXAM CHEST 1 VIEW: CPT | Mod: 26

## 2021-01-05 PROCEDURE — 99284 EMERGENCY DEPT VISIT MOD MDM: CPT

## 2021-01-05 RX ORDER — AZITHROMYCIN 500 MG/1
1 TABLET, FILM COATED ORAL
Qty: 4 | Refills: 0
Start: 2021-01-05 | End: 2021-01-08

## 2021-01-05 RX ORDER — CEFPODOXIME PROXETIL 100 MG
1 TABLET ORAL
Qty: 14 | Refills: 0
Start: 2021-01-05 | End: 2021-01-11

## 2021-01-05 RX ORDER — SODIUM CHLORIDE 9 MG/ML
1000 INJECTION INTRAMUSCULAR; INTRAVENOUS; SUBCUTANEOUS ONCE
Refills: 0 | Status: COMPLETED | OUTPATIENT
Start: 2021-01-05 | End: 2021-01-05

## 2021-01-05 RX ORDER — CEFTRIAXONE 500 MG/1
1000 INJECTION, POWDER, FOR SOLUTION INTRAMUSCULAR; INTRAVENOUS ONCE
Refills: 0 | Status: COMPLETED | OUTPATIENT
Start: 2021-01-05 | End: 2021-01-05

## 2021-01-05 RX ORDER — AZITHROMYCIN 500 MG/1
500 TABLET, FILM COATED ORAL ONCE
Refills: 0 | Status: COMPLETED | OUTPATIENT
Start: 2021-01-05 | End: 2021-01-05

## 2021-01-05 RX ADMIN — CEFTRIAXONE 100 MILLIGRAM(S): 500 INJECTION, POWDER, FOR SOLUTION INTRAMUSCULAR; INTRAVENOUS at 14:23

## 2021-01-05 RX ADMIN — Medication 50 MILLIGRAM(S): at 11:48

## 2021-01-05 RX ADMIN — AZITHROMYCIN 500 MILLIGRAM(S): 500 TABLET, FILM COATED ORAL at 14:21

## 2021-01-05 RX ADMIN — SODIUM CHLORIDE 1000 MILLILITER(S): 9 INJECTION INTRAMUSCULAR; INTRAVENOUS; SUBCUTANEOUS at 14:22

## 2021-01-05 RX ADMIN — Medication 100 MILLIGRAM(S): at 11:48

## 2021-01-05 NOTE — ED PROVIDER NOTE - OBJECTIVE STATEMENT
55 year old female w/PMH of DM, HTN, presents to the ED for non-productive cough and SOB x3 days. No CP, but has some SOB. Denies any fever/chills, abdominal pain, N/V/D or loss of smell or taste. No known COVID exposure.

## 2021-01-05 NOTE — ED PROVIDER NOTE - CLINICAL SUMMARY MEDICAL DECISION MAKING FREE TEXT BOX
DDx: COVID/pneumonia/URI, no pleuritic pain to suggest PE  Plan: CBC, CMP, COVID swab, steroids, x-ray, reassess

## 2021-01-05 NOTE — ED PROVIDER NOTE - PATIENT PORTAL LINK FT
You can access the FollowMyHealth Patient Portal offered by [facility] by registering at the following website: [email or web address]. By joining [employer]’s FollowMyHealth portal, you will also be able to view your health information using other applications (apps) compatible with our system.

## 2021-01-05 NOTE — ED PROVIDER NOTE - CARE PROVIDER_API CALL
Keon Henry  INTERNAL MEDICINE  [address] [place] [address], [address]  [place] [address]  Phone: [phone]  Fax: [phone]  Follow Up Time: 4-6 Days

## 2021-01-05 NOTE — ED PROVIDER NOTE - PROGRESS NOTE DETAILS
Patient returned call to clinic. Writer RN conveyed pre-procedure instructions and allergy pre-meds instructions. Patient verbalizes understanding and agrees with plan of care. No further questions at this time. RX submitted to patient preferred pharmacy.     Prednisone - Take 60 mg AM and PM the day before procedure and 60 mg AM day of procedure.      Allegra - Take 180 mg AM day before procedure and 180 mg AM day of procedure.     Singulair - Take 10 mg in AM day before procedure and 10 mg AM day of procedure.  
PT is breathing comfortably. Pt CXR shows RLL pna. Pt treated w abx, return precautions and isolation precautions for possible covid given. Pt ready for d/c.

## 2021-01-05 NOTE — ED ADULT NURSE NOTE - OBJECTIVE STATEMENT
A&Ox4, ambulating. pt c/o cough, tightness in chest, sob x3days, pt denies headache/dizziness/fevers/chills/cp/N/V. A&Ox4, ambulating. pt c/o nonproductive cough, tightness in chest, sob x3days. pt denies headache/dizziness/fevers/chills/cp/N/V. pt denies falls/injuries.

## 2021-01-05 NOTE — ED ADULT NURSE NOTE - NSFALLRSKHARMRISK_ED_ALL_ED
History  Chief Complaint   Patient presents with    Asthma     pt c/o asthma acting up for last 2 weeks, c/o coughing and rib/chest pain due to cough  does feel like it is harder for her to breath  is taking inhaler as well as neb at home  25-year-old female comes in for asthma exacerbation  Patient states for the past 2 weeks she has increased use of her inhaler and nebulizer  Also complains of a nonproductive cough and rib and chest pain due to the cough  Patient states like she has increased work of breathing  Denies any fever nausea or vomiting      History provided by:  Patient   used: No    Asthma   Location:  Generalized  Quality:  Wheezing  Severity:  Moderate  Onset quality:  Gradual  Duration:  1 week  Timing:  Constant  Progression:  Worsening  Chronicity:  New  Context:  History of asthma  Ineffective treatments:  See med list taking all home meds  Associated symptoms: cough and wheezing    Associated symptoms: no abdominal pain, no chest pain, no congestion, no diarrhea, no ear pain, no fatigue, no fever, no headaches, no rash, no shortness of breath and no vomiting    Cough:     Cough characteristics:  Non-productive    Sputum characteristics:  Nondescript    Severity:  Moderate    Onset quality:  Gradual    Duration:  2 weeks    Timing:  Intermittent    Progression:  Waxing and waning    Chronicity:  New  Wheezing:     Severity:  Moderate    Onset quality:  Gradual    Duration:  2 weeks    Timing:  Constant    Progression:  Worsening    Chronicity:  New      Prior to Admission Medications   Prescriptions Last Dose Informant Patient Reported? Taking?    albuterol (VENTOLIN HFA) 90 mcg/act inhaler   No No   Sig: Inhale 2 puffs every 4 (four) hours as needed for wheezing 1 for home, 1 for school   albuterol (VENTOLIN HFA) 90 mcg/act inhaler   No No   Sig: Inhale 2 puffs every 4 (four) hours as needed for wheezing 1 for home, 1 for school   fluticasone (FLOVENT HFA) 110 MCG/ACT inhaler  Mother No No   Sig: Inhale 1 puff 2 (two) times a day Rinse mouth after use  fluticasone (FLOVENT HFA) 110 MCG/ACT inhaler   No No   Sig: Inhale 1 puff 2 (two) times a day Rinse mouth after use    montelukast (SINGULAIR) 10 mg tablet   No No   Sig: Take 0 5 tablets (5 mg total) by mouth daily for 30 days      Facility-Administered Medications: None       Past Medical History:   Diagnosis Date    Asthma        No past surgical history on file  Family History   Problem Relation Age of Onset    No Known Problems Mother     No Known Problems Father     No Known Problems Brother      I have reviewed and agree with the history as documented  Social History     Tobacco Use    Smoking status: Never Smoker    Smokeless tobacco: Never Used   Substance Use Topics    Alcohol use: No    Drug use: No        Review of Systems   Constitutional: Negative for fatigue and fever  HENT: Negative for congestion and ear pain  Eyes: Negative for discharge and redness  Respiratory: Positive for cough and wheezing  Negative for apnea and shortness of breath  Cardiovascular: Negative for chest pain  Gastrointestinal: Negative for abdominal pain, diarrhea and vomiting  Endocrine: Negative for cold intolerance and polydipsia  Genitourinary: Negative for difficulty urinating and hematuria  Musculoskeletal: Negative for arthralgias and back pain  Skin: Negative for color change and rash  Allergic/Immunologic: Negative for environmental allergies and immunocompromised state  Neurological: Negative for numbness and headaches  Hematological: Negative for adenopathy  Does not bruise/bleed easily  Psychiatric/Behavioral: Negative for agitation and behavioral problems  Physical Exam  Physical Exam   Constitutional: She is oriented to person, place, and time  Vital signs are normal  She appears well-developed and well-nourished  Non-toxic appearance     HENT:   Head: Normocephalic and atraumatic  Right Ear: Tympanic membrane and external ear normal    Left Ear: Tympanic membrane and external ear normal    Nose: Nose normal  No rhinorrhea, sinus tenderness or nasal deformity  Mouth/Throat: Uvula is midline and oropharynx is clear and moist  Normal dentition  Eyes: Pupils are equal, round, and reactive to light  Conjunctivae, EOM and lids are normal  Right eye exhibits no discharge  Left eye exhibits no discharge  Neck: Trachea normal and normal range of motion  Neck supple  No JVD present  Carotid bruit is not present  Cardiovascular: Normal rate, regular rhythm, intact distal pulses and normal pulses  No extrasystoles are present  PMI is not displaced  Pulmonary/Chest: Effort normal  No accessory muscle usage  No respiratory distress  She has wheezes  She has no rhonchi  She has no rales  Abdominal: Soft  Normal appearance and bowel sounds are normal  She exhibits no mass  There is no tenderness  There is no rigidity, no rebound and no guarding  Musculoskeletal:        Right shoulder: She exhibits normal range of motion, no bony tenderness, no swelling and no deformity  Cervical back: Normal  She exhibits normal range of motion, no tenderness, no bony tenderness and no deformity  Lymphadenopathy:     She has no cervical adenopathy  She has no axillary adenopathy  Neurological: She is alert and oriented to person, place, and time  She has normal strength and normal reflexes  No cranial nerve deficit or sensory deficit  GCS eye subscore is 4  GCS verbal subscore is 5  GCS motor subscore is 6  Skin: Skin is warm and dry  No rash noted  Psychiatric: She has a normal mood and affect  Her speech is normal and behavior is normal    Nursing note and vitals reviewed        Vital Signs  ED Triage Vitals [10/22/19 0913]   Temperature Pulse Respirations Blood Pressure SpO2   98 2 °F (36 8 °C) 85 18 (!) 101/57 99 %      Temp src Heart Rate Source Patient Position - Orthostatic VS BP Location FiO2 (%)   Oral Monitor Sitting Left arm --      Pain Score       --           Vitals:    10/22/19 0913   BP: (!) 101/57   Pulse: 85   Patient Position - Orthostatic VS: Sitting         Visual Acuity      ED Medications  Medications   albuterol inhalation solution 5 mg (5 mg Nebulization Given 10/22/19 1056)   ipratropium (ATROVENT) 0 02 % inhalation solution 0 5 mg (0 5 mg Nebulization Given 10/22/19 1056)   predniSONE tablet 60 mg (60 mg Oral Given 10/22/19 1058)   ibuprofen (MOTRIN) tablet 400 mg (400 mg Oral Given 10/22/19 1059)       Diagnostic Studies  Results Reviewed     None                 XR chest 1 view   Final Result by Shailesh Fleming MD (10/22 1223)      No acute cardiopulmonary disease  Workstation performed: [id number]                    Procedures  Procedures       ED Course                               MDM  Number of Diagnoses or Management Options  Asthma exacerbation: new and requires workup     Amount and/or Complexity of Data Reviewed  Tests in the radiology section of CPT®: ordered and reviewed  Independent visualization of images, tracings, or specimens: yes (Chest x-ray within normal limits no pneumonia no rib fractures)    Patient Progress  Patient progress: stable      Disposition  Final diagnoses:   Asthma exacerbation     Time reflects when diagnosis was documented in both MDM as applicable and the Disposition within this note     Time User Action Codes Description Comment    10/22/2019 11:45 AM Cameron Arias [[id number]] Asthma exacerbation     10/22/2019 11:45 AM Cameron Arias [[id number]] Mild persistent asthma with exacerbation     10/22/2019 11:45 AM Lizabeth Farnsworth [[id number]] Allergic rhinitis, unspecified seasonality, unspecified trigger       ED Disposition     ED Disposition Condition Date/Time Comment    Discharge Stable Tue Oct 22, 2019 11:45 AM [address] [place] [address] discharge to home/self care              Follow-up Information Follow up With Specialties Details Why DO [facility] Schedule an appointment as soon as possible for a visit   1 [facility] Ted Britt [address] [address]  [phone]            Discharge Medication List as of 10/22/2019 11:52 AM      START taking these medications    Details   albuterol (2 5 mg/3 mL) 0 083 % nebulizer solution Take 1 vial (2 5 mg total) by nebulization every 6 (six) hours as needed for wheezing or shortness of breath, Starting Tue 10/22/2019, Normal      predniSONE 20 mg tablet Take 1 tablet (20 mg total) by mouth daily for 4 days Take 2tabs daily for 4 days, Starting Tue 10/22/2019, Until Sat 10/26/2019, Print         CONTINUE these medications which have CHANGED    Details   albuterol (VENTOLIN HFA) 90 mcg/act inhaler Inhale 2 puffs every 4 (four) hours as needed for wheezing 1 for home, 1 for school, Starting Tue 10/22/2019, Normal      fluticasone (FLOVENT HFA) 110 MCG/ACT inhaler Inhale 1 puff 2 (two) times a day Rinse mouth after use , Starting Tue 10/22/2019, Normal         CONTINUE these medications which have NOT CHANGED    Details   montelukast (SINGULAIR) 10 mg tablet Take 0 5 tablets (5 mg total) by mouth daily for 30 days, Starting Mon 2/11/2019, Until Wed 4/17/2019, Normal           No discharge procedures on file      ED Provider  Electronically Signed by           Maureen Barragan DO  10/22/19 2407 no

## 2021-01-06 ENCOUNTER — RX RENEWAL (OUTPATIENT)
Age: 56
End: 2021-01-06

## 2021-01-06 LAB — SARS-COV-2 RNA SPEC QL NAA+PROBE: SIGNIFICANT CHANGE UP

## 2021-01-13 ENCOUNTER — INPATIENT (INPATIENT)
Facility: HOSPITAL | Age: 56
LOS: 20 days | Discharge: HOME | End: 2021-02-03
Attending: HOSPITALIST | Admitting: HOSPITALIST
Payer: COMMERCIAL

## 2021-01-13 VITALS
OXYGEN SATURATION: 93 % | HEIGHT: 68 IN | RESPIRATION RATE: 22 BRPM | SYSTOLIC BLOOD PRESSURE: 109 MMHG | DIASTOLIC BLOOD PRESSURE: 69 MMHG | TEMPERATURE: 98 F | WEIGHT: 130.07 LBS | HEART RATE: 123 BPM

## 2021-01-13 DIAGNOSIS — Z95.5 PRESENCE OF CORONARY ANGIOPLASTY IMPLANT AND GRAFT: Chronic | ICD-10-CM

## 2021-01-13 LAB
ALBUMIN SERPL ELPH-MCNC: 2.7 G/DL — LOW (ref 3.3–5)
ALP SERPL-CCNC: 204 U/L — HIGH (ref 40–120)
ALT FLD-CCNC: 72 U/L — SIGNIFICANT CHANGE UP (ref 12–78)
ANION GAP SERPL CALC-SCNC: 11 MMOL/L — SIGNIFICANT CHANGE UP (ref 5–17)
AST SERPL-CCNC: 64 U/L — HIGH (ref 15–37)
BASOPHILS # BLD AUTO: 0.19 K/UL — SIGNIFICANT CHANGE UP (ref 0–0.2)
BASOPHILS NFR BLD AUTO: 1 % — SIGNIFICANT CHANGE UP (ref 0–2)
BILIRUB SERPL-MCNC: 0.5 MG/DL — SIGNIFICANT CHANGE UP (ref 0.2–1.2)
BUN SERPL-MCNC: 11 MG/DL — SIGNIFICANT CHANGE UP (ref 7–23)
CALCIUM SERPL-MCNC: 9.8 MG/DL — SIGNIFICANT CHANGE UP (ref 8.5–10.1)
CHLORIDE SERPL-SCNC: 91 MMOL/L — LOW (ref 96–108)
CO2 SERPL-SCNC: 25 MMOL/L — SIGNIFICANT CHANGE UP (ref 22–31)
CREAT SERPL-MCNC: 1.02 MG/DL — SIGNIFICANT CHANGE UP (ref 0.5–1.3)
D DIMER BLD IA.RAPID-MCNC: 394 NG/ML DDU — HIGH
EOSINOPHIL # BLD AUTO: 1.9 K/UL — HIGH (ref 0–0.5)
EOSINOPHIL NFR BLD AUTO: 10 % — HIGH (ref 0–6)
GLUCOSE SERPL-MCNC: 244 MG/DL — HIGH (ref 70–99)
HCT VFR BLD CALC: 37.5 % — SIGNIFICANT CHANGE UP (ref 34.5–45)
HGB BLD-MCNC: 12.3 G/DL — SIGNIFICANT CHANGE UP (ref 11.5–15.5)
LACTATE SERPL-SCNC: 2.4 MMOL/L — HIGH (ref 0.7–2)
LYMPHOCYTES # BLD AUTO: 1.14 K/UL — SIGNIFICANT CHANGE UP (ref 1–3.3)
LYMPHOCYTES # BLD AUTO: 6 % — LOW (ref 13–44)
MANUAL SMEAR VERIFICATION: SIGNIFICANT CHANGE UP
MCHC RBC-ENTMCNC: 27.7 PG — SIGNIFICANT CHANGE UP (ref 27–34)
MCHC RBC-ENTMCNC: 32.8 GM/DL — SIGNIFICANT CHANGE UP (ref 32–36)
MCV RBC AUTO: 84.5 FL — SIGNIFICANT CHANGE UP (ref 80–100)
MONOCYTES # BLD AUTO: 1.33 K/UL — HIGH (ref 0–0.9)
MONOCYTES NFR BLD AUTO: 7 % — SIGNIFICANT CHANGE UP (ref 2–14)
NEUTROPHILS # BLD AUTO: 14.42 K/UL — HIGH (ref 1.8–7.4)
NEUTROPHILS NFR BLD AUTO: 71 % — SIGNIFICANT CHANGE UP (ref 43–77)
NEUTS BAND # BLD: 5 % — SIGNIFICANT CHANGE UP (ref 0–8)
NRBC # BLD: 0 /100 — SIGNIFICANT CHANGE UP (ref 0–0)
NRBC # BLD: SIGNIFICANT CHANGE UP /100 WBCS (ref 0–0)
NT-PROBNP SERPL-SCNC: 215 PG/ML — HIGH (ref 0–125)
PLAT MORPH BLD: NORMAL — SIGNIFICANT CHANGE UP
PLATELET # BLD AUTO: 482 K/UL — HIGH (ref 150–400)
POTASSIUM SERPL-MCNC: 4.2 MMOL/L — SIGNIFICANT CHANGE UP (ref 3.5–5.3)
POTASSIUM SERPL-SCNC: 4.2 MMOL/L — SIGNIFICANT CHANGE UP (ref 3.5–5.3)
PROT SERPL-MCNC: 8.6 GM/DL — HIGH (ref 6–8.3)
RAPID RVP RESULT: SIGNIFICANT CHANGE UP
RBC # BLD: 4.44 M/UL — SIGNIFICANT CHANGE UP (ref 3.8–5.2)
RBC # FLD: 13.4 % — SIGNIFICANT CHANGE UP (ref 10.3–14.5)
RBC BLD AUTO: NORMAL — SIGNIFICANT CHANGE UP
SARS-COV-2 RNA SPEC QL NAA+PROBE: SIGNIFICANT CHANGE UP
SODIUM SERPL-SCNC: 127 MMOL/L — LOW (ref 135–145)
TROPONIN I SERPL-MCNC: <.015 NG/ML — SIGNIFICANT CHANGE UP (ref 0.01–0.04)
WBC # BLD: 18.97 K/UL — HIGH (ref 3.8–10.5)
WBC # FLD AUTO: 18.97 K/UL — HIGH (ref 3.8–10.5)

## 2021-01-13 PROCEDURE — 71275 CT ANGIOGRAPHY CHEST: CPT | Mod: 26,MC

## 2021-01-13 PROCEDURE — 99223 1ST HOSP IP/OBS HIGH 75: CPT | Mod: AI

## 2021-01-13 PROCEDURE — 71045 X-RAY EXAM CHEST 1 VIEW: CPT | Mod: 26

## 2021-01-13 PROCEDURE — 99285 EMERGENCY DEPT VISIT HI MDM: CPT

## 2021-01-13 RX ORDER — SODIUM CHLORIDE 9 MG/ML
1000 INJECTION INTRAMUSCULAR; INTRAVENOUS; SUBCUTANEOUS ONCE
Refills: 0 | Status: COMPLETED | OUTPATIENT
Start: 2021-01-13 | End: 2021-01-13

## 2021-01-13 RX ORDER — CEFTRIAXONE 500 MG/1
1000 INJECTION, POWDER, FOR SOLUTION INTRAMUSCULAR; INTRAVENOUS ONCE
Refills: 0 | Status: COMPLETED | OUTPATIENT
Start: 2021-01-13 | End: 2021-01-13

## 2021-01-13 RX ADMIN — SODIUM CHLORIDE 1000 MILLILITER(S): 9 INJECTION INTRAMUSCULAR; INTRAVENOUS; SUBCUTANEOUS at 18:26

## 2021-01-13 RX ADMIN — CEFTRIAXONE 100 MILLIGRAM(S): 500 INJECTION, POWDER, FOR SOLUTION INTRAMUSCULAR; INTRAVENOUS at 18:26

## 2021-01-13 NOTE — H&P ADULT - NSHPPHYSICALEXAM_GEN_ALL_CORE
Vital Signs Last 24 Hrs  T(C): 37.1 (14 Jan 2021 06:17), Max: 38.7 (13 Jan 2021 15:31)  T(F): 98.7 (14 Jan 2021 06:17), Max: 101.7 (13 Jan 2021 15:31)  HR: 84 (14 Jan 2021 06:17) (66 - 123)  BP: 109/64 (14 Jan 2021 06:17) (105/85 - 135/82)  BP(mean): --  RR: 17 (14 Jan 2021 06:17) (16 - 22)  SpO2: 96% (14 Jan 2021 06:17) (93% - 99%)    PHYSICAL EXAM:    GENERAL: NAD, well-groomed, well-developed  HEAD:  Atraumatic, Normocephalic  EYES: EOMI, PERRLA, conjunctiva and sclera clear  ENMT: No tonsillar erythema, exudates, or enlargement; Moist mucous membranes, No lesions  NECK: Supple, No JVD, Normal thyroid  NERVOUS SYSTEM:  Alert & Oriented X3, Good concentration; Motor Strength 5/5 B/L upper and lower extremities; DTRs 2+ intact and symmetric  CHEST/LUNG: Left Clear to percussion No rales, rhonchi, wheezing, or rubs, R decreased bs at base +rhonchi  HEART: Regular rate and rhythm; No rubs, or gallops, +S1,S2  ABDOMEN: Soft, Nontender, Nondistended; Bowel sounds present  EXTREMITIES:  2+ Peripheral Pulses, No clubbing, cyanosis, or edema  LYMPH: No cervical adenopathy  RECTAL: deferred  BREAST: deferred  : deferred  SKIN: No rashes or lesions    IMPROVE VTE Individual Risk Assessment        RISK                                                          Points  [  ] Previous VTE                                                3  [  ] Thrombophilia                                             2  [  ] Lower limb paralysis                                    2        (unable to hold up >15 seconds)    [  ] Current Cancer                                             2         (within 6 months)  [ x ] Immobilization > 24 hrs                              1  [  ] ICU/CCU stay > 24 hours                            1  [  ] Age > 60                                                    1  IMPROVE VTE Score _______1__

## 2021-01-13 NOTE — H&P ADULT - ASSESSMENT
pt w/ 54 y/o female w/pmhx of  DM II, HTN, RA, and MI comes w/complain of sob x 2 days and recent pna worsening radiographically

## 2021-01-13 NOTE — H&P ADULT - NSHPLABSRESULTS_GEN_ALL_CORE
LABS:                        12.3   18.97 )-----------( 482      ( 2021 15:22 )             37.5     -13    127<L>  |  91<L>  |  11  ----------------------------<  244<H>  4.2   |  25  |  1.02    Ca    9.8      2021 15:22    TPro  8.6<H>  /  Alb  2.7<L>  /  TBili  0.5  /  DBili  x   /  AST  64<H>  /  ALT  72  /  AlkPhos  204<H>  -      Urinalysis Basic - ( 2021 03:14 )    Color: Yellow / Appearance: Clear / S.010 / pH: x  Gluc: x / Ketone: Trace  / Bili: Negative / Urobili: Negative mg/dL   Blood: x / Protein: 30 mg/dL / Nitrite: Negative   Leuk Esterase: Trace / RBC: 0-2 /HPF / WBC 6-10   Sq Epi: x / Non Sq Epi: Few / Bacteria: Few      CAPILLARY BLOOD GLUCOSE      POCT Blood Glucose.: 174 mg/dL (2021 00:34)        RADIOLOGY & ADDITIONAL TESTS:    Imaging Personally Reviewed:  [ X] YES  [ ] NO

## 2021-01-13 NOTE — H&P ADULT - HISTORY OF PRESENT ILLNESS
Pt is a 54 y/o female w/pmhx of  DM II, HTN, RA, and MI comes w/complain of sob x 2 days.  pt was diagnosed w/CAP on 1/5/21 was sent home on azithro and cefopodoxime x7days.  pt completed abx, she was covid neg then and again today, she returns w/increasing sob over the last 2 days and pleuritic pain on deep breaths.  pt denies anyno fever, chills, +non productive cough +pleuritic pain no cp, palpitations, n/v/d/c no travels.    CTA in ed  No pulmonary emboli visualized. Consolidation in the right lower lobe with mediastinal and right hilar lymphadenopathy which may represent infection.  Pt is a 56 y/o female w/pmhx of  DM II, HTN, RA, and MI comes w/complain of sob x 2 days and marked fatigue and fever to tmax 100.2.  pt was diagnosed w/CAP on 1/5/21 was sent home on azithro and cefopodoxime x7days.  pt completed abx, she was covid neg then and again today, she returns w/increasing sob over the last 2 days and pleuritic pain on deep breaths.  pt denies any , chills, +non productive cough +pleuritic pain no cp, palpitations, n/v/d/c no travels. feels very fatigued, barely able to put on a blouse.    CTA in ed  No pulmonary emboli visualized. Consolidation in the right lower lobe with mediastinal and right hilar lymphadenopathy which may represent infection.

## 2021-01-13 NOTE — ED PROVIDER NOTE - PROGRESS NOTE DETAILS
CXR appears unchanged from 1/5. D-dimer +, CTA added. + worsening leukocytosis, lactate 2.4, IV abx ordered. CXR appears unchanged from 1/5. D-dimer elevated, CTA added. + Worsening leukocytosis, lactate 2.4, IV abx ordered. Spoke w/ pt daughter, updated to pt condition / results / need for admission. Daughter ph # [phone]

## 2021-01-13 NOTE — ED PROVIDER NOTE - CLINICAL SUMMARY MEDICAL DECISION MAKING FREE TEXT BOX
56yo F w/ PMH HTN, DM, RA, RLL PNA diagnosis 1wk ago pw worsening SOB x 2 days. + mild tachycardia, tachypnea on ED arrival. Plan: Obtain CBC, CMP, lactate, CXR, D-dimer, trop, ECG, BNP. Give IVF. Re-eval. + Worsening leukocytosis to 18, otherwise CBC, CMP w/o significant abnormalities. Lactate 2.4, IV abx given. CXR w/ worsening RLL PNA. Trop neg, ECG sinus tachy. . D-dimer elevated, CTA neg for PE. Pt will be admitted to medicine. Plan d/w pt and her daughter over the phone, they understand and agree w/ this plan. 56yo F w/ PMH HTN, DM, RA, RLL PNA diagnosis 1wk ago pw worsening SOB x 2 days. + mild tachycardia, tachypnea on ED arrival. Plan: Obtain CBC, CMP, lactate, CXR, D-dimer, trop, ECG, BNP. Give IVF. Re-eval. + Worsening leukocytosis to 18, otherwise CBC, CMP w/o significant abnormalities. Lactate 2.4, IV abx given. CXR w/ worsening RLL PNA. Trop neg, ECG sinus tachy. . D-dimer elevated, CTA neg for PE. Pt will be admitted to medicine (d/w Dr Lucero, Tele Hospitalist). Plan d/w pt and her daughter over the phone, they understand and agree w/ this plan.

## 2021-01-13 NOTE — H&P ADULT - PROBLEM SELECTOR PLAN 1
admit to medicine  start cefepime/vanco, as pt alredy treated w/azithro and 3rd gen cephalosporin  ID consult  pulm consult

## 2021-01-13 NOTE — ED ADULT NURSE NOTE - NSIMPLEMENTINTERV_GEN_ALL_ED
Implemented All Universal Safety Interventions:  [place] to call system. Call bell, personal items and telephone within reach. Instruct patient to call for assistance. Room bathroom lighting operational. Non-slip footwear when patient is off stretcher. Physically safe environment: no spills, clutter or unnecessary equipment. Stretcher in lowest position, wheels locked, appropriate side rails in place.

## 2021-01-13 NOTE — ED ADULT TRIAGE NOTE - CHIEF COMPLAINT QUOTE
Pt c/o sob since yesterday worsened today denies chest pain. pt states she was diagnosed with pneumonia with negative covid last week. Pt c/o pain to mid chest with deep inspiration

## 2021-01-13 NOTE — ED PROVIDER NOTE - PHYSICAL EXAMINATION
GEN: Awake, alert, interactive, + mild respiratory distress.   HEAD AND NECK: NC/AT. Airway patent. Neck supple.   EYES:  Clear b/l. EOMI. PERRL.   ENT: Moist mucus membranes.   CARDIAC: + mild tachycardia, regular rhythm. No evident pedal edema.    RESP/CHEST: Increased work of breathing.  Clear throughout on auscultation.  ABD: soft, non-distended, non-tender. No rebound, no guarding.   BACK: No midline spinal TTP. No CVAT.   EXTREMITIES: Moving all extremities with no apparent deformities.   SKIN: Warm, dry, intact normal color. No rash.   NEURO: AOx3, CN II-XII grossly intact, no focal deficits.   PSYCH: Appropriate mood and affect.

## 2021-01-14 DIAGNOSIS — M06.9 RHEUMATOID ARTHRITIS, UNSPECIFIED: ICD-10-CM

## 2021-01-14 DIAGNOSIS — I10 ESSENTIAL (PRIMARY) HYPERTENSION: ICD-10-CM

## 2021-01-14 DIAGNOSIS — J18.9 PNEUMONIA, UNSPECIFIED ORGANISM: ICD-10-CM

## 2021-01-14 DIAGNOSIS — E11.65 TYPE 2 DIABETES MELLITUS WITH HYPERGLYCEMIA: ICD-10-CM

## 2021-01-14 DIAGNOSIS — Z29.9 ENCOUNTER FOR PROPHYLACTIC MEASURES, UNSPECIFIED: ICD-10-CM

## 2021-01-14 LAB
ALBUMIN SERPL ELPH-MCNC: 2.3 G/DL — LOW (ref 3.3–5)
ALP SERPL-CCNC: 179 U/L — HIGH (ref 40–120)
ALT FLD-CCNC: 60 U/L — SIGNIFICANT CHANGE UP (ref 12–78)
ANION GAP SERPL CALC-SCNC: 4 MMOL/L — LOW (ref 5–17)
APPEARANCE UR: CLEAR — SIGNIFICANT CHANGE UP
AST SERPL-CCNC: 38 U/L — HIGH (ref 15–37)
BACTERIA # UR AUTO: ABNORMAL
BILIRUB SERPL-MCNC: 0.4 MG/DL — SIGNIFICANT CHANGE UP (ref 0.2–1.2)
BILIRUB UR-MCNC: NEGATIVE — SIGNIFICANT CHANGE UP
BUN SERPL-MCNC: 8 MG/DL — SIGNIFICANT CHANGE UP (ref 7–23)
CALCIUM SERPL-MCNC: 9.2 MG/DL — SIGNIFICANT CHANGE UP (ref 8.5–10.1)
CHLORIDE SERPL-SCNC: 96 MMOL/L — SIGNIFICANT CHANGE UP (ref 96–108)
CO2 SERPL-SCNC: 30 MMOL/L — SIGNIFICANT CHANGE UP (ref 22–31)
COLOR SPEC: YELLOW — SIGNIFICANT CHANGE UP
CREAT SERPL-MCNC: 0.75 MG/DL — SIGNIFICANT CHANGE UP (ref 0.5–1.3)
DIFF PNL FLD: ABNORMAL
EPI CELLS # UR: SIGNIFICANT CHANGE UP
ERYTHROCYTE [SEDIMENTATION RATE] IN BLOOD: 106 MM/HR — HIGH (ref 0–20)
GLUCOSE BLDC GLUCOMTR-MCNC: 174 MG/DL — HIGH (ref 70–99)
GLUCOSE BLDC GLUCOMTR-MCNC: 241 MG/DL — HIGH (ref 70–99)
GLUCOSE SERPL-MCNC: 263 MG/DL — HIGH (ref 70–99)
GLUCOSE UR QL: NEGATIVE MG/DL — SIGNIFICANT CHANGE UP
HCT VFR BLD CALC: 31.8 % — LOW (ref 34.5–45)
HGB BLD-MCNC: 10.5 G/DL — LOW (ref 11.5–15.5)
KETONES UR-MCNC: ABNORMAL
LACTATE SERPL-SCNC: 1.2 MMOL/L — SIGNIFICANT CHANGE UP (ref 0.7–2)
LEUKOCYTE ESTERASE UR-ACNC: ABNORMAL
MCHC RBC-ENTMCNC: 27.6 PG — SIGNIFICANT CHANGE UP (ref 27–34)
MCHC RBC-ENTMCNC: 33 GM/DL — SIGNIFICANT CHANGE UP (ref 32–36)
MCV RBC AUTO: 83.7 FL — SIGNIFICANT CHANGE UP (ref 80–100)
NITRITE UR-MCNC: NEGATIVE — SIGNIFICANT CHANGE UP
NRBC # BLD: 0 /100 WBCS — SIGNIFICANT CHANGE UP (ref 0–0)
PH UR: 5 — SIGNIFICANT CHANGE UP (ref 5–8)
PLATELET # BLD AUTO: 416 K/UL — HIGH (ref 150–400)
POTASSIUM SERPL-MCNC: 4.2 MMOL/L — SIGNIFICANT CHANGE UP (ref 3.5–5.3)
POTASSIUM SERPL-SCNC: 4.2 MMOL/L — SIGNIFICANT CHANGE UP (ref 3.5–5.3)
PROCALCITONIN SERPL-MCNC: 0.15 NG/ML — HIGH (ref 0.02–0.1)
PROT SERPL-MCNC: 7.8 GM/DL — SIGNIFICANT CHANGE UP (ref 6–8.3)
PROT UR-MCNC: 30 MG/DL
RBC # BLD: 3.8 M/UL — SIGNIFICANT CHANGE UP (ref 3.8–5.2)
RBC # FLD: 13.5 % — SIGNIFICANT CHANGE UP (ref 10.3–14.5)
RBC CASTS # UR COMP ASSIST: SIGNIFICANT CHANGE UP /HPF (ref 0–4)
SARS-COV-2 IGG SERPL QL IA: POSITIVE
SARS-COV-2 IGM SERPL IA-ACNC: 10.6 INDEX — HIGH
SODIUM SERPL-SCNC: 130 MMOL/L — LOW (ref 135–145)
SP GR SPEC: 1.01 — SIGNIFICANT CHANGE UP (ref 1.01–1.02)
UROBILINOGEN FLD QL: NEGATIVE MG/DL — SIGNIFICANT CHANGE UP
WBC # BLD: 17.74 K/UL — HIGH (ref 3.8–10.5)
WBC # FLD AUTO: 17.74 K/UL — HIGH (ref 3.8–10.5)
WBC UR QL: ABNORMAL

## 2021-01-14 PROCEDURE — 99233 SBSQ HOSP IP/OBS HIGH 50: CPT

## 2021-01-14 RX ORDER — CEFEPIME 1 G/1
2000 INJECTION, POWDER, FOR SOLUTION INTRAMUSCULAR; INTRAVENOUS ONCE
Refills: 0 | Status: COMPLETED | OUTPATIENT
Start: 2021-01-14 | End: 2021-01-14

## 2021-01-14 RX ORDER — DEXTROSE 50 % IN WATER 50 %
15 SYRINGE (ML) INTRAVENOUS ONCE
Refills: 0 | Status: DISCONTINUED | OUTPATIENT
Start: 2021-01-14 | End: 2021-01-29

## 2021-01-14 RX ORDER — CEFTRIAXONE 500 MG/1
1000 INJECTION, POWDER, FOR SOLUTION INTRAMUSCULAR; INTRAVENOUS EVERY 24 HOURS
Refills: 0 | Status: DISCONTINUED | OUTPATIENT
Start: 2021-01-14 | End: 2021-01-14

## 2021-01-14 RX ORDER — DEXTROSE 50 % IN WATER 50 %
25 SYRINGE (ML) INTRAVENOUS ONCE
Refills: 0 | Status: DISCONTINUED | OUTPATIENT
Start: 2021-01-14 | End: 2021-01-29

## 2021-01-14 RX ORDER — AMLODIPINE BESYLATE 2.5 MG/1
5 TABLET ORAL DAILY
Refills: 0 | Status: DISCONTINUED | OUTPATIENT
Start: 2021-01-14 | End: 2021-02-03

## 2021-01-14 RX ORDER — CEFEPIME 1 G/1
2000 INJECTION, POWDER, FOR SOLUTION INTRAMUSCULAR; INTRAVENOUS ONCE
Refills: 0 | Status: DISCONTINUED | OUTPATIENT
Start: 2021-01-14 | End: 2021-01-14

## 2021-01-14 RX ORDER — CEFEPIME 1 G/1
2000 INJECTION, POWDER, FOR SOLUTION INTRAMUSCULAR; INTRAVENOUS DAILY
Refills: 0 | Status: DISCONTINUED | OUTPATIENT
Start: 2021-01-15 | End: 2021-01-19

## 2021-01-14 RX ORDER — FAMOTIDINE 10 MG/ML
20 INJECTION INTRAVENOUS DAILY
Refills: 0 | Status: DISCONTINUED | OUTPATIENT
Start: 2021-01-14 | End: 2021-02-03

## 2021-01-14 RX ORDER — INSULIN LISPRO 100/ML
VIAL (ML) SUBCUTANEOUS AT BEDTIME
Refills: 0 | Status: DISCONTINUED | OUTPATIENT
Start: 2021-01-14 | End: 2021-01-29

## 2021-01-14 RX ORDER — AZITHROMYCIN 500 MG/1
TABLET, FILM COATED ORAL
Refills: 0 | Status: DISCONTINUED | OUTPATIENT
Start: 2021-01-14 | End: 2021-01-14

## 2021-01-14 RX ORDER — ACETAMINOPHEN 500 MG
650 TABLET ORAL EVERY 6 HOURS
Refills: 0 | Status: DISCONTINUED | OUTPATIENT
Start: 2021-01-14 | End: 2021-02-03

## 2021-01-14 RX ORDER — CEFEPIME 1 G/1
INJECTION, POWDER, FOR SOLUTION INTRAMUSCULAR; INTRAVENOUS
Refills: 0 | Status: DISCONTINUED | OUTPATIENT
Start: 2021-01-14 | End: 2021-01-19

## 2021-01-14 RX ORDER — SODIUM CHLORIDE 9 MG/ML
1000 INJECTION, SOLUTION INTRAVENOUS
Refills: 0 | Status: DISCONTINUED | OUTPATIENT
Start: 2021-01-14 | End: 2021-01-29

## 2021-01-14 RX ORDER — GLUCAGON INJECTION, SOLUTION 0.5 MG/.1ML
1 INJECTION, SOLUTION SUBCUTANEOUS ONCE
Refills: 0 | Status: DISCONTINUED | OUTPATIENT
Start: 2021-01-14 | End: 2021-01-29

## 2021-01-14 RX ORDER — AZITHROMYCIN 500 MG/1
500 TABLET, FILM COATED ORAL ONCE
Refills: 0 | Status: COMPLETED | OUTPATIENT
Start: 2021-01-14 | End: 2021-01-14

## 2021-01-14 RX ORDER — INSULIN LISPRO 100/ML
VIAL (ML) SUBCUTANEOUS
Refills: 0 | Status: DISCONTINUED | OUTPATIENT
Start: 2021-01-14 | End: 2021-01-29

## 2021-01-14 RX ORDER — CEFEPIME 1 G/1
INJECTION, POWDER, FOR SOLUTION INTRAMUSCULAR; INTRAVENOUS
Refills: 0 | Status: DISCONTINUED | OUTPATIENT
Start: 2021-01-14 | End: 2021-01-14

## 2021-01-14 RX ORDER — DEXTROSE 50 % IN WATER 50 %
12.5 SYRINGE (ML) INTRAVENOUS ONCE
Refills: 0 | Status: DISCONTINUED | OUTPATIENT
Start: 2021-01-14 | End: 2021-01-29

## 2021-01-14 RX ORDER — LOPERAMIDE HCL 2 MG
4 TABLET ORAL ONCE
Refills: 0 | Status: DISCONTINUED | OUTPATIENT
Start: 2021-01-14 | End: 2021-01-14

## 2021-01-14 RX ORDER — VANCOMYCIN HCL 1 G
750 VIAL (EA) INTRAVENOUS EVERY 12 HOURS
Refills: 0 | Status: DISCONTINUED | OUTPATIENT
Start: 2021-01-14 | End: 2021-01-16

## 2021-01-14 RX ADMIN — Medication 250 MILLIGRAM(S): at 10:55

## 2021-01-14 RX ADMIN — FAMOTIDINE 20 MILLIGRAM(S): 10 INJECTION INTRAVENOUS at 11:50

## 2021-01-14 RX ADMIN — Medication 650 MILLIGRAM(S): at 02:03

## 2021-01-14 RX ADMIN — AZITHROMYCIN 255 MILLIGRAM(S): 500 TABLET, FILM COATED ORAL at 03:57

## 2021-01-14 RX ADMIN — CEFEPIME 100 MILLIGRAM(S): 1 INJECTION, POWDER, FOR SOLUTION INTRAMUSCULAR; INTRAVENOUS at 10:09

## 2021-01-14 RX ADMIN — Medication 4: at 08:04

## 2021-01-14 RX ADMIN — Medication 200 MILLIGRAM(S): at 10:44

## 2021-01-14 RX ADMIN — Medication 250 MILLIGRAM(S): at 23:31

## 2021-01-14 RX ADMIN — Medication 6: at 10:52

## 2021-01-14 RX ADMIN — AMLODIPINE BESYLATE 5 MILLIGRAM(S): 2.5 TABLET ORAL at 04:01

## 2021-01-14 NOTE — CONSULT NOTE ADULT - SUBJECTIVE AND OBJECTIVE BOX
HPI:  Pt is a 54 y/o female w/pmhx of  DM II, HTN, RA, and MI comes w/complain of sob x 2 days and marked fatigue and fever to tmax 100.2.  pt was diagnosed w/CAP on 21 was sent home on azithro and cefopodoxime x7days.  pt completed abx, she was covid neg then and again today, she returns w/increasing sob over the last 2 days and pleuritic pain on deep breaths.  pt denies any , chills, +non productive cough +pleuritic pain no cp, palpitations, n/v/d/c no travels. feels very fatigued, barely able to put on a blouse.    CTA in ed  No pulmonary emboli visualized. Consolidation in the right lower lobe with mediastinal and right hilar lymphadenopathy which may represent infection.  (2021 21:31)  from [place]   here in oct ; fluid around the haert went to [place] all was ok    PAST MEDICAL & SURGICAL HISTORY:  Heart attack    RA (rheumatoid arthritis)    Tubal Ligation    History of Tubal Ligation    Diabetes    Hypertension    H/O heart artery stent    S/P Tubal Ligation        SOCHX:  no  tobacco, no -  alcohol  no prev h/o TB   FMHX: FA/MO  - non contributory        Recent Travel:    Immunizations:    Allergies    ACE inhibitors (Angioedema (Severe))  lisinopril (Anaphylaxis; Angioedema)    Intolerances        MEDICATIONS  (STANDING):  amLODIPine   Tablet 5 milliGRAM(s) Oral daily  cefepime   IVPB      dextrose 40% Gel 15 Gram(s) Oral once  dextrose 5%. 1000 milliLiter(s) (50 mL/Hr) IV Continuous <Continuous>  dextrose 5%. 1000 milliLiter(s) (100 mL/Hr) IV Continuous <Continuous>  dextrose 50% Injectable 12.5 Gram(s) IV Push once  dextrose 50% Injectable 25 Gram(s) IV Push once  dextrose 50% Injectable 25 Gram(s) IV Push once  famotidine    Tablet 20 milliGRAM(s) Oral daily  glucagon  Injectable 1 milliGRAM(s) IntraMuscular once  insulin lispro (ADMELOG) corrective regimen sliding scale   SubCutaneous three times a day before meals  insulin lispro (ADMELOG) corrective regimen sliding scale   SubCutaneous at bedtime  vancomycin  IVPB 750 milliGRAM(s) IV Intermittent every 12 hours    MEDICATIONS  (PRN):  acetaminophen   Tablet .. 650 milliGRAM(s) Oral every 6 hours PRN Temp greater or equal to 38C (100.4F), Mild Pain (1 - 3)  guaiFENesin   Syrup  (Sugar-Free) 200 milliGRAM(s) Oral every 6 hours PRN Cough      REVIEW OF SYSTEMS:  CONSTITUTIONAL: No fever,  has  weight loss, or fatigue  EYES: No eye pain, visual disturbances, or discharge  ENMT:  No difficulty hearing, tinnitus, vertigo; No sinus or throat pain  NECK: No pain or stiffness  BREASTS: No pain, masses, or nipple discharge  RESPIRATORY: pos  cough,   no wheezing, chills or hemoptysis;   has  shortness of breath  dirty phlegm   CARDIOVASCULAR: No chest pain, palpitations, dizziness, or leg swelling  GASTROINTESTINAL: No abdominal epigastric pain. No nausea, vomiting, or hematemesis; No diarrhea or constipation. No melena or hematochezia.  GENITOURINARY: No dysuria, frequency, hematuria, or incontinence  NEUROLOGICAL: No headaches, memory loss, loss of strength, numbness, or tremors  SKIN: No itching, burning, rashes, or lesions   LYMPH NODES: No enlarged glands  ENDOCRINE: No heat or cold intolerance; No hair loss  MUSCULOSKELETAL: No joint pain or swelling; No muscle, back, or extremity pain  PSYCHIATRIC: No depression, anxiety, mood swings, or difficulty sleeping  HEME/LYMPH: No easy bruising, or bleeding gums  ALLERGY AND IMMUNOLOGIC: No hives or eczema    VITAL SIGNS:    T(C): 36.9 (21 @ 12:13), Max: 38.7 (21 @ 15:31)  T(F): 98.4 (21 @ 12:13), Max: 101.7 (21 @ 15:31)  HR: 97 (21 @ 12:13) (66 - 123)  BP: 123/76 (21 @ 12:13) (105/85 - 135/82)  RR: 18 (21 @ 12:13) (16 - 22)  SpO2: 98% (21 @ 12:13) (93% - 99%)    PHYSICAL EXAM:    GENERAL: NAD, well-groomed, well-developed  thin   HEAD:  Atraumatic, Normocephalic  EYES: EOMI, PERRLA, conjunctiva and sclera clear  ENMT: No tonsillar erythema, exudates, or enlargement; Moist mucous membranes,   NECK: Supple, No JVD, Normal thyroid  NERVOUS SYSTEM:  Alert & Oriented X3, Good concentration; limited exam   CHEST/LUNG: Clear to auscultation bilaterally; No rales, rhonchi, wheezing bilaterally  HEART: Regular rate and rhythm; No murmurs, rubs, or gallops  ABDOMEN: Soft, Nontender, Nondistended; Bowel sounds present  EXTREMITIES:  2+ Peripheral Pulses, No clubbing, cyanosis, or edema  LYMPH: No lymphadenopathy noted  SKIN: No rashes or lesions  BACK: no pressor sore     LABS:                         10.5   17.74 )-----------( 416      ( 2021 11:15 )             31.8         130<L>  |  96  |  8   ----------------------------<  263<H>  4.2   |  30  |  0.75    Ca    9.2      2021 11:15    TPro  7.8  /  Alb  2.3<L>  /  TBili  0.4  /  DBili  x   /  AST  38<H>  /  ALT  60  /  AlkPhos  179<H>      LIVER FUNCTIONS - ( 2021 11:15 )  Alb: 2.3 g/dL / Pro: 7.8 gm/dL / ALK PHOS: 179 U/L / ALT: 60 U/L / AST: 38 U/L / GGT: x             Urinalysis Basic - ( 2021 03:14 )    Color: Yellow / Appearance: Clear / S.010 / pH: x  Gluc: x / Ketone: Trace  / Bili: Negative / Urobili: Negative mg/dL   Blood: x / Protein: 30 mg/dL / Nitrite: Negative   Leuk Esterase: Trace / RBC: 0-2 /HPF / WBC 6-10   Sq Epi: x / Non Sq Epi: Few / Bacteria: Few        CARDIAC MARKERS ( 2021 15:22 )  <.015 ng/mL / x     / x     / x     / x                                      Radiology:  t< from: CT Angio Chest w/ IV Cont (21 @ 17:46) >  FINDINGS:    LUNGS AND AIRWAYS: Patent central airways.  Consolidation in the right lower lobe.  PLEURA: No pleural effusion.  MEDIASTINUM AND MASHA: Mediastinal lymphadenopathy measuring up to 1.2 cm in greatest short axis in the precarinal region and 1.4 cm in greatest short axis in the subcarinal region. Right hilar lymphadenopathy which is difficult to measure and separate from the consolidation. More superiorly, it measures up to 1.6 cm in greatest short axis.  VESSELS: No pulmonary emboli visualized.  HEART: Heart size is normal. No pericardial effusion.  CHEST WALL AND LOWER NECK: Heterogeneity in the right lobe of the thyroid gland with low-attenuation nodules measuring up to 1.1 cm. This may be further evaluated with thyroid ultrasound.  VISUALIZED UPPER ABDOMEN: Within normal limits.  BONES: Minimal degenerative changes of bone.    IMPRESSION:  No pulmonary emboli visualized. Consolidation in the right lower lobe with mediastinal and right hilar lymphadenopathy which may represent infection. Underlying neoplastic process cannot BE excluded and follow-up is recommended.              HERB STALEY MD; Attending Radiologist  This document has been electronically signed. 2021  6:04PM    < end of copied text >

## 2021-01-14 NOTE — PROGRESS NOTE ADULT - SUBJECTIVE AND OBJECTIVE BOX
Patient is a 55y old  Female who presents with a chief complaint of sob (2021 21:31)      INTERVAL HPI/OVERNIGHT EVENTS: no events     MEDICATIONS  (STANDING):  amLODIPine   Tablet 5 milliGRAM(s) Oral daily  cefepime   IVPB      dextrose 40% Gel 15 Gram(s) Oral once  dextrose 5%. 1000 milliLiter(s) (50 mL/Hr) IV Continuous <Continuous>  dextrose 5%. 1000 milliLiter(s) (100 mL/Hr) IV Continuous <Continuous>  dextrose 50% Injectable 12.5 Gram(s) IV Push once  dextrose 50% Injectable 25 Gram(s) IV Push once  dextrose 50% Injectable 25 Gram(s) IV Push once  famotidine    Tablet 20 milliGRAM(s) Oral daily  glucagon  Injectable 1 milliGRAM(s) IntraMuscular once  insulin lispro (ADMELOG) corrective regimen sliding scale   SubCutaneous three times a day before meals  insulin lispro (ADMELOG) corrective regimen sliding scale   SubCutaneous at bedtime  vancomycin  IVPB 750 milliGRAM(s) IV Intermittent every 12 hours    MEDICATIONS  (PRN):  acetaminophen   Tablet .. 650 milliGRAM(s) Oral every 6 hours PRN Temp greater or equal to 38C (100.4F), Mild Pain (1 - 3)  guaiFENesin   Syrup  (Sugar-Free) 200 milliGRAM(s) Oral every 6 hours PRN Cough      Allergies    ACE inhibitors (Angioedema (Severe))  lisinopril (Anaphylaxis; Angioedema)    Intolerances           Vital Signs Last 24 Hrs  T(C): 36.9 (2021 12:13), Max: 38.7 (2021 15:31)  T(F): 98.4 (2021 12:13), Max: 101.7 (2021 15:31)  HR: 97 (2021 12:13) (66 - 123)  BP: 123/76 (2021 12:13) (105/85 - 135/82)  BP(mean): --  RR: 18 (2021 12:13) (16 - 22)  SpO2: 98% (2021 12:13) (93% - 99%)    PHYSICAL EXAM:  GENERAL: NAD, well-groomed, well-developed  HEAD:  Atraumatic, Normocephalic  EYES: EOMI, PERRLA, conjunctiva and sclera clear  ENMT: No tonsillar erythema, exudates, or enlargement; Moist mucous membranes, Good dentition, No lesions  NECK: Supple, No JVD, Normal thyroid  NERVOUS SYSTEM:  Alert & Oriented X3, Good concentration; Motor Strength 5/5 B/L upper and lower extremities; DTRs 2+ intact and symmetric  CHEST/LUNG: Clear to percussion bilaterally; No rales, rhonchi, wheezing, or rubs  HEART: Regular rate and rhythm; No murmurs, rubs, or gallops  ABDOMEN: Soft, Nontender, Nondistended; Bowel sounds present  EXTREMITIES:  2+ Peripheral Pulses, No clubbing, cyanosis, or edema  LYMPH: No lymphadenopathy noted  SKIN: No rashes or lesions    LABS:                        10.5   17.74 )-----------( 416      ( 2021 11:15 )             31.8     14    130<L>  |  96  |  8   ----------------------------<  263<H>  4.2   |  30  |  0.75    Ca    9.2      2021 11:15    TPro  7.8  /  Alb  2.3<L>  /  TBili  0.4  /  DBili  x   /  AST  38<H>  /  ALT  60  /  AlkPhos  179<H>        Urinalysis Basic - ( 2021 03:14 )    Color: Yellow / Appearance: Clear / S.010 / pH: x  Gluc: x / Ketone: Trace  / Bili: Negative / Urobili: Negative mg/dL   Blood: x / Protein: 30 mg/dL / Nitrite: Negative   Leuk Esterase: Trace / RBC: 0-2 /HPF / WBC 6-10   Sq Epi: x / Non Sq Epi: Few / Bacteria: Few      CAPILLARY BLOOD GLUCOSE      POCT Blood Glucose.: 296 mg/dL (2021 10:50)  POCT Blood Glucose.: 241 mg/dL (2021 07:33)  POCT Blood Glucose.: 174 mg/dL (2021 00:34)      RADIOLOGY & ADDITIONAL TESTS:    Imaging Personally Reviewed:  [ X] YES  [ ] NO    Consultant(s) Notes Reviewed:  [ X] YES  [ ] NO    Care Discussed with Consultants/Other Providers [X ] YES  [ ] NO

## 2021-01-14 NOTE — CONSULT NOTE ADULT - ASSESSMENT
community acquired pneumonia  failed outpt antibiotics treatment   no effusion   agree with trial of broad spectrum antibiotics   esr  qgtb   h/o covid last year   h/o ace inhibitor induced angioedema   all prev chart notes noted

## 2021-01-14 NOTE — CONSULT NOTE ADULT - SUBJECTIVE AND OBJECTIVE BOX
Patient is a 55y old  Female who presents with a chief complaint of sob (2021 14:08)    HPI:  Pt is a 54 y/o female w/pmhx of  DM II, HTN, RA, and MI comes w/complain of sob x 2 days and marked fatigue and fever to tmax 100.2.  pt was diagnosed w/CAP on 21 was sent home on azithro and cefopodoxime x7days.  pt completed abx, she was covid neg then and again today, she returns w/increasing sob over the last 2 days and pleuritic pain on deep breaths.  pt denies any , chills, +non productive cough +pleuritic pain no cp, palpitations, n/v/d/c no travels. feels very fatigued, barely able to put on a blouse.  Non smoker. Reports weight loss over few months,    CTA in ed  No pulmonary emboli visualized. Consolidation in the right lower lobe with mediastinal and right hilar lymphadenopathy which may represent infection.  (2021 21:31).  Admitted with Pneumonia, leukocytosis.    PAST MEDICAL & SURGICAL HISTORY:  Heart attack    RA (rheumatoid arthritis)    Tubal Ligation    History of Tubal Ligation    Diabetes    Hypertension    H/O heart artery stent    S/P Tubal Ligation    FAMILY HISTORY:  No pertinent family history in first degree relatives    SOCIAL HISTORY: BMI (kg/m2): 20.1 (- @ 03:25). non smoker    Allergies  ACE inhibitors (Angioedema (Severe))  lisinopril (Anaphylaxis; Angioedema)    MEDICATIONS  (STANDING):  amLODIPine   Tablet 5 milliGRAM(s) Oral daily  cefepime   IVPB      dextrose 40% Gel 15 Gram(s) Oral once  dextrose 5%. 1000 milliLiter(s) (50 mL/Hr) IV Continuous <Continuous>  dextrose 5%. 1000 milliLiter(s) (100 mL/Hr) IV Continuous <Continuous>  dextrose 50% Injectable 12.5 Gram(s) IV Push once  dextrose 50% Injectable 25 Gram(s) IV Push once  dextrose 50% Injectable 25 Gram(s) IV Push once  famotidine    Tablet 20 milliGRAM(s) Oral daily  glucagon  Injectable 1 milliGRAM(s) IntraMuscular once  insulin lispro (ADMELOG) corrective regimen sliding scale   SubCutaneous three times a day before meals  insulin lispro (ADMELOG) corrective regimen sliding scale   SubCutaneous at bedtime  vancomycin  IVPB 750 milliGRAM(s) IV Intermittent every 12 hours    MEDICATIONS  (PRN):  acetaminophen   Tablet .. 650 milliGRAM(s) Oral every 6 hours PRN Temp greater or equal to 38C (100.4F), Mild Pain (1 - 3)  guaiFENesin   Syrup  (Sugar-Free) 200 milliGRAM(s) Oral every 6 hours PRN Cough    REVIEW OF SYSTEMS:    Constitutional:            fever, weight loss or fatigue  HEENT:                         No difficulty hearing, tinnitus, vertigo; No sinus or throat pain  Respiratory:                   SOB  Cardiovascular:           No chest pain, palpitations  Gastrointestinal:        No abdominal or epigastric pain. No N/V/diarrhea or hematemesis  Genitourinary:            No dysuria, frequency, hematuria or incontinence  SKIN:                             no rash  Musculoskeletal:        No joint pain or swelling  Extremities:                No swelling  Neurological:              No headaches  Psychiatric:                 No depression, anxiety    Vital Signs Last 24 Hrs  T(C): 36.9 (2021 12:13), Max: 38.7 (2021 15:31)  T(F): 98.4 (2021 12:13), Max: 101.7 (2021 15:31)  HR: 97 (2021 12:13) (66 - 107)  BP: 123/76 (2021 12:13) (105/85 - 135/82)  BP(mean): --  RR: 18 (2021 12:13) (16 - 21)  SpO2: 98% (2021 12:13) (95% - 99%)    PHYSICAL EXAM:  GEN:         Awake, responsive and comfortable.  HEENT:    Normal.    RESP:        no wheezing.  CVS:          Regular rate and rhythm.   ABD:         Soft, non-tender, non-distended;   SKIN:           Warm and dry.  EXTR:            No clubbing, cyanosis or edema  CNS:              Intact sensory and motor function.  PSYCH:        cooperative, no anxiety or depression    LABS:                        10.5   17.74 )-----------( 416      ( 2021 11:15 )             31.8     01-14    130<L>  |  96  |  8   ----------------------------<  263<H>  4.2   |  30  |  0.75    Ca    9.2      2021 11:15    TPro  7.8  /  Alb  2.3<L>  /  TBili  0.4  /  DBili  x   /  AST  38<H>  /  ALT  60  /  AlkPhos  179<H>      Urinalysis Basic - ( 2021 03:14 )    Color: Yellow / Appearance: Clear / S.010 / pH: x  Gluc: x / Ketone: Trace  / Bili: Negative / Urobili: Negative mg/dL   Blood: x / Protein: 30 mg/dL / Nitrite: Negative   Leuk Esterase: Trace / RBC: 0-2 /HPF / WBC 6-10   Sq Epi: x / Non Sq Epi: Few / Bacteria: Few    EKG: sinus    RADIOLOGY & ADDITIONAL STUDIES:  < from: CT Angio Chest w/ IV Cont (21 @ 17:46) >  EXAM:  CT ANGIO CHEST (W)AW IC                          PROCEDURE DATE:  2021      INTERPRETATION:  CLINICAL INFORMATION: Shortness of breath. Positive d-dimer.    COMPARISON: None.    PROCEDURE:  CT Angiography of the Chest.  90 ml of Omnipaque 350 was injected intravenously. 10 ml were discarded.  Sagittal and coronal reformats were performed as well as 3D (MIP) reconstructions.    FINDINGS:    LUNGS AND AIRWAYS: Patent central airways.  Consolidation in the right lower lobe.  PLEURA: No pleural effusion.  MEDIASTINUM AND MASHA: Mediastinal lymphadenopathy measuring up to 1.2 cm in greatest short axis in the precarinal region and 1.4 cm in greatest short axis in the subcarinal region. Right hilar lymphadenopathy which is difficult to measure and separate from the consolidation. More superiorly, it measures up to 1.6 cm in greatest short axis.  VESSELS: No pulmonary emboli visualized.  HEART: Heart size is normal. No pericardial effusion.  CHEST WALL AND LOWER NECK: Heterogeneity in the right lobe of the thyroid gland with low-attenuation nodules measuring up to 1.1 cm. This may be further evaluated with thyroid ultrasound.  VISUALIZED UPPER ABDOMEN: Within normal limits.  BONES: Minimal degenerative changes of bone.    IMPRESSION:  No pulmonary emboli visualized. Consolidation in the right lower lobe with mediastinal and right hilar lymphadenopathy which may represent infection. Underlying neoplastic process cannot BE excluded and follow-up is recommended.    HERB STALEY MD; Attending Radiologist  This document has been electronically signed. 2021  6:04PM    ASSESSMENT AND PLAN:  ·	SOB.  ·	RLL Pneumonia with Lymph adenopathy.  ·	Leukocytosis.  ·	DM.  ·	HTN.  ·	Arthritis.    Complete Course of antibiotics.  Will need repeat chest CT in 4-6 weeks to rule out underlying mass.

## 2021-01-14 NOTE — PROGRESS NOTE ADULT - PROBLEM SELECTOR PLAN 1
Covering for gram neg pna, Sepsis POA   cefepime/vanco, as pt alredy treated w/azithro and 3rd gen cephalosporin  ID consult  pulm consult  urine legionella  hyponatremia improving

## 2021-01-15 LAB
A1C WITH ESTIMATED AVERAGE GLUCOSE RESULT: 9.2 % — HIGH (ref 4–5.6)
ALBUMIN SERPL ELPH-MCNC: 2.1 G/DL — LOW (ref 3.3–5)
ALP SERPL-CCNC: 182 U/L — HIGH (ref 40–120)
ALT FLD-CCNC: 73 U/L — SIGNIFICANT CHANGE UP (ref 12–78)
ANION GAP SERPL CALC-SCNC: 8 MMOL/L — SIGNIFICANT CHANGE UP (ref 5–17)
AST SERPL-CCNC: 57 U/L — HIGH (ref 15–37)
BILIRUB SERPL-MCNC: 0.5 MG/DL — SIGNIFICANT CHANGE UP (ref 0.2–1.2)
BUN SERPL-MCNC: 6 MG/DL — LOW (ref 7–23)
CALCIUM SERPL-MCNC: 8.8 MG/DL — SIGNIFICANT CHANGE UP (ref 8.5–10.1)
CHLORIDE SERPL-SCNC: 97 MMOL/L — SIGNIFICANT CHANGE UP (ref 96–108)
CO2 SERPL-SCNC: 27 MMOL/L — SIGNIFICANT CHANGE UP (ref 22–31)
CREAT SERPL-MCNC: 0.64 MG/DL — SIGNIFICANT CHANGE UP (ref 0.5–1.3)
CRP SERPL-MCNC: 25.57 MG/DL — HIGH (ref 0–0.4)
CULTURE RESULTS: SIGNIFICANT CHANGE UP
ESTIMATED AVERAGE GLUCOSE: 217 MG/DL — HIGH (ref 68–114)
GLUCOSE SERPL-MCNC: 200 MG/DL — HIGH (ref 70–99)
HCT VFR BLD CALC: 29.1 % — LOW (ref 34.5–45)
HGB BLD-MCNC: 9.8 G/DL — LOW (ref 11.5–15.5)
LEGIONELLA AG UR QL: NEGATIVE — SIGNIFICANT CHANGE UP
MCHC RBC-ENTMCNC: 27.7 PG — SIGNIFICANT CHANGE UP (ref 27–34)
MCHC RBC-ENTMCNC: 33.7 GM/DL — SIGNIFICANT CHANGE UP (ref 32–36)
MCV RBC AUTO: 82.2 FL — SIGNIFICANT CHANGE UP (ref 80–100)
NRBC # BLD: 0 /100 WBCS — SIGNIFICANT CHANGE UP (ref 0–0)
PLATELET # BLD AUTO: 402 K/UL — HIGH (ref 150–400)
POTASSIUM SERPL-MCNC: 3.4 MMOL/L — LOW (ref 3.5–5.3)
POTASSIUM SERPL-SCNC: 3.4 MMOL/L — LOW (ref 3.5–5.3)
PROT SERPL-MCNC: 7.3 GM/DL — SIGNIFICANT CHANGE UP (ref 6–8.3)
RBC # BLD: 3.54 M/UL — LOW (ref 3.8–5.2)
RBC # FLD: 13.5 % — SIGNIFICANT CHANGE UP (ref 10.3–14.5)
SODIUM SERPL-SCNC: 132 MMOL/L — LOW (ref 135–145)
SPECIMEN SOURCE: SIGNIFICANT CHANGE UP
VANCOMYCIN TROUGH SERPL-MCNC: 6.6 UG/ML — LOW (ref 10–20)
WBC # BLD: 16.29 K/UL — HIGH (ref 3.8–10.5)
WBC # FLD AUTO: 16.29 K/UL — HIGH (ref 3.8–10.5)

## 2021-01-15 PROCEDURE — 99233 SBSQ HOSP IP/OBS HIGH 50: CPT

## 2021-01-15 PROCEDURE — 93306 TTE W/DOPPLER COMPLETE: CPT | Mod: 26

## 2021-01-15 RX ORDER — ASPIRIN/CALCIUM CARB/MAGNESIUM 324 MG
81 TABLET ORAL DAILY
Refills: 0 | Status: DISCONTINUED | OUTPATIENT
Start: 2021-01-15 | End: 2021-02-03

## 2021-01-15 RX ORDER — IPRATROPIUM/ALBUTEROL SULFATE 18-103MCG
3 AEROSOL WITH ADAPTER (GRAM) INHALATION ONCE
Refills: 0 | Status: COMPLETED | OUTPATIENT
Start: 2021-01-15 | End: 2021-01-15

## 2021-01-15 RX ADMIN — AMLODIPINE BESYLATE 5 MILLIGRAM(S): 2.5 TABLET ORAL at 05:04

## 2021-01-15 RX ADMIN — CEFEPIME 100 MILLIGRAM(S): 1 INJECTION, POWDER, FOR SOLUTION INTRAMUSCULAR; INTRAVENOUS at 12:56

## 2021-01-15 RX ADMIN — Medication 4: at 16:34

## 2021-01-15 RX ADMIN — FAMOTIDINE 20 MILLIGRAM(S): 10 INJECTION INTRAVENOUS at 11:34

## 2021-01-15 RX ADMIN — Medication 4: at 08:02

## 2021-01-15 RX ADMIN — Medication 6: at 11:34

## 2021-01-15 RX ADMIN — Medication 650 MILLIGRAM(S): at 05:04

## 2021-01-15 RX ADMIN — Medication 200 MILLIGRAM(S): at 21:38

## 2021-01-15 RX ADMIN — Medication 250 MILLIGRAM(S): at 22:17

## 2021-01-15 RX ADMIN — Medication 250 MILLIGRAM(S): at 08:59

## 2021-01-15 RX ADMIN — Medication 81 MILLIGRAM(S): at 11:34

## 2021-01-15 RX ADMIN — Medication 3 MILLILITER(S): at 22:36

## 2021-01-15 NOTE — PROGRESS NOTE ADULT - SUBJECTIVE AND OBJECTIVE BOX
Patient is a 55y old  Female who presents with a chief complaint of sob (2021 14:20)      INTERVAL HPI/OVERNIGHT EVENTS:    MEDICATIONS  (STANDING):  amLODIPine   Tablet 5 milliGRAM(s) Oral daily  aspirin enteric coated 81 milliGRAM(s) Oral daily  cefepime   IVPB      cefepime   IVPB 2000 milliGRAM(s) IV Intermittent daily  dextrose 40% Gel 15 Gram(s) Oral once  dextrose 5%. 1000 milliLiter(s) (50 mL/Hr) IV Continuous <Continuous>  dextrose 5%. 1000 milliLiter(s) (100 mL/Hr) IV Continuous <Continuous>  dextrose 50% Injectable 12.5 Gram(s) IV Push once  dextrose 50% Injectable 25 Gram(s) IV Push once  dextrose 50% Injectable 25 Gram(s) IV Push once  famotidine    Tablet 20 milliGRAM(s) Oral daily  glucagon  Injectable 1 milliGRAM(s) IntraMuscular once  insulin lispro (ADMELOG) corrective regimen sliding scale   SubCutaneous three times a day before meals  insulin lispro (ADMELOG) corrective regimen sliding scale   SubCutaneous at bedtime  vancomycin  IVPB 750 milliGRAM(s) IV Intermittent every 12 hours    MEDICATIONS  (PRN):  acetaminophen   Tablet .. 650 milliGRAM(s) Oral every 6 hours PRN Temp greater or equal to 38C (100.4F), Mild Pain (1 - 3)  guaiFENesin   Syrup  (Sugar-Free) 200 milliGRAM(s) Oral every 6 hours PRN Cough      Allergies    ACE inhibitors (Angioedema (Severe))  lisinopril (Anaphylaxis; Angioedema)    Intolerances        REVIEW OF SYSTEMS:  CONSTITUTIONAL: No fever, weight loss, or fatigue  EYES: No eye pain, visual disturbances, or discharge  ENMT:  No difficulty hearing, tinnitus, vertigo; No sinus or throat pain  NECK: No pain or stiffness  BREASTS: No pain, masses, or nipple discharge  RESPIRATORY: No cough, wheezing, chills or hemoptysis; No shortness of breath  CARDIOVASCULAR: No chest pain, palpitations, dizziness, or leg swelling  GASTROINTESTINAL: No abdominal or epigastric pain. No nausea, vomiting, or hematemesis; No diarrhea or constipation. No melena or hematochezia.  GENITOURINARY: No dysuria, frequency, hematuria, or incontinence  NEUROLOGICAL: No headaches, memory loss, loss of strength, numbness, or tremors  SKIN: No itching, burning, rashes, or lesions   LYMPH NODES: No enlarged glands  ENDOCRINE: No heat or cold intolerance; No hair loss  MUSCULOSKELETAL: No joint pain or swelling; No muscle, back, or extremity pain  PSYCHIATRIC: No depression, anxiety, mood swings, or difficulty sleeping  HEME/LYMPH: No easy bruising, or bleeding gums  ALLERGY AND IMMUNOLOGIC: No hives or eczema    Vital Signs Last 24 Hrs  T(C): 37.1 (15 Daniele 2021 05:36), Max: 37.3 (2021 17:28)  T(F): 98.7 (15 Daniele 2021 05:36), Max: 99.1 (2021 17:28)  HR: 82 (15 Daniele 2021 09:27) (79 - 102)  BP: 124/75 (15 Daniele 2021 09:27) (85/62 - 139/75)  BP(mean): --  RR: 18 (15 Daniele 2021 09:27) (17 - 22)  SpO2: 97% (15 Daniele 2021 09:27) (97% - 100%)    PHYSICAL EXAM:  GENERAL: NAD, well-groomed, well-developed  HEAD:  Atraumatic, Normocephalic  EYES: EOMI, PERRLA, conjunctiva and sclera clear  ENMT: No tonsillar erythema, exudates, or enlargement; Moist mucous membranes, Good dentition, No lesions  NECK: Supple, No JVD, Normal thyroid  NERVOUS SYSTEM:  Alert & Oriented X3, Good concentration; Motor Strength 5/5 B/L upper and lower extremities; DTRs 2+ intact and symmetric  CHEST/LUNG: Clear to percussion bilaterally; No rales, rhonchi, wheezing, or rubs  HEART: Regular rate and rhythm; No murmurs, rubs, or gallops  ABDOMEN: Soft, Nontender, Nondistended; Bowel sounds present  EXTREMITIES:  2+ Peripheral Pulses, No clubbing, cyanosis, or edema  LYMPH: No lymphadenopathy noted  SKIN: No rashes or lesions    LABS:                        9.8    16.29 )-----------( 402      ( 15 Daniele 2021 06:31 )             29.1     0115    132<L>  |  97  |  6<L>  ----------------------------<  200<H>  3.4<L>   |  27  |  0.64    Ca    8.8      15 Daniele 2021 06:31    TPro  7.3  /  Alb  2.1<L>  /  TBili  0.5  /  DBili  x   /  AST  57<H>  /  ALT  73  /  AlkPhos  182<H>  01-15      Urinalysis Basic - ( 2021 03:14 )    Color: Yellow / Appearance: Clear / S.010 / pH: x  Gluc: x / Ketone: Trace  / Bili: Negative / Urobili: Negative mg/dL   Blood: x / Protein: 30 mg/dL / Nitrite: Negative   Leuk Esterase: Trace / RBC: 0-2 /HPF / WBC 6-10   Sq Epi: x / Non Sq Epi: Few / Bacteria: Few      CAPILLARY BLOOD GLUCOSE      POCT Blood Glucose.: 249 mg/dL (15 Daniele 2021 07:51)  POCT Blood Glucose.: 174 mg/dL (2021 22:12)  POCT Blood Glucose.: 128 mg/dL (2021 16:58)  POCT Blood Glucose.: 296 mg/dL (2021 10:50)      RADIOLOGY & ADDITIONAL TESTS:    Imaging Personally Reviewed:  [ X] YES  [ ] NO    Consultant(s) Notes Reviewed:  [ X] YES  [ ] NO    Care Discussed with Consultants/Other Providers [X ] YES  [ ] NO

## 2021-01-15 NOTE — PROGRESS NOTE ADULT - SUBJECTIVE AND OBJECTIVE BOX
HPI:  Pt is a 54 y/o female w/pmhx of  DM II, HTN, RA, and MI comes w/complain of sob x 2 days and marked fatigue and fever to tmax 100.2.  pt was diagnosed w/CAP on 21 was sent home on azithro and cefopodoxime x7days.  pt completed abx, she was covid neg then and again today, she returns w/increasing sob over the last 2 days and pleuritic pain on deep breaths.  pt denies any , chills, +non productive cough +pleuritic pain no cp, palpitations, n/v/d/c no travels. feels very fatigued, barely able to put on a blouse.    CTA in ed  No pulmonary emboli visualized. Consolidation in the right lower lobe with mediastinal and right hilar lymphadenopathy which may represent infection.  (2021 21:31)  clinically not better       Allergies    ACE inhibitors (Angioedema (Severe))  lisinopril (Anaphylaxis; Angioedema)    Intolerances        MEDICATIONS  (STANDING):  amLODIPine   Tablet 5 milliGRAM(s) Oral daily  aspirin enteric coated 81 milliGRAM(s) Oral daily  cefepime   IVPB      cefepime   IVPB 2000 milliGRAM(s) IV Intermittent daily  dextrose 40% Gel 15 Gram(s) Oral once  dextrose 5%. 1000 milliLiter(s) (50 mL/Hr) IV Continuous <Continuous>  dextrose 5%. 1000 milliLiter(s) (100 mL/Hr) IV Continuous <Continuous>  dextrose 50% Injectable 12.5 Gram(s) IV Push once  dextrose 50% Injectable 25 Gram(s) IV Push once  dextrose 50% Injectable 25 Gram(s) IV Push once  famotidine    Tablet 20 milliGRAM(s) Oral daily  glucagon  Injectable 1 milliGRAM(s) IntraMuscular once  insulin lispro (ADMELOG) corrective regimen sliding scale   SubCutaneous three times a day before meals  insulin lispro (ADMELOG) corrective regimen sliding scale   SubCutaneous at bedtime  vancomycin  IVPB 750 milliGRAM(s) IV Intermittent every 12 hours    MEDICATIONS  (PRN):  acetaminophen   Tablet .. 650 milliGRAM(s) Oral every 6 hours PRN Temp greater or equal to 38C (100.4F), Mild Pain (1 - 3)  guaiFENesin   Syrup  (Sugar-Free) 200 milliGRAM(s) Oral every 6 hours PRN Cough      REVIEW OF SYSTEMS:    cough   more annoying daily and sputum        VITAL SIGNS:  T(C): 36.7 (01-15-21 @ 17:07), Max: 37.1 (21 @ 23:41)  T(F): 98 (01-15-21 @ 17:07), Max: 98.8 (21 @ 23:41)  HR: 84 (01-15-21 @ 17:07) (79 - 102)  BP: 114/69 (01-15-21 @ 17:07) (85/62 - 129/79)  RR: 18 (01-15-21 @ 17:07) (17 - 22)  SpO2: 97% (01-15-21 @ 17:07) (96% - 100%)  Wt(kg): --    PHYSICAL EXAM:    GENERAL: not in any distress  HEENT: Neck is supple, normocephalic, atraumatic   CHEST/LUNG:bl , rhonchi,   no wheezing  HEART: Regular rate and rhythm; No murmurs, rubs, or gallops  ABDOMEN: Soft, Nontender, Nondistended; Bowel sounds present, no rebound   EXTREMITIES:  2+ Peripheral Pulses, No clubbing, cyanosis, or edema  SKIN: No rashes or lesions  BACK: no pressor sore   NERVOUS SYSTEM:  Alert & Oriented X3, Good concentration  PSYCH: normal affect     LABS:                         9.8    16.29 )-----------( 402      ( 15 Daniele 2021 06:31 )             29.1     01-15    132<L>  |  97  |  6<L>  ----------------------------<  200<H>  3.4<L>   |  27  |  0.64    Ca    8.8      15 Daniele 2021 06:31    TPro  7.3  /  Alb  2.1<L>  /  TBili  0.5  /  DBili  x   /  AST  57<H>  /  ALT  73  /  AlkPhos  182<H>  01-15    LIVER FUNCTIONS - ( 15 Daniele 2021 06:31 )  Alb: 2.1 g/dL / Pro: 7.3 gm/dL / ALK PHOS: 182 U/L / ALT: 73 U/L / AST: 57 U/L / GGT: x             Urinalysis Basic - ( 2021 03:14 )    Color: Yellow / Appearance: Clear / S.010 / pH: x  Gluc: x / Ketone: Trace  / Bili: Negative / Urobili: Negative mg/dL   Blood: x / Protein: 30 mg/dL / Nitrite: Negative   Leuk Esterase: Trace / RBC: 0-2 /HPF / WBC 6-10   Sq Epi: x / Non Sq Epi: Few / Bacteria: Few                Sedimentation Rate, Erythrocyte: 106 mm/hr ( @ 20:51)            Culture Results:   <10,000 CFU/mL Normal Urogenital Neli ( @ 08:24)  Culture Results:   No growth to date. ( @ 08:16)  Culture Results:   No growth to date. ( @ 08:16)                Radiology:

## 2021-01-15 NOTE — PROGRESS NOTE ADULT - PROBLEM SELECTOR PLAN 1
Covering for gram neg pna, Sepsis POA   cefepime/vanco, as pt alredy treated w/azithro and 3rd gen cephalosporin  ID consult  pulm consult  urine legionella  hyponatremia improving    Still w dyspnea hx of CAD, mi?, resume asa, tte ordered

## 2021-01-15 NOTE — PROGRESS NOTE ADULT - ASSESSMENT
non resolving community acquired pneumonia   immunosuppressed host   > 2 years on leflunamide for RA ? rule out toxicity from that as well   101.4 on admission otherwise no fevers   continue antibiotics   procalcitonin is really not that high 0.15 only   will follow

## 2021-01-15 NOTE — PROGRESS NOTE ADULT - ASSESSMENT
pt w/ 56 y/o female w/pmhx of  DM II, HTN, RA, and MI comes w/complain of sob x 2 days and recent pna worsening radiographically

## 2021-01-16 LAB
ANION GAP SERPL CALC-SCNC: 12 MMOL/L — SIGNIFICANT CHANGE UP (ref 5–17)
BASOPHILS # BLD AUTO: 0.1 K/UL — SIGNIFICANT CHANGE UP (ref 0–0.2)
BASOPHILS NFR BLD AUTO: 0.6 % — SIGNIFICANT CHANGE UP (ref 0–2)
BUN SERPL-MCNC: 9 MG/DL — SIGNIFICANT CHANGE UP (ref 7–23)
CALCIUM SERPL-MCNC: 9.1 MG/DL — SIGNIFICANT CHANGE UP (ref 8.5–10.1)
CHLORIDE SERPL-SCNC: 96 MMOL/L — SIGNIFICANT CHANGE UP (ref 96–108)
CO2 SERPL-SCNC: 22 MMOL/L — SIGNIFICANT CHANGE UP (ref 22–31)
CREAT SERPL-MCNC: 0.62 MG/DL — SIGNIFICANT CHANGE UP (ref 0.5–1.3)
EOSINOPHIL # BLD AUTO: 2.53 K/UL — HIGH (ref 0–0.5)
EOSINOPHIL NFR BLD AUTO: 15.7 % — HIGH (ref 0–6)
GAMMA INTERFERON BACKGROUND BLD IA-ACNC: 0.02 IU/ML — SIGNIFICANT CHANGE UP
GLUCOSE SERPL-MCNC: 222 MG/DL — HIGH (ref 70–99)
HCT VFR BLD CALC: 29.2 % — LOW (ref 34.5–45)
HGB BLD-MCNC: 9.5 G/DL — LOW (ref 11.5–15.5)
IMM GRANULOCYTES NFR BLD AUTO: 0.5 % — SIGNIFICANT CHANGE UP (ref 0–1.5)
LYMPHOCYTES # BLD AUTO: 1.63 K/UL — SIGNIFICANT CHANGE UP (ref 1–3.3)
LYMPHOCYTES # BLD AUTO: 10.1 % — LOW (ref 13–44)
M TB IFN-G BLD-IMP: NEGATIVE — SIGNIFICANT CHANGE UP
M TB IFN-G CD4+ BCKGRND COR BLD-ACNC: 0 IU/ML — SIGNIFICANT CHANGE UP
M TB IFN-G CD4+CD8+ BCKGRND COR BLD-ACNC: 0 IU/ML — SIGNIFICANT CHANGE UP
MCHC RBC-ENTMCNC: 27.5 PG — SIGNIFICANT CHANGE UP (ref 27–34)
MCHC RBC-ENTMCNC: 32.5 GM/DL — SIGNIFICANT CHANGE UP (ref 32–36)
MCV RBC AUTO: 84.4 FL — SIGNIFICANT CHANGE UP (ref 80–100)
MONOCYTES # BLD AUTO: 0.92 K/UL — HIGH (ref 0–0.9)
MONOCYTES NFR BLD AUTO: 5.7 % — SIGNIFICANT CHANGE UP (ref 2–14)
MRSA PCR RESULT.: SIGNIFICANT CHANGE UP
NEUTROPHILS # BLD AUTO: 10.83 K/UL — HIGH (ref 1.8–7.4)
NEUTROPHILS NFR BLD AUTO: 67.4 % — SIGNIFICANT CHANGE UP (ref 43–77)
NRBC # BLD: 0 /100 WBCS — SIGNIFICANT CHANGE UP (ref 0–0)
PLATELET # BLD AUTO: 422 K/UL — HIGH (ref 150–400)
POTASSIUM SERPL-MCNC: 3.5 MMOL/L — SIGNIFICANT CHANGE UP (ref 3.5–5.3)
POTASSIUM SERPL-SCNC: 3.5 MMOL/L — SIGNIFICANT CHANGE UP (ref 3.5–5.3)
QUANT TB PLUS MITOGEN MINUS NIL: 1.01 IU/ML — SIGNIFICANT CHANGE UP
RBC # BLD: 3.46 M/UL — LOW (ref 3.8–5.2)
RBC # FLD: 13.5 % — SIGNIFICANT CHANGE UP (ref 10.3–14.5)
S AUREUS DNA NOSE QL NAA+PROBE: SIGNIFICANT CHANGE UP
SODIUM SERPL-SCNC: 130 MMOL/L — LOW (ref 135–145)
WBC # BLD: 16.09 K/UL — HIGH (ref 3.8–10.5)
WBC # FLD AUTO: 16.09 K/UL — HIGH (ref 3.8–10.5)

## 2021-01-16 PROCEDURE — 99233 SBSQ HOSP IP/OBS HIGH 50: CPT

## 2021-01-16 RX ORDER — VANCOMYCIN HCL 1 G
1250 VIAL (EA) INTRAVENOUS EVERY 12 HOURS
Refills: 0 | Status: DISCONTINUED | OUTPATIENT
Start: 2021-01-16 | End: 2021-01-18

## 2021-01-16 RX ADMIN — Medication 250 MILLIGRAM(S): at 10:10

## 2021-01-16 RX ADMIN — Medication 8: at 12:05

## 2021-01-16 RX ADMIN — Medication 81 MILLIGRAM(S): at 12:05

## 2021-01-16 RX ADMIN — Medication 6: at 08:46

## 2021-01-16 RX ADMIN — Medication 166.67 MILLIGRAM(S): at 23:06

## 2021-01-16 RX ADMIN — Medication 2: at 16:56

## 2021-01-16 RX ADMIN — FAMOTIDINE 20 MILLIGRAM(S): 10 INJECTION INTRAVENOUS at 12:05

## 2021-01-16 RX ADMIN — CEFEPIME 100 MILLIGRAM(S): 1 INJECTION, POWDER, FOR SOLUTION INTRAMUSCULAR; INTRAVENOUS at 12:06

## 2021-01-16 RX ADMIN — Medication 200 MILLIGRAM(S): at 23:00

## 2021-01-16 NOTE — PROGRESS NOTE ADULT - SUBJECTIVE AND OBJECTIVE BOX
Patient is a 55y old  Female who presents with a chief complaint of sob (15 Daniele 2021 19:15)      INTERVAL HPI/OVERNIGHT EVENTS: still w productive cough    MEDICATIONS  (STANDING):  amLODIPine   Tablet 5 milliGRAM(s) Oral daily  aspirin enteric coated 81 milliGRAM(s) Oral daily  cefepime   IVPB      cefepime   IVPB 2000 milliGRAM(s) IV Intermittent daily  dextrose 40% Gel 15 Gram(s) Oral once  dextrose 5%. 1000 milliLiter(s) (50 mL/Hr) IV Continuous <Continuous>  dextrose 5%. 1000 milliLiter(s) (100 mL/Hr) IV Continuous <Continuous>  dextrose 50% Injectable 12.5 Gram(s) IV Push once  dextrose 50% Injectable 25 Gram(s) IV Push once  dextrose 50% Injectable 25 Gram(s) IV Push once  famotidine    Tablet 20 milliGRAM(s) Oral daily  glucagon  Injectable 1 milliGRAM(s) IntraMuscular once  insulin lispro (ADMELOG) corrective regimen sliding scale   SubCutaneous three times a day before meals  insulin lispro (ADMELOG) corrective regimen sliding scale   SubCutaneous at bedtime  vancomycin  IVPB 750 milliGRAM(s) IV Intermittent every 12 hours    MEDICATIONS  (PRN):  acetaminophen   Tablet .. 650 milliGRAM(s) Oral every 6 hours PRN Temp greater or equal to 38C (100.4F), Mild Pain (1 - 3)  guaiFENesin   Syrup  (Sugar-Free) 200 milliGRAM(s) Oral every 6 hours PRN Cough      Allergies    ACE inhibitors (Angioedema (Severe))  lisinopril (Anaphylaxis; Angioedema)    Intolerances           Vital Signs Last 24 Hrs  T(C): 37.3 (16 Jan 2021 04:19), Max: 37.3 (16 Jan 2021 04:19)  T(F): 99.2 (16 Jan 2021 04:19), Max: 99.2 (16 Jan 2021 04:19)  HR: 90 (16 Jan 2021 04:19) (83 - 90)  BP: 109/73 (16 Jan 2021 04:19) (109/73 - 137/76)  BP(mean): --  RR: 18 (16 Jan 2021 04:19) (17 - 20)  SpO2: 94% (16 Jan 2021 04:19) (94% - 98%)    PHYSICAL EXAM:  GENERAL: NAD, well-groomed, well-developed  HEAD:  Atraumatic, Normocephalic  EYES: EOMI, PERRLA, conjunctiva and sclera clear  ENMT: No tonsillar erythema, exudates, or enlargement; Moist mucous membranes, Good dentition, No lesions  NECK: Supple, No JVD, Normal thyroid  NERVOUS SYSTEM:  Alert & Oriented X3, Good concentration; Motor Strength 5/5 B/L upper and lower extremities; DTRs 2+ intact and symmetric  CHEST/LUNG: Clear to percussion bilaterally; No rales, rhonchi, wheezing, or rubs  HEART: Regular rate and rhythm; No murmurs, rubs, or gallops  ABDOMEN: Soft, Nontender, Nondistended; Bowel sounds present  EXTREMITIES:  2+ Peripheral Pulses, No clubbing, cyanosis, or edema  LYMPH: No lymphadenopathy noted  SKIN: No rashes or lesions    LABS:                        9.5    16.09 )-----------( 422      ( 16 Jan 2021 07:33 )             29.2     01-16    130<L>  |  96  |  9   ----------------------------<  222<H>  3.5   |  22  |  0.62    Ca    9.1      16 Jan 2021 07:33    TPro  7.3  /  Alb  2.1<L>  /  TBili  0.5  /  DBili  x   /  AST  57<H>  /  ALT  73  /  AlkPhos  182<H>  01-15        CAPILLARY BLOOD GLUCOSE      POCT Blood Glucose.: 267 mg/dL (16 Jan 2021 08:39)  POCT Blood Glucose.: 149 mg/dL (15 Daniele 2021 22:25)  POCT Blood Glucose.: 229 mg/dL (15 Daniele 2021 16:31)  POCT Blood Glucose.: 290 mg/dL (15 Daniele 2021 11:19)      RADIOLOGY & ADDITIONAL TESTS:    Imaging Personally Reviewed:  [ X] YES  [ ] NO    Consultant(s) Notes Reviewed:  [ X] YES  [ ] NO    Care Discussed with Consultants/Other Providers [X ] YES  [ ] NO

## 2021-01-16 NOTE — PROGRESS NOTE ADULT - SUBJECTIVE AND OBJECTIVE BOX
INTERVAL HPI:  Pt is a 56 y/o female w/pmhx of  DM II, HTN, RA, and MI comes w/complain of sob x 2 days and marked fatigue and fever to tmax 100.2.  pt was diagnosed w/CAP on 1/5/21 was sent home on azithro and cefopodoxime x7days.  pt completed abx, she was covid neg then and again today, she returns w/increasing sob over the last 2 days and pleuritic pain on deep breaths.  pt denies any , chills, +non productive cough +pleuritic pain no cp, palpitations, n/v/d/c no travels. feels very fatigued, barely able to put on a blouse.  Non smoker. Reports weight loss over few months,  CTA in ed  No pulmonary emboli visualized. Consolidation in the right lower lobe with mediastinal and right hilar lymphadenopathy which may represent infection.  (13 Jan 2021 21:31).  Admitted with Pneumonia, leukocytosis.    OVERNIGHT EVENTS:  Feels better.    Vital Signs Last 24 Hrs  T(C): 36.8 (16 Jan 2021 17:38), Max: 37.3 (16 Jan 2021 04:19)  T(F): 98.2 (16 Jan 2021 17:38), Max: 99.2 (16 Jan 2021 04:19)  HR: 90 (16 Jan 2021 17:38) (84 - 93)  BP: 134/78 (16 Jan 2021 17:38) (106/64 - 137/76)  BP(mean): --  RR: 18 (16 Jan 2021 17:38) (18 - 20)  SpO2: 97% (16 Jan 2021 17:38) (94% - 98%)    PHYSICAL EXAM:  GEN:         Awake, responsive and comfortable.  HEENT:    Normal.    RESP:        no distress  CVS:          Regular rate and rhythm.   ABD:         Soft, non-tender, non-distended;     MEDICATIONS  (STANDING):  amLODIPine   Tablet 5 milliGRAM(s) Oral daily  aspirin enteric coated 81 milliGRAM(s) Oral daily  cefepime   IVPB      cefepime   IVPB 2000 milliGRAM(s) IV Intermittent daily  dextrose 40% Gel 15 Gram(s) Oral once  dextrose 5%. 1000 milliLiter(s) (50 mL/Hr) IV Continuous <Continuous>  dextrose 5%. 1000 milliLiter(s) (100 mL/Hr) IV Continuous <Continuous>  dextrose 50% Injectable 12.5 Gram(s) IV Push once  dextrose 50% Injectable 25 Gram(s) IV Push once  dextrose 50% Injectable 25 Gram(s) IV Push once  famotidine    Tablet 20 milliGRAM(s) Oral daily  glucagon  Injectable 1 milliGRAM(s) IntraMuscular once  insulin lispro (ADMELOG) corrective regimen sliding scale   SubCutaneous three times a day before meals  insulin lispro (ADMELOG) corrective regimen sliding scale   SubCutaneous at bedtime  vancomycin  IVPB 1250 milliGRAM(s) IV Intermittent every 12 hours    MEDICATIONS  (PRN):  acetaminophen   Tablet .. 650 milliGRAM(s) Oral every 6 hours PRN Temp greater or equal to 38C (100.4F), Mild Pain (1 - 3)  guaiFENesin   Syrup  (Sugar-Free) 200 milliGRAM(s) Oral every 6 hours PRN Cough    LABS:                        9.5    16.09 )-----------( 422      ( 16 Jan 2021 07:33 )             29.2     01-16    130<L>  |  96  |  9   ----------------------------<  222<H>  3.5   |  22  |  0.62    Ca    9.1      16 Jan 2021 07:33    TPro  7.3  /  Alb  2.1<L>  /  TBili  0.5  /  DBili  x   /  AST  57<H>  /  ALT  73  /  AlkPhos  182<H>  01-15    ASSESSMENT AND PLAN:  ·	SOB.  ·	RLL Pneumonia with Lymph adenopathy.  ·	Leukocytosis.  ·	DM.  ·	HTN.  ·	Arthritis.    Continue Antibiotics.  Follow up chest CT in 4-6 weeks.

## 2021-01-16 NOTE — PROGRESS NOTE ADULT - SUBJECTIVE AND OBJECTIVE BOX
HPI:  Pt is a 54 y/o female w/pmhx of  DM II, HTN, RA, and MI comes w/complain of sob x 2 days and marked fatigue and fever to tmax 100.2.  pt was diagnosed w/CAP on 1/5/21 was sent home on azithro and cefopodoxime x7days.  pt completed abx, she was covid neg then and again today, she returns w/increasing sob over the last 2 days and pleuritic pain on deep breaths.  pt denies any , chills, +non productive cough +pleuritic pain no cp, palpitations, n/v/d/c no travels. feels very fatigued, barely able to put on a blouse.    CTA in ed  No pulmonary emboli visualized. Consolidation in the right lower lobe with mediastinal and right hilar lymphadenopathy which may represent infection.  (13 Jan 2021 21:31)  doing better  less cough and less short of breath but still quite symptomatic     Allergies    ACE inhibitors (Angioedema (Severe))  lisinopril (Anaphylaxis; Angioedema)    Intolerances        MEDICATIONS  (STANDING):  amLODIPine   Tablet 5 milliGRAM(s) Oral daily  aspirin enteric coated 81 milliGRAM(s) Oral daily  cefepime   IVPB      cefepime   IVPB 2000 milliGRAM(s) IV Intermittent daily  dextrose 40% Gel 15 Gram(s) Oral once  dextrose 5%. 1000 milliLiter(s) (50 mL/Hr) IV Continuous <Continuous>  dextrose 5%. 1000 milliLiter(s) (100 mL/Hr) IV Continuous <Continuous>  dextrose 50% Injectable 12.5 Gram(s) IV Push once  dextrose 50% Injectable 25 Gram(s) IV Push once  dextrose 50% Injectable 25 Gram(s) IV Push once  famotidine    Tablet 20 milliGRAM(s) Oral daily  glucagon  Injectable 1 milliGRAM(s) IntraMuscular once  insulin lispro (ADMELOG) corrective regimen sliding scale   SubCutaneous three times a day before meals  insulin lispro (ADMELOG) corrective regimen sliding scale   SubCutaneous at bedtime  vancomycin  IVPB 750 milliGRAM(s) IV Intermittent every 12 hours    MEDICATIONS  (PRN):  acetaminophen   Tablet .. 650 milliGRAM(s) Oral every 6 hours PRN Temp greater or equal to 38C (100.4F), Mild Pain (1 - 3)  guaiFENesin   Syrup  (Sugar-Free) 200 milliGRAM(s) Oral every 6 hours PRN Cough      REVIEW OF SYSTEMS:    cough   short of breath   phlegm     VITAL SIGNS:  T(C): 36.7 (01-16-21 @ 12:48), Max: 37.3 (01-16-21 @ 04:19)  T(F): 98.1 (01-16-21 @ 12:48), Max: 99.2 (01-16-21 @ 04:19)  HR: 93 (01-16-21 @ 12:48) (84 - 93)  BP: 106/64 (01-16-21 @ 12:48) (106/64 - 137/76)  RR: 18 (01-16-21 @ 12:48) (18 - 20)  SpO2: 96% (01-16-21 @ 12:48) (94% - 98%)  Wt(kg): --    PHYSICAL EXAM:    GENERAL: not in any distress  HEENT: Neck is supple, normocephalic, atraumatic   CHEST/LUNG: bilateral , rhonchi,   no wheezing   HEART: Regular rate and rhythm; No murmurs, rubs, or gallops  ABDOMEN: Soft, Nontender, Nondistended; Bowel sounds present, no rebound   EXTREMITIES:  2+ Peripheral Pulses, No clubbing, cyanosis, or edema  SKIN: No rashes or lesions  BACK: no pressor sore   NERVOUS SYSTEM:  Alert & Oriented X3, Good concentration  PSYCH: normal affect     LABS:                         9.5    16.09 )-----------( 422      ( 16 Jan 2021 07:33 )             29.2     01-16    130<L>  |  96  |  9   ----------------------------<  222<H>  3.5   |  22  |  0.62    Ca    9.1      16 Jan 2021 07:33    TPro  7.3  /  Alb  2.1<L>  /  TBili  0.5  /  DBili  x   /  AST  57<H>  /  ALT  73  /  AlkPhos  182<H>  01-15    LIVER FUNCTIONS - ( 15 Daniele 2021 06:31 )  Alb: 2.1 g/dL / Pro: 7.3 gm/dL / ALK PHOS: 182 U/L / ALT: 73 U/L / AST: 57 U/L / GGT: x                         Sedimentation Rate, Erythrocyte: 106 mm/hr (01-14 @ 20:51)      Vancomycin Level, Trough: 6.6 ug/mL (01-15 @ 21:43)        Culture Results:   <10,000 CFU/mL Normal Urogenital Neli (01-14 @ 08:24)  Culture Results:   No growth to date. (01-14 @ 08:16)  Culture Results:   No growth to date. (01-14 @ 08:16)          Legionella Antigen, Urine: Negative (01-15 @ 08:29)        Radiology:      < from: CT Angio Chest w/ IV Cont (01.13.21 @ 17:46) >  No pulmonary emboli visualized. Consolidation in the right lower lobe with mediastinal and right hilar lymphadenopathy which may represent infection. Underlying neoplastic process cannot BE excluded and follow-up is recommended.              HERB STALEY MD; Attending Radiologist  This document has been electronically signed. Jan 13 2021  6:04PM    < end of copied text >

## 2021-01-16 NOTE — PROGRESS NOTE ADULT - PROBLEM SELECTOR PLAN 1
Covering for gram neg pna, Sepsis POA   cefepime/vanco, as pt alredy treated w/azithro and 3rd gen cephalosporin  ID consult  pulm consult  urine legionella negative   hyponatremia improving    TTE wnl    WBC leukocytosis, and high eosinophil count, leflunomide tox? will reach out to rheum

## 2021-01-16 NOTE — PROGRESS NOTE ADULT - ASSESSMENT
non resolving community acquired pneumonia   immunosuppressed host   > 2 years on leflunamide for RA ? rule out toxicity from that as well   101.4 on admission otherwise no fevers   continue antibiotics   procalcitonin is really not that high 0.15 only   pulm follow up   qgtb pending

## 2021-01-17 LAB
ANION GAP SERPL CALC-SCNC: 10 MMOL/L — SIGNIFICANT CHANGE UP (ref 5–17)
BASOPHILS # BLD AUTO: 0 K/UL — SIGNIFICANT CHANGE UP (ref 0–0.2)
BASOPHILS NFR BLD AUTO: 0 % — SIGNIFICANT CHANGE UP (ref 0–2)
BUN SERPL-MCNC: 11 MG/DL — SIGNIFICANT CHANGE UP (ref 7–23)
CALCIUM SERPL-MCNC: 8.9 MG/DL — SIGNIFICANT CHANGE UP (ref 8.5–10.1)
CHLORIDE SERPL-SCNC: 97 MMOL/L — SIGNIFICANT CHANGE UP (ref 96–108)
CO2 SERPL-SCNC: 25 MMOL/L — SIGNIFICANT CHANGE UP (ref 22–31)
CREAT SERPL-MCNC: 0.73 MG/DL — SIGNIFICANT CHANGE UP (ref 0.5–1.3)
EOSINOPHIL # BLD AUTO: 2.23 K/UL — HIGH (ref 0–0.5)
EOSINOPHIL # BLD: 2470 /UL — HIGH (ref 50–350)
EOSINOPHIL NFR BLD AUTO: 16 % — HIGH (ref 0–6)
GLUCOSE SERPL-MCNC: 256 MG/DL — HIGH (ref 70–99)
HCT VFR BLD CALC: 28.7 % — LOW (ref 34.5–45)
HGB BLD-MCNC: 9.7 G/DL — LOW (ref 11.5–15.5)
LYMPHOCYTES # BLD AUTO: 1.54 K/UL — SIGNIFICANT CHANGE UP (ref 1–3.3)
LYMPHOCYTES # BLD AUTO: 11 % — LOW (ref 13–44)
MCHC RBC-ENTMCNC: 27.9 PG — SIGNIFICANT CHANGE UP (ref 27–34)
MCHC RBC-ENTMCNC: 33.8 GM/DL — SIGNIFICANT CHANGE UP (ref 32–36)
MCV RBC AUTO: 82.5 FL — SIGNIFICANT CHANGE UP (ref 80–100)
MONOCYTES # BLD AUTO: 0.98 K/UL — HIGH (ref 0–0.9)
MONOCYTES NFR BLD AUTO: 7 % — SIGNIFICANT CHANGE UP (ref 2–14)
NEUTROPHILS # BLD AUTO: 9.21 K/UL — HIGH (ref 1.8–7.4)
NEUTROPHILS NFR BLD AUTO: 66 % — SIGNIFICANT CHANGE UP (ref 43–77)
NRBC # BLD: SIGNIFICANT CHANGE UP /100 WBCS (ref 0–0)
PLATELET # BLD AUTO: 439 K/UL — HIGH (ref 150–400)
POTASSIUM SERPL-MCNC: 3.4 MMOL/L — LOW (ref 3.5–5.3)
POTASSIUM SERPL-SCNC: 3.4 MMOL/L — LOW (ref 3.5–5.3)
RBC # BLD: 3.48 M/UL — LOW (ref 3.8–5.2)
RBC # FLD: 13.7 % — SIGNIFICANT CHANGE UP (ref 10.3–14.5)
SODIUM SERPL-SCNC: 132 MMOL/L — LOW (ref 135–145)
WBC # BLD: 13.96 K/UL — HIGH (ref 3.8–10.5)
WBC # FLD AUTO: 13.96 K/UL — HIGH (ref 3.8–10.5)

## 2021-01-17 PROCEDURE — 99233 SBSQ HOSP IP/OBS HIGH 50: CPT

## 2021-01-17 RX ADMIN — FAMOTIDINE 20 MILLIGRAM(S): 10 INJECTION INTRAVENOUS at 11:21

## 2021-01-17 RX ADMIN — Medication 81 MILLIGRAM(S): at 11:21

## 2021-01-17 RX ADMIN — AMLODIPINE BESYLATE 5 MILLIGRAM(S): 2.5 TABLET ORAL at 05:50

## 2021-01-17 RX ADMIN — CEFEPIME 100 MILLIGRAM(S): 1 INJECTION, POWDER, FOR SOLUTION INTRAMUSCULAR; INTRAVENOUS at 11:20

## 2021-01-17 RX ADMIN — Medication 6: at 16:30

## 2021-01-17 RX ADMIN — Medication 166.67 MILLIGRAM(S): at 11:20

## 2021-01-17 RX ADMIN — Medication 166.67 MILLIGRAM(S): at 22:14

## 2021-01-17 RX ADMIN — Medication 6: at 08:00

## 2021-01-17 RX ADMIN — Medication 6: at 11:21

## 2021-01-17 NOTE — PROGRESS NOTE ADULT - SUBJECTIVE AND OBJECTIVE BOX
Patient is a 55y old  Female who presents with a chief complaint of sob (16 Jan 2021 20:07)      INTERVAL HPI/OVERNIGHT EVENTS: no events     MEDICATIONS  (STANDING):  amLODIPine   Tablet 5 milliGRAM(s) Oral daily  aspirin enteric coated 81 milliGRAM(s) Oral daily  cefepime   IVPB      cefepime   IVPB 2000 milliGRAM(s) IV Intermittent daily  dextrose 40% Gel 15 Gram(s) Oral once  dextrose 5%. 1000 milliLiter(s) (50 mL/Hr) IV Continuous <Continuous>  dextrose 5%. 1000 milliLiter(s) (100 mL/Hr) IV Continuous <Continuous>  dextrose 50% Injectable 12.5 Gram(s) IV Push once  dextrose 50% Injectable 25 Gram(s) IV Push once  dextrose 50% Injectable 25 Gram(s) IV Push once  famotidine    Tablet 20 milliGRAM(s) Oral daily  glucagon  Injectable 1 milliGRAM(s) IntraMuscular once  insulin lispro (ADMELOG) corrective regimen sliding scale   SubCutaneous three times a day before meals  insulin lispro (ADMELOG) corrective regimen sliding scale   SubCutaneous at bedtime  vancomycin  IVPB 1250 milliGRAM(s) IV Intermittent every 12 hours    MEDICATIONS  (PRN):  acetaminophen   Tablet .. 650 milliGRAM(s) Oral every 6 hours PRN Temp greater or equal to 38C (100.4F), Mild Pain (1 - 3)  guaiFENesin   Syrup  (Sugar-Free) 200 milliGRAM(s) Oral every 6 hours PRN Cough      Allergies    ACE inhibitors (Angioedema (Severe))  lisinopril (Anaphylaxis; Angioedema)    Intolerances         Vital Signs Last 24 Hrs  T(C): 36.9 (17 Jan 2021 05:18), Max: 37.1 (16 Jan 2021 23:34)  T(F): 98.5 (17 Jan 2021 05:18), Max: 98.8 (16 Jan 2021 23:34)  HR: 83 (17 Jan 2021 05:18) (83 - 93)  BP: 127/78 (17 Jan 2021 05:18) (106/64 - 134/78)  BP(mean): --  RR: 17 (17 Jan 2021 05:18) (17 - 18)  SpO2: 96% (17 Jan 2021 05:18) (96% - 97%)    PHYSICAL EXAM:  GENERAL: NAD, well-groomed, well-developed  HEAD:  Atraumatic, Normocephalic  EYES: EOMI, PERRLA, conjunctiva and sclera clear  ENMT: No tonsillar erythema, exudates, or enlargement; Moist mucous membranes, Good dentition, No lesions  NECK: Supple, No JVD, Normal thyroid  NERVOUS SYSTEM:  Alert & Oriented X3, Good concentration; Motor Strength 5/5 B/L upper and lower extremities; DTRs 2+ intact and symmetric  CHEST/LUNG: Clear to percussion bilaterally; No rales, rhonchi, wheezing, or rubs  HEART: Regular rate and rhythm; No murmurs, rubs, or gallops  ABDOMEN: Soft, Nontender, Nondistended; Bowel sounds present  EXTREMITIES:  2+ Peripheral Pulses, No clubbing, cyanosis, or edema  LYMPH: No lymphadenopathy noted  SKIN: No rashes or lesions    LABS:                        9.5    16.09 )-----------( 422      ( 16 Jan 2021 07:33 )             29.2     01-16    130<L>  |  96  |  9   ----------------------------<  222<H>  3.5   |  22  |  0.62    Ca    9.1      16 Jan 2021 07:33          CAPILLARY BLOOD GLUCOSE      POCT Blood Glucose.: 280 mg/dL (17 Jan 2021 07:41)  POCT Blood Glucose.: 239 mg/dL (16 Jan 2021 21:45)  POCT Blood Glucose.: 186 mg/dL (16 Jan 2021 16:43)  POCT Blood Glucose.: 307 mg/dL (16 Jan 2021 11:24)      RADIOLOGY & ADDITIONAL TESTS:    Imaging Personally Reviewed:  [ X] YES  [ ] NO    Consultant(s) Notes Reviewed:  [ X] YES  [ ] NO    Care Discussed with Consultants/Other Providers [X ] YES  [ ] NO

## 2021-01-17 NOTE — PROGRESS NOTE ADULT - SUBJECTIVE AND OBJECTIVE BOX
INTERVAL HPI:  Pt is a 56 y/o female w/pmhx of  DM II, HTN, RA, and MI comes w/complain of sob x 2 days and marked fatigue and fever to tmax 100.2.  pt was diagnosed w/CAP on 1/5/21 was sent home on azithro and cefopodoxime x7days.  pt completed abx, she was covid neg then and again today, she returns w/increasing sob over the last 2 days and pleuritic pain on deep breaths.  pt denies any , chills, +non productive cough +pleuritic pain no cp, palpitations, n/v/d/c no travels. feels very fatigued, barely able to put on a blouse.  Non smoker. Reports weight loss over few months,  CTA in ed  No pulmonary emboli visualized. Consolidation in the right lower lobe with mediastinal and right hilar lymphadenopathy which may represent infection.  (13 Jan 2021 21:31).  Admitted with Pneumonia, leukocytosis.    OVERNIGHT EVENTS:  Awake and comfortable    Vital Signs Last 24 Hrs  T(C): 37.1 (17 Jan 2021 11:38), Max: 37.1 (16 Jan 2021 23:34)  T(F): 98.8 (17 Jan 2021 11:38), Max: 98.8 (16 Jan 2021 23:34)  HR: 85 (17 Jan 2021 11:38) (83 - 90)  BP: 137/82 (17 Jan 2021 11:38) (127/78 - 137/82)  BP(mean): --  RR: 18 (17 Jan 2021 11:38) (17 - 18)  SpO2: 94% (17 Jan 2021 11:38) (94% - 97%)    PHYSICAL EXAM:  GEN:         Awake, responsive and comfortable.  HEENT:    Normal.    RESP:        no wheezing  CVS:          Regular rate and rhythm.   ABD:         Soft, non-tender, non-distended;     MEDICATIONS  (STANDING):  amLODIPine   Tablet 5 milliGRAM(s) Oral daily  aspirin enteric coated 81 milliGRAM(s) Oral daily  cefepime   IVPB      cefepime   IVPB 2000 milliGRAM(s) IV Intermittent daily  dextrose 40% Gel 15 Gram(s) Oral once  dextrose 5%. 1000 milliLiter(s) (50 mL/Hr) IV Continuous <Continuous>  dextrose 5%. 1000 milliLiter(s) (100 mL/Hr) IV Continuous <Continuous>  dextrose 50% Injectable 12.5 Gram(s) IV Push once  dextrose 50% Injectable 25 Gram(s) IV Push once  dextrose 50% Injectable 25 Gram(s) IV Push once  famotidine    Tablet 20 milliGRAM(s) Oral daily  glucagon  Injectable 1 milliGRAM(s) IntraMuscular once  insulin lispro (ADMELOG) corrective regimen sliding scale   SubCutaneous three times a day before meals  insulin lispro (ADMELOG) corrective regimen sliding scale   SubCutaneous at bedtime  vancomycin  IVPB 1250 milliGRAM(s) IV Intermittent every 12 hours    MEDICATIONS  (PRN):  acetaminophen   Tablet .. 650 milliGRAM(s) Oral every 6 hours PRN Temp greater or equal to 38C (100.4F), Mild Pain (1 - 3)  guaiFENesin   Syrup  (Sugar-Free) 200 milliGRAM(s) Oral every 6 hours PRN Cough    LABS:                        9.7    13.96 )-----------( 439      ( 17 Jan 2021 10:47 )             28.7     01-17    132<L>  |  97  |  11  ----------------------------<  256<H>  3.4<L>   |  25  |  0.73    Ca    8.9      17 Jan 2021 10:47    ASSESSMENT AND PLAN:  ·	SOB.  ·	RLL Pneumonia with Lymph adenopathy.  ·	Leukocytosis.  ·	DM.  ·	HTN.  ·	Arthritis.    Complete antibiotics per ID.  Follow up chest CT in 4-6 weeks.

## 2021-01-17 NOTE — PROGRESS NOTE ADULT - PROBLEM SELECTOR PLAN 1
Covering for gram neg pna, Sepsis POA   cefepime/vanco, as pt alredy treated w/azithro and 3rd gen cephalosporin  ID consult  pulm consult  urine legionella negative   hyponatremia improving    TTE wnl    WBC leukocytosis, and high eosinophil count, leflunomide tox? will reach out to rheum  still w strong cough dyspnea and rhonci

## 2021-01-17 NOTE — PROGRESS NOTE ADULT - SUBJECTIVE AND OBJECTIVE BOX
HPI:  Pt is a 56 y/o female w/pmhx of  DM II, HTN, RA, and MI comes w/complain of sob x 2 days and marked fatigue and fever to tmax 100.2.  pt was diagnosed w/CAP on 1/5/21 was sent home on azithro and cefopodoxime x7days.  pt completed abx, she was covid neg then and again today, she returns w/increasing sob over the last 2 days and pleuritic pain on deep breaths.  pt denies any , chills, +non productive cough +pleuritic pain no cp, palpitations, n/v/d/c no travels. feels very fatigued, barely able to put on a blouse.    CTA in ed  No pulmonary emboli visualized. Consolidation in the right lower lobe with mediastinal and right hilar lymphadenopathy which may represent infection.  (13 Jan 2021 21:31)      Allergies    ACE inhibitors (Angioedema (Severe))  lisinopril (Anaphylaxis; Angioedema)    Intolerances        MEDICATIONS  (STANDING):  amLODIPine   Tablet 5 milliGRAM(s) Oral daily  aspirin enteric coated 81 milliGRAM(s) Oral daily  cefepime   IVPB      cefepime   IVPB 2000 milliGRAM(s) IV Intermittent daily  dextrose 40% Gel 15 Gram(s) Oral once  dextrose 5%. 1000 milliLiter(s) (50 mL/Hr) IV Continuous <Continuous>  dextrose 5%. 1000 milliLiter(s) (100 mL/Hr) IV Continuous <Continuous>  dextrose 50% Injectable 12.5 Gram(s) IV Push once  dextrose 50% Injectable 25 Gram(s) IV Push once  dextrose 50% Injectable 25 Gram(s) IV Push once  famotidine    Tablet 20 milliGRAM(s) Oral daily  glucagon  Injectable 1 milliGRAM(s) IntraMuscular once  insulin lispro (ADMELOG) corrective regimen sliding scale   SubCutaneous three times a day before meals  insulin lispro (ADMELOG) corrective regimen sliding scale   SubCutaneous at bedtime  vancomycin  IVPB 1250 milliGRAM(s) IV Intermittent every 12 hours    MEDICATIONS  (PRN):  acetaminophen   Tablet .. 650 milliGRAM(s) Oral every 6 hours PRN Temp greater or equal to 38C (100.4F), Mild Pain (1 - 3)  guaiFENesin   Syrup  (Sugar-Free) 200 milliGRAM(s) Oral every 6 hours PRN Cough      REVIEW OF SYSTEMS:    CONSTITUTIONAL: No fever, chills, weight loss, or fatigue  HEENT: No sore throat, runny nose, ear ache  RESPIRATORY: No cough, wheezing, No shortness of breath  CARDIOVASCULAR: No chest pain, palpitations, dizziness  GASTROINTESTINAL: No abdominal pain. No nausea, vomiting, diarrhea  GENITOURINARY: No dysuria, increase frequency, hematuria, or incontinence  NEUROLOGICAL: No headaches, memory loss, loss of strength, numbness, or tremors, no weakness  EXTREMITY: No pedal edema BLE  SKIN: No itching, burning, rashes, or lesions     VITAL SIGNS:  T(C): 37.1 (01-17-21 @ 11:38), Max: 37.1 (01-16-21 @ 23:34)  T(F): 98.8 (01-17-21 @ 11:38), Max: 98.8 (01-16-21 @ 23:34)  HR: 85 (01-17-21 @ 11:38) (83 - 90)  BP: 137/82 (01-17-21 @ 11:38) (127/78 - 137/82)  RR: 18 (01-17-21 @ 11:38) (17 - 18)  SpO2: 94% (01-17-21 @ 11:38) (94% - 97%)  Wt(kg): --    PHYSICAL EXAM:    GENERAL: not in any distress  HEENT: Neck is supple, normocephalic, atraumatic   CHEST/LUNG: Clear to auscultation bilaterally; No rales, rhonchi, wheezing  HEART: Regular rate and rhythm; No murmurs, rubs, or gallops  ABDOMEN: Soft, Nontender, Nondistended; Bowel sounds present, no rebound   EXTREMITIES:  2+ Peripheral Pulses, No clubbing, cyanosis, or edema  GENITOURINARY:   SKIN: No rashes or lesions  BACK: no pressor sore   NERVOUS SYSTEM:  Alert & Oriented X3, Good concentration  PSYCH: normal affect     LABS:                         9.7    13.96 )-----------( 439      ( 17 Jan 2021 10:47 )             28.7     01-17    132<L>  |  97  |  11  ----------------------------<  256<H>  3.4<L>   |  25  |  0.73    Ca    8.9      17 Jan 2021 10:47                      Sedimentation Rate, Erythrocyte: 106 mm/hr (01-14 @ 20:51)      Vancomycin Level, Trough: 6.6 ug/mL (01-15 @ 21:43)        Culture Results:   <10,000 CFU/mL Normal Urogenital Neli (01-14 @ 08:24)  Culture Results:   No growth to date. (01-14 @ 08:16)  Culture Results:   No growth to date. (01-14 @ 08:16)          Legionella Antigen, Urine: Negative (01-15 @ 08:29)        Radiology:       HPI:  Pt is a 56 y/o female w/pmhx of  DM II, HTN, RA, and MI comes w/complain of sob x 2 days and marked fatigue and fever to tmax 100.2.  pt was diagnosed w/CAP on 1/5/21 was sent home on azithro and cefopodoxime x7days.  pt completed abx, she was covid neg then and again today, she returns w/increasing sob over the last 2 days and pleuritic pain on deep breaths.  pt denies any , chills, +non productive cough +pleuritic pain no cp, palpitations, n/v/d/c no travels. feels very fatigued, barely able to put on a blouse.    CTA in ed  No pulmonary emboli visualized. Consolidation in the right lower lobe with mediastinal and right hilar lymphadenopathy which may represent infection.  (13 Jan 2021 21:31)  she is not bettr nor worse in all these days   her cough and short of breath starts at night when she lays down otherwise she is fine all day     Allergies    ACE inhibitors (Angioedema (Severe))  lisinopril (Anaphylaxis; Angioedema)    Intolerances        MEDICATIONS  (STANDING):  amLODIPine   Tablet 5 milliGRAM(s) Oral daily  aspirin enteric coated 81 milliGRAM(s) Oral daily  cefepime   IVPB      cefepime   IVPB 2000 milliGRAM(s) IV Intermittent daily  dextrose 40% Gel 15 Gram(s) Oral once  dextrose 5%. 1000 milliLiter(s) (50 mL/Hr) IV Continuous <Continuous>  dextrose 5%. 1000 milliLiter(s) (100 mL/Hr) IV Continuous <Continuous>  dextrose 50% Injectable 12.5 Gram(s) IV Push once  dextrose 50% Injectable 25 Gram(s) IV Push once  dextrose 50% Injectable 25 Gram(s) IV Push once  famotidine    Tablet 20 milliGRAM(s) Oral daily  glucagon  Injectable 1 milliGRAM(s) IntraMuscular once  insulin lispro (ADMELOG) corrective regimen sliding scale   SubCutaneous three times a day before meals  insulin lispro (ADMELOG) corrective regimen sliding scale   SubCutaneous at bedtime  vancomycin  IVPB 1250 milliGRAM(s) IV Intermittent every 12 hours    MEDICATIONS  (PRN):  acetaminophen   Tablet .. 650 milliGRAM(s) Oral every 6 hours PRN Temp greater or equal to 38C (100.4F), Mild Pain (1 - 3)  guaiFENesin   Syrup  (Sugar-Free) 200 milliGRAM(s) Oral every 6 hours PRN Cough      REVIEW OF SYSTEMS:    cough  short of breath mainly at night    VITAL SIGNS:  T(C): 37.1 (01-17-21 @ 11:38), Max: 37.1 (01-16-21 @ 23:34)  T(F): 98.8 (01-17-21 @ 11:38), Max: 98.8 (01-16-21 @ 23:34)  HR: 85 (01-17-21 @ 11:38) (83 - 90)  BP: 137/82 (01-17-21 @ 11:38) (127/78 - 137/82)  RR: 18 (01-17-21 @ 11:38) (17 - 18)  SpO2: 94% (01-17-21 @ 11:38) (94% - 97%)  Wt(kg): --    PHYSICAL EXAM:    GENERAL: not in any distress  HEENT: Neck is supple, normocephalic, atraumatic   CHEST/LUNG: Clear to auscultation bilaterally; No rales,  some rhonchi,   no wheezing  HEART: Regular rate and rhythm; No murmurs, rubs, or gallops  ABDOMEN: Soft, Nontender, Nondistended; Bowel sounds present, no rebound   EXTREMITIES:  2+ Peripheral Pulses, No clubbing, cyanosis, or edema  SKIN: No rashes or lesions  BACK: no pressor sore   NERVOUS SYSTEM:  Alert & Oriented X3, Good concentration  PSYCH: normal affect     LABS:                         9.7    13.96 )-----------( 439      ( 17 Jan 2021 10:47 )             28.7     01-17    132<L>  |  97  |  11  ----------------------------<  256<H>  3.4<L>   |  25  |  0.73    Ca    8.9      17 Jan 2021 10:47                      Sedimentation Rate, Erythrocyte: 106 mm/hr (01-14 @ 20:51)      Vancomycin Level, Trough: 6.6 ug/mL (01-15 @ 21:43)        Culture Results:   <10,000 CFU/mL Normal Urogenital Neli (01-14 @ 08:24)  Culture Results:   No growth to date. (01-14 @ 08:16)  Culture Results:   No growth to date. (01-14 @ 08:16)          Legionella Antigen, Urine: Negative (01-15 @ 08:29)        Radiology:

## 2021-01-18 LAB
ANION GAP SERPL CALC-SCNC: 9 MMOL/L — SIGNIFICANT CHANGE UP (ref 5–17)
BASOPHILS # BLD AUTO: 0.09 K/UL — SIGNIFICANT CHANGE UP (ref 0–0.2)
BASOPHILS NFR BLD AUTO: 0.7 % — SIGNIFICANT CHANGE UP (ref 0–2)
BUN SERPL-MCNC: 9 MG/DL — SIGNIFICANT CHANGE UP (ref 7–23)
CALCIUM SERPL-MCNC: 9.1 MG/DL — SIGNIFICANT CHANGE UP (ref 8.5–10.1)
CHLORIDE SERPL-SCNC: 97 MMOL/L — SIGNIFICANT CHANGE UP (ref 96–108)
CO2 SERPL-SCNC: 24 MMOL/L — SIGNIFICANT CHANGE UP (ref 22–31)
CREAT SERPL-MCNC: 0.66 MG/DL — SIGNIFICANT CHANGE UP (ref 0.5–1.3)
EOSINOPHIL # BLD AUTO: 2.51 K/UL — HIGH (ref 0–0.5)
EOSINOPHIL NFR BLD AUTO: 18.4 % — HIGH (ref 0–6)
GLUCOSE SERPL-MCNC: 269 MG/DL — HIGH (ref 70–99)
HCT VFR BLD CALC: 28.3 % — LOW (ref 34.5–45)
HGB BLD-MCNC: 9.5 G/DL — LOW (ref 11.5–15.5)
IMM GRANULOCYTES NFR BLD AUTO: 0.7 % — SIGNIFICANT CHANGE UP (ref 0–1.5)
LYMPHOCYTES # BLD AUTO: 1.62 K/UL — SIGNIFICANT CHANGE UP (ref 1–3.3)
LYMPHOCYTES # BLD AUTO: 11.9 % — LOW (ref 13–44)
MCHC RBC-ENTMCNC: 27.7 PG — SIGNIFICANT CHANGE UP (ref 27–34)
MCHC RBC-ENTMCNC: 33.6 GM/DL — SIGNIFICANT CHANGE UP (ref 32–36)
MCV RBC AUTO: 82.5 FL — SIGNIFICANT CHANGE UP (ref 80–100)
MONOCYTES # BLD AUTO: 0.85 K/UL — SIGNIFICANT CHANGE UP (ref 0–0.9)
MONOCYTES NFR BLD AUTO: 6.2 % — SIGNIFICANT CHANGE UP (ref 2–14)
NEUTROPHILS # BLD AUTO: 8.46 K/UL — HIGH (ref 1.8–7.4)
NEUTROPHILS NFR BLD AUTO: 62.1 % — SIGNIFICANT CHANGE UP (ref 43–77)
NRBC # BLD: 0 /100 WBCS — SIGNIFICANT CHANGE UP (ref 0–0)
PLATELET # BLD AUTO: 465 K/UL — HIGH (ref 150–400)
POTASSIUM SERPL-MCNC: 3.8 MMOL/L — SIGNIFICANT CHANGE UP (ref 3.5–5.3)
POTASSIUM SERPL-SCNC: 3.8 MMOL/L — SIGNIFICANT CHANGE UP (ref 3.5–5.3)
RBC # BLD: 3.43 M/UL — LOW (ref 3.8–5.2)
RBC # FLD: 13.7 % — SIGNIFICANT CHANGE UP (ref 10.3–14.5)
SODIUM SERPL-SCNC: 130 MMOL/L — LOW (ref 135–145)
VANCOMYCIN TROUGH SERPL-MCNC: 10 UG/ML — SIGNIFICANT CHANGE UP (ref 10–20)
WBC # BLD: 13.62 K/UL — HIGH (ref 3.8–10.5)
WBC # FLD AUTO: 13.62 K/UL — HIGH (ref 3.8–10.5)

## 2021-01-18 PROCEDURE — 99233 SBSQ HOSP IP/OBS HIGH 50: CPT

## 2021-01-18 PROCEDURE — 99255 IP/OBS CONSLTJ NEW/EST HI 80: CPT

## 2021-01-18 RX ORDER — VANCOMYCIN HCL 1 G
1000 VIAL (EA) INTRAVENOUS EVERY 8 HOURS
Refills: 0 | Status: DISCONTINUED | OUTPATIENT
Start: 2021-01-18 | End: 2021-01-19

## 2021-01-18 RX ADMIN — Medication 10: at 11:20

## 2021-01-18 RX ADMIN — FAMOTIDINE 20 MILLIGRAM(S): 10 INJECTION INTRAVENOUS at 11:20

## 2021-01-18 RX ADMIN — Medication 81 MILLIGRAM(S): at 11:20

## 2021-01-18 RX ADMIN — Medication 2: at 21:54

## 2021-01-18 RX ADMIN — Medication 650 MILLIGRAM(S): at 18:37

## 2021-01-18 RX ADMIN — CEFEPIME 100 MILLIGRAM(S): 1 INJECTION, POWDER, FOR SOLUTION INTRAMUSCULAR; INTRAVENOUS at 11:19

## 2021-01-18 RX ADMIN — Medication 6: at 08:04

## 2021-01-18 RX ADMIN — Medication 200 MILLIGRAM(S): at 02:37

## 2021-01-18 RX ADMIN — Medication 166.67 MILLIGRAM(S): at 11:20

## 2021-01-18 RX ADMIN — Medication 4: at 16:52

## 2021-01-18 RX ADMIN — AMLODIPINE BESYLATE 5 MILLIGRAM(S): 2.5 TABLET ORAL at 05:19

## 2021-01-18 RX ADMIN — Medication 166.67 MILLIGRAM(S): at 21:55

## 2021-01-18 NOTE — PROGRESS NOTE ADULT - SUBJECTIVE AND OBJECTIVE BOX
HPI:  Pt is a 54 y/o female w/pmhx of  DM II, HTN, RA, and MI comes w/complain of sob x 2 days and marked fatigue and fever to tmax 100.2.  pt was diagnosed w/CAP on 1/5/21 was sent home on azithro and cefopodoxime x7days.  pt completed abx, she was covid neg then and again today, she returns w/increasing sob over the last 2 days and pleuritic pain on deep breaths.  pt denies any , chills, +non productive cough +pleuritic pain no cp, palpitations, n/v/d/c no travels. feels very fatigued, barely able to put on a blouse.    CTA in ed  No pulmonary emboli visualized. Consolidation in the right lower lobe with mediastinal and right hilar lymphadenopathy which may represent infection.  (13 Jan 2021 21:31)  today has 100.2 fever   last temp 101 on 1/14  feels sick   rheum eval noted    Allergies    ACE inhibitors (Angioedema (Severe))  lisinopril (Anaphylaxis; Angioedema)    Intolerances        MEDICATIONS  (STANDING):  amLODIPine   Tablet 5 milliGRAM(s) Oral daily  aspirin enteric coated 81 milliGRAM(s) Oral daily  cefepime   IVPB      cefepime   IVPB 2000 milliGRAM(s) IV Intermittent daily  dextrose 40% Gel 15 Gram(s) Oral once  dextrose 5%. 1000 milliLiter(s) (50 mL/Hr) IV Continuous <Continuous>  dextrose 5%. 1000 milliLiter(s) (100 mL/Hr) IV Continuous <Continuous>  dextrose 50% Injectable 25 Gram(s) IV Push once  dextrose 50% Injectable 25 Gram(s) IV Push once  dextrose 50% Injectable 12.5 Gram(s) IV Push once  famotidine    Tablet 20 milliGRAM(s) Oral daily  glucagon  Injectable 1 milliGRAM(s) IntraMuscular once  insulin lispro (ADMELOG) corrective regimen sliding scale   SubCutaneous three times a day before meals  insulin lispro (ADMELOG) corrective regimen sliding scale   SubCutaneous at bedtime  vancomycin  IVPB 1250 milliGRAM(s) IV Intermittent every 12 hours    MEDICATIONS  (PRN):  acetaminophen   Tablet .. 650 milliGRAM(s) Oral every 6 hours PRN Temp greater or equal to 38C (100.4F), Mild Pain (1 - 3)  guaiFENesin   Syrup  (Sugar-Free) 200 milliGRAM(s) Oral every 6 hours PRN Cough      REVIEW OF SYSTEMS:    coughing a lot  pleuritic pain   fever today   cant sleep at night     VITAL SIGNS:  T(C): 37.9 (01-18-21 @ 18:39), Max: 37.9 (01-18-21 @ 18:39)  T(F): 100.2 (01-18-21 @ 18:39), Max: 100.2 (01-18-21 @ 18:39)  HR: 85 (01-18-21 @ 17:08) (81 - 85)  BP: 138/77 (01-18-21 @ 17:08) (124/78 - 138/77)  RR: 16 (01-18-21 @ 17:08) (16 - 18)  SpO2: 98% (01-18-21 @ 17:08) (95% - 98%)  Wt(kg): --    PHYSICAL EXAM:    GENERAL: not in any distress  HEENT: Neck is supple, normocephalic, atraumatic   CHEST/LUNG: decreased  to auscultation bilaterally;   bilateral  rhonchi,   no wheezing  HEART: Regular rate and rhythm; No murmurs, rubs, or gallops  ABDOMEN: Soft, Nontender, Nondistended; Bowel sounds present, no rebound   EXTREMITIES:  2+ Peripheral Pulses, No clubbing, cyanosis, or edema  SKIN: No rashes or lesions  BACK: no pressor sore   NERVOUS SYSTEM:  Alert & Oriented X3, Good concentration  PSYCH: normal affect     LABS:                         9.5    13.62 )-----------( 465      ( 18 Jan 2021 07:21 )             28.3     01-18    130<L>  |  97  |  9   ----------------------------<  269<H>  3.8   |  24  |  0.66    Ca    9.1      18 Jan 2021 07:21                      Sedimentation Rate, Erythrocyte: 106 mm/hr (01-14 @ 20:51)      Vancomycin Level, Trough: 10.0 ug/mL (01-18 @ 21:25)        Culture Results:   <10,000 CFU/mL Normal Urogenital Neli (01-14 @ 08:24)  Culture Results:   No growth to date. (01-14 @ 08:16)  Culture Results:   No growth to date. (01-14 @ 08:16)          Legionella Antigen, Urine: Negative (01-15 @ 08:29)        Radiology:

## 2021-01-18 NOTE — PROGRESS NOTE ADULT - PROBLEM SELECTOR PLAN 1
Covering for gram neg pna, Sepsis POA   cefepime/vanco, as pt alredy treated w/azithro and 3rd gen cephalosporin  ID consult  pulm consult  urine legionella negative   hyponatremia improving    TTE wnl    WBC leukocytosis, and high eosinophil count, leflunomide tox?     rheum c.s appreciated  improving today

## 2021-01-18 NOTE — CONSULT NOTE ADULT - ASSESSMENT
55 year old female with RA on leflunomide currently admitted w/ PNA.  Doubt current symptoms are due to toxicity from leflunomide, which can cause pulmonary toxicity, but typically would cause ILD, not a focal consolidation.  Pt also w/ eosinophilia of unclear etiology.  DDx includes atypical infection (e.g. fungal or mycobacterial), franco as pt immunosuppressed vs medication-induced (pt recently started on meloxicam and her dose of HCTZ was recently increased, both of which have been reported to cause pulmonary eosinophilia) vs. hypereosinophilic syndrome.  No other signs/symptoms to suggest DRESS at this time.    - Continue abx as per ID.    - Agree with holding leflunomide for now until infection has resolved.    - Can use low-dose prednisone (e.g. 10mg daily) prn if RA flares.    - Appreciate ID input.    - If symptoms continue, would consider testing for atypical organisms. 55 year old female with RA on leflunomide currently admitted w/ PNA.  Doubt current symptoms are due to toxicity from leflunomide, which can cause pulmonary toxicity, but typically would cause ILD, not a focal consolidation.  Pt also w/ eosinophilia of unclear etiology.  DDx includes atypical infection (e.g. fungal or mycobacterial), franco as pt immunosuppressed vs medication-induced (pt recently started on meloxicam and her dose of HCTZ was recently increased, both of which have been reported to cause pulmonary eosinophilia) vs. hypereosinophilic syndrome.  No other signs/symptoms to suggest DRESS at this time.  Pt also w/ LBP most c/w sciatica.    - Continue abx as per ID.    - Agree with holding leflunomide for now until infection has resolved.    - Can use low-dose prednisone (e.g. 10mg daily) prn if RA flares.    - Appreciate ID input.    - If symptoms continue, would consider testing for atypical organisms.    - Recommend PT for sciatica.

## 2021-01-18 NOTE — CONSULT NOTE ADULT - SUBJECTIVE AND OBJECTIVE BOX
HISTORY OF PRESENT ILLNESS:    Patient is a 55y Female    HPI:  Pt is a 56 y/o female w/pmhx of  DM II, HTN, RA, and MI comes w/complain of sob x 2 days and marked fatigue and fever to tmax 100.2.  pt was diagnosed w/CAP on 1/5/21 was sent home on azithro and cefopodoxime x7days.  pt completed abx, she was covid neg then and again today, she returns w/increasing sob over the last 2 days and pleuritic pain on deep breaths.  pt denies any , chills, +non productive cough +pleuritic pain no cp, palpitations, n/v/d/c no travels. feels very fatigued, barely able to put on a blouse.    CTA in ed  No pulmonary emboli visualized. Consolidation in the right lower lobe with mediastinal and right hilar lymphadenopathy which may represent infection.  (13 Jan 2021 21:31)  Of note, pt is a well known to me - she has long-standing RA since 2014, has been well controlled on leflunomide.  RA currently well controlled.  No joint pain/swelling/AM stiffness.    PAST MEDICAL & SURGICAL HISTORY:  Heart attack    RA (rheumatoid arthritis)    Tubal Ligation    History of Tubal Ligation    Diabetes    Hypertension    H/O heart artery stent    S/P Tubal Ligation        ROS: No chest pain, oral ulcers, rashes, joint pain, alopecia, photosensitivity, dry eye/dry mouth, Raynaud's, dysphagia, focal weakness, or eye symptoms.    MEDICATIONS  (STANDING):  amLODIPine   Tablet 5 milliGRAM(s) Oral daily  aspirin enteric coated 81 milliGRAM(s) Oral daily  cefepime   IVPB      cefepime   IVPB 2000 milliGRAM(s) IV Intermittent daily  dextrose 40% Gel 15 Gram(s) Oral once  dextrose 5%. 1000 milliLiter(s) (50 mL/Hr) IV Continuous <Continuous>  dextrose 5%. 1000 milliLiter(s) (100 mL/Hr) IV Continuous <Continuous>  dextrose 50% Injectable 25 Gram(s) IV Push once  dextrose 50% Injectable 25 Gram(s) IV Push once  dextrose 50% Injectable 12.5 Gram(s) IV Push once  famotidine    Tablet 20 milliGRAM(s) Oral daily  glucagon  Injectable 1 milliGRAM(s) IntraMuscular once  insulin lispro (ADMELOG) corrective regimen sliding scale   SubCutaneous three times a day before meals  insulin lispro (ADMELOG) corrective regimen sliding scale   SubCutaneous at bedtime  vancomycin  IVPB 1250 milliGRAM(s) IV Intermittent every 12 hours    MEDICATIONS  (PRN):  acetaminophen   Tablet .. 650 milliGRAM(s) Oral every 6 hours PRN Temp greater or equal to 38C (100.4F), Mild Pain (1 - 3)  guaiFENesin   Syrup  (Sugar-Free) 200 milliGRAM(s) Oral every 6 hours PRN Cough      Allergies    ACE inhibitors (Angioedema (Severe))  lisinopril (Anaphylaxis; Angioedema)    Intolerances        FAMILY HISTORY:  No pertinent family history in first degree relatives        SOCIAL HISTORY:  Tobacco--   none            Vital Signs Last 24 Hrs  T(C): 36.8 (18 Jan 2021 05:13), Max: 37.1 (17 Jan 2021 11:38)  T(F): 98.2 (18 Jan 2021 05:13), Max: 98.8 (17 Jan 2021 11:38)  HR: 83 (18 Jan 2021 05:13) (81 - 85)  BP: 124/78 (18 Jan 2021 05:13) (124/78 - 137/82)  BP(mean): --  RR: 18 (18 Jan 2021 05:13) (17 - 18)  SpO2: 96% (18 Jan 2021 05:13) (94% - 96%)    PHYSICAL EXAM:  General :  NAD  HEENT--  no oral ulcers  Nodes--   LAD  Lungs--  (+)rhonchi, no wheezing  Heart--  RRR, nlS1 &S2 normal;   Abdomen--  soft, NT, ND +BS  Skin:  no rashes  Musculoskeletal exam:  No synovitis; full ROM in all joints    LABS:                        9.5    13.62 )-----------( 465      ( 18 Jan 2021 07:21 )             28.3     01-18    130<L>  |  97  |  9   ----------------------------<  269<H>  3.8   |  24  |  0.66    Ca    9.1      18 Jan 2021 07:21    Complete Blood Count + Automated Diff in AM (01.18.21 @ 07:21)    WBC Count: 13.62 K/uL    RBC Count: 3.43 M/uL    Hemoglobin: 9.5 g/dL    Hematocrit: 28.3 %    Mean Cell Volume: 82.5 fl    Mean Cell Hemoglobin: 27.7 pg    Mean Cell Hemoglobin Conc: 33.6 gm/dL    Red Cell Distrib Width: 13.7 %    Platelet Count - Automated: 465 K/uL    Auto Neutrophil #: 8.46 K/uL    Auto Lymphocyte #: 1.62 K/uL    Auto Monocyte #: 0.85 K/uL    Auto Eosinophil #: 2.51 K/uL    Auto Basophil #: 0.09 K/uL    Auto Neutrophil %: 62.1: Differential percentages must be correlated with absolute numbers for  clinical significance. %    Auto Lymphocyte %: 11.9 %    Auto Monocyte %: 6.2 %    Auto Eosinophil %: 18.4 %    Auto Basophil %: 0.7 %    Auto Immature Granulocyte %: 0.7 %    Nucleated RBC: 0 /100 WBCs        RADIOLOGY & ADDITIONAL STUDIES:    < from: CT Angio Chest w/ IV Cont (01.13.21 @ 17:46) >  EXAM:  CT ANGIO CHEST (W)AW IC                            PROCEDURE DATE:  01/13/2021          INTERPRETATION:  CLINICAL INFORMATION: Shortness of breath. Positive d-dimer.    COMPARISON: None.    PROCEDURE:  CT Angiography of the Chest.  90 ml of Omnipaque 350 was injected intravenously. 10 ml were discarded.  Sagittal and coronal reformats were performed as well as 3D (MIP) reconstructions.    FINDINGS:    LUNGS AND AIRWAYS: Patent central airways.  Consolidation in the right lower lobe.  PLEURA: No pleural effusion.  MEDIASTINUM AND MASHA: Mediastinal lymphadenopathy measuring up to 1.2 cm in greatest short axis in the precarinal region and 1.4 cm in greatest short axis in the subcarinal region. Right hilar lymphadenopathy which is difficult to measure and separate from the consolidation. More superiorly, it measures up to 1.6 cm in greatest short axis.  VESSELS: No pulmonary emboli visualized.  HEART: Heart size is normal. No pericardial effusion.  CHEST WALL AND LOWER NECK: Heterogeneity in the right lobe of the thyroid gland with low-attenuation nodules measuring up to 1.1 cm. This may be further evaluated with thyroid ultrasound.  VISUALIZED UPPER ABDOMEN: Within normal limits.  BONES: Minimal degenerative changes of bone.    IMPRESSION:  No pulmonary emboli visualized. Consolidation in the right lower lobe with mediastinal and right hilar lymphadenopathy which may represent infection. Underlying neoplastic process cannot BE excluded and follow-up is recommended.    < end of copied text >   HISTORY OF PRESENT ILLNESS:    Patient is a 55y Female    HPI:  Pt is a 56 y/o female w/pmhx of  DM II, HTN, RA, and MI comes w/complain of sob x 2 days and marked fatigue and fever to tmax 100.2.  pt was diagnosed w/CAP on 1/5/21 was sent home on azithro and cefopodoxime x7days.  pt completed abx, she was covid neg then and again today, she returns w/increasing sob over the last 2 days and pleuritic pain on deep breaths.  pt denies any , chills, +non productive cough +pleuritic pain no cp, palpitations, n/v/d/c no travels. feels very fatigued, barely able to put on a blouse.    CTA in ed  No pulmonary emboli visualized. Consolidation in the right lower lobe with mediastinal and right hilar lymphadenopathy which may represent infection.  (13 Jan 2021 21:31)  Of note, pt is a well known to me - she has long-standing RA since 2014, has been well controlled on leflunomide.  RA currently well controlled.  No joint pain/swelling/AM stiffness.  Pt also c/o low back pain radiating down her LE's, for which she recently received an epidural injection.    PAST MEDICAL & SURGICAL HISTORY:  Heart attack    RA (rheumatoid arthritis)    Tubal Ligation    History of Tubal Ligation    Diabetes    Hypertension    H/O heart artery stent    S/P Tubal Ligation        ROS: No chest pain, oral ulcers, rashes, joint pain, alopecia, photosensitivity, dry eye/dry mouth, Raynaud's, dysphagia, focal weakness, or eye symptoms.    MEDICATIONS  (STANDING):  amLODIPine   Tablet 5 milliGRAM(s) Oral daily  aspirin enteric coated 81 milliGRAM(s) Oral daily  cefepime   IVPB      cefepime   IVPB 2000 milliGRAM(s) IV Intermittent daily  dextrose 40% Gel 15 Gram(s) Oral once  dextrose 5%. 1000 milliLiter(s) (50 mL/Hr) IV Continuous <Continuous>  dextrose 5%. 1000 milliLiter(s) (100 mL/Hr) IV Continuous <Continuous>  dextrose 50% Injectable 25 Gram(s) IV Push once  dextrose 50% Injectable 25 Gram(s) IV Push once  dextrose 50% Injectable 12.5 Gram(s) IV Push once  famotidine    Tablet 20 milliGRAM(s) Oral daily  glucagon  Injectable 1 milliGRAM(s) IntraMuscular once  insulin lispro (ADMELOG) corrective regimen sliding scale   SubCutaneous three times a day before meals  insulin lispro (ADMELOG) corrective regimen sliding scale   SubCutaneous at bedtime  vancomycin  IVPB 1250 milliGRAM(s) IV Intermittent every 12 hours    MEDICATIONS  (PRN):  acetaminophen   Tablet .. 650 milliGRAM(s) Oral every 6 hours PRN Temp greater or equal to 38C (100.4F), Mild Pain (1 - 3)  guaiFENesin   Syrup  (Sugar-Free) 200 milliGRAM(s) Oral every 6 hours PRN Cough      Allergies    ACE inhibitors (Angioedema (Severe))  lisinopril (Anaphylaxis; Angioedema)    Intolerances        FAMILY HISTORY:  No pertinent family history in first degree relatives        SOCIAL HISTORY:  Tobacco--   none            Vital Signs Last 24 Hrs  T(C): 36.8 (18 Jan 2021 05:13), Max: 37.1 (17 Jan 2021 11:38)  T(F): 98.2 (18 Jan 2021 05:13), Max: 98.8 (17 Jan 2021 11:38)  HR: 83 (18 Jan 2021 05:13) (81 - 85)  BP: 124/78 (18 Jan 2021 05:13) (124/78 - 137/82)  BP(mean): --  RR: 18 (18 Jan 2021 05:13) (17 - 18)  SpO2: 96% (18 Jan 2021 05:13) (94% - 96%)    PHYSICAL EXAM:  General :  NAD  HEENT--  no oral ulcers  Nodes--   LAD  Lungs--  (+)rhonchi, no wheezing  Heart--  RRR, nlS1 &S2 normal;   Abdomen--  soft, NT, ND +BS  Skin:  no rashes  Musculoskeletal exam:  No synovitis; full ROM in all joints    LABS:                        9.5    13.62 )-----------( 465      ( 18 Jan 2021 07:21 )             28.3     01-18    130<L>  |  97  |  9   ----------------------------<  269<H>  3.8   |  24  |  0.66    Ca    9.1      18 Jan 2021 07:21    Complete Blood Count + Automated Diff in AM (01.18.21 @ 07:21)    WBC Count: 13.62 K/uL    RBC Count: 3.43 M/uL    Hemoglobin: 9.5 g/dL    Hematocrit: 28.3 %    Mean Cell Volume: 82.5 fl    Mean Cell Hemoglobin: 27.7 pg    Mean Cell Hemoglobin Conc: 33.6 gm/dL    Red Cell Distrib Width: 13.7 %    Platelet Count - Automated: 465 K/uL    Auto Neutrophil #: 8.46 K/uL    Auto Lymphocyte #: 1.62 K/uL    Auto Monocyte #: 0.85 K/uL    Auto Eosinophil #: 2.51 K/uL    Auto Basophil #: 0.09 K/uL    Auto Neutrophil %: 62.1: Differential percentages must be correlated with absolute numbers for  clinical significance. %    Auto Lymphocyte %: 11.9 %    Auto Monocyte %: 6.2 %    Auto Eosinophil %: 18.4 %    Auto Basophil %: 0.7 %    Auto Immature Granulocyte %: 0.7 %    Nucleated RBC: 0 /100 WBCs        RADIOLOGY & ADDITIONAL STUDIES:    < from: CT Angio Chest w/ IV Cont (01.13.21 @ 17:46) >  EXAM:  CT ANGIO CHEST (W)AW IC                            PROCEDURE DATE:  01/13/2021          INTERPRETATION:  CLINICAL INFORMATION: Shortness of breath. Positive d-dimer.    COMPARISON: None.    PROCEDURE:  CT Angiography of the Chest.  90 ml of Omnipaque 350 was injected intravenously. 10 ml were discarded.  Sagittal and coronal reformats were performed as well as 3D (MIP) reconstructions.    FINDINGS:    LUNGS AND AIRWAYS: Patent central airways.  Consolidation in the right lower lobe.  PLEURA: No pleural effusion.  MEDIASTINUM AND MASHA: Mediastinal lymphadenopathy measuring up to 1.2 cm in greatest short axis in the precarinal region and 1.4 cm in greatest short axis in the subcarinal region. Right hilar lymphadenopathy which is difficult to measure and separate from the consolidation. More superiorly, it measures up to 1.6 cm in greatest short axis.  VESSELS: No pulmonary emboli visualized.  HEART: Heart size is normal. No pericardial effusion.  CHEST WALL AND LOWER NECK: Heterogeneity in the right lobe of the thyroid gland with low-attenuation nodules measuring up to 1.1 cm. This may be further evaluated with thyroid ultrasound.  VISUALIZED UPPER ABDOMEN: Within normal limits.  BONES: Minimal degenerative changes of bone.    IMPRESSION:  No pulmonary emboli visualized. Consolidation in the right lower lobe with mediastinal and right hilar lymphadenopathy which may represent infection. Underlying neoplastic process cannot BE excluded and follow-up is recommended.    < end of copied text >

## 2021-01-18 NOTE — PROGRESS NOTE ADULT - ASSESSMENT
non resolving community acquired pneumonia   immunosuppressed host   > 2 years on leflunamide for RA ? rule out toxicity from that as well   101.4 on admission otherwise no fevers ; today 100.2 at 638 pm   continue antibiotics   procalcitonin is really not that high 0.15 only   pulm follow up   qgtb pending   legionella NEGATIVE  methicillin resitant staph aureus NEGATIVE   consider a trial of prednisone per rheumatology   repeat ct   if fevers persist re ct scan   will add doxy ; ?/ can assist at times with RA manangemet   can have fevers from RA but has mainly pulm signs and symptoms   qgtb pending  non resolving community acquired pneumonia   immunosuppressed host   > 2 years on leflunamide for RA ? rule out toxicity from that as well   101.4 on admission otherwise no fevers ; today 100.2 at 638 pm   continue antibiotics   procalcitonin is really not that high 0.15 only   pulm follow up   qgtb pending   legionella NEGATIVE  methicillin resitant staph aureus NEGATIVE   consider a trial of prednisone per rheumatology   repeat ct   if fevers persist re ct scan   will add doxy ; ?/ can assist at times with RA manangemet   can have fevers from RA but has mainly pulm signs and symptoms   qgtb pending   vanco level low ?? no methicillin resitant staph aureus though  methicillin resitant staph aureus screen am   adjust vanco   if no response 48 hours to revised dose readdress all antibiotics   appreciate rheum consult

## 2021-01-18 NOTE — PROGRESS NOTE ADULT - SUBJECTIVE AND OBJECTIVE BOX
Patient is a 55y old  Female who presents with a chief complaint of sob (18 Jan 2021 09:46)      INTERVAL HPI/OVERNIGHT EVENTS: no events     MEDICATIONS  (STANDING):  amLODIPine   Tablet 5 milliGRAM(s) Oral daily  aspirin enteric coated 81 milliGRAM(s) Oral daily  cefepime   IVPB      cefepime   IVPB 2000 milliGRAM(s) IV Intermittent daily  dextrose 40% Gel 15 Gram(s) Oral once  dextrose 5%. 1000 milliLiter(s) (50 mL/Hr) IV Continuous <Continuous>  dextrose 5%. 1000 milliLiter(s) (100 mL/Hr) IV Continuous <Continuous>  dextrose 50% Injectable 25 Gram(s) IV Push once  dextrose 50% Injectable 25 Gram(s) IV Push once  dextrose 50% Injectable 12.5 Gram(s) IV Push once  famotidine    Tablet 20 milliGRAM(s) Oral daily  glucagon  Injectable 1 milliGRAM(s) IntraMuscular once  insulin lispro (ADMELOG) corrective regimen sliding scale   SubCutaneous three times a day before meals  insulin lispro (ADMELOG) corrective regimen sliding scale   SubCutaneous at bedtime  vancomycin  IVPB 1250 milliGRAM(s) IV Intermittent every 12 hours    MEDICATIONS  (PRN):  acetaminophen   Tablet .. 650 milliGRAM(s) Oral every 6 hours PRN Temp greater or equal to 38C (100.4F), Mild Pain (1 - 3)  guaiFENesin   Syrup  (Sugar-Free) 200 milliGRAM(s) Oral every 6 hours PRN Cough      Allergies    ACE inhibitors (Angioedema (Severe))  lisinopril (Anaphylaxis; Angioedema)    Intolerances         Vital Signs Last 24 Hrs  T(C): 36.6 (18 Jan 2021 10:41), Max: 36.8 (17 Jan 2021 17:55)  T(F): 97.8 (18 Jan 2021 10:41), Max: 98.3 (17 Jan 2021 17:55)  HR: 81 (18 Jan 2021 10:41) (81 - 83)  BP: 133/79 (18 Jan 2021 10:41) (124/78 - 136/86)  BP(mean): --  RR: 16 (18 Jan 2021 10:41) (16 - 18)  SpO2: 95% (18 Jan 2021 10:41) (95% - 96%)    PHYSICAL EXAM:  GENERAL: NAD, well-groomed, well-developed  HEAD:  Atraumatic, Normocephalic  EYES: EOMI, PERRLA, conjunctiva and sclera clear  ENMT: No tonsillar erythema, exudates, or enlargement; Moist mucous membranes, Good dentition, No lesions  NECK: Supple, No JVD, Normal thyroid  NERVOUS SYSTEM:  Alert & Oriented X3, Good concentration; Motor Strength 5/5 B/L upper and lower extremities; DTRs 2+ intact and symmetric  CHEST/LUNG: Clear to percussion bilaterally; No rales, rhonchi, wheezing, or rubs  HEART: Regular rate and rhythm; No murmurs, rubs, or gallops  ABDOMEN: Soft, Nontender, Nondistended; Bowel sounds present  EXTREMITIES:  2+ Peripheral Pulses, No clubbing, cyanosis, or edema  LYMPH: No lymphadenopathy noted  SKIN: No rashes or lesions    LABS:                        9.5    13.62 )-----------( 465      ( 18 Jan 2021 07:21 )             28.3     01-18    130<L>  |  97  |  9   ----------------------------<  269<H>  3.8   |  24  |  0.66    Ca    9.1      18 Jan 2021 07:21          CAPILLARY BLOOD GLUCOSE      POCT Blood Glucose.: 353 mg/dL (18 Jan 2021 10:50)  POCT Blood Glucose.: 295 mg/dL (18 Jan 2021 07:30)  POCT Blood Glucose.: 228 mg/dL (17 Jan 2021 22:05)  POCT Blood Glucose.: 258 mg/dL (17 Jan 2021 16:13)      RADIOLOGY & ADDITIONAL TESTS:    Imaging Personally Reviewed:  [ X] YES  [ ] NO    Consultant(s) Notes Reviewed:  [ X] YES  [ ] NO    Care Discussed with Consultants/Other Providers [X ] YES  [ ] NO

## 2021-01-19 LAB
CULTURE RESULTS: SIGNIFICANT CHANGE UP
CULTURE RESULTS: SIGNIFICANT CHANGE UP
D DIMER BLD IA.RAPID-MCNC: 469 NG/ML DDU — HIGH
SPECIMEN SOURCE: SIGNIFICANT CHANGE UP
SPECIMEN SOURCE: SIGNIFICANT CHANGE UP

## 2021-01-19 PROCEDURE — 99233 SBSQ HOSP IP/OBS HIGH 50: CPT

## 2021-01-19 RX ORDER — ACETAMINOPHEN 500 MG
1000 TABLET ORAL ONCE
Refills: 0 | Status: COMPLETED | OUTPATIENT
Start: 2021-01-19 | End: 2021-01-19

## 2021-01-19 RX ORDER — MEROPENEM 1 G/30ML
1000 INJECTION INTRAVENOUS EVERY 8 HOURS
Refills: 0 | Status: DISCONTINUED | OUTPATIENT
Start: 2021-01-19 | End: 2021-01-24

## 2021-01-19 RX ADMIN — Medication 250 MILLIGRAM(S): at 14:16

## 2021-01-19 RX ADMIN — Medication 250 MILLIGRAM(S): at 05:00

## 2021-01-19 RX ADMIN — Medication 8: at 08:11

## 2021-01-19 RX ADMIN — Medication 4: at 16:23

## 2021-01-19 RX ADMIN — Medication 81 MILLIGRAM(S): at 11:38

## 2021-01-19 RX ADMIN — AMLODIPINE BESYLATE 5 MILLIGRAM(S): 2.5 TABLET ORAL at 05:00

## 2021-01-19 RX ADMIN — Medication 10: at 11:42

## 2021-01-19 RX ADMIN — MEROPENEM 100 MILLIGRAM(S): 1 INJECTION INTRAVENOUS at 22:05

## 2021-01-19 RX ADMIN — FAMOTIDINE 20 MILLIGRAM(S): 10 INJECTION INTRAVENOUS at 11:38

## 2021-01-19 RX ADMIN — Medication 650 MILLIGRAM(S): at 05:06

## 2021-01-19 RX ADMIN — Medication 100 MILLIGRAM(S): at 05:00

## 2021-01-19 RX ADMIN — Medication 400 MILLIGRAM(S): at 18:34

## 2021-01-19 RX ADMIN — CEFEPIME 100 MILLIGRAM(S): 1 INJECTION, POWDER, FOR SOLUTION INTRAMUSCULAR; INTRAVENOUS at 11:38

## 2021-01-19 RX ADMIN — Medication 100 MILLIGRAM(S): at 17:26

## 2021-01-19 NOTE — DIETITIAN INITIAL EVALUATION ADULT. - PERTINENT MEDS FT
MEDICATIONS  (STANDING):  amLODIPine   Tablet 5 milliGRAM(s) Oral daily  aspirin enteric coated 81 milliGRAM(s) Oral daily  cefepime   IVPB      cefepime   IVPB 2000 milliGRAM(s) IV Intermittent daily  dextrose 40% Gel 15 Gram(s) Oral once  dextrose 5%. 1000 milliLiter(s) (50 mL/Hr) IV Continuous <Continuous>  dextrose 5%. 1000 milliLiter(s) (100 mL/Hr) IV Continuous <Continuous>  dextrose 50% Injectable 25 Gram(s) IV Push once  dextrose 50% Injectable 25 Gram(s) IV Push once  dextrose 50% Injectable 12.5 Gram(s) IV Push once  doxycycline hyclate Capsule 100 milliGRAM(s) Oral every 12 hours  famotidine    Tablet 20 milliGRAM(s) Oral daily  glucagon  Injectable 1 milliGRAM(s) IntraMuscular once  insulin lispro (ADMELOG) corrective regimen sliding scale   SubCutaneous three times a day before meals  insulin lispro (ADMELOG) corrective regimen sliding scale   SubCutaneous at bedtime  vancomycin  IVPB 1000 milliGRAM(s) IV Intermittent every 8 hours    MEDICATIONS  (PRN):  acetaminophen   Tablet .. 650 milliGRAM(s) Oral every 6 hours PRN Temp greater or equal to 38C (100.4F), Mild Pain (1 - 3)  guaiFENesin   Syrup  (Sugar-Free) 200 milliGRAM(s) Oral every 6 hours PRN Cough

## 2021-01-19 NOTE — DIETITIAN INITIAL EVALUATION ADULT. - ETIOLOGY
physiological causes (T2DM) requiring careful timing & consistency of CHO; food & nutrition-related knowledge deficit.

## 2021-01-19 NOTE — PROGRESS NOTE ADULT - PROBLEM SELECTOR PLAN 1
Covering for gram neg pna, Sepsis POA   cefepime/vanco, doxy was added  ID consult on board  pulm consult  urine legionella negative   hyponatremia improving    TTE wnl    WBC leukocytosis, and high eosinophil count, leflunomide tox?     rheum c.s appreciated  improving today, but [id number] last night  if afebrile and feeling better may dc in AM

## 2021-01-19 NOTE — PROGRESS NOTE ADULT - SUBJECTIVE AND OBJECTIVE BOX
HPI:  Pt is a 56 y/o female w/pmhx of  DM II, HTN, RA, and MI comes w/complain of sob x 2 days and marked fatigue and fever to tmax 100.2.  pt was diagnosed w/CAP on 1/5/21 was sent home on azithro and cefopodoxime x7days.  pt completed abx, she was covid neg then and again today, she returns w/increasing sob over the last 2 days and pleuritic pain on deep breaths.  pt denies any , chills, +non productive cough +pleuritic pain no cp, palpitations, n/v/d/c no travels. feels very fatigued, barely able to put on a blouse.    CTA in ed  No pulmonary emboli visualized. Consolidation in the right lower lobe with mediastinal and right hilar lymphadenopathy which may represent infection.  (13 Jan 2021 21:31)  co fever ;; recorded 98 by RN as patient told her she had a fever  stat rectal temp was 104.8       Allergies    ACE inhibitors (Angioedema (Severe))  lisinopril (Anaphylaxis; Angioedema)    Intolerances        MEDICATIONS  (STANDING):  amLODIPine   Tablet 5 milliGRAM(s) Oral daily  aspirin enteric coated 81 milliGRAM(s) Oral daily  cefepime   IVPB      cefepime   IVPB 2000 milliGRAM(s) IV Intermittent daily  dextrose 40% Gel 15 Gram(s) Oral once  dextrose 5%. 1000 milliLiter(s) (50 mL/Hr) IV Continuous <Continuous>  dextrose 5%. 1000 milliLiter(s) (100 mL/Hr) IV Continuous <Continuous>  dextrose 50% Injectable 25 Gram(s) IV Push once  dextrose 50% Injectable 25 Gram(s) IV Push once  dextrose 50% Injectable 12.5 Gram(s) IV Push once  doxycycline hyclate Capsule 100 milliGRAM(s) Oral every 12 hours  famotidine    Tablet 20 milliGRAM(s) Oral daily  glucagon  Injectable 1 milliGRAM(s) IntraMuscular once  insulin lispro (ADMELOG) corrective regimen sliding scale   SubCutaneous three times a day before meals  insulin lispro (ADMELOG) corrective regimen sliding scale   SubCutaneous at bedtime  vancomycin  IVPB 1000 milliGRAM(s) IV Intermittent every 8 hours    MEDICATIONS  (PRN):  acetaminophen   Tablet .. 650 milliGRAM(s) Oral every 6 hours PRN Temp greater or equal to 38C (100.4F), Mild Pain (1 - 3)  guaiFENesin   Syrup  (Sugar-Free) 200 milliGRAM(s) Oral every 6 hours PRN Cough      REVIEW OF SYSTEMS:    feels febrile   cough better   does not really feel well   VITAL SIGNS:  T(C): 40.2 (01-19-21 @ 18:35), Max: 40.2 (01-19-21 @ 18:35)  T(F): 104.3 (01-19-21 @ 18:35), Max: 104.3 (01-19-21 @ 18:35)  HR: 100 (01-19-21 @ 17:58) (80 - 100)  BP: 148/84 (01-19-21 @ 17:58) (124/75 - 148/84)  RR: 17 (01-19-21 @ 17:58) (16 - 18)  SpO2: 95% (01-19-21 @ 17:58) (93% - 96%)  Wt(kg): --    PHYSICAL EXAM:    GENERAL: not in any distress  HEENT: Neck is supple, normocephalic, atraumatic   CHEST/LUNG: Clear to auscultation bilaterally; No rales, rhonchi, wheezing  HEART: Regular rate and rhythm; No murmurs, rubs, or gallops  ABDOMEN: Soft, Nontender, Nondistended; Bowel sounds present, no rebound   EXTREMITIES:  2+ Peripheral Pulses, No clubbing, cyanosis, or edema  SKIN: No rashes or lesions  BACK: no pressor sore   NERVOUS SYSTEM:  Alert & Oriented X3, Good concentration  PSYCH: normal affect     LABS:                         9.5    13.62 )-----------( 465      ( 18 Jan 2021 07:21 )             28.3     01-18    130<L>  |  97  |  9   ----------------------------<  269<H>  3.8   |  24  |  0.66    Ca    9.1      18 Jan 2021 07:21                      Sedimentation Rate, Erythrocyte: 106 mm/hr (01-14 @ 20:51)      Vancomycin Level, Trough: 10.0 ug/mL (01-18 @ 21:25)        Culture Results:   <10,000 CFU/mL Normal Urogenital Neli (01-14 @ 08:24)  Culture Results:   No Growth Final (01-14 @ 08:16)  Culture Results:   No Growth Final (01-14 @ 08:16)          Legionella Antigen, Urine: Negative (01-15 @ 08:29)        Radiology:

## 2021-01-19 NOTE — PROGRESS NOTE ADULT - ASSESSMENT
non resolving community acquired pneumonia   immunosuppressed host   > 2 years on leflunamide for RA ? rule out toxicity from that as well   101.4 on admission otherwise no fevers recorded;; actually the patient always felt hot to me despite gloves but this is the first time we did rectal temps   will continue rectal temps x 48 hours    consider a trial of prednisone per rheumatology   will add doxy ; ?/ can assist at times with RA manangemet   can have fevers from RA but has mainly pulm signs and symptoms   qgtb pending   vanco level low ?? no methicillin resitant staph aureus though  appreciate rheum consult

## 2021-01-19 NOTE — DIETITIAN INITIAL EVALUATION ADULT. - PERTINENT LABORATORY DATA
01-18 Na130 mmol/L<L> Glu 269 mg/dL<H> K+ 3.8 mmol/L Cr  0.66 mg/dL BUN 9 mg/dL 01-15 Alb 2.1 g/dL<L>    01-15-21 @ 11:26A1C 9.2  POCT Glucose (1/19) 341, (1/18) 271, 231, 353, 295.

## 2021-01-19 NOTE — DIETITIAN INITIAL EVALUATION ADULT. - OTHER INFO
Pt adm w/shortness of breath. Met w/pt at bedside, pt reports good appetite & PO intake. Pt denies N/V/D/C or difficulty chewing/swallowing, pt has top dentures. PTA pt reports living w/family and able to do food shopping & cooking. Pt reports following a "sugar-free" diet PTA and eating 3 meals/daily. Pt reports checking finger sticks x 2/day w/"good numbers". Pt upset about finger sticks being high in the hospital. Discussed a variety of reasons causing elevated finger sticks, support provided. Pt reports UBW of 130-135#, states she regularly sees an endocrinologist and is weighed there. PTA pt was taking Metformin x 2/day. Noted HbA1c of 9.2%- reflects poor DM control. Diet education, verbal & written, on Diabetes & Nutrition, CHO-sources of food, portion size of CHO, and Low salt, Heart Healthy diet, provided and compliance encouraged. Pt verbalized comprehension.

## 2021-01-19 NOTE — PROGRESS NOTE ADULT - SUBJECTIVE AND OBJECTIVE BOX
Patient is a 55y old  Female who presents with a chief complaint of sob (19 Jan 2021 11:40)      INTERVAL HPI/OVERNIGHT EVENTS: no events     MEDICATIONS  (STANDING):  amLODIPine   Tablet 5 milliGRAM(s) Oral daily  aspirin enteric coated 81 milliGRAM(s) Oral daily  cefepime   IVPB      cefepime   IVPB 2000 milliGRAM(s) IV Intermittent daily  dextrose 40% Gel 15 Gram(s) Oral once  dextrose 5%. 1000 milliLiter(s) (50 mL/Hr) IV Continuous <Continuous>  dextrose 5%. 1000 milliLiter(s) (100 mL/Hr) IV Continuous <Continuous>  dextrose 50% Injectable 25 Gram(s) IV Push once  dextrose 50% Injectable 25 Gram(s) IV Push once  dextrose 50% Injectable 12.5 Gram(s) IV Push once  doxycycline hyclate Capsule 100 milliGRAM(s) Oral every 12 hours  famotidine    Tablet 20 milliGRAM(s) Oral daily  glucagon  Injectable 1 milliGRAM(s) IntraMuscular once  insulin lispro (ADMELOG) corrective regimen sliding scale   SubCutaneous three times a day before meals  insulin lispro (ADMELOG) corrective regimen sliding scale   SubCutaneous at bedtime  vancomycin  IVPB 1000 milliGRAM(s) IV Intermittent every 8 hours    MEDICATIONS  (PRN):  acetaminophen   Tablet .. 650 milliGRAM(s) Oral every 6 hours PRN Temp greater or equal to 38C (100.4F), Mild Pain (1 - 3)  guaiFENesin   Syrup  (Sugar-Free) 200 milliGRAM(s) Oral every 6 hours PRN Cough      Allergies    ACE inhibitors (Angioedema (Severe))  lisinopril (Anaphylaxis; Angioedema)    Intolerances           Vital Signs Last 24 Hrs  T(C): 36.4 (19 Jan 2021 11:06), Max: 37.9 (18 Jan 2021 18:39)  T(F): 97.5 (19 Jan 2021 11:06), Max: 100.2 (18 Jan 2021 18:39)  HR: 89 (19 Jan 2021 11:06) (80 - 89)  BP: 124/75 (19 Jan 2021 11:06) (124/75 - 138/77)  BP(mean): --  RR: 16 (19 Jan 2021 11:06) (16 - 18)  SpO2: 93% (19 Jan 2021 11:06) (93% - 98%)    PHYSICAL EXAM:  GENERAL: NAD, well-groomed, well-developed  HEAD:  Atraumatic, Normocephalic  EYES: EOMI, PERRLA, conjunctiva and sclera clear  ENMT: No tonsillar erythema, exudates, or enlargement; Moist mucous membranes, Good dentition, No lesions  NECK: Supple, No JVD, Normal thyroid  NERVOUS SYSTEM:  Alert & Oriented X3, Good concentration; Motor Strength 5/5 B/L upper and lower extremities; DTRs 2+ intact and symmetric  CHEST/LUNG: Clear to percussion bilaterally; No rales, rhonchi, wheezing, or rubs  HEART: Regular rate and rhythm; No murmurs, rubs, or gallops  ABDOMEN: Soft, Nontender, Nondistended; Bowel sounds present  EXTREMITIES:  2+ Peripheral Pulses, No clubbing, cyanosis, or edema  LYMPH: No lymphadenopathy noted  SKIN: No rashes or lesions    LABS:                        9.5    13.62 )-----------( 465      ( 18 Jan 2021 07:21 )             28.3     01-18    130<L>  |  97  |  9   ----------------------------<  269<H>  3.8   |  24  |  0.66    Ca    9.1      18 Jan 2021 07:21          CAPILLARY BLOOD GLUCOSE      POCT Blood Glucose.: 392 mg/dL (19 Jan 2021 11:41)  POCT Blood Glucose.: 341 mg/dL (19 Jan 2021 08:09)  POCT Blood Glucose.: 271 mg/dL (18 Jan 2021 21:53)  POCT Blood Glucose.: 231 mg/dL (18 Jan 2021 16:24)      RADIOLOGY & ADDITIONAL TESTS:    Imaging Personally Reviewed:  [ X] YES  [ ] NO    Consultant(s) Notes Reviewed:  [ X] YES  [ ] NO    Care Discussed with Consultants/Other Providers [X ] YES  [ ] NO

## 2021-01-19 NOTE — PROGRESS NOTE ADULT - SUBJECTIVE AND OBJECTIVE BOX
INTERVAL HPI:  Pt is a 54 y/o female w/pmhx of  DM II, HTN, RA, and MI comes w/complain of sob x 2 days and marked fatigue and fever to tmax 100.2.  pt was diagnosed w/CAP on 1/5/21 was sent home on azithro and cefopodoxime x7days.  pt completed abx, she was covid neg then and again today, she returns w/increasing sob over the last 2 days and pleuritic pain on deep breaths.  pt denies any , chills, +non productive cough +pleuritic pain no cp, palpitations, n/v/d/c no travels. feels very fatigued, barely able to put on a blouse.  Non smoker. Reports weight loss over few months,  CTA in ed  No pulmonary emboli visualized. Consolidation in the right lower lobe with mediastinal and right hilar lymphadenopathy which may represent infection.  (13 Jan 2021 21:31).  Admitted with Pneumonia, leukocytosis.    OVERNIGHT EVENTS:  Low grade temperature noted    Vital Signs Last 24 Hrs  T(C): 36.4 (19 Jan 2021 11:06), Max: 37.9 (18 Jan 2021 18:39)  T(F): 97.5 (19 Jan 2021 11:06), Max: 100.2 (18 Jan 2021 18:39)  HR: 89 (19 Jan 2021 11:06) (80 - 89)  BP: 124/75 (19 Jan 2021 11:06) (124/75 - 138/77)  BP(mean): --  RR: 16 (19 Jan 2021 11:06) (16 - 18)  SpO2: 93% (19 Jan 2021 11:06) (93% - 98%)    PHYSICAL EXAM:  GEN:         Awake, responsive and comfortable.  HEENT:    Normal.    RESP:        no distress  CVS:          Regular rate and rhythm.   ABD:         Soft, non-tender, non-distended;     MEDICATIONS  (STANDING):  amLODIPine   Tablet 5 milliGRAM(s) Oral daily  aspirin enteric coated 81 milliGRAM(s) Oral daily  cefepime   IVPB      cefepime   IVPB 2000 milliGRAM(s) IV Intermittent daily  dextrose 40% Gel 15 Gram(s) Oral once  dextrose 5%. 1000 milliLiter(s) (50 mL/Hr) IV Continuous <Continuous>  dextrose 5%. 1000 milliLiter(s) (100 mL/Hr) IV Continuous <Continuous>  dextrose 50% Injectable 25 Gram(s) IV Push once  dextrose 50% Injectable 25 Gram(s) IV Push once  dextrose 50% Injectable 12.5 Gram(s) IV Push once  doxycycline hyclate Capsule 100 milliGRAM(s) Oral every 12 hours  famotidine    Tablet 20 milliGRAM(s) Oral daily  glucagon  Injectable 1 milliGRAM(s) IntraMuscular once  insulin lispro (ADMELOG) corrective regimen sliding scale   SubCutaneous three times a day before meals  insulin lispro (ADMELOG) corrective regimen sliding scale   SubCutaneous at bedtime  vancomycin  IVPB 1000 milliGRAM(s) IV Intermittent every 8 hours    MEDICATIONS  (PRN):  acetaminophen   Tablet .. 650 milliGRAM(s) Oral every 6 hours PRN Temp greater or equal to 38C (100.4F), Mild Pain (1 - 3)  guaiFENesin   Syrup  (Sugar-Free) 200 milliGRAM(s) Oral every 6 hours PRN Cough    LABS:                        9.5    13.62 )-----------( 465      ( 18 Jan 2021 07:21 )             28.3     01-18    130<L>  |  97  |  9   ----------------------------<  269<H>  3.8   |  24  |  0.66    Ca    9.1      18 Jan 2021 07:21    ASSESSMENT AND PLAN:  ·	SOB.  ·	RLL Pneumonia with Lymph adenopathy.  ·	Leukocytosis.  ·	DM.  ·	HTN.  ·	Arthritis.    Low grade temperature needed.  Continue antibiotics per ID.

## 2021-01-20 DIAGNOSIS — E87.6 HYPOKALEMIA: ICD-10-CM

## 2021-01-20 LAB
ANION GAP SERPL CALC-SCNC: 10 MMOL/L — SIGNIFICANT CHANGE UP (ref 5–17)
BASOPHILS # BLD AUTO: 0.1 K/UL — SIGNIFICANT CHANGE UP (ref 0–0.2)
BASOPHILS NFR BLD AUTO: 0.9 % — SIGNIFICANT CHANGE UP (ref 0–2)
BUN SERPL-MCNC: 8 MG/DL — SIGNIFICANT CHANGE UP (ref 7–23)
CALCIUM SERPL-MCNC: 9.1 MG/DL — SIGNIFICANT CHANGE UP (ref 8.5–10.1)
CHLORIDE SERPL-SCNC: 96 MMOL/L — SIGNIFICANT CHANGE UP (ref 96–108)
CO2 SERPL-SCNC: 25 MMOL/L — SIGNIFICANT CHANGE UP (ref 22–31)
CREAT SERPL-MCNC: 0.71 MG/DL — SIGNIFICANT CHANGE UP (ref 0.5–1.3)
CRP SERPL-MCNC: 10.65 MG/DL — HIGH (ref 0–0.4)
EOSINOPHIL # BLD AUTO: 1.44 K/UL — HIGH (ref 0–0.5)
EOSINOPHIL NFR BLD AUTO: 13 % — HIGH (ref 0–6)
FERRITIN SERPL-MCNC: 508 NG/ML — HIGH (ref 15–150)
GLUCOSE BLDC GLUCOMTR-MCNC: 193 MG/DL — HIGH (ref 70–99)
GLUCOSE BLDC GLUCOMTR-MCNC: 212 MG/DL — HIGH (ref 70–99)
GLUCOSE BLDC GLUCOMTR-MCNC: 246 MG/DL — HIGH (ref 70–99)
GLUCOSE SERPL-MCNC: 260 MG/DL — HIGH (ref 70–99)
HCT VFR BLD CALC: 29 % — LOW (ref 34.5–45)
HGB BLD-MCNC: 9.9 G/DL — LOW (ref 11.5–15.5)
IMM GRANULOCYTES NFR BLD AUTO: 0.6 % — SIGNIFICANT CHANGE UP (ref 0–1.5)
LYMPHOCYTES # BLD AUTO: 1.33 K/UL — SIGNIFICANT CHANGE UP (ref 1–3.3)
LYMPHOCYTES # BLD AUTO: 12 % — LOW (ref 13–44)
MCHC RBC-ENTMCNC: 28 PG — SIGNIFICANT CHANGE UP (ref 27–34)
MCHC RBC-ENTMCNC: 34.1 GM/DL — SIGNIFICANT CHANGE UP (ref 32–36)
MCV RBC AUTO: 82.2 FL — SIGNIFICANT CHANGE UP (ref 80–100)
MONOCYTES # BLD AUTO: 1 K/UL — HIGH (ref 0–0.9)
MONOCYTES NFR BLD AUTO: 9 % — SIGNIFICANT CHANGE UP (ref 2–14)
NEUTROPHILS # BLD AUTO: 7.17 K/UL — SIGNIFICANT CHANGE UP (ref 1.8–7.4)
NEUTROPHILS NFR BLD AUTO: 64.5 % — SIGNIFICANT CHANGE UP (ref 43–77)
NRBC # BLD: 0 /100 WBCS — SIGNIFICANT CHANGE UP (ref 0–0)
PLATELET # BLD AUTO: 476 K/UL — HIGH (ref 150–400)
POTASSIUM SERPL-MCNC: 3.4 MMOL/L — LOW (ref 3.5–5.3)
POTASSIUM SERPL-SCNC: 3.4 MMOL/L — LOW (ref 3.5–5.3)
PROCALCITONIN SERPL-MCNC: 0.12 NG/ML — HIGH (ref 0.02–0.1)
RBC # BLD: 3.53 M/UL — LOW (ref 3.8–5.2)
RBC # FLD: 13.9 % — SIGNIFICANT CHANGE UP (ref 10.3–14.5)
SODIUM SERPL-SCNC: 131 MMOL/L — LOW (ref 135–145)
WBC # BLD: 11.11 K/UL — HIGH (ref 3.8–10.5)
WBC # FLD AUTO: 11.11 K/UL — HIGH (ref 3.8–10.5)

## 2021-01-20 PROCEDURE — 99233 SBSQ HOSP IP/OBS HIGH 50: CPT

## 2021-01-20 RX ORDER — POTASSIUM CHLORIDE 20 MEQ
40 PACKET (EA) ORAL ONCE
Refills: 0 | Status: COMPLETED | OUTPATIENT
Start: 2021-01-20 | End: 2021-01-20

## 2021-01-20 RX ADMIN — Medication 100 MILLIGRAM(S): at 05:19

## 2021-01-20 RX ADMIN — FAMOTIDINE 20 MILLIGRAM(S): 10 INJECTION INTRAVENOUS at 11:53

## 2021-01-20 RX ADMIN — Medication 2: at 16:48

## 2021-01-20 RX ADMIN — Medication 8: at 07:57

## 2021-01-20 RX ADMIN — MEROPENEM 100 MILLIGRAM(S): 1 INJECTION INTRAVENOUS at 21:57

## 2021-01-20 RX ADMIN — MEROPENEM 100 MILLIGRAM(S): 1 INJECTION INTRAVENOUS at 05:19

## 2021-01-20 RX ADMIN — Medication 81 MILLIGRAM(S): at 11:53

## 2021-01-20 RX ADMIN — AMLODIPINE BESYLATE 5 MILLIGRAM(S): 2.5 TABLET ORAL at 05:54

## 2021-01-20 RX ADMIN — Medication 100 MILLIGRAM(S): at 18:18

## 2021-01-20 RX ADMIN — MEROPENEM 100 MILLIGRAM(S): 1 INJECTION INTRAVENOUS at 14:07

## 2021-01-20 RX ADMIN — Medication 40 MILLIEQUIVALENT(S): at 14:07

## 2021-01-20 RX ADMIN — Medication 4: at 11:53

## 2021-01-20 NOTE — PROGRESS NOTE ADULT - PROBLEM SELECTOR PLAN 1
Covering for gram neg pna, Sepsis POA   cefepime/vanco, doxy was added  ID consult noted on board  pulm consult noted   urine legionella negative   hyponatremia improving    TTE wnl    WBC leukocytosis is improving     rheum  consult noted

## 2021-01-20 NOTE — PROGRESS NOTE ADULT - SUBJECTIVE AND OBJECTIVE BOX
INTERVAL HPI:  Pt is a 56 y/o female w/pmhx of  DM II, HTN, RA, and MI comes w/complain of sob x 2 days and marked fatigue and fever to tmax 100.2.  pt was diagnosed w/CAP on 1/5/21 was sent home on azithro and cefopodoxime x7days.  pt completed abx, she was covid neg then and again today, she returns w/increasing sob over the last 2 days and pleuritic pain on deep breaths.  pt denies any , chills, +non productive cough +pleuritic pain no cp, palpitations, n/v/d/c no travels. feels very fatigued, barely able to put on a blouse.  Non smoker. Reports weight loss over few months,  CTA in ed  No pulmonary emboli visualized. Consolidation in the right lower lobe with mediastinal and right hilar lymphadenopathy which may represent infection.  (13 Jan 2021 21:31).  Admitted with Pneumonia, leukocytosis.    OVERNIGHT EVENTS:  Still with fever.    Vital Signs Last 24 Hrs  T(C): 37.7 (20 Jan 2021 17:30), Max: 37.7 (20 Jan 2021 17:30)  T(F): 99.8 (20 Jan 2021 17:30), Max: 99.8 (20 Jan 2021 17:30)  HR: 102 (20 Jan 2021 17:30) (86 - 103)  BP: 143/86 (20 Jan 2021 17:30) (123/76 - 145/78)  BP(mean): --  RR: 17 (20 Jan 2021 17:30) (16 - 18)  SpO2: 93% (20 Jan 2021 17:30) (93% - 96%)    PHYSICAL EXAM:  GEN:         Awake, responsive and comfortable.  HEENT:    Normal.    RESP:        no distress  CVS:         Regular rate and rhythm.   ABD:         Soft, non-tender, non-distended;     MEDICATIONS  (STANDING):  amLODIPine   Tablet 5 milliGRAM(s) Oral daily  aspirin enteric coated 81 milliGRAM(s) Oral daily  dextrose 40% Gel 15 Gram(s) Oral once  dextrose 5%. 1000 milliLiter(s) (50 mL/Hr) IV Continuous <Continuous>  dextrose 5%. 1000 milliLiter(s) (100 mL/Hr) IV Continuous <Continuous>  dextrose 50% Injectable 25 Gram(s) IV Push once  dextrose 50% Injectable 12.5 Gram(s) IV Push once  dextrose 50% Injectable 25 Gram(s) IV Push once  doxycycline hyclate Capsule 100 milliGRAM(s) Oral every 12 hours  famotidine    Tablet 20 milliGRAM(s) Oral daily  glucagon  Injectable 1 milliGRAM(s) IntraMuscular once  insulin lispro (ADMELOG) corrective regimen sliding scale   SubCutaneous three times a day before meals  insulin lispro (ADMELOG) corrective regimen sliding scale   SubCutaneous at bedtime  meropenem  IVPB 1000 milliGRAM(s) IV Intermittent every 8 hours    MEDICATIONS  (PRN):  acetaminophen   Tablet .. 650 milliGRAM(s) Oral every 6 hours PRN Temp greater or equal to 38C (100.4F), Mild Pain (1 - 3)  guaiFENesin   Syrup  (Sugar-Free) 200 milliGRAM(s) Oral every 6 hours PRN Cough    LABS:                        9.9    11.11 )-----------( 476      ( 20 Jan 2021 08:21 )             29.0     01-20    131<L>  |  96  |  8   ----------------------------<  260<H>  3.4<L>   |  25  |  0.71    Ca    9.1      20 Jan 2021 08:21    ASSESSMENT AND PLAN:  ·	SOB.  ·	RLL Pneumonia with Lymph adenopathy.  ·	Leukocytosis.  ·	DM.  ·	HTN.  ·	Arthritis.    Still with fever, so COVID being ruled out.  Antibiotics per ID.  Oxygenation stable.

## 2021-01-20 NOTE — PROGRESS NOTE ADULT - SUBJECTIVE AND OBJECTIVE BOX
I AM INHERITING THIS PATIENT ON TODAY 1/20/2021    Patient is a 55y old  Female who presents with a chief complaint of sob (19 Jan 2021 11:40)      INTERVAL HPI/OVERNIGHT EVENTS: no events     MEDICATIONS  (STANDING):  amLODIPine   Tablet 5 milliGRAM(s) Oral daily  aspirin enteric coated 81 milliGRAM(s) Oral daily  cefepime   IVPB      cefepime   IVPB 2000 milliGRAM(s) IV Intermittent daily  dextrose 40% Gel 15 Gram(s) Oral once  dextrose 5%. 1000 milliLiter(s) (50 mL/Hr) IV Continuous <Continuous>  dextrose 5%. 1000 milliLiter(s) (100 mL/Hr) IV Continuous <Continuous>  dextrose 50% Injectable 25 Gram(s) IV Push once  dextrose 50% Injectable 25 Gram(s) IV Push once  dextrose 50% Injectable 12.5 Gram(s) IV Push once  doxycycline hyclate Capsule 100 milliGRAM(s) Oral every 12 hours  famotidine    Tablet 20 milliGRAM(s) Oral daily  glucagon  Injectable 1 milliGRAM(s) IntraMuscular once  insulin lispro (ADMELOG) corrective regimen sliding scale   SubCutaneous three times a day before meals  insulin lispro (ADMELOG) corrective regimen sliding scale   SubCutaneous at bedtime  vancomycin  IVPB 1000 milliGRAM(s) IV Intermittent every 8 hours    MEDICATIONS  (PRN):  acetaminophen   Tablet .. 650 milliGRAM(s) Oral every 6 hours PRN Temp greater or equal to 38C (100.4F), Mild Pain (1 - 3)  guaiFENesin   Syrup  (Sugar-Free) 200 milliGRAM(s) Oral every 6 hours PRN Cough      Allergies    ACE inhibitors (Angioedema (Severe))  lisinopril (Anaphylaxis; Angioedema)    Intolerances    Vital Signs Last 24 Hrs  T(C): 37 (20 Jan 2021 10:46), Max: 40.2 (19 Jan 2021 18:35)  T(F): 98.6 (20 Jan 2021 10:46), Max: 104.3 (19 Jan 2021 18:35)  HR: 103 (20 Jan 2021 10:46) (86 - 103)  BP: 128/79 (20 Jan 2021 10:46) (123/76 - 148/84)  BP(mean): --  RR: 16 (20 Jan 2021 10:46) (16 - 18)  SpO2: 95% (20 Jan 2021 10:46) (95% - 96%)    PHYSICAL EXAM:  GENERAL: NAD, well-groomed, well-developed  HEAD:  Atraumatic, Normocephalic  EYES: EOMI, PERRLA, conjunctiva and sclera clear  ENMT: No tonsillar erythema, exudates, or enlargement; Moist mucous membranes, Good dentition, No lesions  NECK: Supple, No JVD, Normal thyroid  NERVOUS SYSTEM:  Alert & Oriented X3, Good concentration; Motor Strength 5/5 B/L upper and lower extremities; DTRs 2+ intact and symmetric  CHEST/LUNG: Clear to percussion bilaterally; No rales, rhonchi, wheezing, or rubs  HEART: Regular rate and rhythm; No murmurs, rubs, or gallops  ABDOMEN: Soft, Nontender, Nondistended; Bowel sounds present  EXTREMITIES:  2+ Peripheral Pulses, No clubbing, cyanosis, or edema  SKIN: No rashes or lesions    LABS:                                 9.9    11.11 )-----------( 476      ( 20 Jan 2021 08:21 )             29.0   01-20    131<L>  |  96  |  8   ----------------------------<  260<H>  3.4<L>   |  25  |  0.71    Ca    9.1      20 Jan 2021 08:21          CAPILLARY BLOOD GLUCOSE      POCT Blood Glucose.: 246 mg/dL (20 Jan 2021 11:02)  POCT Blood Glucose.: 308 mg/dL (20 Jan 2021 07:48)  POCT Blood Glucose.: 242 mg/dL (19 Jan 2021 22:01)  POCT Blood Glucose.: 247 mg/dL (19 Jan 2021 16:19)      RADIOLOGY & ADDITIONAL TESTS:    Imaging Personally Reviewed:  [ X] YES  [ ] NO    Consultant(s) Notes Reviewed:  [ X] YES  [ ] NO    Care Discussed with Consultants/Other Providers [X ] YES  [ ] NO

## 2021-01-21 ENCOUNTER — APPOINTMENT (OUTPATIENT)
Dept: ENDOCRINOLOGY | Facility: CLINIC | Age: 56
End: 2021-01-21

## 2021-01-21 LAB
ANION GAP SERPL CALC-SCNC: 8 MMOL/L — SIGNIFICANT CHANGE UP (ref 5–17)
BUN SERPL-MCNC: 9 MG/DL — SIGNIFICANT CHANGE UP (ref 7–23)
CALCIUM SERPL-MCNC: 9.4 MG/DL — SIGNIFICANT CHANGE UP (ref 8.5–10.1)
CHLORIDE SERPL-SCNC: 93 MMOL/L — LOW (ref 96–108)
CO2 SERPL-SCNC: 29 MMOL/L — SIGNIFICANT CHANGE UP (ref 22–31)
CREAT SERPL-MCNC: 0.68 MG/DL — SIGNIFICANT CHANGE UP (ref 0.5–1.3)
GLUCOSE BLDC GLUCOMTR-MCNC: 195 MG/DL — HIGH (ref 70–99)
GLUCOSE BLDC GLUCOMTR-MCNC: 259 MG/DL — HIGH (ref 70–99)
GLUCOSE BLDC GLUCOMTR-MCNC: 266 MG/DL — HIGH (ref 70–99)
GLUCOSE BLDC GLUCOMTR-MCNC: 330 MG/DL — HIGH (ref 70–99)
GLUCOSE SERPL-MCNC: 251 MG/DL — HIGH (ref 70–99)
HCT VFR BLD CALC: 31.1 % — LOW (ref 34.5–45)
HGB BLD-MCNC: 9.9 G/DL — LOW (ref 11.5–15.5)
MAGNESIUM SERPL-MCNC: 1.8 MG/DL — SIGNIFICANT CHANGE UP (ref 1.6–2.6)
MCHC RBC-ENTMCNC: 27 PG — SIGNIFICANT CHANGE UP (ref 27–34)
MCHC RBC-ENTMCNC: 31.8 GM/DL — LOW (ref 32–36)
MCV RBC AUTO: 85 FL — SIGNIFICANT CHANGE UP (ref 80–100)
NRBC # BLD: 0 /100 WBCS — SIGNIFICANT CHANGE UP (ref 0–0)
PHOSPHATE SERPL-MCNC: 2.7 MG/DL — SIGNIFICANT CHANGE UP (ref 2.5–4.5)
PLATELET # BLD AUTO: 530 K/UL — HIGH (ref 150–400)
POTASSIUM SERPL-MCNC: 3.8 MMOL/L — SIGNIFICANT CHANGE UP (ref 3.5–5.3)
POTASSIUM SERPL-SCNC: 3.8 MMOL/L — SIGNIFICANT CHANGE UP (ref 3.5–5.3)
RBC # BLD: 3.66 M/UL — LOW (ref 3.8–5.2)
RBC # FLD: 13.8 % — SIGNIFICANT CHANGE UP (ref 10.3–14.5)
SARS-COV-2 RNA SPEC QL NAA+PROBE: SIGNIFICANT CHANGE UP
SODIUM SERPL-SCNC: 130 MMOL/L — LOW (ref 135–145)
WBC # BLD: 14 K/UL — HIGH (ref 3.8–10.5)
WBC # FLD AUTO: 14 K/UL — HIGH (ref 3.8–10.5)

## 2021-01-21 PROCEDURE — 99233 SBSQ HOSP IP/OBS HIGH 50: CPT

## 2021-01-21 RX ADMIN — Medication 100 MILLIGRAM(S): at 05:18

## 2021-01-21 RX ADMIN — AMLODIPINE BESYLATE 5 MILLIGRAM(S): 2.5 TABLET ORAL at 05:18

## 2021-01-21 RX ADMIN — MEROPENEM 100 MILLIGRAM(S): 1 INJECTION INTRAVENOUS at 14:46

## 2021-01-21 RX ADMIN — Medication 8: at 11:40

## 2021-01-21 RX ADMIN — MEROPENEM 100 MILLIGRAM(S): 1 INJECTION INTRAVENOUS at 21:06

## 2021-01-21 RX ADMIN — MEROPENEM 100 MILLIGRAM(S): 1 INJECTION INTRAVENOUS at 05:18

## 2021-01-21 RX ADMIN — Medication 6: at 08:16

## 2021-01-21 RX ADMIN — Medication 81 MILLIGRAM(S): at 11:41

## 2021-01-21 RX ADMIN — FAMOTIDINE 20 MILLIGRAM(S): 10 INJECTION INTRAVENOUS at 11:41

## 2021-01-21 RX ADMIN — Medication 2: at 21:06

## 2021-01-21 RX ADMIN — Medication 2: at 16:47

## 2021-01-21 RX ADMIN — Medication 100 MILLIGRAM(S): at 17:32

## 2021-01-21 NOTE — PROGRESS NOTE ADULT - ASSESSMENT
non resolving community acquired pneumonia   immunosuppressed host   > 2 years on leflunamide for RA ? rule out toxicity from that as well   101.4 on admission otherwise no fevers recorded;; actually the patient always felt hot to me despite gloves but this is the first time we did rectal temps   will continue rectal temps x 48 hours    consider a trial of prednisone per rheumatology   will add doxy ; ?/ can assist at times with RA manangemet   can have fevers from RA but has mainly pulm signs and symptoms   qgtb pending   follow up chest xray last on 1/13  appreciate rheum consult

## 2021-01-21 NOTE — PROGRESS NOTE ADULT - PROBLEM SELECTOR PLAN 1
Covering for gram neg pna, Sepsis POA   cefepime/vanco, doxy was added  ID consult noted on board  pulm consult noted   urine legionella negative   hyponatremia improving    TTE wnl    WBC leukocytosis is improving     rheum  consult noted    1/21/2021 no fever since 1/19/2021 covid negative from 1/20/21 Covering for gram neg pna, Sepsis POA   cefepime/vanco, doxy was added  ID consult noted on board  pulm consult noted   urine legionella negative   hyponatremia improving    TTE wnl    WBC leukocytosis is improving     rheum  consult noted    1/21/2021 no fever since 1/19/2021 covid negative from 1/20/21, f/u imdium scan

## 2021-01-21 NOTE — PROGRESS NOTE ADULT - SUBJECTIVE AND OBJECTIVE BOX
I AM INHERITING THIS PATIENT ON TODAY 1/20/2021    Patient is a 55y old  Female who presents with a chief complaint of sob (19 Jan 2021 11:40)      INTERVAL HPI/OVERNIGHT EVENTS: no events       MEDICATIONS  (STANDING):  amLODIPine   Tablet 5 milliGRAM(s) Oral daily  aspirin enteric coated 81 milliGRAM(s) Oral daily  dextrose 40% Gel 15 Gram(s) Oral once  dextrose 5%. 1000 milliLiter(s) (50 mL/Hr) IV Continuous <Continuous>  dextrose 5%. 1000 milliLiter(s) (100 mL/Hr) IV Continuous <Continuous>  dextrose 50% Injectable 25 Gram(s) IV Push once  dextrose 50% Injectable 12.5 Gram(s) IV Push once  dextrose 50% Injectable 25 Gram(s) IV Push once  doxycycline hyclate Capsule 100 milliGRAM(s) Oral every 12 hours  famotidine    Tablet 20 milliGRAM(s) Oral daily  glucagon  Injectable 1 milliGRAM(s) IntraMuscular once  insulin lispro (ADMELOG) corrective regimen sliding scale   SubCutaneous three times a day before meals  insulin lispro (ADMELOG) corrective regimen sliding scale   SubCutaneous at bedtime  meropenem  IVPB 1000 milliGRAM(s) IV Intermittent every 8 hours    MEDICATIONS  (PRN):  acetaminophen   Tablet .. 650 milliGRAM(s) Oral every 6 hours PRN Temp greater or equal to 38C (100.4F), Mild Pain (1 - 3)  guaiFENesin   Syrup  (Sugar-Free) 200 milliGRAM(s) Oral every 6 hours PRN Cough  Allergies    ACE inhibitors (Angioedema (Severe))  lisinopril (Anaphylaxis; Angioedema)    Intolerances    Vital Signs Last 24 Hrs  T(C): 36.8 (21 Jan 2021 10:45), Max: 37.7 (20 Jan 2021 17:30)  T(F): 98.2 (21 Jan 2021 10:45), Max: 99.8 (20 Jan 2021 17:30)  HR: 106 (21 Jan 2021 10:45) (89 - 106)  BP: 139/84 (21 Jan 2021 10:45) (122/79 - 143/86)  BP(mean): --  RR: 16 (21 Jan 2021 10:45) (16 - 18)  SpO2: 95% (21 Jan 2021 10:45) (93% - 95%)      PHYSICAL EXAM:  GENERAL: NAD, well-groomed, well-developed  HEAD:  Atraumatic, Normocephalic  EYES: EOMI, PERRLA, conjunctiva and sclera clear  ENMT: No tonsillar erythema, exudates, or enlargement; Moist mucous membranes, Good dentition, No lesions  NECK: Supple, No JVD, Normal thyroid  NERVOUS SYSTEM:  Alert & Oriented X3, Good concentration; Motor Strength 5/5 B/L upper and lower extremities; DTRs 2+ intact and symmetric  CHEST/LUNG: Clear to percussion bilaterally; No rales, rhonchi, wheezing, or rubs  HEART: Regular rate and rhythm; No murmurs, rubs, or gallops  ABDOMEN: Soft, Nontender, Nondistended; Bowel sounds present  EXTREMITIES:  2+ Peripheral Pulses, No clubbing, cyanosis, or edema  SKIN: No rashes or lesions    LABS:                                  9.9    14.00 )-----------( 530      ( 21 Jan 2021 08:12 )             31.1   01-21    130<L>  |  93<L>  |  9   ----------------------------<  251<H>  3.8   |  29  |  0.68    Ca    9.4      21 Jan 2021 08:12  Phos  2.7     01-21  Mg     1.8     01-21      A1C with Estimated Average Glucose (01.15.21 @ 11:26)    A1C with Estimated Average Glucose Result: 9.2: Method: Immunoassay       Reference Range                4.0-5.6%       High risk (prediabetic)        5.7-6.4%       Diabetic, diagnostic             >=6.5%       ADA diabetic treatment goal       <7.0%  The Hemoglobin A1c testing is NGSP-certified.Reference ranges are based  upon the 2010 recommendations of  the American Diabetes Association.  Interpretation may vary for children  and adolescents. %    Estimated Average Glucose: 217: The Estimated Average Glucose (eAG) or Mean Plasma Glucose (MPG) value is  calculated from the hemoglobin A1c value and covers the same time period.   The American Diabetes Association (ADA) and other professional  organizations recommend reporting the eAG with the HgbA1c. mg/dL        CAPILLARY BLOOD GLUCOSE        POCT Blood Glucose.: 330 mg/dL (21 Jan 2021 10:49)  POCT Blood Glucose.: 259 mg/dL (21 Jan 2021 07:47)  POCT Blood Glucose.: 212 mg/dL (20 Jan 2021 21:44)  POCT Blood Glucose.: 193 mg/dL (20 Jan 2021 16:36)        RADIOLOGY & ADDITIONAL TESTS:    Imaging Personally Reviewed:  [ X] YES  [ ] NO    Consultant(s) Notes Reviewed:  [ X] YES  [ ] NO    Care Discussed with Consultants/Other Providers [X ] YES  [ ] NO

## 2021-01-21 NOTE — PROGRESS NOTE ADULT - SUBJECTIVE AND OBJECTIVE BOX
HPI:  Pt is a 54 y/o female w/pmhx of  DM II, HTN, RA, and MI comes w/complain of sob x 2 days and marked fatigue and fever to tmax 100.2.  pt was diagnosed w/CAP on 1/5/21 was sent home on azithro and cefopodoxime x7days.  pt completed abx, she was covid neg then and again today, she returns w/increasing sob over the last 2 days and pleuritic pain on deep breaths.  pt denies any , chills, +non productive cough +pleuritic pain no cp, palpitations, n/v/d/c no travels. feels very fatigued, barely able to put on a blouse.    CTA in ed  No pulmonary emboli visualized. Consolidation in the right lower lobe with mediastinal and right hilar lymphadenopathy which may represent infection.  (13 Jan 2021 21:31)    suddenly better today   gallium ongoing   Allergies    ACE inhibitors (Angioedema (Severe))  lisinopril (Anaphylaxis; Angioedema)    Intolerances        MEDICATIONS  (STANDING):  amLODIPine   Tablet 5 milliGRAM(s) Oral daily  aspirin enteric coated 81 milliGRAM(s) Oral daily  dextrose 40% Gel 15 Gram(s) Oral once  dextrose 5%. 1000 milliLiter(s) (50 mL/Hr) IV Continuous <Continuous>  dextrose 5%. 1000 milliLiter(s) (100 mL/Hr) IV Continuous <Continuous>  dextrose 50% Injectable 25 Gram(s) IV Push once  dextrose 50% Injectable 12.5 Gram(s) IV Push once  dextrose 50% Injectable 25 Gram(s) IV Push once  doxycycline hyclate Capsule 100 milliGRAM(s) Oral every 12 hours  famotidine    Tablet 20 milliGRAM(s) Oral daily  glucagon  Injectable 1 milliGRAM(s) IntraMuscular once  insulin lispro (ADMELOG) corrective regimen sliding scale   SubCutaneous three times a day before meals  insulin lispro (ADMELOG) corrective regimen sliding scale   SubCutaneous at bedtime  meropenem  IVPB 1000 milliGRAM(s) IV Intermittent every 8 hours    MEDICATIONS  (PRN):  acetaminophen   Tablet .. 650 milliGRAM(s) Oral every 6 hours PRN Temp greater or equal to 38C (100.4F), Mild Pain (1 - 3)  guaiFENesin   Syrup  (Sugar-Free) 200 milliGRAM(s) Oral every 6 hours PRN Cough      REVIEW OF SYSTEMS:    feels fine   no fever chills nausea vomiting diarrhea   VITAL SIGNS:  T(C): 37.1 (01-21-21 @ 23:38), Max: 37.1 (01-21-21 @ 23:38)  T(F): 98.8 (01-21-21 @ 23:38), Max: 98.8 (01-21-21 @ 23:38)  HR: 85 (01-21-21 @ 23:38) (83 - 106)  BP: 146/84 (01-21-21 @ 23:38) (122/79 - 146/84)  RR: 18 (01-21-21 @ 23:38) (16 - 18)  SpO2: 93% (01-21-21 @ 23:38) (93% - 95%)  Wt(kg): --    PHYSICAL EXAM:    GENERAL: not in any distress  HEENT: Neck is supple, normocephalic, atraumatic   CHEST/LUNG: Clear to auscultation bilaterally; No rales, rhonchi, wheezing  HEART: Regular rate and rhythm; No murmurs, rubs, or gallops  ABDOMEN: Soft, Nontender, Nondistended; Bowel sounds present, no rebound   EXTREMITIES:  2+ Peripheral Pulses, No clubbing, cyanosis, or edema  SKIN: No rashes or lesions  BACK: no pressor sore   NERVOUS SYSTEM:  Alert & Oriented X3, Good concentration  PSYCH: normal affect     LABS:                         9.9    14.00 )-----------( 530      ( 21 Jan 2021 08:12 )             31.1     01-21    130<L>  |  93<L>  |  9   ----------------------------<  251<H>  3.8   |  29  |  0.68    Ca    9.4      21 Jan 2021 08:12  Phos  2.7     01-21  Mg     1.8     01-21                                Culture Results:   No growth to date. (01-20 @ 09:02)  Culture Results:   No growth to date. (01-20 @ 00:33)                Radiology:  < from: CT Angio Chest w/ IV Cont (01.13.21 @ 17:46) >      < end of copied text >

## 2021-01-22 LAB
GLUCOSE BLDC GLUCOMTR-MCNC: 161 MG/DL — HIGH (ref 70–99)
GLUCOSE BLDC GLUCOMTR-MCNC: 213 MG/DL — HIGH (ref 70–99)
GLUCOSE BLDC GLUCOMTR-MCNC: 250 MG/DL — HIGH (ref 70–99)
GLUCOSE BLDC GLUCOMTR-MCNC: 268 MG/DL — HIGH (ref 70–99)

## 2021-01-22 PROCEDURE — 99233 SBSQ HOSP IP/OBS HIGH 50: CPT

## 2021-01-22 PROCEDURE — 78802 RP LOCLZJ TUM WHBDY 1 D IMG: CPT | Mod: 26

## 2021-01-22 RX ADMIN — Medication 4: at 12:16

## 2021-01-22 RX ADMIN — FAMOTIDINE 20 MILLIGRAM(S): 10 INJECTION INTRAVENOUS at 12:17

## 2021-01-22 RX ADMIN — MEROPENEM 100 MILLIGRAM(S): 1 INJECTION INTRAVENOUS at 06:51

## 2021-01-22 RX ADMIN — AMLODIPINE BESYLATE 5 MILLIGRAM(S): 2.5 TABLET ORAL at 06:51

## 2021-01-22 RX ADMIN — MEROPENEM 100 MILLIGRAM(S): 1 INJECTION INTRAVENOUS at 20:19

## 2021-01-22 RX ADMIN — Medication 100 MILLIGRAM(S): at 16:53

## 2021-01-22 RX ADMIN — Medication 100 MILLIGRAM(S): at 06:51

## 2021-01-22 RX ADMIN — Medication 81 MILLIGRAM(S): at 12:17

## 2021-01-22 RX ADMIN — Medication 2: at 16:54

## 2021-01-22 RX ADMIN — Medication 650 MILLIGRAM(S): at 20:17

## 2021-01-22 RX ADMIN — MEROPENEM 100 MILLIGRAM(S): 1 INJECTION INTRAVENOUS at 13:50

## 2021-01-22 NOTE — PROGRESS NOTE ADULT - PROBLEM SELECTOR PLAN 1
Covering for gram neg pna, Sepsis POA   cefepime/vanco, doxy was added  ID consult noted on board  pulm consult noted   urine legionella negative   hyponatremia improving    TTE wnl    WBC leukocytosis is improving     rheum  consult noted    1/21/2021 no fever since 1/19/2021 covid negative from 1/20/21, f/u imdium scan  1/22/2021 indium scan as above RLL

## 2021-01-22 NOTE — PROGRESS NOTE ADULT - SUBJECTIVE AND OBJECTIVE BOX
HPI:  Pt is a 54 y/o female w/pmhx of  DM II, HTN, RA, and MI comes w/complain of sob x 2 days and marked fatigue and fever to tmax 100.2.  pt was diagnosed w/CAP on 1/5/21 was sent home on azithro and cefopodoxime x7days.  pt completed abx, she was covid neg then and again today, she returns w/increasing sob over the last 2 days and pleuritic pain on deep breaths.  pt denies any , chills, +non productive cough +pleuritic pain no cp, palpitations, n/v/d/c no travels. feels very fatigued, barely able to put on a blouse.    CTA in ed  No pulmonary emboli visualized. Consolidation in the right lower lobe with mediastinal and right hilar lymphadenopathy which may represent infection.  (13 Jan 2021 21:31)      Allergies    ACE inhibitors (Angioedema (Severe))  lisinopril (Anaphylaxis; Angioedema)    Intolerances        MEDICATIONS  (STANDING):  amLODIPine   Tablet 5 milliGRAM(s) Oral daily  aspirin enteric coated 81 milliGRAM(s) Oral daily  dextrose 40% Gel 15 Gram(s) Oral once  dextrose 5%. 1000 milliLiter(s) (50 mL/Hr) IV Continuous <Continuous>  dextrose 5%. 1000 milliLiter(s) (100 mL/Hr) IV Continuous <Continuous>  dextrose 50% Injectable 12.5 Gram(s) IV Push once  dextrose 50% Injectable 25 Gram(s) IV Push once  dextrose 50% Injectable 25 Gram(s) IV Push once  doxycycline hyclate Capsule 100 milliGRAM(s) Oral every 12 hours  famotidine    Tablet 20 milliGRAM(s) Oral daily  glucagon  Injectable 1 milliGRAM(s) IntraMuscular once  insulin lispro (ADMELOG) corrective regimen sliding scale   SubCutaneous three times a day before meals  insulin lispro (ADMELOG) corrective regimen sliding scale   SubCutaneous at bedtime  meropenem  IVPB 1000 milliGRAM(s) IV Intermittent every 8 hours    MEDICATIONS  (PRN):  acetaminophen   Tablet .. 650 milliGRAM(s) Oral every 6 hours PRN Temp greater or equal to 38C (100.4F), Mild Pain (1 - 3)  guaiFENesin   Syrup  (Sugar-Free) 200 milliGRAM(s) Oral every 6 hours PRN Cough      REVIEW OF SYSTEMS:    CONSTITUTIONAL: No fever, chills, weight loss, or fatigue  HEENT: No sore throat, runny nose, ear ache  RESPIRATORY: No cough, wheezing, No shortness of breath  CARDIOVASCULAR: No chest pain, palpitations, dizziness  GASTROINTESTINAL: No abdominal pain. No nausea, vomiting, diarrhea  GENITOURINARY: No dysuria, increase frequency, hematuria, or incontinence  NEUROLOGICAL: No headaches, memory loss, loss of strength, numbness, or tremors, no weakness  EXTREMITY: No pedal edema BLE  SKIN: No itching, burning, rashes, or lesions     VITAL SIGNS:  T(C): 36.6 (01-22-21 @ 17:10), Max: 37.1 (01-21-21 @ 23:38)  T(F): 97.9 (01-22-21 @ 17:10), Max: 98.8 (01-21-21 @ 23:38)  HR: 86 (01-22-21 @ 17:10) (85 - 89)  BP: 134/87 (01-22-21 @ 17:10) (123/76 - 146/84)  RR: 17 (01-22-21 @ 17:10) (16 - 18)  SpO2: 96% (01-22-21 @ 17:10) (93% - 96%)  Wt(kg): --    PHYSICAL EXAM:    GENERAL: not in any distress  HEENT: Neck is supple, normocephalic, atraumatic   CHEST/LUNG: Clear to auscultation bilaterally; No rales, rhonchi, wheezing  HEART: Regular rate and rhythm; No murmurs, rubs, or gallops  ABDOMEN: Soft, Nontender, Nondistended; Bowel sounds present, no rebound   EXTREMITIES:  2+ Peripheral Pulses, No clubbing, cyanosis, or edema  GENITOURINARY:   SKIN: No rashes or lesions  BACK: no pressor sore   NERVOUS SYSTEM:  Alert & Oriented X3, Good concentration  PSYCH: normal affect     LABS:                         9.9    14.00 )-----------( 530      ( 21 Jan 2021 08:12 )             31.1     01-21    130<L>  |  93<L>  |  9   ----------------------------<  251<H>  3.8   |  29  |  0.68    Ca    9.4      21 Jan 2021 08:12  Phos  2.7     01-21  Mg     1.8     01-21                                Culture Results:   No growth to date. (01-20 @ 09:02)  Culture Results:   No growth to date. (01-20 @ 00:33)                Radiology:       HPI:  Pt is a 54 y/o female w/pmhx of  DM II, HTN, RA, and MI comes w/complain of sob x 2 days and marked fatigue and fever to tmax 100.2.  pt was diagnosed w/CAP on 1/5/21 was sent home on azithro and cefopodoxime x7days.  pt completed abx, she was covid neg then and again today, she returns w/increasing sob over the last 2 days and pleuritic pain on deep breaths.  pt denies any , chills, +non productive cough +pleuritic pain no cp, palpitations, n/v/d/c no travels. feels very fatigued, barely able to put on a blouse.    CTA in ed  No pulmonary emboli visualized. Consolidation in the right lower lobe with mediastinal and right hilar lymphadenopathy which may represent infection.  (13 Jan 2021 21:31)  was doing ok   now fevers daily  again co chills today ; stat rectal temp 102.8     Allergies    ACE inhibitors (Angioedema (Severe))  lisinopril (Anaphylaxis; Angioedema)    Intolerances        MEDICATIONS  (STANDING):  amLODIPine   Tablet 5 milliGRAM(s) Oral daily  aspirin enteric coated 81 milliGRAM(s) Oral daily  dextrose 40% Gel 15 Gram(s) Oral once  dextrose 5%. 1000 milliLiter(s) (50 mL/Hr) IV Continuous <Continuous>  dextrose 5%. 1000 milliLiter(s) (100 mL/Hr) IV Continuous <Continuous>  dextrose 50% Injectable 12.5 Gram(s) IV Push once  dextrose 50% Injectable 25 Gram(s) IV Push once  dextrose 50% Injectable 25 Gram(s) IV Push once  doxycycline hyclate Capsule 100 milliGRAM(s) Oral every 12 hours  famotidine    Tablet 20 milliGRAM(s) Oral daily  glucagon  Injectable 1 milliGRAM(s) IntraMuscular once  insulin lispro (ADMELOG) corrective regimen sliding scale   SubCutaneous three times a day before meals  insulin lispro (ADMELOG) corrective regimen sliding scale   SubCutaneous at bedtime  meropenem  IVPB 1000 milliGRAM(s) IV Intermittent every 8 hours    MEDICATIONS  (PRN):  acetaminophen   Tablet .. 650 milliGRAM(s) Oral every 6 hours PRN Temp greater or equal to 38C (100.4F), Mild Pain (1 - 3)  guaiFENesin   Syrup  (Sugar-Free) 200 milliGRAM(s) Oral every 6 hours PRN Cough      REVIEW OF SYSTEMS:  co fever chills ongoing cough   no change in status   wants to go home    VITAL SIGNS:  T(C): 36.6 (01-22-21 @ 17:10), Max: 37.1 (01-21-21 @ 23:38)  T(F): 97.9 (01-22-21 @ 17:10), Max: 98.8 (01-21-21 @ 23:38)  HR: 86 (01-22-21 @ 17:10) (85 - 89)  BP: 134/87 (01-22-21 @ 17:10) (123/76 - 146/84)  RR: 17 (01-22-21 @ 17:10) (16 - 18)  SpO2: 96% (01-22-21 @ 17:10) (93% - 96%)  Wt(kg): --    PHYSICAL EXAM:    GENERAL: not in any distress  HEENT: Neck is supple, normocephalic, atraumatic   CHEST/LUNG: Clear to auscultation bilaterally; No rales, rhonchi, wheezing  HEART: Regular rate and rhythm; No murmurs, rubs, or gallops  ABDOMEN: Soft, Nontender, Nondistended; Bowel sounds present, no rebound   EXTREMITIES:  2+ Peripheral Pulses, No clubbing, cyanosis, or edema  SKIN: No rashes or lesions  BACK: no pressor sore   NERVOUS SYSTEM:  Alert & Oriented X3, Good concentration  PSYCH: normal affect     LABS:                         9.9    14.00 )-----------( 530      ( 21 Jan 2021 08:12 )             31.1     01-21    130<L>  |  93<L>  |  9   ----------------------------<  251<H>  3.8   |  29  |  0.68    Ca    9.4      21 Jan 2021 08:12  Phos  2.7     01-21  Mg     1.8     01-21                                Culture Results:   No growth to date. (01-20 @ 09:02)  Culture Results:   No growth to date. (01-20 @ 00:33)                Radiology:

## 2021-01-22 NOTE — PROGRESS NOTE ADULT - SUBJECTIVE AND OBJECTIVE BOX
INTERVAL HPI:   Pt is a 56 y/o female w/pmhx of  DM II, HTN, RA, and MI comes w/complain of sob x 2 days and marked fatigue and fever to tmax 100.2.  pt was diagnosed w/CAP on 1/5/21 was sent home on azithro and cefopodoxime x7days.  pt completed abx, she was covid neg then and again today, she returns w/increasing sob over the last 2 days and pleuritic pain on deep breaths.  pt denies any , chills, +non productive cough +pleuritic pain no cp, palpitations, n/v/d/c no travels. feels very fatigued, barely able to put on a blouse.  Non smoker. Reports weight loss over few months,  CTA in ed  No pulmonary emboli visualized. Consolidation in the right lower lobe with mediastinal and right hilar lymphadenopathy which may represent infection.  (13 Jan 2021 21:31).  Admitted with Pneumonia, leukocytosis.    OVERNIGHT EVENTS:  Feels better.    Vital Signs Last 24 Hrs  T(C): 36.7 (22 Jan 2021 10:49), Max: 37.1 (21 Jan 2021 23:38)  T(F): 98.1 (22 Jan 2021 10:49), Max: 98.8 (21 Jan 2021 23:38)  HR: 85 (22 Jan 2021 10:49) (83 - 89)  BP: 144/80 (22 Jan 2021 10:49) (123/76 - 146/84)  BP(mean): --  RR: 16 (22 Jan 2021 10:49) (16 - 18)  SpO2: 95% (22 Jan 2021 10:49) (93% - 95%)    PHYSICAL EXAM:  GEN:         Awake, responsive and comfortable.  HEENT:    Normal.    RESP:        no wheezing.  CVS:          Regular rate and rhythm.   ABD:         Soft, non-tender, non-distended;     MEDICATIONS  (STANDING):  amLODIPine   Tablet 5 milliGRAM(s) Oral daily  aspirin enteric coated 81 milliGRAM(s) Oral daily  dextrose 40% Gel 15 Gram(s) Oral once  dextrose 5%. 1000 milliLiter(s) (50 mL/Hr) IV Continuous <Continuous>  dextrose 5%. 1000 milliLiter(s) (100 mL/Hr) IV Continuous <Continuous>  dextrose 50% Injectable 12.5 Gram(s) IV Push once  dextrose 50% Injectable 25 Gram(s) IV Push once  dextrose 50% Injectable 25 Gram(s) IV Push once  doxycycline hyclate Capsule 100 milliGRAM(s) Oral every 12 hours  famotidine    Tablet 20 milliGRAM(s) Oral daily  glucagon  Injectable 1 milliGRAM(s) IntraMuscular once  insulin lispro (ADMELOG) corrective regimen sliding scale   SubCutaneous three times a day before meals  insulin lispro (ADMELOG) corrective regimen sliding scale   SubCutaneous at bedtime  meropenem  IVPB 1000 milliGRAM(s) IV Intermittent every 8 hours    MEDICATIONS  (PRN):  acetaminophen   Tablet .. 650 milliGRAM(s) Oral every 6 hours PRN Temp greater or equal to 38C (100.4F), Mild Pain (1 - 3)  guaiFENesin   Syrup  (Sugar-Free) 200 milliGRAM(s) Oral every 6 hours PRN Cough    LABS:                        9.9    14.00 )-----------( 530      ( 21 Jan 2021 08:12 )             31.1     01-21    130<L>  |  93<L>  |  9   ----------------------------<  251<H>  3.8   |  29  |  0.68    Ca    9.4      21 Jan 2021 08:12  Phos  2.7     01-21  Mg     1.8     01-21    ASSESSMENT AND PLAN:  ·	SOB.  ·	RLL Pneumonia with Lymph adenopathy.  ·	Leukocytosis.  ·	DM.  ·	HTN.  ·	Arthritis.    Clinically better,  Oxygenation stable.  Complete antibiotics per ID.  Follow up chest CT in 4-6 weeks.

## 2021-01-22 NOTE — PROGRESS NOTE ADULT - ASSESSMENT
non resolving community acquired pneumonia   immunosuppressed host   > 2 years on leflunamide for RA ? rule out toxicity from that as well   101.4 on admission otherwise no fevers recorded;; actually the patient always felt hot to me despite gloves but this is the first time we did rectal temps   will continue rectal temps x 48 hours    consider a trial of prednisone per rheumatology   will add doxy ; ?/ can assist at times with RA manangemet   can have fevers from RA but has mainly pulm signs and symptoms   qgtb pending   follow up chest xray last on 1/13  appreciate rheum consult non resolving community acquired pneumonia   immunosuppressed host   > 2 years on leflunamide for RA ? rule out toxicity from that as well   101.4 on admission otherwise no fevers recorded;; actually the patient always felt hot to me and said she had chills  102.8 now   on  doxy  can assist at times with RA manangemet   methicillin resitant staph aureus screen NEGATIVE   can have fevers from RA but has mainly pulm signs and symptoms   qgtb  NEGATIVE   need rheum follow up   steroids ??   ct chest am   cefepime 4 days then merrem with no resoltion of fever and indium uptake in lungs    non resolving community acquired pneumonia   immunosuppressed host   > 2 years on leflunamide for RA ? rule out toxicity from that as well   101.4 on admission otherwise no fevers recorded;; actually the patient always felt hot to me and said she had chills  102.8 now   on  doxy  can assist at times with RA manangemet   methicillin resitant staph aureus screen NEGATIVE ;; no fevers till was on vanco   can have fevers from RA but has mainly pulm signs and symptoms   qgtb  NEGATIVE   need rheum follow up   steroids ??   ct chest am   cefepime 4 days then merrem with no resoltion of fever and indium uptake in lungs   if ct worse methicillin resitant staph aureus coverage

## 2021-01-23 LAB
GLUCOSE BLDC GLUCOMTR-MCNC: 141 MG/DL — HIGH (ref 70–99)
GLUCOSE BLDC GLUCOMTR-MCNC: 246 MG/DL — HIGH (ref 70–99)
GLUCOSE BLDC GLUCOMTR-MCNC: 274 MG/DL — HIGH (ref 70–99)
GLUCOSE BLDC GLUCOMTR-MCNC: 321 MG/DL — HIGH (ref 70–99)
HCT VFR BLD CALC: 29.4 % — LOW (ref 34.5–45)
HGB BLD-MCNC: 9.7 G/DL — LOW (ref 11.5–15.5)
MCHC RBC-ENTMCNC: 27.6 PG — SIGNIFICANT CHANGE UP (ref 27–34)
MCHC RBC-ENTMCNC: 33 GM/DL — SIGNIFICANT CHANGE UP (ref 32–36)
MCV RBC AUTO: 83.5 FL — SIGNIFICANT CHANGE UP (ref 80–100)
NRBC # BLD: 0 /100 WBCS — SIGNIFICANT CHANGE UP (ref 0–0)
PLATELET # BLD AUTO: 521 K/UL — HIGH (ref 150–400)
PROCALCITONIN SERPL-MCNC: 0.08 NG/ML — SIGNIFICANT CHANGE UP (ref 0.02–0.1)
RBC # BLD: 3.52 M/UL — LOW (ref 3.8–5.2)
RBC # FLD: 13.9 % — SIGNIFICANT CHANGE UP (ref 10.3–14.5)
WBC # BLD: 16.98 K/UL — HIGH (ref 3.8–10.5)
WBC # FLD AUTO: 16.98 K/UL — HIGH (ref 3.8–10.5)

## 2021-01-23 PROCEDURE — 71045 X-RAY EXAM CHEST 1 VIEW: CPT | Mod: 26

## 2021-01-23 PROCEDURE — 71250 CT THORAX DX C-: CPT | Mod: 26

## 2021-01-23 PROCEDURE — 99233 SBSQ HOSP IP/OBS HIGH 50: CPT

## 2021-01-23 RX ORDER — VANCOMYCIN HCL 1 G
1000 VIAL (EA) INTRAVENOUS EVERY 12 HOURS
Refills: 0 | Status: DISCONTINUED | OUTPATIENT
Start: 2021-01-23 | End: 2021-01-24

## 2021-01-23 RX ADMIN — Medication 4: at 21:38

## 2021-01-23 RX ADMIN — Medication 6: at 08:03

## 2021-01-23 RX ADMIN — Medication 250 MILLIGRAM(S): at 21:38

## 2021-01-23 RX ADMIN — MEROPENEM 100 MILLIGRAM(S): 1 INJECTION INTRAVENOUS at 05:20

## 2021-01-23 RX ADMIN — Medication 650 MILLIGRAM(S): at 05:21

## 2021-01-23 RX ADMIN — Medication 650 MILLIGRAM(S): at 16:55

## 2021-01-23 RX ADMIN — Medication 100 MILLIGRAM(S): at 05:21

## 2021-01-23 RX ADMIN — Medication 81 MILLIGRAM(S): at 11:25

## 2021-01-23 RX ADMIN — Medication 100 MILLIGRAM(S): at 16:55

## 2021-01-23 RX ADMIN — Medication 4: at 11:25

## 2021-01-23 RX ADMIN — FAMOTIDINE 20 MILLIGRAM(S): 10 INJECTION INTRAVENOUS at 11:25

## 2021-01-23 RX ADMIN — AMLODIPINE BESYLATE 5 MILLIGRAM(S): 2.5 TABLET ORAL at 05:21

## 2021-01-23 RX ADMIN — MEROPENEM 100 MILLIGRAM(S): 1 INJECTION INTRAVENOUS at 21:38

## 2021-01-23 RX ADMIN — MEROPENEM 100 MILLIGRAM(S): 1 INJECTION INTRAVENOUS at 14:56

## 2021-01-23 NOTE — PROGRESS NOTE ADULT - SUBJECTIVE AND OBJECTIVE BOX
I AM INHERITING THIS PATIENT ON TODAY 1/20/2021    Patient is a 55y old  Female who presents with a chief complaint of sob (19 Jan 2021 11:40)      INTERVAL HPI/OVERNIGHT EVENTS: fever    MEDICATIONS  (STANDING):  amLODIPine   Tablet 5 milliGRAM(s) Oral daily  aspirin enteric coated 81 milliGRAM(s) Oral daily  dextrose 40% Gel 15 Gram(s) Oral once  dextrose 5%. 1000 milliLiter(s) (100 mL/Hr) IV Continuous <Continuous>  dextrose 5%. 1000 milliLiter(s) (50 mL/Hr) IV Continuous <Continuous>  dextrose 50% Injectable 25 Gram(s) IV Push once  dextrose 50% Injectable 12.5 Gram(s) IV Push once  dextrose 50% Injectable 25 Gram(s) IV Push once  doxycycline hyclate Capsule 100 milliGRAM(s) Oral every 12 hours  famotidine    Tablet 20 milliGRAM(s) Oral daily  glucagon  Injectable 1 milliGRAM(s) IntraMuscular once  insulin lispro (ADMELOG) corrective regimen sliding scale   SubCutaneous three times a day before meals  insulin lispro (ADMELOG) corrective regimen sliding scale   SubCutaneous at bedtime  meropenem  IVPB 1000 milliGRAM(s) IV Intermittent every 8 hours    MEDICATIONS  (PRN):  acetaminophen   Tablet .. 650 milliGRAM(s) Oral every 6 hours PRN Temp greater or equal to 38C (100.4F), Mild Pain (1 - 3)  guaiFENesin   Syrup  (Sugar-Free) 200 milliGRAM(s) Oral every 6 hours PRN Cough    Allergies    ACE inhibitors (Angioedema (Severe))  lisinopril (Anaphylaxis; Angioedema)    Intolerances    Vital Signs Last 24 Hrs  T(C): 37.6 (23 Jan 2021 11:34), Max: 39 (22 Jan 2021 20:11)  T(F): 99.7 (23 Jan 2021 11:34), Max: 102.2 (22 Jan 2021 20:11)  HR: 87 (23 Jan 2021 11:34) (80 - 90)  BP: 124/77 (23 Jan 2021 11:34) (124/77 - 134/87)  BP(mean): --  RR: 19 (23 Jan 2021 11:34) (17 - 19)  SpO2: 95% (23 Jan 2021 11:34) (95% - 97%)    PHYSICAL EXAM:  GENERAL: NAD, well-groomed, well-developed  HEAD:  Atraumatic, Normocephalic  EYES: EOMI, PERRLA, conjunctiva and sclera clear  ENMT: No tonsillar erythema, exudates, or enlargement; Moist mucous membranes, Good dentition, No lesions  NECK: Supple, No JVD, Normal thyroid  NERVOUS SYSTEM:  Alert & Oriented X3, Good concentration; Motor Strength 5/5 B/L upper and lower extremities; DTRs 2+ intact and symmetric  CHEST/LUNG: Clear to percussion bilaterally; No rales, rhonchi, wheezing, or rubs  HEART: Regular rate and rhythm; No murmurs, rubs, or gallops  ABDOMEN: Soft, Nontender, Nondistended; Bowel sounds present  EXTREMITIES:  2+ Peripheral Pulses, No clubbing, cyanosis, or edema  SKIN: No rashes or lesions    LABS:                                  A1C with Estimated Average Glucose (01.15.21 @ 11:26)    A1C with Estimated Average Glucose Result: 9.2: Method: Immunoassay       Reference Range                4.0-5.6%       High risk (prediabetic)        5.7-6.4%       Diabetic, diagnostic             >=6.5%       ADA diabetic treatment goal       <7.0%  The Hemoglobin A1c testing is NGSP-certified.Reference ranges are based  upon the 2010 recommendations of  the American Diabetes Association.  Interpretation may vary for children  and adolescents. %    Estimated Average Glucose: 217: The Estimated Average Glucose (eAG) or Mean Plasma Glucose (MPG) value is  calculated from the hemoglobin A1c value and covers the same time period.   The American Diabetes Association (ADA) and other professional  organizations recommend reporting the eAG with the HgbA1c. mg/dL        CAPILLARY BLOOD GLUCOSE    CAPILLARY BLOOD GLUCOSE      POCT Blood Glucose.: 246 mg/dL (23 Jan 2021 11:22)  POCT Blood Glucose.: 274 mg/dL (23 Jan 2021 07:54)  POCT Blood Glucose.: 213 mg/dL (22 Jan 2021 21:35)  POCT Blood Glucose.: 161 mg/dL (22 Jan 2021 16:27)              RADIOLOGY & ADDITIONAL TESTS:    Imaging Personally Reviewed:  [ X] YES  [ ] NO    Consultant(s) Notes Reviewed:  [ X] YES  [ ] NO    Care Discussed with Consultants/Other Providers [X ] YES  [ ] NO

## 2021-01-23 NOTE — PROGRESS NOTE ADULT - PROBLEM SELECTOR PLAN 1
Covering for gram neg pna, Sepsis POA   cefepime/vanco, doxy was added  ID consult noted on board  pulm consult noted   urine legionella negative   hyponatremia improving    TTE wnl    WBC leukocytosis is improving     rheum  consult noted    1/21/2021 no fever since 1/19/2021 covid negative from 1/20/21, f/u imdium scan  1/23/2021 indium scna shows uptake right lobe . still with fever f/u ct chest per ID recommendations  may need vancomycin   QuantiFeron is negative

## 2021-01-24 LAB
ANION GAP SERPL CALC-SCNC: 9 MMOL/L — SIGNIFICANT CHANGE UP (ref 5–17)
BUN SERPL-MCNC: 10 MG/DL — SIGNIFICANT CHANGE UP (ref 7–23)
CALCIUM SERPL-MCNC: 8.8 MG/DL — SIGNIFICANT CHANGE UP (ref 8.5–10.1)
CHLORIDE SERPL-SCNC: 95 MMOL/L — LOW (ref 96–108)
CO2 SERPL-SCNC: 26 MMOL/L — SIGNIFICANT CHANGE UP (ref 22–31)
CREAT SERPL-MCNC: 0.66 MG/DL — SIGNIFICANT CHANGE UP (ref 0.5–1.3)
GLUCOSE BLDC GLUCOMTR-MCNC: 198 MG/DL — HIGH (ref 70–99)
GLUCOSE BLDC GLUCOMTR-MCNC: 267 MG/DL — HIGH (ref 70–99)
GLUCOSE BLDC GLUCOMTR-MCNC: 267 MG/DL — HIGH (ref 70–99)
GLUCOSE BLDC GLUCOMTR-MCNC: 294 MG/DL — HIGH (ref 70–99)
GLUCOSE SERPL-MCNC: 271 MG/DL — HIGH (ref 70–99)
HCT VFR BLD CALC: 28.4 % — LOW (ref 34.5–45)
HGB BLD-MCNC: 9.4 G/DL — LOW (ref 11.5–15.5)
MAGNESIUM SERPL-MCNC: 1.9 MG/DL — SIGNIFICANT CHANGE UP (ref 1.6–2.6)
MCHC RBC-ENTMCNC: 27 PG — SIGNIFICANT CHANGE UP (ref 27–34)
MCHC RBC-ENTMCNC: 33.1 GM/DL — SIGNIFICANT CHANGE UP (ref 32–36)
MCV RBC AUTO: 81.6 FL — SIGNIFICANT CHANGE UP (ref 80–100)
NRBC # BLD: 0 /100 WBCS — SIGNIFICANT CHANGE UP (ref 0–0)
PHOSPHATE SERPL-MCNC: 3.1 MG/DL — SIGNIFICANT CHANGE UP (ref 2.5–4.5)
PLATELET # BLD AUTO: 483 K/UL — HIGH (ref 150–400)
POTASSIUM SERPL-MCNC: 3.6 MMOL/L — SIGNIFICANT CHANGE UP (ref 3.5–5.3)
POTASSIUM SERPL-SCNC: 3.6 MMOL/L — SIGNIFICANT CHANGE UP (ref 3.5–5.3)
RBC # BLD: 3.48 M/UL — LOW (ref 3.8–5.2)
RBC # FLD: 13.9 % — SIGNIFICANT CHANGE UP (ref 10.3–14.5)
SODIUM SERPL-SCNC: 130 MMOL/L — LOW (ref 135–145)
WBC # BLD: 15.61 K/UL — HIGH (ref 3.8–10.5)
WBC # FLD AUTO: 15.61 K/UL — HIGH (ref 3.8–10.5)

## 2021-01-24 PROCEDURE — 99233 SBSQ HOSP IP/OBS HIGH 50: CPT

## 2021-01-24 RX ORDER — IPRATROPIUM/ALBUTEROL SULFATE 18-103MCG
3 AEROSOL WITH ADAPTER (GRAM) INHALATION EVERY 6 HOURS
Refills: 0 | Status: DISCONTINUED | OUTPATIENT
Start: 2021-01-24 | End: 2021-02-03

## 2021-01-24 RX ORDER — ACETAMINOPHEN 500 MG
1000 TABLET ORAL ONCE
Refills: 0 | Status: COMPLETED | OUTPATIENT
Start: 2021-01-24 | End: 2021-01-24

## 2021-01-24 RX ORDER — SODIUM CHLORIDE 0.65 %
1 AEROSOL, SPRAY (ML) NASAL THREE TIMES A DAY
Refills: 0 | Status: DISCONTINUED | OUTPATIENT
Start: 2021-01-24 | End: 2021-02-03

## 2021-01-24 RX ORDER — SODIUM CHLORIDE 9 MG/ML
1 INJECTION INTRAMUSCULAR; INTRAVENOUS; SUBCUTANEOUS ONCE
Refills: 0 | Status: COMPLETED | OUTPATIENT
Start: 2021-01-24 | End: 2021-01-24

## 2021-01-24 RX ADMIN — Medication 6: at 08:12

## 2021-01-24 RX ADMIN — Medication 6: at 11:19

## 2021-01-24 RX ADMIN — FAMOTIDINE 20 MILLIGRAM(S): 10 INJECTION INTRAVENOUS at 11:19

## 2021-01-24 RX ADMIN — Medication 81 MILLIGRAM(S): at 11:19

## 2021-01-24 RX ADMIN — Medication 3 MILLILITER(S): at 17:11

## 2021-01-24 RX ADMIN — Medication 100 MILLIGRAM(S): at 17:39

## 2021-01-24 RX ADMIN — MEROPENEM 100 MILLIGRAM(S): 1 INJECTION INTRAVENOUS at 14:28

## 2021-01-24 RX ADMIN — Medication 2: at 21:23

## 2021-01-24 RX ADMIN — Medication 250 MILLIGRAM(S): at 05:33

## 2021-01-24 RX ADMIN — SODIUM CHLORIDE 1 GRAM(S): 9 INJECTION INTRAMUSCULAR; INTRAVENOUS; SUBCUTANEOUS at 14:28

## 2021-01-24 RX ADMIN — Medication 100 MILLIGRAM(S): at 05:34

## 2021-01-24 RX ADMIN — Medication 2: at 16:20

## 2021-01-24 RX ADMIN — Medication 1 SPRAY(S): at 05:34

## 2021-01-24 RX ADMIN — Medication 400 MILLIGRAM(S): at 17:39

## 2021-01-24 RX ADMIN — Medication 650 MILLIGRAM(S): at 02:39

## 2021-01-24 RX ADMIN — Medication 250 MILLIGRAM(S): at 17:39

## 2021-01-24 RX ADMIN — AMLODIPINE BESYLATE 5 MILLIGRAM(S): 2.5 TABLET ORAL at 05:33

## 2021-01-24 RX ADMIN — Medication 200 MILLIGRAM(S): at 11:21

## 2021-01-24 RX ADMIN — MEROPENEM 100 MILLIGRAM(S): 1 INJECTION INTRAVENOUS at 05:33

## 2021-01-24 NOTE — CHART NOTE - NSCHARTNOTEFT_GEN_A_CORE
Notified by RN that pt has rectal temperature of 104.5.  Pt without complaints, procalcitonin 0.08 on 1/23.  1g IV acetaminophen given.  Dr. Paz informed.  Per Dr. Paz, dc all antibiotics, obtain blood culture x 1 if pt has another temperature > 100.8.      Vital Signs Last 24 Hrs  T(C): 40.3 (24 Jan 2021 17:01), Max: 40.3 (24 Jan 2021 17:01)  T(F): 104.5 (24 Jan 2021 17:01), Max: 104.5 (24 Jan 2021 17:01)  HR: 91 (24 Jan 2021 17:56) (90 - 99)  BP: 138/81 (24 Jan 2021 17:01) (119/76 - 139/80)  BP(mean): --  RR: 18 (24 Jan 2021 17:01) (18 - 18)  SpO2: 97% (24 Jan 2021 17:56) (94% - 98%)

## 2021-01-24 NOTE — PROGRESS NOTE ADULT - PROBLEM SELECTOR PLAN 1
Covering for gram neg pna, Sepsis POA   cefepime/vanco, doxy was added  ID consult noted on board  pulm consult noted   urine legionella negative   hyponatremia improving    TTE wnl    WBC leukocytosis is improving     rheum  consult noted    1/21/2021 no fever since 1/19/2021 covid negative from 1/20/21, f/u imdium scan  1/23/2021 indium scna shows uptake right lobe . still with fever f/u ct chest per ID recommendations  may need vancomycin   QuantiFeron is negative  1/24/2021 vancomycin  started yesterday night . will monitor one more day of still febrile in am will repeat blood cx yet again

## 2021-01-24 NOTE — PROGRESS NOTE ADULT - SUBJECTIVE AND OBJECTIVE BOX
I AM INHERITING THIS PATIENT ON TODAY 1/20/2021    Patient is a 55y old  Female who presents with a chief complaint of sob (19 Jan 2021 11:40)      INTERVAL HPI/OVERNIGHT EVENTS: fever    MEDICATIONS  (STANDING):  amLODIPine   Tablet 5 milliGRAM(s) Oral daily  aspirin enteric coated 81 milliGRAM(s) Oral daily  dextrose 40% Gel 15 Gram(s) Oral once  dextrose 5%. 1000 milliLiter(s) (50 mL/Hr) IV Continuous <Continuous>  dextrose 5%. 1000 milliLiter(s) (100 mL/Hr) IV Continuous <Continuous>  dextrose 50% Injectable 25 Gram(s) IV Push once  dextrose 50% Injectable 12.5 Gram(s) IV Push once  dextrose 50% Injectable 25 Gram(s) IV Push once  doxycycline hyclate Capsule 100 milliGRAM(s) Oral every 12 hours  famotidine    Tablet 20 milliGRAM(s) Oral daily  glucagon  Injectable 1 milliGRAM(s) IntraMuscular once  insulin lispro (ADMELOG) corrective regimen sliding scale   SubCutaneous three times a day before meals  insulin lispro (ADMELOG) corrective regimen sliding scale   SubCutaneous at bedtime  meropenem  IVPB 1000 milliGRAM(s) IV Intermittent every 8 hours  sodium chloride 1 Gram(s) Oral once  vancomycin  IVPB 1000 milliGRAM(s) IV Intermittent every 12 hours    MEDICATIONS  (PRN):  acetaminophen   Tablet .. 650 milliGRAM(s) Oral every 6 hours PRN Temp greater or equal to 38C (100.4F), Mild Pain (1 - 3)  guaiFENesin   Syrup  (Sugar-Free) 200 milliGRAM(s) Oral every 6 hours PRN Cough  sodium chloride 0.65% Nasal 1 Spray(s) Both Nostrils three times a day PRN Nasal Congestion      Allergies    ACE inhibitors (Angioedema (Severe))  lisinopril (Anaphylaxis; Angioedema)    Intolerances    Vital Signs Last 24 Hrs  T(C): 37.1 (24 Jan 2021 05:25), Max: 39.3 (24 Jan 2021 02:37)  T(F): 98.8 (24 Jan 2021 05:25), Max: 102.7 (24 Jan 2021 02:37)  HR: 90 (24 Jan 2021 05:25) (90 - 94)  BP: 119/76 (24 Jan 2021 05:25) (113/71 - 137/83)  BP(mean): --  RR: 18 (24 Jan 2021 05:25) (17 - 18)  SpO2: 94% (24 Jan 2021 05:25) (94% - 98%)  PHYSICAL EXAM:  GENERAL: NAD, well-groomed, well-developed  HEAD:  Atraumatic, Normocephalic  EYES: EOMI, PERRLA, conjunctiva and sclera clear  ENMT: No tonsillar erythema, exudates, or enlargement; Moist mucous membranes, Good dentition, No lesions  NECK: Supple, No JVD, Normal thyroid  NERVOUS SYSTEM:  Alert & Oriented X3, Good concentration; Motor Strength 5/5 B/L upper and lower extremities; DTRs 2+ intact and symmetric  CHEST/LUNG: Clear to percussion bilaterally; No rales, rhonchi, wheezing, or rubs  HEART: Regular rate and rhythm; No murmurs, rubs, or gallops  ABDOMEN: Soft, Nontender, Nondistended; Bowel sounds present  EXTREMITIES:  2+ Peripheral Pulses, No clubbing, cyanosis, or edema  SKIN: No rashes or lesions    LABS:                             9.4    15.61 )-----------( 483      ( 24 Jan 2021 06:38 )             28.4   01-24    130<L>  |  95<L>  |  10  ----------------------------<  271<H>  3.6   |  26  |  0.66    Ca    8.8      24 Jan 2021 06:38  Phos  3.1     01-24  Mg     1.9     01-24                               A1C with Estimated Average Glucose (01.15.21 @ 11:26)    A1C with Estimated Average Glucose Result: 9.2: Method: Immunoassay       Reference Range                4.0-5.6%       High risk (prediabetic)        5.7-6.4%       Diabetic, diagnostic             >=6.5%       ADA diabetic treatment goal       <7.0%  The Hemoglobin A1c testing is NGSP-certified.Reference ranges are based  upon the 2010 recommendations of  the American Diabetes Association.  Interpretation may vary for children  and adolescents. %    Estimated Average Glucose: 217: The Estimated Average Glucose (eAG) or Mean Plasma Glucose (MPG) value is  calculated from the hemoglobin A1c value and covers the same time period.   The American Diabetes Association (ADA) and other professional  organizations recommend reporting the eAG with the HgbA1c. mg/dL        CAPILLARY BLOOD GLUCOSE    CAPILLARY BLOOD GLUCOSE      POCT Blood Glucose.: 246 mg/dL (23 Jan 2021 11:22)  POCT Blood Glucose.: 274 mg/dL (23 Jan 2021 07:54)  POCT Blood Glucose.: 213 mg/dL (22 Jan 2021 21:35)  POCT Blood Glucose.: 161 mg/dL (22 Jan 2021 16:27)              RADIOLOGY & ADDITIONAL TESTS:    Imaging Personally Reviewed:  [ X] YES  [ ] NO    Consultant(s) Notes Reviewed:  [ X] YES  [ ] NO    Care Discussed with Consultants/Other Providers [X ] YES  [ ] NO I AM INHERITING THIS PATIENT ON TODAY 1/20/2021    Patient is a 55y old  Female who presents with a chief complaint of sob (19 Jan 2021 11:40)      INTERVAL HPI/OVERNIGHT EVENTS: fever    MEDICATIONS  (STANDING):  amLODIPine   Tablet 5 milliGRAM(s) Oral daily  aspirin enteric coated 81 milliGRAM(s) Oral daily  dextrose 40% Gel 15 Gram(s) Oral once  dextrose 5%. 1000 milliLiter(s) (50 mL/Hr) IV Continuous <Continuous>  dextrose 5%. 1000 milliLiter(s) (100 mL/Hr) IV Continuous <Continuous>  dextrose 50% Injectable 25 Gram(s) IV Push once  dextrose 50% Injectable 12.5 Gram(s) IV Push once  dextrose 50% Injectable 25 Gram(s) IV Push once  doxycycline hyclate Capsule 100 milliGRAM(s) Oral every 12 hours  famotidine    Tablet 20 milliGRAM(s) Oral daily  glucagon  Injectable 1 milliGRAM(s) IntraMuscular once  insulin lispro (ADMELOG) corrective regimen sliding scale   SubCutaneous three times a day before meals  insulin lispro (ADMELOG) corrective regimen sliding scale   SubCutaneous at bedtime  meropenem  IVPB 1000 milliGRAM(s) IV Intermittent every 8 hours  sodium chloride 1 Gram(s) Oral once  vancomycin  IVPB 1000 milliGRAM(s) IV Intermittent every 12 hours    MEDICATIONS  (PRN):  acetaminophen   Tablet .. 650 milliGRAM(s) Oral every 6 hours PRN Temp greater or equal to 38C (100.4F), Mild Pain (1 - 3)  guaiFENesin   Syrup  (Sugar-Free) 200 milliGRAM(s) Oral every 6 hours PRN Cough  sodium chloride 0.65% Nasal 1 Spray(s) Both Nostrils three times a day PRN Nasal Congestion      Allergies    ACE inhibitors (Angioedema (Severe))  lisinopril (Anaphylaxis; Angioedema)    Intolerances    Vital Signs Last 24 Hrs  T(C): 37.1 (24 Jan 2021 05:25), Max: 39.3 (24 Jan 2021 02:37)  T(F): 98.8 (24 Jan 2021 05:25), Max: 102.7 (24 Jan 2021 02:37)  HR: 90 (24 Jan 2021 05:25) (90 - 94)  BP: 119/76 (24 Jan 2021 05:25) (113/71 - 137/83)  BP(mean): --  RR: 18 (24 Jan 2021 05:25) (17 - 18)  SpO2: 94% (24 Jan 2021 05:25) (94% - 98%)  PHYSICAL EXAM:  GENERAL: NAD, well-groomed, well-developed  HEAD:  Atraumatic, Normocephalic  EYES: EOMI, PERRLA, conjunctiva and sclera clear  ENMT: No tonsillar erythema, exudates, or enlargement; Moist mucous membranes, Good dentition, No lesions  NECK: Supple, No JVD, Normal thyroid  NERVOUS SYSTEM:  Alert & Oriented X3, Good concentration; Motor Strength 5/5 B/L upper and lower extremities; DTRs 2+ intact and symmetric  CHEST/LUNG: mild decreased bs at right base ; No rales, rhonchi, wheezing, or rubs  HEART: Regular rate and rhythm; No murmurs, rubs, or gallops  ABDOMEN: Soft, Nontender, Nondistended; Bowel sounds present  EXTREMITIES:  2+ Peripheral Pulses, No clubbing, cyanosis, or edema  SKIN: No rashes or lesions    LABS:                             9.4    15.61 )-----------( 483      ( 24 Jan 2021 06:38 )             28.4   01-24    130<L>  |  95<L>  |  10  ----------------------------<  271<H>  3.6   |  26  |  0.66    Ca    8.8      24 Jan 2021 06:38  Phos  3.1     01-24  Mg     1.9     01-24                               A1C with Estimated Average Glucose (01.15.21 @ 11:26)    A1C with Estimated Average Glucose Result: 9.2: Method: Immunoassay       Reference Range                4.0-5.6%       High risk (prediabetic)        5.7-6.4%       Diabetic, diagnostic             >=6.5%       ADA diabetic treatment goal       <7.0%  The Hemoglobin A1c testing is NGSP-certified.Reference ranges are based  upon the 2010 recommendations of  the American Diabetes Association.  Interpretation may vary for children  and adolescents. %    Estimated Average Glucose: 217: The Estimated Average Glucose (eAG) or Mean Plasma Glucose (MPG) value is  calculated from the hemoglobin A1c value and covers the same time period.   The American Diabetes Association (ADA) and other professional  organizations recommend reporting the eAG with the HgbA1c. mg/dL        CAPILLARY BLOOD GLUCOSE    CAPILLARY BLOOD GLUCOSE      POCT Blood Glucose.: 246 mg/dL (23 Jan 2021 11:22)  POCT Blood Glucose.: 274 mg/dL (23 Jan 2021 07:54)  POCT Blood Glucose.: 213 mg/dL (22 Jan 2021 21:35)  POCT Blood Glucose.: 161 mg/dL (22 Jan 2021 16:27)              RADIOLOGY & ADDITIONAL TESTS:    Imaging Personally Reviewed:  [ X] YES  [ ] NO    Consultant(s) Notes Reviewed:  [ X] YES  [ ] NO    Care Discussed with Consultants/Other Providers [X ] YES  [ ] NO

## 2021-01-25 LAB
ANION GAP SERPL CALC-SCNC: 9 MMOL/L — SIGNIFICANT CHANGE UP (ref 5–17)
BUN SERPL-MCNC: 9 MG/DL — SIGNIFICANT CHANGE UP (ref 7–23)
CALCIUM SERPL-MCNC: 9.2 MG/DL — SIGNIFICANT CHANGE UP (ref 8.5–10.1)
CHLORIDE SERPL-SCNC: 97 MMOL/L — SIGNIFICANT CHANGE UP (ref 96–108)
CO2 SERPL-SCNC: 26 MMOL/L — SIGNIFICANT CHANGE UP (ref 22–31)
CREAT SERPL-MCNC: 0.62 MG/DL — SIGNIFICANT CHANGE UP (ref 0.5–1.3)
CULTURE RESULTS: SIGNIFICANT CHANGE UP
GLUCOSE BLDC GLUCOMTR-MCNC: 137 MG/DL — HIGH (ref 70–99)
GLUCOSE BLDC GLUCOMTR-MCNC: 229 MG/DL — HIGH (ref 70–99)
GLUCOSE BLDC GLUCOMTR-MCNC: 260 MG/DL — HIGH (ref 70–99)
GLUCOSE BLDC GLUCOMTR-MCNC: 285 MG/DL — HIGH (ref 70–99)
GLUCOSE SERPL-MCNC: 262 MG/DL — HIGH (ref 70–99)
HCT VFR BLD CALC: 28.9 % — LOW (ref 34.5–45)
HGB BLD-MCNC: 9.5 G/DL — LOW (ref 11.5–15.5)
MAGNESIUM SERPL-MCNC: 1.7 MG/DL — SIGNIFICANT CHANGE UP (ref 1.6–2.6)
MCHC RBC-ENTMCNC: 27 PG — SIGNIFICANT CHANGE UP (ref 27–34)
MCHC RBC-ENTMCNC: 32.9 GM/DL — SIGNIFICANT CHANGE UP (ref 32–36)
MCV RBC AUTO: 82.1 FL — SIGNIFICANT CHANGE UP (ref 80–100)
NRBC # BLD: 0 /100 WBCS — SIGNIFICANT CHANGE UP (ref 0–0)
PHOSPHATE SERPL-MCNC: 2.8 MG/DL — SIGNIFICANT CHANGE UP (ref 2.5–4.5)
PLATELET # BLD AUTO: 502 K/UL — HIGH (ref 150–400)
POTASSIUM SERPL-MCNC: 3.8 MMOL/L — SIGNIFICANT CHANGE UP (ref 3.5–5.3)
POTASSIUM SERPL-SCNC: 3.8 MMOL/L — SIGNIFICANT CHANGE UP (ref 3.5–5.3)
RBC # BLD: 3.52 M/UL — LOW (ref 3.8–5.2)
RBC # FLD: 14 % — SIGNIFICANT CHANGE UP (ref 10.3–14.5)
SARS-COV-2 RNA SPEC QL NAA+PROBE: SIGNIFICANT CHANGE UP
SODIUM SERPL-SCNC: 132 MMOL/L — LOW (ref 135–145)
SPECIMEN SOURCE: SIGNIFICANT CHANGE UP
VANCOMYCIN TROUGH SERPL-MCNC: 8.2 UG/ML — LOW (ref 10–20)
WBC # BLD: 12.29 K/UL — HIGH (ref 3.8–10.5)
WBC # FLD AUTO: 12.29 K/UL — HIGH (ref 3.8–10.5)

## 2021-01-25 PROCEDURE — 99233 SBSQ HOSP IP/OBS HIGH 50: CPT

## 2021-01-25 RX ADMIN — FAMOTIDINE 20 MILLIGRAM(S): 10 INJECTION INTRAVENOUS at 12:03

## 2021-01-25 RX ADMIN — Medication 6: at 12:03

## 2021-01-25 RX ADMIN — Medication 81 MILLIGRAM(S): at 12:03

## 2021-01-25 RX ADMIN — AMLODIPINE BESYLATE 5 MILLIGRAM(S): 2.5 TABLET ORAL at 05:28

## 2021-01-25 RX ADMIN — Medication 6: at 07:50

## 2021-01-25 NOTE — PROGRESS NOTE ADULT - SUBJECTIVE AND OBJECTIVE BOX
INTERVAL HPI/OVERNIGHT EVENTS:  Had worse dyspnea and coughing yesterday, but feeling better today.  No new complaints.    MEDICATIONS  (STANDING):  amLODIPine   Tablet 5 milliGRAM(s) Oral daily  aspirin enteric coated 81 milliGRAM(s) Oral daily  dextrose 40% Gel 15 Gram(s) Oral once  dextrose 5%. 1000 milliLiter(s) (50 mL/Hr) IV Continuous <Continuous>  dextrose 5%. 1000 milliLiter(s) (100 mL/Hr) IV Continuous <Continuous>  dextrose 50% Injectable 12.5 Gram(s) IV Push once  dextrose 50% Injectable 25 Gram(s) IV Push once  dextrose 50% Injectable 25 Gram(s) IV Push once  famotidine    Tablet 20 milliGRAM(s) Oral daily  glucagon  Injectable 1 milliGRAM(s) IntraMuscular once  insulin lispro (ADMELOG) corrective regimen sliding scale   SubCutaneous three times a day before meals  insulin lispro (ADMELOG) corrective regimen sliding scale   SubCutaneous at bedtime    MEDICATIONS  (PRN):  acetaminophen   Tablet .. 650 milliGRAM(s) Oral every 6 hours PRN Temp greater or equal to 38C (100.4F), Mild Pain (1 - 3)  albuterol/ipratropium for Nebulization 3 milliLiter(s) Nebulizer every 6 hours PRN Shortness of Breath and/or Wheezing  guaiFENesin   Syrup  (Sugar-Free) 200 milliGRAM(s) Oral every 6 hours PRN Cough  sodium chloride 0.65% Nasal 1 Spray(s) Both Nostrils three times a day PRN Nasal Congestion      Allergies    ACE inhibitors (Angioedema (Severe))  lisinopril (Anaphylaxis; Angioedema)      Vital Signs Last 24 Hrs  T(C): 37.7 (25 Jan 2021 05:30), Max: 40.3 (24 Jan 2021 17:01)  T(F): 99.8 (25 Jan 2021 05:30), Max: 104.5 (24 Jan 2021 17:01)  HR: 91 (25 Jan 2021 05:30) (91 - 99)  BP: 127/79 (25 Jan 2021 05:30) (124/76 - 139/80)  BP(mean): --  RR: 18 (25 Jan 2021 05:30) (18 - 18)  SpO2: 95% (25 Jan 2021 05:30) (95% - 98%)    PHYSICAL EXAM:  General :  NAD  HEENT--  no oral ulcers  Nodes--   LAD  Lungs--  (+)rhonchi (R>L), no wheezing  Heart--  RRR, nlS1 &S2 normal;   Abdomen--  soft, NT, ND +BS  Skin:  no rashes  Musculoskeletal exam:  No synovitis; full ROM in all joints        LABS:                        9.5    12.29 )-----------( 502      ( 25 Jan 2021 08:14 )             28.9     01-25    132<L>  |  97  |  9   ----------------------------<  262<H>  3.8   |  26  |  0.62    Ca    9.2      25 Jan 2021 08:14  Phos  2.8     01-25  Mg     1.7     01-25        RADIOLOGY & ADDITIONAL TESTS:  < from: CT Chest No Cont (01.23.21 @ 13:57) >  EXAM:  CT CHEST                            PROCEDURE DATE:  01/23/2021          INTERPRETATION:  CT CHEST WITHOUT CONTRAST    INDICATION: Fever.    TECHNIQUE: Unenhanced helical images were obtained of the chest. Coronal and sagittal images were reconstructed. Maximum intensity projection images were generated.    COMPARISON: Radiograph 1/23/2021. CT chest 1/13/2021.    FINDINGS:    Tubes/Lines: None.    Lungs And Airways: Since 1/13/2021, the right middle lobe collapse is new. There are secretions within the bronchus intermedius, right middle lobe bronchus, and right lower lobe bronchus.    Allowing for differences in technique, the right lower lobe consolidation has decreased. However, patchy foci of consolidation and centrilobular nodules remain.    Emphysema. There are additional tiny bilateral nodules that are unchanged. The remainder of the lungs and airways are otherwise unremarkable.    Pleura:No pleural effusion or pneumothorax.    Mediastinum: There chest lymph nodes are unchanged. The visualized thyroid gland contains a hypodense nodule and calcifications. The esophagus is unremarkable.    Heart and Vasculature: Heart is normal in size. Coronary artery calcifications. Left-sided aortic arch and left-sided descending thoracic aorta. Aortic calcifications. The pulmonary artery is unremarkable.    Upper Abdomen: Left renal calcification. Cholelithiasis.    Bones And Soft Tissues: The bones are unremarkable.  The soft tissues are unremarkable.      IMPRESSION:    1.  New opacification of the bronchus intermedius, right middle lobe bronchus and right lower lobe bronchus with new collapse of the right middle lobe.  2.  The right lower lobe consolidation has slightly decreased, however, centrilobular opacities and additional patchy foci consolidation remain. While these findings could be secondary to infection, other etiologies can look similar. A follow-up is recommended in 6-8 weeks to evaluate for resolution/change.    < end of copied text >

## 2021-01-25 NOTE — PROGRESS NOTE ADULT - SUBJECTIVE AND OBJECTIVE BOX
HPI:  Pt is a 54 y/o female w/pmhx of  DM II, HTN, RA, and MI comes w/complain of sob x 2 days and marked fatigue and fever to tmax 100.2.  pt was diagnosed w/CAP on 1/5/21 was sent home on azithro and cefopodoxime x7days.  pt completed abx, she was covid neg then and again today, she returns w/increasing sob over the last 2 days and pleuritic pain on deep breaths.  pt denies any , chills, +non productive cough +pleuritic pain no cp, palpitations, n/v/d/c no travels. feels very fatigued, barely able to put on a blouse.    CTA in ed  No pulmonary emboli visualized. Consolidation in the right lower lobe with mediastinal and right hilar lymphadenopathy which may represent infection.  (13 Jan 2021 21:31)      Allergies    ACE inhibitors (Angioedema (Severe))  lisinopril (Anaphylaxis; Angioedema)    Intolerances        MEDICATIONS  (STANDING):  amLODIPine   Tablet 5 milliGRAM(s) Oral daily  aspirin enteric coated 81 milliGRAM(s) Oral daily  dextrose 40% Gel 15 Gram(s) Oral once  dextrose 5%. 1000 milliLiter(s) (50 mL/Hr) IV Continuous <Continuous>  dextrose 5%. 1000 milliLiter(s) (100 mL/Hr) IV Continuous <Continuous>  dextrose 50% Injectable 12.5 Gram(s) IV Push once  dextrose 50% Injectable 25 Gram(s) IV Push once  dextrose 50% Injectable 25 Gram(s) IV Push once  famotidine    Tablet 20 milliGRAM(s) Oral daily  glucagon  Injectable 1 milliGRAM(s) IntraMuscular once  insulin lispro (ADMELOG) corrective regimen sliding scale   SubCutaneous three times a day before meals  insulin lispro (ADMELOG) corrective regimen sliding scale   SubCutaneous at bedtime    MEDICATIONS  (PRN):  acetaminophen   Tablet .. 650 milliGRAM(s) Oral every 6 hours PRN Temp greater or equal to 38C (100.4F), Mild Pain (1 - 3)  albuterol/ipratropium for Nebulization 3 milliLiter(s) Nebulizer every 6 hours PRN Shortness of Breath and/or Wheezing  guaiFENesin   Syrup  (Sugar-Free) 200 milliGRAM(s) Oral every 6 hours PRN Cough  sodium chloride 0.65% Nasal 1 Spray(s) Both Nostrils three times a day PRN Nasal Congestion      REVIEW OF SYSTEMS:    CONSTITUTIONAL: No fever, chills, weight loss, or fatigue  HEENT: No sore throat, runny nose, ear ache  RESPIRATORY: No cough, wheezing, No shortness of breath  CARDIOVASCULAR: No chest pain, palpitations, dizziness  GASTROINTESTINAL: No abdominal pain. No nausea, vomiting, diarrhea  GENITOURINARY: No dysuria, increase frequency, hematuria, or incontinence  NEUROLOGICAL: No headaches, memory loss, loss of strength, numbness, or tremors, no weakness  EXTREMITY: No pedal edema BLE  SKIN: No itching, burning, rashes, or lesions     VITAL SIGNS:  T(C): 38 (01-25-21 @ 23:34), Max: 38 (01-25-21 @ 23:34)  T(F): 100.4 (01-25-21 @ 23:34), Max: 100.4 (01-25-21 @ 23:34)  HR: 79 (01-25-21 @ 23:34) (79 - 95)  BP: 123/67 (01-25-21 @ 23:34) (123/67 - 160/88)  RR: 18 (01-25-21 @ 23:34) (18 - 18)  SpO2: 95% (01-25-21 @ 23:34) (94% - 98%)  Wt(kg): --    PHYSICAL EXAM:    GENERAL: not in any distress  HEENT: Neck is supple, normocephalic, atraumatic   CHEST/LUNG: Clear to auscultation bilaterally; No rales, rhonchi, wheezing  HEART: Regular rate and rhythm; No murmurs, rubs, or gallops  ABDOMEN: Soft, Nontender, Nondistended; Bowel sounds present, no rebound   EXTREMITIES:  2+ Peripheral Pulses, No clubbing, cyanosis, or edema  GENITOURINARY:   SKIN: No rashes or lesions  BACK: no pressor sore   NERVOUS SYSTEM:  Alert & Oriented X3, Good concentration  PSYCH: normal affect     LABS:                         9.5    12.29 )-----------( 502      ( 25 Jan 2021 08:14 )             28.9     01-25    132<L>  |  97  |  9   ----------------------------<  262<H>  3.8   |  26  |  0.62    Ca    9.2      25 Jan 2021 08:14  Phos  2.8     01-25  Mg     1.7     01-25                          Vancomycin Level, Trough: 8.2 ug/mL (01-25 @ 08:14)        Culture Results:   No Growth Final (01-20 @ 09:02)  Culture Results:   No growth to date. (01-20 @ 00:33)                Radiology:       HPI:  Pt is a 56 y/o female w/pmhx of  DM II, HTN, RA, and MI comes w/complain of sob x 2 days and marked fatigue and fever to tmax 100.2.  pt was diagnosed w/CAP on 1/5/21 was sent home on azithro and cefopodoxime x7days.  pt completed abx, she was covid neg then and again today, she returns w/increasing sob over the last 2 days and pleuritic pain on deep breaths.  pt denies any , chills, +non productive cough +pleuritic pain no cp, palpitations, n/v/d/c no travels. feels very fatigued, barely able to put on a blouse.    CTA in ed  No pulmonary emboli visualized. Consolidation in the right lower lobe with mediastinal and right hilar lymphadenopathy which may represent infection.  (13 Jan 2021 21:31)  sp prolonged antibiotics   ongoing fevers   antibiotics dcd day before as fevers never respoded    Allergies    ACE inhibitors (Angioedema (Severe))  lisinopril (Anaphylaxis; Angioedema)    Intolerances        MEDICATIONS  (STANDING):  amLODIPine   Tablet 5 milliGRAM(s) Oral daily  aspirin enteric coated 81 milliGRAM(s) Oral daily  dextrose 40% Gel 15 Gram(s) Oral once  dextrose 5%. 1000 milliLiter(s) (50 mL/Hr) IV Continuous <Continuous>  dextrose 5%. 1000 milliLiter(s) (100 mL/Hr) IV Continuous <Continuous>  dextrose 50% Injectable 12.5 Gram(s) IV Push once  dextrose 50% Injectable 25 Gram(s) IV Push once  dextrose 50% Injectable 25 Gram(s) IV Push once  famotidine    Tablet 20 milliGRAM(s) Oral daily  glucagon  Injectable 1 milliGRAM(s) IntraMuscular once  insulin lispro (ADMELOG) corrective regimen sliding scale   SubCutaneous three times a day before meals  insulin lispro (ADMELOG) corrective regimen sliding scale   SubCutaneous at bedtime    MEDICATIONS  (PRN):  acetaminophen   Tablet .. 650 milliGRAM(s) Oral every 6 hours PRN Temp greater or equal to 38C (100.4F), Mild Pain (1 - 3)  albuterol/ipratropium for Nebulization 3 milliLiter(s) Nebulizer every 6 hours PRN Shortness of Breath and/or Wheezing  guaiFENesin   Syrup  (Sugar-Free) 200 milliGRAM(s) Oral every 6 hours PRN Cough  sodium chloride 0.65% Nasal 1 Spray(s) Both Nostrils three times a day PRN Nasal Congestion      REVIEW OF SYSTEMS:  cough short of breath   CONSTITUTIONAL:   plus  fever, chills, weight loss, and  fatigue  HEENT: No sore throat, runny nose, ear ache  RESPIRATORY: plus  cough,  no  wheezing, some  shortness of breath  CARDIOVASCULAR: No chest pain, palpitations, dizziness  GASTROINTESTINAL: No abdominal pain. No nausea, vomiting, diarrhea  GENITOURINARY: No dysuria, increase frequency, hematuria, or incontinence  NEUROLOGICAL: No headaches, memory loss, loss of strength, numbness, or tremors, no weakness  EXTREMITY: No pedal edema BLE  SKIN: No itching, burning, rashes, or lesions     VITAL SIGNS:  T(C): 38 (01-25-21 @ 23:34), Max: 38 (01-25-21 @ 23:34)  T(F): 100.4 (01-25-21 @ 23:34), Max: 100.4 (01-25-21 @ 23:34)  HR: 79 (01-25-21 @ 23:34) (79 - 95)  BP: 123/67 (01-25-21 @ 23:34) (123/67 - 160/88)  RR: 18 (01-25-21 @ 23:34) (18 - 18)  SpO2: 95% (01-25-21 @ 23:34) (94% - 98%)  Wt(kg): --    PHYSICAL EXAM:    GENERAL: not in any distress  HEENT: Neck is supple, normocephalic, atraumatic   CHEST/LUNG: Clear to auscultation left; right , rhonchi, wheezing and poor air entry  HEART: Regular rate and rhythm; No murmurs, rubs, or gallops  ABDOMEN: Soft, Nontender, Nondistended; Bowel sounds present, no rebound   EXTREMITIES:  2+ Peripheral Pulses, No clubbing, cyanosis, or edema  SKIN: No rashes or lesions  BACK: no pressor sore   NERVOUS SYSTEM:  Alert & Oriented X3, Good concentration  PSYCH: normal affect     LABS:                         9.5    12.29 )-----------( 502      ( 25 Jan 2021 08:14 )             28.9     01-25    132<L>  |  97  |  9   ----------------------------<  262<H>  3.8   |  26  |  0.62    Ca    9.2      25 Jan 2021 08:14  Phos  2.8     01-25  Mg     1.7     01-25                          Vancomycin Level, Trough: 8.2 ug/mL (01-25 @ 08:14)        Culture Results:   No Growth Final (01-20 @ 09:02)  Culture Results:   No growth to date. (01-20 @ 00:33)                Radiology:    < from: CT Chest No Cont (01.23.21 @ 13:57) >    IMPRESSION:    1.  New opacification of the bronchus intermedius, right middle lobe bronchus and right lower lobe bronchus with new collapse of the right middle lobe.  2.  The right lower lobe consolidation has slightly decreased, however, centrilobular opacities and additional patchy foci consolidation remain. While these findings could be secondary to infection, other etiologies can look similar. A follow-up is recommended in 6-8 weeks to evaluate for resolution/change.            EH GARRETT MD; Attending Radiologist  This document has been electronically signed. Jan 23 2021  3:07PM    < end of copied text >

## 2021-01-25 NOTE — PROGRESS NOTE ADULT - PROBLEM SELECTOR PLAN 1
Covering for gram neg pna, Sepsis POA   cefepime/vanco, doxy was added  ID consult noted on board  pulm consult noted   urine legionella negative   hyponatremia improving    TTE wnl    WBC leukocytosis is improving     rheum  consult noted    1/21/2021 no fever since 1/19/2021 covid negative from 1/20/21, f/u imdium scan  1/23/2021 indium scna shows uptake right lobe . still with fever f/u ct chest per ID recommendations  may need vancomycin   QuantiFeron is negative  1/24/2021 vancomycin  started yesterday night . will monitor one more day of still febrile in am will repeat blood cx yet again  1/25/2021 per ID was started on vancomycin over weekend now it appears stopped per ID , will ask pulmonary for re evaluation Covering for gram neg pna, Sepsis POA   cefepime/vanco, doxy was added  ID consult noted on board  pulm consult noted   urine legionella negative   hyponatremia improving    TTE wnl    WBC leukocytosis is improving     rheum  consult noted    1/21/2021 no fever since 1/19/2021 covid negative from 1/20/21, f/u imdium scan  1/23/2021 indium scna shows uptake right lobe . still with fever f/u ct chest per ID recommendations  may need vancomycin   QuantiFeron is negative  1/24/2021 vancomycin  started yesterday night . will monitor one more day of still febrile in am will repeat blood cx yet again  1/25/2021 per ID was started on vancomycin over weekend now it appears stopped per ID , will ask pulmonary for re evaluation   will ask thoracic for bonch

## 2021-01-25 NOTE — PROGRESS NOTE ADULT - ASSESSMENT
non resolving community acquired pneumonia   immunosuppressed host   > 2 years on leflunamide for RA ? rule out toxicity from that as well   101.4 on admission otherwise no fevers recorded;; actually the patient always felt hot to me and said she had chills  102.8 now   on  doxy  can assist at times with RA manangemet   methicillin resitant staph aureus screen NEGATIVE ;; no fevers till was on vanco   can have fevers from RA but has mainly pulm signs and symptoms   qgtb  NEGATIVE   need rheum follow up   steroids ??   ct chest am   cefepime 4 days then merrem with no resoltion of fever and indium uptake in lungs   if ct worse methicillin resitant staph aureus coverage    non resolving community acquired pneumonia   immunosuppressed host   101.4 on admission otherwise no fevers recorded;; actually the patient always felt hot to me and said she had chills  off antibiotics   fevers are better   can have fevers from RA but has mainly pulm signs and symptoms   qgtb  NEGATIVE    rheum follow up noted  steroids ?? patient vehemingly against steroids   ct chest noted  bronch off antibiotics  discussed with dr garcia this pm   hiv testing ; patient counselled  bronch with bx scheduled for thursday

## 2021-01-25 NOTE — CONSULT NOTE ADULT - PROBLEM SELECTOR RECOMMENDATION 2
RML atelectasis with RLL consolidative pneumonia with no improvement with antibiotics.  Agree needs diagnostic bronchoscopy.  Will arrange in OR if bronchoscope available.

## 2021-01-25 NOTE — PROGRESS NOTE ADULT - SUBJECTIVE AND OBJECTIVE BOX
I AM INHERITING THIS PATIENT ON TODAY 1/20/2021    Patient is a 55y old  Female who presents with a chief complaint of sob (19 Jan 2021 11:40)      INTERVAL HPI/OVERNIGHT EVENTS: fever    MEDICATIONS  (STANDING):  amLODIPine   Tablet 5 milliGRAM(s) Oral daily  aspirin enteric coated 81 milliGRAM(s) Oral daily  dextrose 40% Gel 15 Gram(s) Oral once  dextrose 5%. 1000 milliLiter(s) (50 mL/Hr) IV Continuous <Continuous>  dextrose 5%. 1000 milliLiter(s) (100 mL/Hr) IV Continuous <Continuous>  dextrose 50% Injectable 12.5 Gram(s) IV Push once  dextrose 50% Injectable 25 Gram(s) IV Push once  dextrose 50% Injectable 25 Gram(s) IV Push once  famotidine    Tablet 20 milliGRAM(s) Oral daily  glucagon  Injectable 1 milliGRAM(s) IntraMuscular once  insulin lispro (ADMELOG) corrective regimen sliding scale   SubCutaneous three times a day before meals  insulin lispro (ADMELOG) corrective regimen sliding scale   SubCutaneous at bedtime    MEDICATIONS  (PRN):  acetaminophen   Tablet .. 650 milliGRAM(s) Oral every 6 hours PRN Temp greater or equal to 38C (100.4F), Mild Pain (1 - 3)  albuterol/ipratropium for Nebulization 3 milliLiter(s) Nebulizer every 6 hours PRN Shortness of Breath and/or Wheezing  guaiFENesin   Syrup  (Sugar-Free) 200 milliGRAM(s) Oral every 6 hours PRN Cough  sodium chloride 0.65% Nasal 1 Spray(s) Both Nostrils three times a day PRN Nasal Congestion      Allergies    ACE inhibitors (Angioedema (Severe))  lisinopril (Anaphylaxis; Angioedema)    Intolerances        Vital Signs Last 24 Hrs  T(C): 36.7 (25 Jan 2021 12:04), Max: 40.3 (24 Jan 2021 17:01)  T(F): 98.1 (25 Jan 2021 12:04), Max: 104.5 (24 Jan 2021 17:01)  HR: 82 (25 Jan 2021 12:04) (82 - 99)  BP: 130/76 (25 Jan 2021 12:04) (124/76 - 139/80)  BP(mean): --  RR: 18 (25 Jan 2021 12:04) (18 - 18)  SpO2: 94% (25 Jan 2021 12:04) (94% - 98%)    PHYSICAL EXAM:  GENERAL: NAD, well-groomed, well-developed  HEAD:  Atraumatic, Normocephalic  EYES: EOMI, PERRLA, conjunctiva and sclera clear  ENMT: No tonsillar erythema, exudates, or enlargement; Moist mucous membranes, Good dentition, No lesions  NECK: Supple, No JVD, Normal thyroid  NERVOUS SYSTEM:  Alert & Oriented X3, Good concentration; Motor Strength 5/5 B/L upper and lower extremities; DTRs 2+ intact and symmetric  CHEST/LUNG: mild decreased bs at right base ; No rales, rhonchi, wheezing, or rubs  HEART: Regular rate and rhythm; No murmurs, rubs, or gallops  ABDOMEN: Soft, Nontender, Nondistended; Bowel sounds present  EXTREMITIES:  2+ Peripheral Pulses, No clubbing, cyanosis, or edema  SKIN: No rashes or lesions    LABS:                                               9.5    12.29 )-----------( 502      ( 25 Jan 2021 08:14 )             28.9   01-25    132<L>  |  97  |  9   ----------------------------<  262<H>  3.8   |  26  |  0.62    Ca    9.2      25 Jan 2021 08:14  Phos  2.8     01-25  Mg     1.7     01-25                               A1C with Estimated Average Glucose (01.15.21 @ 11:26)    A1C with Estimated Average Glucose Result: 9.2: Method: Immunoassay       Reference Range                4.0-5.6%       High risk (prediabetic)        5.7-6.4%       Diabetic, diagnostic             >=6.5%       ADA diabetic treatment goal       <7.0%  The Hemoglobin A1c testing is NGSP-certified.Reference ranges are based  upon the 2010 recommendations of  the American Diabetes Association.  Interpretation may vary for children  and adolescents. %    Estimated Average Glucose: 217: The Estimated Average Glucose (eAG) or Mean Plasma Glucose (MPG) value is  calculated from the hemoglobin A1c value and covers the same time period.   The American Diabetes Association (ADA) and other professional  organizations recommend reporting the eAG with the HgbA1c. mg/dL      CAPILLARY BLOOD GLUCOSE      POCT Blood Glucose.: 285 mg/dL (25 Jan 2021 11:39)  POCT Blood Glucose.: 260 mg/dL (25 Jan 2021 07:41)  POCT Blood Glucose.: 267 mg/dL (24 Jan 2021 21:21)  POCT Blood Glucose.: 198 mg/dL (24 Jan 2021 16:13)            RADIOLOGY & ADDITIONAL TESTS:  < from: CT Chest No Cont (01.23.21 @ 13:57) >    1.  New opacification of the bronchus intermedius, right middle lobe bronchus and right lower lobe bronchus with new collapse of the right middle lobe.  2.  The right lower lobe consolidation has slightly decreased, however, centrilobular opacities and additional patchy foci consolidation remain. While these findings could be secondary to infection, other etiologies can look similar. A follow-up is recommended in 6-8 weeks to evaluate for resolution/change.      < end of copied text >      Imaging Personally Reviewed:  [ X] YES  [ ] NO    Consultant(s) Notes Reviewed:  [ X] YES  [ ] NO    Care Discussed with Consultants/Other Providers [X ] YES  [ ] NO

## 2021-01-25 NOTE — PHYSICAL THERAPY INITIAL EVALUATION ADULT - SHORT TERM MEMORY, REHAB EVAL
Patient:   GIO JUDD            MRN: [id number]            FIN: [id number]               Age:   73 years     Sex:  FEMALE     :  [birth year]   Associated Diagnoses:   None   Author:   TEE ALEXANDER      History of Present Illness     [facility] Hospitalist - History & Physical    Primary Care Physician: None    Chief Complaint: Abdominal pain    History of Present Illness:    The patient is a 73 year old female with a past medical history of alcohol abuse, anxiety, depression, and a gastric ulcer, who presented to the ED with a chief complaint of abdominal pain.    Patient presented to the ER because of epigastric pain with associated nausea and daughter stated that patient syncopized yesterday because of vomiting.  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  Patient is very confused and sedated and cannot provide any history.  Review of system could not be getting    Upon admission to the ED, the patient was afebrile, , RR 22, SpO2 92%, and /85. Labs were significant for sodium 131, chloride 92, glucose 130, AST 58, alk phosph 128, troponin 0.23, and WBC 16.1. Urinalysis showed moderate occult blood and 20 ketones. Chest x-ray showed a possible hiatal hernia and possible infiltrate or atelectasis/fibrosis, but was suboptimal. CT A/P showed a large hiatal hernia, hepatic steatosis, and an incidental 2.2cm left adrenal nodule. CT head or brain showed interval development of the chronic inflammatory process in the left maxiallary sinus and no intracranial abnormalities. The patient received famotidine, ativan, morphine, metoclopramide, and IV fluids and was admitted for further monitoring and treatment.     Review of Systems:  Positive for: Not performed due to above  Negative for: Not performed due to above  All systems otherwise negative    Past  Medical History:  Alcohol abuse  Anxiety  Depression  Hyperlipidemia  Gastric ulcer     Past Surgical History:  Appendectomy    Family History:  Noncontributory    Social History:  Current alcohol use - Two bottles of vodka daily  Current daily smoker  Denies illicit drug use    Allergies (1) Active Reaction  NKA None Documented       Home Medications (4) Active  busPIRone oral 5 mg tablet (BuSpar) 5 mg = 1 tab, Oral, BID  DULoxetine oral 20 mg DR capsule 20 mg = 1 cap, Oral, BID  fluoxetine 20 mg oral tablet 40 mg = 2 tab, Oral, Daily  omeprazole 40 mg oral delayed release capsule 40 mg = 1 cap, Oral, Daily   .     [facility] Hospitalist - History & Physical    Primary Care Physician: None    Chief Complaint: Abdominal pain    History of Present Illness:    The patient is a 73 year old female with a past medical history of alcohol abuse, anxiety, depression, and a gastric ulcer, who presented to the ED with a chief complaint of abdominal pain.    This is a prep note and has not been reviewed by the rounding physician.    Upon admission to the ED, the patient was afebrile, , RR 22, SpO2 92%, and /85. Labs were significant for sodium 131, chloride 92, glucose 130, AST 58, alk phosph 128, troponin 0.23, and WBC 16.1. Urinalysis showed moderate occult blood and 20 ketones. Chest x-ray showed a possible hiatal hernia and possible infiltrate or atelectasis/fibrosis, but was suboptimal. CT A/P showed a large hiatal hernia, hepatic steatosis, and an incidental 2.2cm left adrenal nodule. CT head or brain showed interval development of the chronic inflammatory process in the left maxiallary sinus and no intracranial abnormalities. The patient received famotidine, ativan, morphine, metoclopramide, and IV fluids and was admitted for further monitoring and treatment.     Review of Systems:  Positive for:   Negative for:   All systems otherwise negative    Past Medical History:  Alcohol  abuse  Anxiety  Depression  Hyperlipidemia  Gastric ulcer     Past Surgical History:  Appendectomy    Family History:  Noncontributory    Social History:  Current alcohol use - Two bottles of vodka daily  Current daily smoker  Denies illicit drug use    Allergies (1) Active Reaction  NKA None Documented       Home Medications (4) Active  busPIRone oral 5 mg tablet (BuSpar) 5 mg = 1 tab, Oral, BID  DULoxetine oral 20 mg DR capsule 20 mg = 1 cap, Oral, BID  fluoxetine 20 mg oral tablet 40 mg = 2 tab, Oral, Daily  omeprazole 40 mg oral delayed release capsule 40 mg = 1 cap, Oral, Daily   .         Physical Examination                Vital signs   Vital Signs   10/11/18 09:02 Temperature - VS 36.0 deg_C  Normal    Temperature Source - VS Tympanic    Heart/Pulse Rate 117  HI    Pulse Source Monitor    Respiration Rate 20 breaths/min  HI    SpO2 94 %  LOW    NIBP Systolic 136  Normal    NIBP Diastolic 84  Normal    NIBP Source Right Arm    NIBP   Normal    SN Alarms Set and Appropriate Patient Alarms Set And Appropriate For Patient   .   General:  No acute distress, Confused, Not alert,    Chest wall:  No tenderness, No deformity.    Cardiovascular:  No murmur.   Respiratory:  Lungs are clear to auscultation, respirations are non-labored.    Gastrointestinal:  Soft, Nontender, Non distended, Normal bowel sounds.    Psychiatric      Medical Decision Making   Results review:    Labs between:  10-OCT-2018 09:40 to 11-OCT-2018 09:40    CBC:                 WBC  HgB  Hct  Plt  MCV  RDW   11-OCT-2018 10.4  12.9  39.0  299  91.8  13.9   10-OCT-2018 (H) 16.1  12.7  37.3  334  89.9  13.4     DIFF:                 Seg  Neutroph//ABS  Lymph//ABS  Mono//ABS  EOS/ABS   11-OCT-2018 NOT APPLICABLE  75 // (H) 7.8  12 // 1.2 10 // (H) 1.0  2 // 0.2  10-OCT-2018 NOT APPLICABLE  84 // (H) 13.4  7 // 1.2 9 // (H) 1.4  0 // (L) 0.0     BMP:                 Na  Cl  BUN  Glu   11-OCT-2018 137  99  10  (H) 113                               K  CO2  Cr  Ca                              3.6  28  0.66  9.3   BMP:                 Na  Cl  BUN  Glu   10-OCT-2018 (L) 131  (L) 92  12  (H) 130                              K  CO2  Cr  Ca                              4.3  27  0.68  9.9     CMP:                 AST  ALT  AlkPhos  Bili  Albumin   11-OCT-2018 (H) 53  37  (H) 123  0.7  (L) 3.5   10-OCT-2018 (H) 58  37  (H) 128  0.7  3.6     Other Chem:             Mg  Phos  Triglycerides  GGTP  DirectBili                           2.1             COAG:                 INR  PT  PTT  Ddimer  Fibrinogen    10-OCT-2018 1.0  10.4                                      I & O between:  10-OCT-2018 09:40 TO 11-OCT-2018 09:40  Med Dosing Weight:  55.3  kg   10-OCT-2018  24 Hour Intake:   0.50  ( 0.01 mL/kg )  24 Hour Output:   0.00           24 Hour Urine/Stool Output:   0.0  24 Hour Balance:   0.50           24 Hour Urine Output:   0.00  ( 0.00 mL/kg/hr )                   Urine Count:  2.00      .   Rationale:    ASSESSMENT AND PLAN:    Abdominal pain , suspect gastritis versus pancreatitis versus hiatal hernia  CT A/P showed a large hiatal hernia, hepatic steatosis, and an incidental 2.2cm left adrenal nodule. CT head or brain showed interval development of the chronic inflammatory process in the left maxiallary sinus and no intracranial abnormalities   - NPO for now, continue IV fluid.  Patient is very confused and sleepy when she is awake she is very agitated                 -We will need surgical consult when patient is out of withdrawals   - PRN analgesia    Elevated troponins  Chest x-ray showed a possible hiatal hernia and possible infiltrate or atelectasis/fibrosis, but was suboptimal  EKG: sinus tachycardia, multiple PACs, probable left atrial enlargement, and borderline ST elevation in anterior leads  Troponins 0.23-->0.22-->0.14   - Echo pending   - Monitor on telemetry   - Trend troponins as indicated     Alcohol abuse  Patient reports drinking two bottle of vodka  per day  Blood alcohol serum 0 on arrival   - Monitor on CIWA protocol with PRN ativan   - Monitor for withdrawal     SIRS criteria  Evidenced by leukocytosis (WBC now WNL), tachycardia, and tachypnea   - Monitor off abx for now      2.2cm left adrenal nodule  As noted on CT A/P above   - Monitor, will need evaluation of follow-up as an outpatient.  Patient is currently negative withdrawal.     Elevated AST and alk phosph  AST 53, alk phosph 128  CT A/P showed hepatic steatosis   - Trend as indicated     Anxiety - PRN ativan and scheduled buspirone, will discontinue  Depression - Home duloxetine , will DC while in active withdrawal  Hyperlipidemia - No home statin, follow lipid panel  Gastric ulcer - continue PPI  Nicotine dependence - Cessation advised, continue with patch     DVT PPx: SCDs    DISPOSITION: Pending further medical evaluation  Discussed with daughter at bedside and the nursing staff.    INPATIENT CERTIFICATION OF MEDICAL NECESSITY    I certify that hospital services are reasonable and necessary for this patient and will be appropriately provided as inpatient services in accordance with the CMS 2-midnight benchmark under 42 .3(e).    This patient requires inpatient hospital services for medical treatment or medically required inpatient diagnostic study.  The reason this patient requires hospital services is because of abdominal pain    This patients length of stay is estimated to be >2 midnights.    The plans for this patients post-hospital care are expected to be:  __ Home - no services  __ Home Health Care  __ Skilled Nursing Facility  _X_ To be determined  __ Other ______________    PCP: None    All labs and imaging reviewed.  All patient questions and concerns addressed.    Charting performed by klarissa Ahumada for Dr. Eaton   ASSESSMENT AND PLAN:    Abdominal pain   CT A/P showed a large hiatal hernia, hepatic steatosis, and an incidental 2.2cm left adrenal nodule. CT head or brain  showed interval development of the chronic inflammatory process in the left maxiallary sinus and no intracranial abnormalities   - NPO for now, continue IV fluid.  Patient is very confused and sleepy when she is awake she is very agitated                 -We will need surgical consult when patient is out of withdrawals   - PRN analgesia    Elevated troponins  Chest x-ray showed a possible hiatal hernia and possible infiltrate or atelectasis/fibrosis, but was suboptimal  EKG: sinus tachycardia, multiple PACs, probable left atrial enlargement, and borderline ST elevation in anterior leads  Troponins 0.23-->0.22-->0.14   - Echo pending   - Monitor on telemetry   - Trend troponins as indicated     Alcohol abuse  Patient reports drinking two bottle of vodka per day  Blood alcohol serum 0 on arrival   - Monitor on CIWA protocol with PRN ativan   - Monitor for withdrawal     SIRS criteria  Evidenced by leukocytosis (WBC now WNL), tachycardia, and tachypnea   - Monitor off abx for now      2.2cm left adrenal nodule  As noted on CT A/P above   - Monitor, will need evaluation of follow-up as an outpatient.  Patient is currently negative withdrawal.     Elevated AST and alk phosph  AST 53, alk phosph 128  CT A/P showed hepatic steatosis   - Trend as indicated     Anxiety - PRN ativan and scheduled buspirone, will discontinue  Depression - Home duloxetine , will DC while in active withdrawal  Hyperlipidemia - No home statin, follow lipid panel  Gastric ulcer - continue PPI  Nicotine dependence - Cessation advised, continue with patch     DVT PPx: SCDs    DISPOSITION: Pending further medical evaluation  Discussed with daughter at bedside and the nursing staff.    INPATIENT CERTIFICATION OF MEDICAL NECESSITY    I certify that hospital services are reasonable and necessary for this patient and will be appropriately provided as inpatient services in accordance with the CMS 2-midnight benchmark under 42 .3(e).    This patient  requires inpatient hospital services for medical treatment or medically required inpatient diagnostic study.  The reason this patient requires hospital services is because of abdominal pain    This patients length of stay is estimated to be >2 midnights.    The plans for this patients post-hospital care are expected to be:  __ Home - no services  __ Home Health Care  __ Skilled Nursing Facility  _X_ To be determined  __ Other ______________    PCP: None    All labs and imaging reviewed.  All patient questions and concerns addressed.    Charting performed by klarissa Ahumada for Dr. Eaton , All medical record entries made by the scribe were at my direction. I have reviewed the chart and agree that the record accurately reflects my personal performance of the history, physical exam, hospital course, and assessment and plan..             Electronically Signed On 10/12/2018 14:14  __________________________________________________   TEE ALEXANDER      Electronically Signed On 10/12/2018 14:45  __________________________________________________   TEE ALEXANDER     intact

## 2021-01-25 NOTE — CONSULT NOTE ADULT - CONSULT REASON
RML/RLL collapse EMERGENCY ROOM ADMISSION    RML/RLL collapse EMERGENCY ROOM ADMISSION    RML/RLL collapse    COVID PNA    PULM INFILTRATES    SHORTNESS OF BREATH    HYPOXIA

## 2021-01-25 NOTE — PROGRESS NOTE ADULT - ASSESSMENT
55 year old female with RA on leflunomide currently admitted w/ non-resolving PNA.  CT also reveals hilar/mediastinal lymphadenopathy + new RML collapse.   While RA and/or leflunomide can cause pulmonary manifestations, these findings are atypical (more typically causes ILD, pleural disease, and/or pulmonary nodules).  DDx includes atypical infection (e.g. fungal or mycobacterial), franco as pt immunosuppressed vs medication-induced (pt recently started on meloxicam and her dose of HCTZ was recently increased, both of which have been reported to cause pulmonary eosinophilia) vs malignancy.  Sarcoidosis less likely.  No other signs/symptoms to suggest DRESS at this time.  Pt also w/ LBP most c/w sciatica.    - Pulm f/u - would likely benefit from bronch / BAL.    - Currently off abx as per ID.    - Agree with holding leflunomide for now.    - Can use low-dose prednisone (e.g. 10mg daily) prn if RA flares.    - Appreciate ID input.    - If symptoms continue, would consider testing for atypical organisms.    - Recommend PT for sciatica.    - Will check ACE level.

## 2021-01-25 NOTE — CONSULT NOTE ADULT - ASSESSMENT
RML atelectasis with RLL consolidative pneumonia with no improvement with antibiotics.  Agree needs diagnostic bronchoscopy.  Will arrange in OR if bronchoscope available.  Otherwise can arrange as OP in another facility

## 2021-01-25 NOTE — PHYSICAL THERAPY INITIAL EVALUATION ADULT - ADDITIONAL COMMENTS
Pt reports living in private house w/ 4 steps to enter no rails and 12 steps inside w/ b/l rails reachable. pt lives w/  and daughter and reports being independent in all ADLs. Pt reports that she owns a straight cane which she only uses when she has severe bouts of sciatica. pt has a shower tub combo and regular toilet seat. pt is R handed.

## 2021-01-25 NOTE — CONSULT NOTE ADULT - SUBJECTIVE AND OBJECTIVE BOX
56 yo female recently discharged from hospital for pneumonia readmitted with continued dyspnea and cough.  Patient has been treated with antibiotics and nebulizers with no improvement.  She has no known Covid exposures and is Covid negative.  She reports trace hemoptysis in last couple of days and pleuritic chest pain.  Imaging studies show RLL consolidative pneumonia with now RML atelectasis which is new.  There is also mediastinal adenopathy present.  She is afebrile and HD stable on RA.    PMH:  RA, DM, CAD, HTN, MI  PSH:  Tubal ligation, PCI  All:  ACE inhibitiors  Meds:  see current list  Soc:  no ETOH or tobacco use  FH: NC    PE:    Gen:  A&O, NAD  HEENT:  NCAT, PERRL, MMM  Neck: no JVD or LAD  Lungs:  diminished BS on right, coarse  Cor: regular  Abd: soft  Ext: no edema or clubbing     56 yo female recently discharged from hospital for pneumonia readmitted with continued dyspnea and cough.  Patient has been treated with antibiotics and nebulizers with no improvement.   She reports trace hemoptysis in last couple of days and pleuritic chest pain.  Imaging studies show RLL consolidative pneumonia with now RML atelectasis which is new.  There is also mediastinal adenopathy present.  She is afebrile and HD stable on RA.    PMH:  RA, DM, CAD, HTN, MI  PSH:  Tubal ligation, PCI  All:  ACE inhibitiors  Meds:  see current list  Soc:  no ETOH or tobacco use  FH: NC        MEDICATIONS  (STANDING):  amLODIPine   Tablet 5 milliGRAM(s) Oral daily  aspirin enteric coated 81 milliGRAM(s) Oral daily  dextrose 40% Gel 15 Gram(s) Oral once  dextrose 5%. 1000 milliLiter(s) (50 mL/Hr) IV Continuous <Continuous>  dextrose 5%. 1000 milliLiter(s) (100 mL/Hr) IV Continuous <Continuous>  dextrose 50% Injectable 12.5 Gram(s) IV Push once  dextrose 50% Injectable 25 Gram(s) IV Push once  dextrose 50% Injectable 25 Gram(s) IV Push once  famotidine    Tablet 20 milliGRAM(s) Oral daily  glucagon  Injectable 1 milliGRAM(s) IntraMuscular once  insulin lispro (ADMELOG) corrective regimen sliding scale   SubCutaneous three times a day before meals  insulin lispro (ADMELOG) corrective regimen sliding scale   SubCutaneous at bedtime    MEDICATIONS  (PRN):  acetaminophen   Tablet .. 650 milliGRAM(s) Oral every 6 hours PRN Temp greater or equal to 38C (100.4F), Mild Pain (1 - 3)  albuterol/ipratropium for Nebulization 3 milliLiter(s) Nebulizer every 6 hours PRN Shortness of Breath and/or Wheezing  guaiFENesin   Syrup  (Sugar-Free) 200 milliGRAM(s) Oral every 6 hours PRN Cough  sodium chloride 0.65% Nasal 1 Spray(s) Both Nostrils three times a day PRN Nasal Congestion      Allergies    ACE inhibitors (Angioedema (Severe))  lisinopril (Anaphylaxis; Angioedema)    Intolerances        Vital Signs Last 24 Hrs  T(C): 36.7 (25 Jan 2021 12:04), Max: 40.3 (24 Jan 2021 17:01)  T(F): 98.1 (25 Jan 2021 12:04), Max: 104.5 (24 Jan 2021 17:01)  HR: 82 (25 Jan 2021 12:04) (82 - 99)  BP: 130/76 (25 Jan 2021 12:04) (124/76 - 139/80)  BP(mean): --  RR: 18 (25 Jan 2021 12:04) (18 - 18)  SpO2: 94% (25 Jan 2021 12:04) (94% - 98%)    PHYSICAL EXAM:  GENERAL: NAD, well-groomed, well-developed  HEAD:  Atraumatic, Normocephalic  EYES: EOMI, PERRLA, conjunctiva and sclera clear  ENMT: No tonsillar erythema, exudates, or enlargement; Moist mucous membranes, Good dentition, No lesions  NECK: Supple, No JVD, Normal thyroid  NERVOUS SYSTEM:  Alert & Oriented X3, Good concentration; Motor Strength 5/5 B/L upper and lower extremities; DTRs 2+ intact and symmetric  CHEST/LUNG: mild decreased bs at right base ; No rales, rhonchi, wheezing, or rubs  HEART: Regular rate and rhythm; No murmurs, rubs, or gallops  ABDOMEN: Soft, Nontender, Nondistended; Bowel sounds present  EXTREMITIES:  2+ Peripheral Pulses, No clubbing, cyanosis, or edema  SKIN: No rashes or lesions    LABS:                                               9.5    12.29 )-----------( 502      ( 25 Jan 2021 08:14 )             28.9   01-25    132<L>  |  97  |  9   ----------------------------<  262<H>  3.8   |  26  |  0.62    Ca    9.2      25 Jan 2021 08:14  Phos  2.8     01-25  Mg     1.7     01-25                               A1C with Estimated Average Glucose (01.15.21 @ 11:26)    A1C with Estimated Average Glucose Result: 9.2: Method: Immunoassay       Reference Range                4.0-5.6%       High risk (prediabetic)        5.7-6.4%       Diabetic, diagnostic             >=6.5%       ADA diabetic treatment goal       <7.0%  The Hemoglobin A1c testing is NGSP-certified.Reference ranges are based  upon the 2010 recommendations of  the American Diabetes Association.  Interpretation may vary for children  and adolescents. %    Estimated Average Glucose: 217: The Estimated Average Glucose (eAG) or Mean Plasma Glucose (MPG) value is  calculated from the hemoglobin A1c value and covers the same time period.   The American Diabetes Association (ADA) and other professional  organizations recommend reporting the eAG with the HgbA1c. mg/dL      CAPILLARY BLOOD GLUCOSE      POCT Blood Glucose.: 285 mg/dL (25 Jan 2021 11:39)  POCT Blood Glucose.: 260 mg/dL (25 Jan 2021 07:41)  POCT Blood Glucose.: 267 mg/dL (24 Jan 2021 21:21)  POCT Blood Glucose.: 198 mg/dL (24 Jan 2021 16:13)            RADIOLOGY & ADDITIONAL TESTS:  < from: CT Chest No Cont (01.23.21 @ 13:57) >    1.  New opacification of the bronchus intermedius, right middle lobe bronchus and right lower lobe bronchus with new collapse of the right middle lobe.  2.  The right lower lobe consolidation has slightly decreased, however, centrilobular opacities and additional patchy foci consolidation remain. While these findings could be secondary to infection, other etiologies can look similar. A follow-up is recommended in 6-8 weeks to evaluate for resolution/change.            Imaging Personally Reviewed:  [ X] YES  [ ] NO    Consultant(s) Notes Reviewed:  [ X] YES  [ ] NO    Care Discussed with Consultants/Other Providers [X ] YES  [ ] NO

## 2021-01-26 LAB
ANION GAP SERPL CALC-SCNC: 4 MMOL/L — LOW (ref 5–17)
APTT BLD: 30.6 SEC — SIGNIFICANT CHANGE UP (ref 27.5–35.5)
BUN SERPL-MCNC: 10 MG/DL — SIGNIFICANT CHANGE UP (ref 7–23)
CALCIUM SERPL-MCNC: 9.2 MG/DL — SIGNIFICANT CHANGE UP (ref 8.5–10.1)
CHLORIDE SERPL-SCNC: 99 MMOL/L — SIGNIFICANT CHANGE UP (ref 96–108)
CO2 SERPL-SCNC: 30 MMOL/L — SIGNIFICANT CHANGE UP (ref 22–31)
CREAT SERPL-MCNC: 0.57 MG/DL — SIGNIFICANT CHANGE UP (ref 0.5–1.3)
GLUCOSE BLDC GLUCOMTR-MCNC: 143 MG/DL — HIGH (ref 70–99)
GLUCOSE BLDC GLUCOMTR-MCNC: 161 MG/DL — HIGH (ref 70–99)
GLUCOSE BLDC GLUCOMTR-MCNC: 227 MG/DL — HIGH (ref 70–99)
GLUCOSE BLDC GLUCOMTR-MCNC: 232 MG/DL — HIGH (ref 70–99)
GLUCOSE BLDC GLUCOMTR-MCNC: 265 MG/DL — HIGH (ref 70–99)
GLUCOSE SERPL-MCNC: 243 MG/DL — HIGH (ref 70–99)
HCT VFR BLD CALC: 28 % — LOW (ref 34.5–45)
HGB BLD-MCNC: 9.2 G/DL — LOW (ref 11.5–15.5)
HIV 1+2 AB+HIV1 P24 AG SERPL QL IA: SIGNIFICANT CHANGE UP
INR BLD: 1.32 RATIO — HIGH (ref 0.88–1.16)
MAGNESIUM SERPL-MCNC: 1.9 MG/DL — SIGNIFICANT CHANGE UP (ref 1.6–2.6)
MCHC RBC-ENTMCNC: 27.1 PG — SIGNIFICANT CHANGE UP (ref 27–34)
MCHC RBC-ENTMCNC: 32.9 GM/DL — SIGNIFICANT CHANGE UP (ref 32–36)
MCV RBC AUTO: 82.4 FL — SIGNIFICANT CHANGE UP (ref 80–100)
NRBC # BLD: 0 /100 WBCS — SIGNIFICANT CHANGE UP (ref 0–0)
PHOSPHATE SERPL-MCNC: 3.1 MG/DL — SIGNIFICANT CHANGE UP (ref 2.5–4.5)
PLATELET # BLD AUTO: 483 K/UL — HIGH (ref 150–400)
POTASSIUM SERPL-MCNC: 3.9 MMOL/L — SIGNIFICANT CHANGE UP (ref 3.5–5.3)
POTASSIUM SERPL-SCNC: 3.9 MMOL/L — SIGNIFICANT CHANGE UP (ref 3.5–5.3)
PROCALCITONIN SERPL-MCNC: 0.24 NG/ML — HIGH (ref 0.02–0.1)
PROTHROM AB SERPL-ACNC: 15.1 SEC — HIGH (ref 10.6–13.6)
RBC # BLD: 3.4 M/UL — LOW (ref 3.8–5.2)
RBC # FLD: 14 % — SIGNIFICANT CHANGE UP (ref 10.3–14.5)
SARS-COV-2 RNA SPEC QL NAA+PROBE: SIGNIFICANT CHANGE UP
SODIUM SERPL-SCNC: 133 MMOL/L — LOW (ref 135–145)
WBC # BLD: 9.97 K/UL — SIGNIFICANT CHANGE UP (ref 3.8–10.5)
WBC # FLD AUTO: 9.97 K/UL — SIGNIFICANT CHANGE UP (ref 3.8–10.5)

## 2021-01-26 PROCEDURE — 99233 SBSQ HOSP IP/OBS HIGH 50: CPT

## 2021-01-26 RX ADMIN — Medication 6: at 08:40

## 2021-01-26 RX ADMIN — Medication 4: at 12:58

## 2021-01-26 RX ADMIN — Medication 650 MILLIGRAM(S): at 00:00

## 2021-01-26 RX ADMIN — FAMOTIDINE 20 MILLIGRAM(S): 10 INJECTION INTRAVENOUS at 12:58

## 2021-01-26 RX ADMIN — Medication 2: at 16:27

## 2021-01-26 RX ADMIN — AMLODIPINE BESYLATE 5 MILLIGRAM(S): 2.5 TABLET ORAL at 05:15

## 2021-01-26 RX ADMIN — Medication 81 MILLIGRAM(S): at 12:58

## 2021-01-26 NOTE — PROGRESS NOTE ADULT - SUBJECTIVE AND OBJECTIVE BOX
I AM INHERITING THIS PATIENT ON TODAY 1/20/2021    Patient is a 55y old  Female who presents with a chief complaint of sob (19 Jan 2021 11:40)      INTERVAL HPI/OVERNIGHT EVENTS: low grade fever    MEDICATIONS  (STANDING):  amLODIPine   Tablet 5 milliGRAM(s) Oral daily  aspirin enteric coated 81 milliGRAM(s) Oral daily  dextrose 40% Gel 15 Gram(s) Oral once  dextrose 5%. 1000 milliLiter(s) (50 mL/Hr) IV Continuous <Continuous>  dextrose 5%. 1000 milliLiter(s) (100 mL/Hr) IV Continuous <Continuous>  dextrose 50% Injectable 12.5 Gram(s) IV Push once  dextrose 50% Injectable 25 Gram(s) IV Push once  dextrose 50% Injectable 25 Gram(s) IV Push once  famotidine    Tablet 20 milliGRAM(s) Oral daily  glucagon  Injectable 1 milliGRAM(s) IntraMuscular once  insulin lispro (ADMELOG) corrective regimen sliding scale   SubCutaneous three times a day before meals  insulin lispro (ADMELOG) corrective regimen sliding scale   SubCutaneous at bedtime    MEDICATIONS  (PRN):  acetaminophen   Tablet .. 650 milliGRAM(s) Oral every 6 hours PRN Temp greater or equal to 38C (100.4F), Mild Pain (1 - 3)  albuterol/ipratropium for Nebulization 3 milliLiter(s) Nebulizer every 6 hours PRN Shortness of Breath and/or Wheezing  guaiFENesin   Syrup  (Sugar-Free) 200 milliGRAM(s) Oral every 6 hours PRN Cough  sodium chloride 0.65% Nasal 1 Spray(s) Both Nostrils three times a day PRN Nasal Congestion    Allergies    ACE inhibitors (Angioedema (Severe))  lisinopril (Anaphylaxis; Angioedema)    Intolerances    Vital Signs Last 24 Hrs  T(C): 36.5 (26 Jan 2021 05:33), Max: 38 (25 Jan 2021 23:34)  T(F): 97.7 (26 Jan 2021 05:33), Max: 100.4 (25 Jan 2021 23:34)  HR: 88 (26 Jan 2021 05:33) (79 - 95)  BP: 119/75 (26 Jan 2021 05:33) (119/75 - 160/88)  BP(mean): --  RR: 17 (26 Jan 2021 05:33) (17 - 18)  SpO2: 98% (26 Jan 2021 05:33) (94% - 98%)    PHYSICAL EXAM:  GENERAL: NAD, well-groomed, well-developed  HEAD:  Atraumatic, Normocephalic  EYES: EOMI, PERRLA, conjunctiva and sclera clear  ENMT: No tonsillar erythema, exudates, or enlargement; Moist mucous membranes, Good dentition, No lesions  NECK: Supple, No JVD, Normal thyroid  NERVOUS SYSTEM:  Alert & Oriented X3, Good concentration; Motor Strength 5/5 B/L upper and lower extremities; DTRs 2+ intact and symmetric  CHEST/LUNG: mild decreased bs at right base ; No rales, rhonchi, wheezing, or rubs  HEART: Regular rate and rhythm; No murmurs, rubs, or gallops  ABDOMEN: Soft, Nontender, Nondistended; Bowel sounds present  EXTREMITIES:  2+ Peripheral Pulses, No clubbing, cyanosis, or edema  SKIN: No rashes or lesions    LABS:                                                                    9.2    9.97  )-----------( 483      ( 26 Jan 2021 08:29 )             28.0   01-26    133<L>  |  99  |  10  ----------------------------<  243<H>  3.9   |  30  |  0.57    Ca    9.2      26 Jan 2021 08:29  Phos  3.1     01-26  Mg     1.9     01-26                               A1C with Estimated Average Glucose (01.15.21 @ 11:26)    A1C with Estimated Average Glucose Result: 9.2: Method: Immunoassay       Reference Range                4.0-5.6%       High risk (prediabetic)        5.7-6.4%       Diabetic, diagnostic             >=6.5%       ADA diabetic treatment goal       <7.0%  The Hemoglobin A1c testing is NGSP-certified.Reference ranges are based  upon the 2010 recommendations of  the American Diabetes Association.  Interpretation may vary for children  and adolescents. %    Estimated Average Glucose: 217: The Estimated Average Glucose (eAG) or Mean Plasma Glucose (MPG) value is  calculated from the hemoglobin A1c value and covers the same time period.   The American Diabetes Association (ADA) and other professional  organizations recommend reporting the eAG with the HgbA1c. mg/dL      CAPILLARY BLOOD GLUCOSE      POCT Blood Glucose.: 285 mg/dL (25 Jan 2021 11:39)  POCT Blood Glucose.: 260 mg/dL (25 Jan 2021 07:41)  POCT Blood Glucose.: 267 mg/dL (24 Jan 2021 21:21)  POCT Blood Glucose.: 198 mg/dL (24 Jan 2021 16:13)            RADIOLOGY & ADDITIONAL TESTS:  < from: CT Chest No Cont (01.23.21 @ 13:57) >    1.  New opacification of the bronchus intermedius, right middle lobe bronchus and right lower lobe bronchus with new collapse of the right middle lobe.  2.  The right lower lobe consolidation has slightly decreased, however, centrilobular opacities and additional patchy foci consolidation remain. While these findings could be secondary to infection, other etiologies can look similar. A follow-up is recommended in 6-8 weeks to evaluate for resolution/change.      < end of copied text >      Imaging Personally Reviewed:  [ X] YES  [ ] NO    Consultant(s) Notes Reviewed:  [ X] YES  [ ] NO    Care Discussed with Consultants/Other Providers [X ] YES  [ ] NO

## 2021-01-26 NOTE — PROGRESS NOTE ADULT - SUBJECTIVE AND OBJECTIVE BOX
INTERVAL HPI:  Pt is a 56 y/o female w/pmhx of  DM II, HTN, RA, and MI comes w/complain of sob x 2 days and marked fatigue and fever to tmax 100.2.  pt was diagnosed w/CAP on 1/5/21 was sent home on azithro and cefopodoxime x7days.  pt completed abx, she was covid neg then and again today, she returns w/increasing sob over the last 2 days and pleuritic pain on deep breaths.  pt denies any , chills, +non productive cough +pleuritic pain no cp, palpitations, n/v/d/c no travels. feels very fatigued, barely able to put on a blouse.  Non smoker. Reports weight loss over few months,  CTA in ed  No pulmonary emboli visualized. Consolidation in the right lower lobe with mediastinal and right hilar lymphadenopathy which may represent infection.  (13 Jan 2021 21:31).  Admitted with Pneumonia, leukocytosis    OVERNIGHT EVENTS:  Still with cough and fever.    Vital Signs Last 24 Hrs  T(C): 37.7 (26 Jan 2021 17:19), Max: 38 (25 Jan 2021 23:34)  T(F): 99.8 (26 Jan 2021 17:19), Max: 100.4 (25 Jan 2021 23:34)  HR: 90 (26 Jan 2021 17:19) (79 - 90)  BP: 130/74 (26 Jan 2021 17:19) (119/75 - 134/84)  BP(mean): --  RR: 17 (26 Jan 2021 17:19) (17 - 18)  SpO2: 93% (26 Jan 2021 17:19) (93% - 98%)    PHYSICAL EXAM:  GEN:         Awake, responsive and comfortable.  HEENT:    Normal.    RESP:       no distress  CVS:          Regular rate and rhythm.   ABD:         Soft, non-tender, non-distended;     MEDICATIONS  (STANDING):  amLODIPine   Tablet 5 milliGRAM(s) Oral daily  aspirin enteric coated 81 milliGRAM(s) Oral daily  dextrose 40% Gel 15 Gram(s) Oral once  dextrose 5%. 1000 milliLiter(s) (50 mL/Hr) IV Continuous <Continuous>  dextrose 5%. 1000 milliLiter(s) (100 mL/Hr) IV Continuous <Continuous>  dextrose 50% Injectable 12.5 Gram(s) IV Push once  dextrose 50% Injectable 25 Gram(s) IV Push once  dextrose 50% Injectable 25 Gram(s) IV Push once  famotidine    Tablet 20 milliGRAM(s) Oral daily  glucagon  Injectable 1 milliGRAM(s) IntraMuscular once  insulin lispro (ADMELOG) corrective regimen sliding scale   SubCutaneous three times a day before meals  insulin lispro (ADMELOG) corrective regimen sliding scale   SubCutaneous at bedtime    MEDICATIONS  (PRN):  acetaminophen   Tablet .. 650 milliGRAM(s) Oral every 6 hours PRN Temp greater or equal to 38C (100.4F), Mild Pain (1 - 3)  albuterol/ipratropium for Nebulization 3 milliLiter(s) Nebulizer every 6 hours PRN Shortness of Breath and/or Wheezing  guaiFENesin   Syrup  (Sugar-Free) 200 milliGRAM(s) Oral every 6 hours PRN Cough  sodium chloride 0.65% Nasal 1 Spray(s) Both Nostrils three times a day PRN Nasal Congestion    LABS:                        9.2    9.97  )-----------( 483      ( 26 Jan 2021 08:29 )             28.0     01-26    133<L>  |  99  |  10  ----------------------------<  243<H>  3.9   |  30  |  0.57    Ca    9.2      26 Jan 2021 08:29  Phos  3.1     01-26  Mg     1.9     01-26      PT/INR - ( 26 Jan 2021 08:29 )   PT: 15.1 sec;   INR: 1.32 ratio      PTT - ( 26 Jan 2021 08:29 )  PTT:30.6 sec  < from: CT Chest No Cont (01.23.21 @ 13:57) >  EXAM:  CT CHEST                          PROCEDURE DATE:  01/23/2021      INTERPRETATION:  CT CHEST WITHOUT CONTRAST    INDICATION: Fever.    TECHNIQUE: Unenhanced helical images were obtained of the chest. Coronal and sagittal images were reconstructed. Maximum intensity projection images were generated.    COMPARISON: Radiograph 1/23/2021. CT chest 1/13/2021.    FINDINGS:    Tubes/Lines: None.    Lungs And Airways: Since 1/13/2021, the right middle lobe collapse is new. There are secretions within the bronchus intermedius, right middle lobe bronchus, and right lower lobe bronchus.    Allowing for differences in technique, the right lower lobe consolidation has decreased. However, patchy foci of consolidation and centrilobular nodules remain.    Emphysema. There are additional tiny bilateral nodules that are unchanged. The remainder of the lungs and airways are otherwise unremarkable.    Pleura:No pleural effusion or pneumothorax.    Mediastinum: There chest lymph nodes are unchanged. The visualized thyroid gland contains a hypodense nodule and calcifications. The esophagus is unremarkable.    Heart and Vasculature: Heart is normal in size. Coronary artery calcifications. Left-sided aortic arch and left-sided descending thoracic aorta. Aortic calcifications. The pulmonary artery is unremarkable.    Upper Abdomen: Left renal calcification. Cholelithiasis.    Bones And Soft Tissues: The bones are unremarkable.  The soft tissues are unremarkable.    IMPRESSION:    1.  New opacification of the bronchus intermedius, right middle lobe bronchus and right lower lobe bronchus with new collapse of the right middle lobe.  2.  The right lower lobe consolidation has slightly decreased, however, centrilobular opacities and additional patchy foci consolidation remain. While these findings could be secondary to infection, other etiologies can look similar. A follow-up is recommended in 6-8 weeks to evaluate for resolution/change.    EH GARRETT MD; Attending Radiologist  This document has been electronically signed. Jan 23 2021  3:07PM    ASSESSMENT AND PLAN:  ·	SOB.  ·	RLL Pneumonia with Lymph adenopathy.  ·	Leukocytosis.  ·	DM.  ·	HTN.  ·	Arthritis.    Repeat CT chest noted with RML+RLL collapse.  For Bronchoscopy on 01/28/21.

## 2021-01-26 NOTE — PROGRESS NOTE ADULT - PROBLEM SELECTOR PLAN 1
Covering for gram neg pna, Sepsis POA   cefepime/vanco, doxy was added  ID consult noted on board  pulm consult noted   urine legionella negative   hyponatremia improving    TTE wnl    WBC leukocytosis is improving     rheum  consult noted    1/21/2021 no fever since 1/19/2021 covid negative from 1/20/21, f/u imdium scan  1/23/2021 indium scna shows uptake right lobe . still with fever f/u ct chest per ID recommendations  may need vancomycin   QuantiFeron is negative  1/24/2021 vancomycin  started yesterday night . will monitor one more day of still febrile in am will repeat blood cx yet again  1/25/2021 per ID was started on vancomycin over weekend now it appears stopped per ID , will ask pulmonary for re evaluation   will ask thoracic for bonch  1/26/2021  per verbal d/w kerri plunkett ( thoracic ) bronch for Thursday follow up HIV testing

## 2021-01-26 NOTE — CHART NOTE - NSCHARTNOTEFT_GEN_A_CORE
Pt admitted c SOB, extreme fatigue, fever; recently dx c PNA now worsening. PMHx includes MI, RA, T2DM, HTN    Factors impacting intake: [X ] none [ ] nausea  [ ] vomiting [ ] diarrhea [ ] constipation  [ ]chewing problems [ ] swallowing issues  [ ] other:     Diet Prescription: Diet, Consistent Carbohydrate w/Evening Snack:   DASH/TLC {Sodium & Cholesterol Restricted} (01-14-21 @ 01:46)    Intake:  pt eating well; consuming 100% most meals    Current Weight:   59.6 kg (1/18); 59.8 kg (1/14); wt of 1/22 (52.4 kg most likely inaccurate as doubtful pt lost 16# x 4 days since she is eating well  % Weight Change: stable x 4 days    Physical Appearance:  no edema noted    Pertinent Medications: MEDICATIONS  (STANDING):  amLODIPine   Tablet 5 milliGRAM(s) Oral daily  aspirin enteric coated 81 milliGRAM(s) Oral daily  dextrose 40% Gel 15 Gram(s) Oral once  dextrose 5%. 1000 milliLiter(s) (50 mL/Hr) IV Continuous <Continuous>  dextrose 5%. 1000 milliLiter(s) (100 mL/Hr) IV Continuous <Continuous>  dextrose 50% Injectable 12.5 Gram(s) IV Push once  dextrose 50% Injectable 25 Gram(s) IV Push once  dextrose 50% Injectable 25 Gram(s) IV Push once  famotidine    Tablet 20 milliGRAM(s) Oral daily  glucagon  Injectable 1 milliGRAM(s) IntraMuscular once  insulin lispro (ADMELOG) corrective regimen sliding scale   SubCutaneous three times a day before meals  insulin lispro (ADMELOG) corrective regimen sliding scale   SubCutaneous at bedtime    MEDICATIONS  (PRN):  acetaminophen   Tablet .. 650 milliGRAM(s) Oral every 6 hours PRN Temp greater or equal to 38C (100.4F), Mild Pain (1 - 3)  albuterol/ipratropium for Nebulization 3 milliLiter(s) Nebulizer every 6 hours PRN Shortness of Breath and/or Wheezing  guaiFENesin   Syrup  (Sugar-Free) 200 milliGRAM(s) Oral every 6 hours PRN Cough  sodium chloride 0.65% Nasal 1 Spray(s) Both Nostrils three times a day PRN Nasal Congestion    Pertinent Labs: 01-26 Na133 mmol/L<L> Glu 243 mg/dL<H> K+ 3.9 mmol/L Cr  0.57 mg/dL BUN 10 mg/dL 01-26 Phos 3.1 mg/dL  01-15-21 A1C 9.2%; average glu 217; 01-25 POCT: 260, 285, 137, 229    Skin:   WDL    Estimated Needs:   [X ] no change since previous assessment  (1/19)  [ ] recalculated:     Previous Nutrition Diagnosis:   [X ] Inconsistent CHO intake  Etiology:  Physiologica cause (T2DM) requiring careful timing and consistency of CHO; food & nutrition-related knowledge deficit  Signs & Symptoms:  A1c 9.2%; inconsistent CHO intake; verbalizes incomplete knowledge of diet    GOAL:  Pt to verbalize need for lifestyle modification - met, able to identify CHO food sources - met     Nutrition Diagnosis is [ X] ongoing  [ ] resolved [ ] not applicable     New Nutrition Diagnosis: [ X] not applicable    Interventions:   continue current diet rx as noted  Recommend  [ ] Change Diet To:  [ ] Nutrition Supplement  [ ] Nutrition Support  [ ] Other:     Monitoring and Evaluation:   [X ] PO intake [ x ] Tolerance to diet prescription [ x ] weights [ x ] labs[ x ] follow up per protocol  [ ] other:

## 2021-01-27 LAB
ACE SERPL-CCNC: 23 U/L — SIGNIFICANT CHANGE UP (ref 14–82)
GLUCOSE BLDC GLUCOMTR-MCNC: 179 MG/DL — HIGH (ref 70–99)
GLUCOSE BLDC GLUCOMTR-MCNC: 238 MG/DL — HIGH (ref 70–99)
GLUCOSE BLDC GLUCOMTR-MCNC: 254 MG/DL — HIGH (ref 70–99)
GLUCOSE BLDC GLUCOMTR-MCNC: 265 MG/DL — HIGH (ref 70–99)

## 2021-01-27 PROCEDURE — 99232 SBSQ HOSP IP/OBS MODERATE 35: CPT

## 2021-01-27 PROCEDURE — 71046 X-RAY EXAM CHEST 2 VIEWS: CPT | Mod: 26

## 2021-01-27 RX ORDER — INSULIN GLARGINE 100 [IU]/ML
10 INJECTION, SOLUTION SUBCUTANEOUS EVERY MORNING
Refills: 0 | Status: DISCONTINUED | OUTPATIENT
Start: 2021-01-28 | End: 2021-01-29

## 2021-01-27 RX ADMIN — Medication 2: at 16:40

## 2021-01-27 RX ADMIN — FAMOTIDINE 20 MILLIGRAM(S): 10 INJECTION INTRAVENOUS at 11:57

## 2021-01-27 RX ADMIN — Medication 6: at 11:57

## 2021-01-27 RX ADMIN — Medication 6: at 08:01

## 2021-01-27 RX ADMIN — Medication 81 MILLIGRAM(S): at 11:57

## 2021-01-27 RX ADMIN — Medication 0: at 22:57

## 2021-01-27 RX ADMIN — AMLODIPINE BESYLATE 5 MILLIGRAM(S): 2.5 TABLET ORAL at 05:47

## 2021-01-27 NOTE — CONSULT NOTE ADULT - SUBJECTIVE AND OBJECTIVE BOX
Patient is a 55y old  Female who presents with a chief complaint of sob (27 Jan 2021 14:36)      Reason For Consult: uncontrolled diabetes     HPI:  Pt is a 54 y/o female w/pmhx of  DM II, HTN, RA, and MI comes w/complain of sob x 2 days and marked fatigue and fever to tmax 100.2.  pt was diagnosed w/CAP on 1/5/21 was sent home on azithro and cefopodoxime x7days.  pt completed abx, she was covid neg then and again today, she returns w/increasing sob over the last 2 days and pleuritic pain on deep breaths.  pt denies any , chills, +non productive cough +pleuritic pain no cp, palpitations, n/v/d/c no travels. feels very fatigued, barely able to put on a blouse.    CTA in ed  No pulmonary emboli visualized. Consolidation in the right lower lobe with mediastinal and right hilar lymphadenopathy which may represent infection.  (13 Jan 2021 21:31)  Endocrine History : HbA1C 9.2 , on Metformin and Oral hypoglycemic agent or oral insulin secretogogue at home   on Lispro sliding scale coverage with meals inpatient but Finger-sticks are in high 100's and low 200's     PAST MEDICAL & SURGICAL HISTORY:  Heart attack    RA (rheumatoid arthritis)    Tubal Ligation    History of Tubal Ligation    Diabetes    Hypertension    H/O heart artery stent    S/P Tubal Ligation        FAMILY HISTORY:  No pertinent family history in first degree relatives          Social History:    MEDICATIONS  (STANDING):  amLODIPine   Tablet 5 milliGRAM(s) Oral daily  aspirin enteric coated 81 milliGRAM(s) Oral daily  dextrose 40% Gel 15 Gram(s) Oral once  dextrose 5%. 1000 milliLiter(s) (50 mL/Hr) IV Continuous <Continuous>  dextrose 5%. 1000 milliLiter(s) (100 mL/Hr) IV Continuous <Continuous>  dextrose 50% Injectable 25 Gram(s) IV Push once  dextrose 50% Injectable 25 Gram(s) IV Push once  dextrose 50% Injectable 12.5 Gram(s) IV Push once  famotidine    Tablet 20 milliGRAM(s) Oral daily  glucagon  Injectable 1 milliGRAM(s) IntraMuscular once  insulin lispro (ADMELOG) corrective regimen sliding scale   SubCutaneous three times a day before meals  insulin lispro (ADMELOG) corrective regimen sliding scale   SubCutaneous at bedtime    MEDICATIONS  (PRN):  acetaminophen   Tablet .. 650 milliGRAM(s) Oral every 6 hours PRN Temp greater or equal to 38C (100.4F), Mild Pain (1 - 3)  albuterol/ipratropium for Nebulization 3 milliLiter(s) Nebulizer every 6 hours PRN Shortness of Breath and/or Wheezing  guaiFENesin   Syrup  (Sugar-Free) 200 milliGRAM(s) Oral every 6 hours PRN Cough  sodium chloride 0.65% Nasal 1 Spray(s) Both Nostrils three times a day PRN Nasal Congestion      REVIEW OF SYSTEMS:  CONSTITUTIONAL:  as per HPI  HEENT:  Eyes:  No diplopia or blurred vision. ENT:  No earache, sore throat or runny nose.  CARDIOVASCULAR:  No pressure, squeezing, strangling, tightness, heaviness or aching about the chest, neck, axilla or epigastrium.  RESPIRATORY:  No cough, shortness of breath, PND or orthopnea.  GASTROINTESTINAL:  No nausea, vomiting or diarrhea.  GENITOURINARY:  No dysuria, frequency or urgency. No Blood in urine  MUSCULOSKELETAL:  no joint aches, no muscle pain, myalgia  SKIN:  No change in skin, hair or nails.  NEUROLOGIC:  No paresthesias, fasciculations, seizures or weakness.  PSYCHIATRIC:  No disorder of thought or mood.  ENDOCRINE:  No heat or cold intolerance, polyuria or polydipsia. abnormal weight gain or loss, oral thrush  HEMATOLOGICAL:  No easy bruising or bleeding.     T(C): 37.1 (01-27-21 @ 16:31), Max: 37.2 (01-26-21 @ 23:55)  HR: 89 (01-27-21 @ 16:31) (80 - 89)  BP: 127/78 (01-27-21 @ 16:31) (123/70 - 127/80)  RR: 18 (01-27-21 @ 16:31) (18 - 18)  SpO2: 99% (01-27-21 @ 16:31) (95% - 99%)  Wt(kg): --    PHYSICAL EXAM:  GENERAL: NAD, well-groomed, well-developed  HEAD:  Atraumatic, Normocephalic  EYES: EOMI, PERRLA, conjunctiva and sclera clear  ENMT: No tonsillar erythema, exudates, or enlargement; Moist mucous membranes, Good dentition, No lesions  NECK: Supple, No JVD, Normal thyroid  NERVOUS SYSTEM:  Alert & Oriented X3, Good concentration; Motor Strength 5/5 B/L upper and lower extremities; DTRs 2+ intact and symmetric  CHEST/LUNG: Clear to percussion bilaterally; No rales, rhonchi, wheezing, or rubs  HEART: Regular rate and rhythm; No murmurs, rubs, or gallops  ABDOMEN: Soft, Nontender, Nondistended; Bowel sounds present  EXTREMITIES:  2+ Peripheral Pulses, No clubbing, cyanosis, or edema  LYMPH: No lymphadenopathy noted  SKIN: No rashes or lesions    CAPILLARY BLOOD GLUCOSE      POCT Blood Glucose.: 179 mg/dL (27 Jan 2021 16:22)  POCT Blood Glucose.: 265 mg/dL (27 Jan 2021 11:13)  POCT Blood Glucose.: 254 mg/dL (27 Jan 2021 07:24)                            9.2    9.97  )-----------( 483      ( 26 Jan 2021 08:29 )             28.0       CMP:  01-26 @ 08:29  SGPT --  Albumin --   Alk Phos --   Anion Gap 4   SGOT --   Total Bili --   BUN 10   Calcium Total 9.2   CO2 30   Chloride 99   Creatinine 0.57   eGFR if    eGFR if non    Glucose 243   Potassium 3.9   Protein --   Sodium 133      Thyroid Function Tests:      Diabetes Tests:     Parathyroids:     Adrenals:       Radiology:

## 2021-01-27 NOTE — CONSULT NOTE ADULT - PROBLEM SELECTOR RECOMMENDATION 9
add Lantus 10 units while inpatient   Patient should have strict diet control and do exercise as tolerated upon discharge   Patient can resume  home regimen upon discharge   Thank You for the courtesy of this consultation !!!

## 2021-01-27 NOTE — PROGRESS NOTE ADULT - SUBJECTIVE AND OBJECTIVE BOX
Continued non-productive cough with MONET.  CT scan with increasing right lung opacities.  H/o Covid in past. EMERGENCY ROOM ADMISSION    H/O COVID PNA    RT SIDE INFILTRATES    SHORTNESS OF BREATH    Continued non-productive cough with MONET.  CT scan with increasing right lung opacities.  H/o Covid in past.      Patient is a 55y old  Female who presents with a chief complaint of sob (19 Jan 2021 11:40)      INTERVAL HPI/OVERNIGHT EVENTS: none    MEDICATIONS  (STANDING):  amLODIPine   Tablet 5 milliGRAM(s) Oral daily  aspirin enteric coated 81 milliGRAM(s) Oral daily  dextrose 40% Gel 15 Gram(s) Oral once  dextrose 5%. 1000 milliLiter(s) (50 mL/Hr) IV Continuous <Continuous>  dextrose 5%. 1000 milliLiter(s) (100 mL/Hr) IV Continuous <Continuous>  dextrose 50% Injectable 25 Gram(s) IV Push once  dextrose 50% Injectable 25 Gram(s) IV Push once  dextrose 50% Injectable 12.5 Gram(s) IV Push once  famotidine    Tablet 20 milliGRAM(s) Oral daily  glucagon  Injectable 1 milliGRAM(s) IntraMuscular once  insulin lispro (ADMELOG) corrective regimen sliding scale   SubCutaneous three times a day before meals  insulin lispro (ADMELOG) corrective regimen sliding scale   SubCutaneous at bedtime    MEDICATIONS  (PRN):  acetaminophen   Tablet .. 650 milliGRAM(s) Oral every 6 hours PRN Temp greater or equal to 38C (100.4F), Mild Pain (1 - 3)  albuterol/ipratropium for Nebulization 3 milliLiter(s) Nebulizer every 6 hours PRN Shortness of Breath and/or Wheezing  guaiFENesin   Syrup  (Sugar-Free) 200 milliGRAM(s) Oral every 6 hours PRN Cough  sodium chloride 0.65% Nasal 1 Spray(s) Both Nostrils three times a day PRN Nasal Congestion    Allergies    ACE inhibitors (Angioedema (Severe))  lisinopril (Anaphylaxis; Angioedema)    Intolerances  Vital Signs Last 24 Hrs  T(C): 37 (27 Jan 2021 11:01), Max: 37.7 (26 Jan 2021 17:19)  T(F): 98.6 (27 Jan 2021 11:01), Max: 99.8 (26 Jan 2021 17:19)  HR: 82 (27 Jan 2021 11:01) (80 - 90)  BP: 123/70 (27 Jan 2021 11:01) (123/70 - 130/74)  BP(mean): --  RR: 18 (27 Jan 2021 11:01) (17 - 18)  SpO2: 97% (27 Jan 2021 11:01) (93% - 97%)    PHYSICAL EXAM:  GENERAL: NAD, well-groomed, well-developed  HEAD:  Atraumatic, Normocephalic  EYES: EOMI, PERRLA, conjunctiva and sclera clear  ENMT: No tonsillar erythema, exudates, or enlargement; Moist mucous membranes, Good dentition, No lesions  NECK: Supple, No JVD, Normal thyroid  NERVOUS SYSTEM:  Alert & Oriented X3, Good concentration; Motor Strength 5/5 B/L upper and lower extremities; DTRs 2+ intact and symmetric  CHEST/LUNG: mild decreased bs at right base ; No rales, rhonchi, wheezing, or rubs  HEART: Regular rate and rhythm; No murmurs, rubs, or gallops  ABDOMEN: Soft, Nontender, Nondistended; Bowel sounds present  EXTREMITIES:  2+ Peripheral Pulses, No clubbing, cyanosis, or edema  SKIN: No rashes or lesions    LABS:                                                                  9.2    9.97  )-----------( 483      ( 26 Jan 2021 08:29 )             28.0                       CAPILLARY BLOOD GLUCOSE      POCT Blood Glucose.: 265 mg/dL (27 Jan 2021 11:13)  POCT Blood Glucose.: 254 mg/dL (27 Jan 2021 07:24)  POCT Blood Glucose.: 232 mg/dL (26 Jan 2021 22:04)  POCT Blood Glucose.: 161 mg/dL (26 Jan 2021 16:27)  POCT Blood Glucose.: 227 mg/dL (26 Jan 2021 12:53)            RADIOLOGY & ADDITIONAL TESTS:  < from: CT Chest No Cont (01.23.21 @ 13:57) >    1.  New opacification of the bronchus intermedius, right middle lobe bronchus and right lower lobe bronchus with new collapse of the right middle lobe.  2.  The right lower lobe consolidation has slightly decreased, however, centrilobular opacities and additional patchy foci consolidation remain. While these findings could be secondary to infection, other etiologies can look similar. A follow-up is recommended in 6-8 weeks to evaluate for resolution/change.      < end of copied text >      Imaging Personally Reviewed:  [ X] YES  [ ] NO    Consultant(s) Notes Reviewed:  [ X] YES  [ ] NO    Care Discussed with Consultants/Other Providers [X ] YES  [ ] NO    Assessment and Plan:   · Assessment	  pt w/ 54 y/o female w/pmhx of  DM II, HTN, RA, and MI comes w/complain of sob x 2 days and recent pna worsening radiographically     Problem/Plan - 1:  ·  Problem: Pneumonia of right lower lobe due to infectious organism.  Plan: Covering for gram neg pna, Sepsis POA

## 2021-01-27 NOTE — PROGRESS NOTE ADULT - PROBLEM SELECTOR PLAN 2
tylenol prn
tylenol prn   1/25/2021 reviewed Mosak re evaluation  1/26/20212 reviewed note
tylenol prn
tylenol prn   1/25/2021 reviewed Mosak re evaluation  1/26/20212 reviewed note
tylenol prn
tylenol prn   1/25/2021 reviewed Nayely pate
tylenol prn

## 2021-01-27 NOTE — PROGRESS NOTE ADULT - PROBLEM SELECTOR PLAN 1
Covering for gram neg pna, Sepsis POA   cefepime/vanco, doxy was added  ID consult noted on board  pulm consult noted   urine legionella negative   hyponatremia improving    TTE wnl    WBC leukocytosis is improving     rheum  consult noted    1/21/2021 no fever since 1/19/2021 covid negative from 1/20/21, f/u imdium scan  1/23/2021 indium scna shows uptake right lobe . still with fever f/u ct chest per ID recommendations  may need vancomycin   QuantiFeron is negative  1/24/2021 vancomycin  started yesterday night . will monitor one more day of still febrile in am will repeat blood cx yet again  1/25/2021 per ID was started on vancomycin over weekend now it appears stopped per ID , will ask pulmonary for re evaluation   will ask thoracic for bonch  1/26/2021  per verbal d/w kerri plunkett ( thoracic ) bronch for Thursday follow up HIV testing  1/27/2021 for bronch in am . npo midnight

## 2021-01-27 NOTE — PROGRESS NOTE ADULT - ASSESSMENT
Plan for diagnostic bronchoscopy in OR tomorrow.  NPO after MN.  Will send for culture and pathology.

## 2021-01-27 NOTE — PROGRESS NOTE ADULT - SUBJECTIVE AND OBJECTIVE BOX
I AM INHERITING THIS PATIENT ON TODAY 1/20/2021    Patient is a 55y old  Female who presents with a chief complaint of sob (19 Jan 2021 11:40)      INTERVAL HPI/OVERNIGHT EVENTS: none    MEDICATIONS  (STANDING):  amLODIPine   Tablet 5 milliGRAM(s) Oral daily  aspirin enteric coated 81 milliGRAM(s) Oral daily  dextrose 40% Gel 15 Gram(s) Oral once  dextrose 5%. 1000 milliLiter(s) (50 mL/Hr) IV Continuous <Continuous>  dextrose 5%. 1000 milliLiter(s) (100 mL/Hr) IV Continuous <Continuous>  dextrose 50% Injectable 25 Gram(s) IV Push once  dextrose 50% Injectable 25 Gram(s) IV Push once  dextrose 50% Injectable 12.5 Gram(s) IV Push once  famotidine    Tablet 20 milliGRAM(s) Oral daily  glucagon  Injectable 1 milliGRAM(s) IntraMuscular once  insulin lispro (ADMELOG) corrective regimen sliding scale   SubCutaneous three times a day before meals  insulin lispro (ADMELOG) corrective regimen sliding scale   SubCutaneous at bedtime    MEDICATIONS  (PRN):  acetaminophen   Tablet .. 650 milliGRAM(s) Oral every 6 hours PRN Temp greater or equal to 38C (100.4F), Mild Pain (1 - 3)  albuterol/ipratropium for Nebulization 3 milliLiter(s) Nebulizer every 6 hours PRN Shortness of Breath and/or Wheezing  guaiFENesin   Syrup  (Sugar-Free) 200 milliGRAM(s) Oral every 6 hours PRN Cough  sodium chloride 0.65% Nasal 1 Spray(s) Both Nostrils three times a day PRN Nasal Congestion    Allergies    ACE inhibitors (Angioedema (Severe))  lisinopril (Anaphylaxis; Angioedema)    Intolerances  Vital Signs Last 24 Hrs  T(C): 37 (27 Jan 2021 11:01), Max: 37.7 (26 Jan 2021 17:19)  T(F): 98.6 (27 Jan 2021 11:01), Max: 99.8 (26 Jan 2021 17:19)  HR: 82 (27 Jan 2021 11:01) (80 - 90)  BP: 123/70 (27 Jan 2021 11:01) (123/70 - 130/74)  BP(mean): --  RR: 18 (27 Jan 2021 11:01) (17 - 18)  SpO2: 97% (27 Jan 2021 11:01) (93% - 97%)    PHYSICAL EXAM:  GENERAL: NAD, well-groomed, well-developed  HEAD:  Atraumatic, Normocephalic  EYES: EOMI, PERRLA, conjunctiva and sclera clear  ENMT: No tonsillar erythema, exudates, or enlargement; Moist mucous membranes, Good dentition, No lesions  NECK: Supple, No JVD, Normal thyroid  NERVOUS SYSTEM:  Alert & Oriented X3, Good concentration; Motor Strength 5/5 B/L upper and lower extremities; DTRs 2+ intact and symmetric  CHEST/LUNG: mild decreased bs at right base ; No rales, rhonchi, wheezing, or rubs  HEART: Regular rate and rhythm; No murmurs, rubs, or gallops  ABDOMEN: Soft, Nontender, Nondistended; Bowel sounds present  EXTREMITIES:  2+ Peripheral Pulses, No clubbing, cyanosis, or edema  SKIN: No rashes or lesions    LABS:                                                                  9.2    9.97  )-----------( 483      ( 26 Jan 2021 08:29 )             28.0                       CAPILLARY BLOOD GLUCOSE      POCT Blood Glucose.: 265 mg/dL (27 Jan 2021 11:13)  POCT Blood Glucose.: 254 mg/dL (27 Jan 2021 07:24)  POCT Blood Glucose.: 232 mg/dL (26 Jan 2021 22:04)  POCT Blood Glucose.: 161 mg/dL (26 Jan 2021 16:27)  POCT Blood Glucose.: 227 mg/dL (26 Jan 2021 12:53)            RADIOLOGY & ADDITIONAL TESTS:  < from: CT Chest No Cont (01.23.21 @ 13:57) >    1.  New opacification of the bronchus intermedius, right middle lobe bronchus and right lower lobe bronchus with new collapse of the right middle lobe.  2.  The right lower lobe consolidation has slightly decreased, however, centrilobular opacities and additional patchy foci consolidation remain. While these findings could be secondary to infection, other etiologies can look similar. A follow-up is recommended in 6-8 weeks to evaluate for resolution/change.      < end of copied text >      Imaging Personally Reviewed:  [ X] YES  [ ] NO    Consultant(s) Notes Reviewed:  [ X] YES  [ ] NO    Care Discussed with Consultants/Other Providers [X ] YES  [ ] NO

## 2021-01-27 NOTE — PROGRESS NOTE ADULT - PROBLEM SELECTOR PLAN 3
ss insulin coverage
ss insulin coverage   hgba1c 9.2
ss insulin coverage
ss insulin coverage
ss insulin coverage   hgba1c 9.2
ss insulin coverage
ss insulin coverage
ss insulin coverage   hgba1c 9.2   get endocrine consult
ss insulin coverage   hgba1c 9.2
ss insulin coverage
ss insulin coverage
ss insulin coverage   hgba1c 9.2

## 2021-01-27 NOTE — PROGRESS NOTE ADULT - ASSESSMENT
non resolving community acquired pneumonia   immunosuppressed host   101.4 on admission otherwise no fevers recorded;; actually the patient always felt hot to me and said she had chills  off antibiotics   fevers are better   can have fevers from RA but has mainly pulm signs and symptoms   qgtb  NEGATIVE    rheum follow up noted  steroids ?? patient vehemingly against steroids   ct chest noted  bronch off antibiotics  discussed with dr garcia this pm   hiv testing ; patient counselled  bronch with bx scheduled for thursday    non resolving community acquired pneumonia   immunosuppressed host   sp prolonged antibiotics; ongoing fevders   off all antibiotics x 4 days and clinically better    rheum follow up noted  steroids ?? patient vehemingly against steroids   for bronch tomorrow  hiv testing ; NEGATIVE patient notified   non resolving community acquired pneumonia   immunosuppressed host   sp prolonged antibiotics; ongoing fevders   off all antibiotics x 4 days and clinically better    rheum follow up noted  steroids ?? patient vehemingly against steroids   for bronch tomorrow  hiv testing ; NEGATIVE patient notified  procal high today .24  will repeat am

## 2021-01-27 NOTE — CONSULT NOTE ADULT - PROBLEM SELECTOR PROBLEM 1
Essential hypertension
Type 2 diabetes mellitus with hyperglycemia, without long-term current use of insulin
2019 novel coronavirus disease (COVID-19)

## 2021-01-27 NOTE — PROGRESS NOTE ADULT - SUBJECTIVE AND OBJECTIVE BOX
HPI:  Pt is a 54 y/o female w/pmhx of  DM II, HTN, RA, and MI comes w/complain of sob x 2 days and marked fatigue and fever to tmax 100.2.  pt was diagnosed w/CAP on 1/5/21 was sent home on azithro and cefopodoxime x7days.  pt completed abx, she was covid neg then and again today, she returns w/increasing sob over the last 2 days and pleuritic pain on deep breaths.  pt denies any , chills, +non productive cough +pleuritic pain no cp, palpitations, n/v/d/c no travels. feels very fatigued, barely able to put on a blouse.    CTA in ed  No pulmonary emboli visualized. Consolidation in the right lower lobe with mediastinal and right hilar lymphadenopathy which may represent infection.  (13 Jan 2021 21:31)      Allergies    ACE inhibitors (Angioedema (Severe))  lisinopril (Anaphylaxis; Angioedema)    Intolerances        MEDICATIONS  (STANDING):  amLODIPine   Tablet 5 milliGRAM(s) Oral daily  aspirin enteric coated 81 milliGRAM(s) Oral daily  dextrose 40% Gel 15 Gram(s) Oral once  dextrose 5%. 1000 milliLiter(s) (50 mL/Hr) IV Continuous <Continuous>  dextrose 5%. 1000 milliLiter(s) (100 mL/Hr) IV Continuous <Continuous>  dextrose 50% Injectable 25 Gram(s) IV Push once  dextrose 50% Injectable 25 Gram(s) IV Push once  dextrose 50% Injectable 12.5 Gram(s) IV Push once  famotidine    Tablet 20 milliGRAM(s) Oral daily  glucagon  Injectable 1 milliGRAM(s) IntraMuscular once  insulin lispro (ADMELOG) corrective regimen sliding scale   SubCutaneous three times a day before meals  insulin lispro (ADMELOG) corrective regimen sliding scale   SubCutaneous at bedtime    MEDICATIONS  (PRN):  acetaminophen   Tablet .. 650 milliGRAM(s) Oral every 6 hours PRN Temp greater or equal to 38C (100.4F), Mild Pain (1 - 3)  albuterol/ipratropium for Nebulization 3 milliLiter(s) Nebulizer every 6 hours PRN Shortness of Breath and/or Wheezing  guaiFENesin   Syrup  (Sugar-Free) 200 milliGRAM(s) Oral every 6 hours PRN Cough  sodium chloride 0.65% Nasal 1 Spray(s) Both Nostrils three times a day PRN Nasal Congestion      REVIEW OF SYSTEMS:    CONSTITUTIONAL: No fever, chills, weight loss, or fatigue  HEENT: No sore throat, runny nose, ear ache  RESPIRATORY: No cough, wheezing, No shortness of breath  CARDIOVASCULAR: No chest pain, palpitations, dizziness  GASTROINTESTINAL: No abdominal pain. No nausea, vomiting, diarrhea  GENITOURINARY: No dysuria, increase frequency, hematuria, or incontinence  NEUROLOGICAL: No headaches, memory loss, loss of strength, numbness, or tremors, no weakness  EXTREMITY: No pedal edema BLE  SKIN: No itching, burning, rashes, or lesions     VITAL SIGNS:  T(C): 37 (01-27-21 @ 11:01), Max: 37.7 (01-26-21 @ 17:19)  T(F): 98.6 (01-27-21 @ 11:01), Max: 99.8 (01-26-21 @ 17:19)  HR: 82 (01-27-21 @ 11:01) (80 - 90)  BP: 123/70 (01-27-21 @ 11:01) (123/70 - 130/74)  RR: 18 (01-27-21 @ 11:01) (17 - 18)  SpO2: 97% (01-27-21 @ 11:01) (93% - 97%)  Wt(kg): --    PHYSICAL EXAM:    GENERAL: not in any distress  HEENT: Neck is supple, normocephalic, atraumatic   CHEST/LUNG: Clear to auscultation bilaterally; No rales, rhonchi, wheezing  HEART: Regular rate and rhythm; No murmurs, rubs, or gallops  ABDOMEN: Soft, Nontender, Nondistended; Bowel sounds present, no rebound   EXTREMITIES:  2+ Peripheral Pulses, No clubbing, cyanosis, or edema  GENITOURINARY:   SKIN: No rashes or lesions  BACK: no pressor sore   NERVOUS SYSTEM:  Alert & Oriented X3, Good concentration  PSYCH: normal affect     LABS:                         9.2    9.97  )-----------( 483      ( 26 Jan 2021 08:29 )             28.0     01-26    133<L>  |  99  |  10  ----------------------------<  243<H>  3.9   |  30  |  0.57    Ca    9.2      26 Jan 2021 08:29  Phos  3.1     01-26  Mg     1.9     01-26        PT/INR - ( 26 Jan 2021 08:29 )   PT: 15.1 sec;   INR: 1.32 ratio         PTT - ( 26 Jan 2021 08:29 )  PTT:30.6 sec                  Vancomycin Level, Trough: 8.2 ug/mL (01-25 @ 08:14)                      Radiology:       HPI:  Pt is a 56 y/o female w/pmhx of  DM II, HTN, RA, and MI comes w/complain of sob x 2 days and marked fatigue and fever to tmax 100.2.  pt was diagnosed w/CAP on 1/5/21 was sent home on azithro and cefopodoxime x7days.  pt completed abx, she was covid neg then and again today, she returns w/increasing sob over the last 2 days and pleuritic pain on deep breaths.  pt denies any , chills, +non productive cough +pleuritic pain no cp, palpitations, n/v/d/c no travels. feels very fatigued, barely able to put on a blouse.    CTA in ed  No pulmonary emboli visualized. Consolidation in the right lower lobe with mediastinal and right hilar lymphadenopathy which may represent infection.  (13 Jan 2021 21:31)  all events noted   has been afebrile since antibiotics dcd     Allergies    ACE inhibitors (Angioedema (Severe))  lisinopril (Anaphylaxis; Angioedema)    Intolerances        MEDICATIONS  (STANDING):  amLODIPine   Tablet 5 milliGRAM(s) Oral daily  aspirin enteric coated 81 milliGRAM(s) Oral daily  dextrose 40% Gel 15 Gram(s) Oral once  dextrose 5%. 1000 milliLiter(s) (50 mL/Hr) IV Continuous <Continuous>  dextrose 5%. 1000 milliLiter(s) (100 mL/Hr) IV Continuous <Continuous>  dextrose 50% Injectable 25 Gram(s) IV Push once  dextrose 50% Injectable 25 Gram(s) IV Push once  dextrose 50% Injectable 12.5 Gram(s) IV Push once  famotidine    Tablet 20 milliGRAM(s) Oral daily  glucagon  Injectable 1 milliGRAM(s) IntraMuscular once  insulin lispro (ADMELOG) corrective regimen sliding scale   SubCutaneous three times a day before meals  insulin lispro (ADMELOG) corrective regimen sliding scale   SubCutaneous at bedtime    MEDICATIONS  (PRN):  acetaminophen   Tablet .. 650 milliGRAM(s) Oral every 6 hours PRN Temp greater or equal to 38C (100.4F), Mild Pain (1 - 3)  albuterol/ipratropium for Nebulization 3 milliLiter(s) Nebulizer every 6 hours PRN Shortness of Breath and/or Wheezing  guaiFENesin   Syrup  (Sugar-Free) 200 milliGRAM(s) Oral every 6 hours PRN Cough  sodium chloride 0.65% Nasal 1 Spray(s) Both Nostrils three times a day PRN Nasal Congestion      REVIEW OF SYSTEMS:  feels fine   minimal cough  CONSTITUTIONAL: No fever, chills, weight loss, or fatigue  HEENT: No sore throat, runny nose, ear ache  RESPIRATORY: No cough, wheezing, No shortness of breath  CARDIOVASCULAR: No chest pain, palpitations, dizziness  GASTROINTESTINAL: No abdominal pain. No nausea, vomiting, diarrhea  GENITOURINARY: No dysuria, increase frequency, hematuria, or incontinence  NEUROLOGICAL: No headaches, memory loss, loss of strength, numbness, or tremors, no weakness  EXTREMITY: No pedal edema BLE  SKIN: No itching, burning, rashes, or lesions     VITAL SIGNS:  T(C): 37 (01-27-21 @ 11:01), Max: 37.7 (01-26-21 @ 17:19)  T(F): 98.6 (01-27-21 @ 11:01), Max: 99.8 (01-26-21 @ 17:19)  HR: 82 (01-27-21 @ 11:01) (80 - 90)  BP: 123/70 (01-27-21 @ 11:01) (123/70 - 130/74)  RR: 18 (01-27-21 @ 11:01) (17 - 18)  SpO2: 97% (01-27-21 @ 11:01) (93% - 97%)  Wt(kg): --    PHYSICAL EXAM:    GENERAL: not in any distress  HEENT: Neck is supple, normocephalic, atraumatic   CHEST/LUNG: Clear to auscultation bilaterally; No rales, rhonchi, wheezing  both lungs clear today   HEART: Regular rate and rhythm; No murmurs, rubs, or gallops  ABDOMEN: Soft, Nontender, Nondistended; Bowel sounds present, no rebound   EXTREMITIES:  2+ Peripheral Pulses, No clubbing, cyanosis, or edema  SKIN: No rashes or lesions  BACK: no pressor sore   NERVOUS SYSTEM:  Alert & Oriented X3, Good concentration  PSYCH: normal affect     LABS:                         9.2    9.97  )-----------( 483      ( 26 Jan 2021 08:29 )             28.0     01-26    133<L>  |  99  |  10  ----------------------------<  243<H>  3.9   |  30  |  0.57    Ca    9.2      26 Jan 2021 08:29  Phos  3.1     01-26  Mg     1.9     01-26        PT/INR - ( 26 Jan 2021 08:29 )   PT: 15.1 sec;   INR: 1.32 ratio         PTT - ( 26 Jan 2021 08:29 )  PTT:30.6 sec                  Vancomycin Level, Trough: 8.2 ug/mL (01-25 @ 08:14)                      Radiology:    < from: Xray Chest 2 Views PA/Lat (01.27.21 @ 16:09) >  EXAM:  XR CHEST PA LAT 2V                            PROCEDURE DATE:  01/27/2021          INTERPRETATION:  PA and lateral chest on January 27, 2021 at 4:04 PM. Patient has fever.    Heart size is within normal limits.    Fairly pronounced right lower lobe infiltrate again noted. There is somewhat better aeration compared to January 23.    IMPRESSION: There is some improvement in the dense right lower lobe infiltrate.            ROSALIND MOODY MD; Attending Radiologist  This document has been electronically signed. Jan 27 2021  4:15PM    < end of copied text >

## 2021-01-28 ENCOUNTER — RESULT REVIEW (OUTPATIENT)
Age: 56
End: 2021-01-28

## 2021-01-28 LAB
GLUCOSE BLDC GLUCOMTR-MCNC: 155 MG/DL — HIGH (ref 70–99)
GLUCOSE BLDC GLUCOMTR-MCNC: 179 MG/DL — HIGH (ref 70–99)
GLUCOSE BLDC GLUCOMTR-MCNC: 190 MG/DL — HIGH (ref 70–99)
GLUCOSE BLDC GLUCOMTR-MCNC: 258 MG/DL — HIGH (ref 70–99)
GLUCOSE BLDC GLUCOMTR-MCNC: 398 MG/DL — HIGH (ref 70–99)
GLUCOSE BLDC GLUCOMTR-MCNC: 493 MG/DL — CRITICAL HIGH (ref 70–99)
GLUCOSE BLDC GLUCOMTR-MCNC: 524 MG/DL — CRITICAL HIGH (ref 70–99)
PROCALCITONIN SERPL-MCNC: 0.11 NG/ML — HIGH (ref 0.02–0.1)

## 2021-01-28 PROCEDURE — 71260 CT THORAX DX C+: CPT | Mod: 26

## 2021-01-28 PROCEDURE — 88305 TISSUE EXAM BY PATHOLOGIST: CPT | Mod: 26

## 2021-01-28 PROCEDURE — 71045 X-RAY EXAM CHEST 1 VIEW: CPT | Mod: 26

## 2021-01-28 PROCEDURE — 99233 SBSQ HOSP IP/OBS HIGH 50: CPT

## 2021-01-28 RX ORDER — FENTANYL CITRATE 50 UG/ML
25 INJECTION INTRAVENOUS
Refills: 0 | Status: DISCONTINUED | OUTPATIENT
Start: 2021-01-28 | End: 2021-01-28

## 2021-01-28 RX ORDER — SODIUM CHLORIDE 9 MG/ML
1000 INJECTION, SOLUTION INTRAVENOUS
Refills: 0 | Status: DISCONTINUED | OUTPATIENT
Start: 2021-01-28 | End: 2021-01-28

## 2021-01-28 RX ADMIN — SODIUM CHLORIDE 75 MILLILITER(S): 9 INJECTION, SOLUTION INTRAVENOUS at 16:07

## 2021-01-28 RX ADMIN — Medication 6: at 08:22

## 2021-01-28 RX ADMIN — Medication 8: at 21:31

## 2021-01-28 RX ADMIN — AMLODIPINE BESYLATE 5 MILLIGRAM(S): 2.5 TABLET ORAL at 05:36

## 2021-01-28 NOTE — PROGRESS NOTE ADULT - ASSESSMENT
Patient is being seen for non resolving community acquired pneumonia   immunosuppressed host   sp prolonged antibiotics    Afebrile  No leukocytosis    procal 0.11    QuantiFeron negative  HIV: NR    CXR: There is some improvement in the dense right lower lobe infiltrate.    Off Abx   For bronch today  Thoracic sx following

## 2021-01-28 NOTE — BRIEF OPERATIVE NOTE - NSICDXBRIEFPROCEDURE_GEN_ALL_CORE_FT
PROCEDURES:  Bronchoscopy with bronchoalveolar lavage and brush biopsy of right lung 28-Jan-2021 15:45:22  Miladys Solano

## 2021-01-28 NOTE — PROGRESS NOTE ADULT - ASSESSMENT
56 y/o female w/pmhx of  DM II, HTN, RA, and MI comes w/complain of sob x 2 days and recent pna worsening radiographically    Problem/Plan - 1:  ·  Problem: Pneumonia of right lower lobe due to infectious organism.  Plan: Covering for gram neg pna, Sepsis POA   cefepime/vanco, doxy was added  ID consult noted on board  pulm consult noted   urine legionella negative   hyponatremia improving  TTE wnl  WBC leukocytosis is improving    rheum  consult noted    1/21/2021 no fever since 1/19/2021 covid negative from 1/20/21, f/u imdium scan  1/23/2021 indium scna shows uptake right lobe . still with fever f/u ct chest per ID recommendations  may need vancomycin   QuantiFeron is negative  1/24/2021 vancomycin  started yesterday night . will monitor one more day of still febrile in am will repeat blood cx yet again  1/25/2021 per ID was started on vancomycin over weekend now it appears stopped per ID , will ask pulmonary for re evaluation   will ask thoracic for bonch  1/26/2021  per verbal d/w charmaineDre plunkett ( thoracic ) bronch for Thursday follow up HIV testing  1/27/2021 for bronch in am . npo midnight.   1/28/2021 - s/p successful bronchoscopy with findings significant for mucopurulent secretions.  cultures are pending.     Problem/Plan - 2:  ·  Problem: Rheumatoid arthritis, involving unspecified site, unspecified whether rheumatoid factor present.  Plan: tylenol prn   1/25/2021 reviewed Mosak re evaluation  1/26/20212 reviewed note.   1/28/2021 - stable at this time no acute interventions    Problem/Plan - 3:  ·  Problem: Type 2 diabetes mellitus with hyperglycemia, without long-term current use of insulin.  Plan: ss insulin coverage   hgba1c 9.2   get endocrine consult.   - 1/28/2021 aim for glucose control to < 200s while inpatient.  Should improve while off antibiotics     Problem/Plan - 4:  ·  Problem: Essential hypertension.  Plan: cont amlodipine.     Problem/Plan - 5:  ·  Problem: Preventive measure.  Plan: sq lovenox.     Problem/Plan - 6:  Problem: Hypokalemia. Plan: resolved.

## 2021-01-28 NOTE — PROGRESS NOTE ADULT - SUBJECTIVE AND OBJECTIVE BOX
Patient is a 55y old  Female who presents with a chief complaint of sob (28 Jan 2021 20:25)    HPI:  Pt is a 56 y/o female w/pmhx of  DM II, HTN, RA, and MI comes w/complain of sob x 2 days and marked fatigue and fever to tmax 100.2.  pt was diagnosed w/CAP on 1/5/21 was sent home on azithro and cefopodoxime x7days.  pt completed abx, she was covid neg then and again today, she returns w/increasing sob over the last 2 days and pleuritic pain on deep breaths.  pt denies any , chills, +non productive cough +pleuritic pain no cp, palpitations, n/v/d/c no travels. feels very fatigued, barely able to put on a blouse.    CTA in ed  No pulmonary emboli visualized. Consolidation in the right lower lobe with mediastinal and right hilar lymphadenopathy which may represent infection.  (13 Jan 2021 21:31)    SUBJECTIVE & OBJECTIVE: Pt seen and examined at bedside. She is for bronchoscopy today  PHYSICAL EXAM:   Vital Signs Last 24 Hrs  T(C): 36.7 (28 Jan 2021 17:15), Max: 36.9 (27 Jan 2021 23:18)  T(F): 98.1 (28 Jan 2021 17:15), Max: 98.5 (27 Jan 2021 23:18)  HR: 88 (28 Jan 2021 17:15) (79 - 93)  BP: 133/82 (28 Jan 2021 17:15) (107/70 - 133/82)  BP(mean): --  ABP: --  ABP(mean): --  RR: 18 (28 Jan 2021 17:15) (14 - 20)  SpO2: 98% (28 Jan 2021 17:15) (95% - 98%)  Daily Height in cm: 172.72 (28 Jan 2021 02:50)    Daily I&O's Detail    27 Jan 2021 07:01  -  28 Jan 2021 07:00  --------------------------------------------------------  IN:  Total IN: 0 mL    OUT:    Voided (mL): 1 mL  Total OUT: 1 mL    Total NET: -1 mL      28 Jan 2021 07:01  -  28 Jan 2021 20:50  --------------------------------------------------------  IN:    Lactated Ringers: 30 mL  Total IN: 30 mL    OUT:  Total OUT: 0 mL    Total NET: 30 mL        GENERAL: NAD, well-groomed, well-developed  HEAD:  Atraumatic, Normocephalic  EYES: EOMI, PERRLA, conjunctiva and sclera clear  ENMT: Moist mucous membranes  NECK: Supple, No JVD  NERVOUS SYSTEM:  Alert & Oriented X3, Motor Strength 5/5 B/L upper and lower extremities; DTRs 2+ intact and symmetric  CHEST/LUNG: poor air entry to right lung base  HEART: Regular rate and rhythm; No murmurs, rubs, or gallops  ABDOMEN: Soft, Nontender, Nondistended; Bowel sounds present  EXTREMITIES:  2+ Peripheral Pulses, No clubbing, cyanosis, or edema  LABS:  CAPILLARY BLOOD GLUCOSE      POCT Blood Glucose.: 190 mg/dL (28 Jan 2021 17:02)  POCT Blood Glucose.: 179 mg/dL (28 Jan 2021 16:18)  POCT Blood Glucose.: 155 mg/dL (28 Jan 2021 11:05)  POCT Blood Glucose.: 258 mg/dL (28 Jan 2021 08:19)  POCT Blood Glucose.: 238 mg/dL (27 Jan 2021 22:55)    RECENT CULTURES:  RADIOLOGY & ADDITIONAL TESTS:

## 2021-01-28 NOTE — PROGRESS NOTE ADULT - SUBJECTIVE AND OBJECTIVE BOX
Patient is a 55y old  Female who presents with a chief complaint of sob (28 Jan 2021 20:25)    HPI:  Pt is a 56 y/o female w/pmhx of  DM II, HTN, RA, and MI comes w/complain of sob x 2 days and marked fatigue and fever to tmax 100.2.  pt was diagnosed w/CAP on 1/5/21 was sent home on azithro and cefopodoxime x7days.  pt completed abx, she was covid neg then and again today, she returns w/increasing sob over the last 2 days and pleuritic pain on deep breaths.  pt denies any , chills, +non productive cough +pleuritic pain no cp, palpitations, n/v/d/c no travels. feels very fatigued, barely able to put on a blouse.    CTA in ed  No pulmonary emboli visualized. Consolidation in the right lower lobe with mediastinal and right hilar lymphadenopathy which may represent infection.  (13 Jan 2021 21:31)    SUBJECTIVE & OBJECTIVE: Pt seen and examined at bedside. She is for bronchoscopy today  PHYSICAL EXAM:   Vital Signs Last 24 Hrs  T(C): 36.7 (28 Jan 2021 17:15), Max: 36.9 (27 Jan 2021 23:18)  T(F): 98.1 (28 Jan 2021 17:15), Max: 98.5 (27 Jan 2021 23:18)  HR: 88 (28 Jan 2021 17:15) (79 - 93)  BP: 133/82 (28 Jan 2021 17:15) (107/70 - 133/82)  BP(mean): --  ABP: --  ABP(mean): --  RR: 18 (28 Jan 2021 17:15) (14 - 20)  SpO2: 98% (28 Jan 2021 17:15) (95% - 98%)  Daily Height in cm: 172.72 (28 Jan 2021 02:50)    Daily I&O's Detail    27 Jan 2021 07:01  -  28 Jan 2021 07:00  --------------------------------------------------------  IN:  Total IN: 0 mL    OUT:    Voided (mL): 1 mL  Total OUT: 1 mL    Total NET: -1 mL      28 Jan 2021 07:01  -  28 Jan 2021 20:50  --------------------------------------------------------  IN:    Lactated Ringers: 30 mL  Total IN: 30 mL    OUT:  Total OUT: 0 mL    Total NET: 30 mL        GENERAL: NAD, well-groomed, well-developed  HEAD:  Atraumatic, Normocephalic  EYES: EOMI, PERRLA, conjunctiva and sclera clear  ENMT: Moist mucous membranes  NECK: Supple, No JVD  NERVOUS SYSTEM:  Alert & Oriented X3, Motor Strength 5/5 B/L upper and lower extremities; DTRs 2+ intact and symmetric  CHEST/LUNG: poor air entry to right lung base  HEART: Regular rate and rhythm; No murmurs, rubs, or gallops  ABDOMEN: Soft, Nontender, Nondistended; Bowel sounds present  EXTREMITIES:  2+ Peripheral Pulses, No clubbing, cyanosis, or edema  LABS:  CAPILLARY BLOOD GLUCOSE      POCT Blood Glucose.: 190 mg/dL (28 Jan 2021 17:02)  POCT Blood Glucose.: 179 mg/dL (28 Jan 2021 16:18)  POCT Blood Glucose.: 155 mg/dL (28 Jan 2021 11:05)  POCT Blood Glucose.: 258 mg/dL (28 Jan 2021 08:19)  POCT Blood Glucose.: 238 mg/dL (27 Jan 2021 22:55)    RECENT CULTURES:  RADIOLOGY & ADDITIONAL TESTS:      Assessment and Plan:   · Assessment	   56 y/o female w/pmhx of  DM II, HTN, RA, and MI comes w/complain of sob x 2 days and recent pna worsening radiographically    PLAN    BRONCHOSCOPY TODAY   Patient is a 55y old  Female who presents with a chief complaint of sob     HPI:  Pt is a 56 y/o female w/pmhx of  DM II, HTN, RA, and MI comes w/complain of sob x 2 days and marked fatigue and fever to tmax 100.2.  pt was diagnosed w/CAP on 1/5/21 was sent home on azithro and cefopodoxime x7days.  pt completed abx, she was covid neg then and again today, she returns w/increasing sob over the last 2 days and pleuritic pain on deep breaths.  pt denies any , chills, +non productive cough +pleuritic pain no cp, palpitations, n/v/d/c no travels. feels very fatigued, barely able to put on a blouse.    CTA in ed  No pulmonary emboli visualized. Consolidation in the right lower lobe with mediastinal and right hilar lymphadenopathy which may represent infection.  (13 Jan 2021 21:31)    SUBJECTIVE & OBJECTIVE: Pt seen and examined at bedside. She is for bronchoscopy today  PHYSICAL EXAM:   Vital Signs Last 24 Hrs  T(C): 36.7 (28 Jan 2021 17:15), Max: 36.9 (27 Jan 2021 23:18)  T(F): 98.1 (28 Jan 2021 17:15), Max: 98.5 (27 Jan 2021 23:18)  HR: 88 (28 Jan 2021 17:15) (79 - 93)  BP: 133/82 (28 Jan 2021 17:15) (107/70 - 133/82)  BP(mean): --  ABP: --  ABP(mean): --  RR: 18 (28 Jan 2021 17:15) (14 - 20)  SpO2: 98% (28 Jan 2021 17:15) (95% - 98%)  Daily Height in cm: 172.72 (28 Jan 2021 02:50)    Daily I&O's Detail    27 Jan 2021 07:01  -  28 Jan 2021 07:00  --------------------------------------------------------  IN:  Total IN: 0 mL    OUT:    Voided (mL): 1 mL  Total OUT: 1 mL    Total NET: -1 mL      28 Jan 2021 07:01  -  28 Jan 2021 20:50  --------------------------------------------------------  IN:    Lactated Ringers: 30 mL  Total IN: 30 mL    OUT:  Total OUT: 0 mL    Total NET: 30 mL        GENERAL: NAD, well-groomed, well-developed  HEAD:  Atraumatic, Normocephalic  EYES: EOMI, PERRLA, conjunctiva and sclera clear  ENMT: Moist mucous membranes  NECK: Supple, No JVD  NERVOUS SYSTEM:  Alert & Oriented X3, Motor Strength 5/5 B/L upper and lower extremities; DTRs 2+ intact and symmetric  CHEST/LUNG: poor air entry to right lung base  HEART: Regular rate and rhythm; No murmurs, rubs, or gallops  ABDOMEN: Soft, Nontender, Nondistended; Bowel sounds present  EXTREMITIES:  2+ Peripheral Pulses, No clubbing, cyanosis, or edema  LABS:  CAPILLARY BLOOD GLUCOSE      POCT Blood Glucose.: 190 mg/dL (28 Jan 2021 17:02)  POCT Blood Glucose.: 179 mg/dL (28 Jan 2021 16:18)  POCT Blood Glucose.: 155 mg/dL (28 Jan 2021 11:05)  POCT Blood Glucose.: 258 mg/dL (28 Jan 2021 08:19)  POCT Blood Glucose.: 238 mg/dL (27 Jan 2021 22:55)    RECENT CULTURES:  RADIOLOGY & ADDITIONAL TESTS:      Assessment and Plan:   · Assessment	   56 y/o female w/pmhx of  DM II, HTN, RA, and MI comes w/complain of sob x 2 days and recent pna worsening radiographically    PLAN    BRONCHOSCOPY TODAY

## 2021-01-28 NOTE — BRIEF OPERATIVE NOTE - OPERATION/FINDINGS
Mucopurulent secretions obstructing right mainstem bronchus.  No endobronchial lesion.  Patient with abnormal bronchial anatomy with presence of post-epiarterial right bronchus.  ?compression of superior segmental branch and RML from intermittent pneumonia

## 2021-01-28 NOTE — PROGRESS NOTE ADULT - PROBLEM SELECTOR PLAN 1
cardiac wise better   Continue with the same regimen while inpatient   can be discharged off insulin   needs tight control of blood glucose as she had a cardiac event   Patient should have strict diet control and do exercise as tolerated upon discharge

## 2021-01-28 NOTE — PROGRESS NOTE ADULT - SUBJECTIVE AND OBJECTIVE BOX
Patient is a 55y old  Female who presents with a chief complaint of sob (28 Jan 2021 15:40)      Interval History: finger sticks are now < 200 , as Lantus 10 units was added     MEDICATIONS  (STANDING):  amLODIPine   Tablet 5 milliGRAM(s) Oral daily  aspirin enteric coated 81 milliGRAM(s) Oral daily  dextrose 40% Gel 15 Gram(s) Oral once  dextrose 5%. 1000 milliLiter(s) (50 mL/Hr) IV Continuous <Continuous>  dextrose 5%. 1000 milliLiter(s) (100 mL/Hr) IV Continuous <Continuous>  dextrose 50% Injectable 25 Gram(s) IV Push once  dextrose 50% Injectable 25 Gram(s) IV Push once  dextrose 50% Injectable 12.5 Gram(s) IV Push once  famotidine    Tablet 20 milliGRAM(s) Oral daily  glucagon  Injectable 1 milliGRAM(s) IntraMuscular once  insulin glargine Injectable (LANTUS) 10 Unit(s) SubCutaneous every morning  insulin lispro (ADMELOG) corrective regimen sliding scale   SubCutaneous three times a day before meals  insulin lispro (ADMELOG) corrective regimen sliding scale   SubCutaneous at bedtime    MEDICATIONS  (PRN):  acetaminophen   Tablet .. 650 milliGRAM(s) Oral every 6 hours PRN Temp greater or equal to 38C (100.4F), Mild Pain (1 - 3)  albuterol/ipratropium for Nebulization 3 milliLiter(s) Nebulizer every 6 hours PRN Shortness of Breath and/or Wheezing  guaiFENesin   Syrup  (Sugar-Free) 200 milliGRAM(s) Oral every 6 hours PRN Cough  sodium chloride 0.65% Nasal 1 Spray(s) Both Nostrils three times a day PRN Nasal Congestion      Allergies    ACE inhibitors (Angioedema (Severe))  lisinopril (Anaphylaxis; Angioedema)    Intolerances        REVIEW OF SYSTEMS:  CONSTITUTIONAL: no changes  EYES: No eye pain, visual disturbances, or discharge  ENMT:  No difficulty hearing, No sinus or throat pain  NECK: No pain or stiffness  RESPIRATORY: No cough, wheezing, chills or hemoptysis; No shortness of breath  CARDIOVASCULAR: No chest pain, palpitations or leg swelling  GASTROINTESTINAL: No abdominal or epigastric pain. No nausea, vomiting, or hematemesis; No diarrhea or constipation. No melena or hematochezia.  GENITOURINARY: No dysuria, frequency, hematuria, or incontinence  NEUROLOGICAL: No headaches, memory loss, loss of strength, numbness, or tremors  SKIN: No itching, burning, rashes, or lesions   ENDOCRINE: No heat or cold intolerance; No hair loss  MUSCULOSKELETAL: No joint pain or swelling; No muscle, back, or extremity pain  PSYCHIATRIC: No depression, anxiety, mood swings, or difficulty sleeping  HEME/LYMPH: No easy bruising, or bleeding gums  ALLERY AND IMMUNOLOGIC: No hives or eczema    Vital Signs Last 24 Hrs  T(C): 36.7 (28 Jan 2021 17:15), Max: 36.9 (27 Jan 2021 23:18)  T(F): 98.1 (28 Jan 2021 17:15), Max: 98.5 (27 Jan 2021 23:18)  HR: 88 (28 Jan 2021 17:15) (79 - 93)  BP: 133/82 (28 Jan 2021 17:15) (107/70 - 133/82)  BP(mean): --  RR: 18 (28 Jan 2021 17:15) (14 - 20)  SpO2: 98% (28 Jan 2021 17:15) (95% - 98%)    PHYSICAL EXAM:  GENERAL:   HEAD: Atraumatic, Normocephalic  EYES: PERRLA, conjunctiva and sclera clear  ENMT: No tonsillar erythema, exudates, or enlargement; Moist mucous membranes, Good dentition, No lesions  NECK: Supple, No JVD, Normal thyroid  NERVOUS SYSTEM:  Alert & Oriented X3, Good concentration; Motor Strength 5/5 B/L upper and lower extremities  CHEST/LUNG: Clear to auscultaion bilaterally; No rales, rhonchi, wheezing, or rubs  HEART: Regular rate and rhythm; No murmurs, rubs, or gallops  ABDOMEN: Soft, Nontender, Nondistended; Bowel sounds present  EXTREMITIES:  2+ Peripheral Pulses, no edema  SKIN: No rashes or lesions    LABS:        CAPILLARY BLOOD GLUCOSE      POCT Blood Glucose.: 190 mg/dL (28 Jan 2021 17:02)  POCT Blood Glucose.: 179 mg/dL (28 Jan 2021 16:18)  POCT Blood Glucose.: 155 mg/dL (28 Jan 2021 11:05)  POCT Blood Glucose.: 258 mg/dL (28 Jan 2021 08:19)  POCT Blood Glucose.: 238 mg/dL (27 Jan 2021 22:55)    Lipid panel:           Thyroid:  Diabetes Tests:  Parathyroid Panel:  Adrenals:  RADIOLOGY & ADDITIONAL TESTS:    Imaging Personally Reviewed:  [ ] YES  [ ] NO    Consultant(s) Notes Reviewed:  [ ] YES  [ ] NO    Care Discussed with Consultants/Other Providers [ ] YES  [ ] NO

## 2021-01-28 NOTE — PROGRESS NOTE ADULT - SUBJECTIVE AND OBJECTIVE BOX
HPI:  Pt is a 56 y/o female w/pmhx of  DM II, HTN, RA, and MI comes w/complain of sob x 2 days and marked fatigue and fever to tmax 100.2.  pt was diagnosed w/CAP on 1/5/21 was sent home on azithro and cefopodoxime x7days.  pt completed abx, she was covid neg then and again today, she returns w/increasing sob over the last 2 days and pleuritic pain on deep breaths.  pt denies any , chills, +non productive cough +pleuritic pain no cp, palpitations, n/v/d/c no travels. feels very fatigued, barely able to put on a blouse.    CTA in ed  No pulmonary emboli visualized. Consolidation in the right lower lobe with mediastinal and right hilar lymphadenopathy which may represent infection.  (13 Jan 2021 21:31)      Allergies    ACE inhibitors (Angioedema (Severe))  lisinopril (Anaphylaxis; Angioedema)    Intolerances        MEDICATIONS  (STANDING):  amLODIPine   Tablet 5 milliGRAM(s) Oral daily  aspirin enteric coated 81 milliGRAM(s) Oral daily  dextrose 40% Gel 15 Gram(s) Oral once  dextrose 5%. 1000 milliLiter(s) (50 mL/Hr) IV Continuous <Continuous>  dextrose 5%. 1000 milliLiter(s) (100 mL/Hr) IV Continuous <Continuous>  dextrose 50% Injectable 25 Gram(s) IV Push once  dextrose 50% Injectable 25 Gram(s) IV Push once  dextrose 50% Injectable 12.5 Gram(s) IV Push once  famotidine    Tablet 20 milliGRAM(s) Oral daily  glucagon  Injectable 1 milliGRAM(s) IntraMuscular once  insulin glargine Injectable (LANTUS) 10 Unit(s) SubCutaneous every morning  insulin lispro (ADMELOG) corrective regimen sliding scale   SubCutaneous three times a day before meals  insulin lispro (ADMELOG) corrective regimen sliding scale   SubCutaneous at bedtime    MEDICATIONS  (PRN):  acetaminophen   Tablet .. 650 milliGRAM(s) Oral every 6 hours PRN Temp greater or equal to 38C (100.4F), Mild Pain (1 - 3)  albuterol/ipratropium for Nebulization 3 milliLiter(s) Nebulizer every 6 hours PRN Shortness of Breath and/or Wheezing  guaiFENesin   Syrup  (Sugar-Free) 200 milliGRAM(s) Oral every 6 hours PRN Cough  sodium chloride 0.65% Nasal 1 Spray(s) Both Nostrils three times a day PRN Nasal Congestion      REVIEW OF SYSTEMS:    CONSTITUTIONAL: No fever, chills, weight loss, or fatigue  HEENT: No sore throat, runny nose, ear ache  RESPIRATORY: No cough, wheezing, No shortness of breath  CARDIOVASCULAR: No chest pain, palpitations, dizziness  GASTROINTESTINAL: No abdominal pain. No nausea, vomiting, diarrhea  GENITOURINARY: No dysuria, increase frequency, hematuria, or incontinence  NEUROLOGICAL: No headaches, memory loss, loss of strength, numbness, or tremors, no weakness  EXTREMITY: No pedal edema BLE  SKIN: No itching, burning, rashes, or lesions     VITAL SIGNS:  T(C): 36.8 (01-28-21 @ 11:44), Max: 37.1 (01-27-21 @ 16:31)  T(F): 98.3 (01-28-21 @ 11:44), Max: 98.7 (01-27-21 @ 16:31)  HR: 92 (01-28-21 @ 11:44) (79 - 93)  BP: 107/70 (01-28-21 @ 11:44) (107/70 - 131/75)  RR: 16 (01-28-21 @ 11:44) (16 - 18)  SpO2: 95% (01-28-21 @ 11:44) (95% - 99%)  Wt(kg): --    PHYSICAL EXAM:    GENERAL: Alert, oriented x3, NAD  HEENT: JANES, EOMI, MOM  CHEST/LUNG: B/L expiratory wheezing R>L  HEART: RRR, s1/s2 present  ABDOMEN: BS present, soft, depressible, no tender  EXTREMITIES: No edema    LABS:       Radiology:

## 2021-01-28 NOTE — PROGRESS NOTE ADULT - SUBJECTIVE AND OBJECTIVE BOX
INTERVAL HPI:  Pt is a 56 y/o female w/pmhx of  DM II, HTN, RA, and MI comes w/complain of sob x 2 days and marked fatigue and fever to tmax 100.2.  pt was diagnosed w/CAP on 1/5/21 was sent home on azithro and cefopodoxime x7days.  pt completed abx, she was covid neg then and again today, she returns w/increasing sob over the last 2 days and pleuritic pain on deep breaths.  pt denies any , chills, +non productive cough +pleuritic pain no cp, palpitations, n/v/d/c no travels. feels very fatigued, barely able to put on a blouse.  Non smoker. Reports weight loss over few months,  CTA in ed  No pulmonary emboli visualized. Consolidation in the right lower lobe with mediastinal and right hilar lymphadenopathy which may represent infection.  (13 Jan 2021 21:31).  Admitted with Pneumonia, leukocytosis    OVERNIGHT EVENTS:  Feels better.    Vital Signs Last 24 Hrs  T(C): 36.8 (28 Jan 2021 11:44), Max: 37.1 (27 Jan 2021 16:31)  T(F): 98.3 (28 Jan 2021 11:44), Max: 98.7 (27 Jan 2021 16:31)  HR: 92 (28 Jan 2021 11:44) (79 - 93)  BP: 107/70 (28 Jan 2021 11:44) (107/70 - 131/75)  BP(mean): --  RR: 16 (28 Jan 2021 11:44) (16 - 18)  SpO2: 95% (28 Jan 2021 11:44) (95% - 99%)    PHYSICAL EXAM:  GEN:         Awake, responsive and comfortable.  HEENT:    Normal.    RESP:        no distress  CVS:          Regular rate and rhythm.   ABD:         Soft, non-tender, non-distended;     MEDICATIONS  (STANDING):  amLODIPine   Tablet 5 milliGRAM(s) Oral daily  aspirin enteric coated 81 milliGRAM(s) Oral daily  dextrose 40% Gel 15 Gram(s) Oral once  dextrose 5%. 1000 milliLiter(s) (50 mL/Hr) IV Continuous <Continuous>  dextrose 5%. 1000 milliLiter(s) (100 mL/Hr) IV Continuous <Continuous>  dextrose 50% Injectable 25 Gram(s) IV Push once  dextrose 50% Injectable 25 Gram(s) IV Push once  dextrose 50% Injectable 12.5 Gram(s) IV Push once  famotidine    Tablet 20 milliGRAM(s) Oral daily  glucagon  Injectable 1 milliGRAM(s) IntraMuscular once  insulin glargine Injectable (LANTUS) 10 Unit(s) SubCutaneous every morning  insulin lispro (ADMELOG) corrective regimen sliding scale   SubCutaneous three times a day before meals  insulin lispro (ADMELOG) corrective regimen sliding scale   SubCutaneous at bedtime    MEDICATIONS  (PRN):  acetaminophen   Tablet .. 650 milliGRAM(s) Oral every 6 hours PRN Temp greater or equal to 38C (100.4F), Mild Pain (1 - 3)  albuterol/ipratropium for Nebulization 3 milliLiter(s) Nebulizer every 6 hours PRN Shortness of Breath and/or Wheezing  guaiFENesin   Syrup  (Sugar-Free) 200 milliGRAM(s) Oral every 6 hours PRN Cough  sodium chloride 0.65% Nasal 1 Spray(s) Both Nostrils three times a day PRN Nasal Congestion    ASSESSMENT AND PLAN:  ·	SOB.  ·	RLL Pneumonia with Lymph adenopathy.  ·	Leukocytosis.  ·	DM.  ·	HTN.  ·	Arthritis.    Completed antibiotics.  For Bronchoscopy today.

## 2021-01-29 DIAGNOSIS — U07.1 COVID-19: ICD-10-CM

## 2021-01-29 LAB
GLUCOSE BLDC GLUCOMTR-MCNC: 186 MG/DL — HIGH (ref 70–99)
GLUCOSE BLDC GLUCOMTR-MCNC: 243 MG/DL — HIGH (ref 70–99)
GLUCOSE BLDC GLUCOMTR-MCNC: 321 MG/DL — HIGH (ref 70–99)
GLUCOSE BLDC GLUCOMTR-MCNC: 338 MG/DL — HIGH (ref 70–99)
GRAM STN FLD: SIGNIFICANT CHANGE UP
NIGHT BLUE STAIN TISS: SIGNIFICANT CHANGE UP
SPECIMEN SOURCE: SIGNIFICANT CHANGE UP
SPECIMEN SOURCE: SIGNIFICANT CHANGE UP

## 2021-01-29 PROCEDURE — 99233 SBSQ HOSP IP/OBS HIGH 50: CPT

## 2021-01-29 RX ORDER — INSULIN LISPRO 100/ML
VIAL (ML) SUBCUTANEOUS
Refills: 0 | Status: DISCONTINUED | OUTPATIENT
Start: 2021-01-29 | End: 2021-02-03

## 2021-01-29 RX ORDER — SODIUM CHLORIDE 9 MG/ML
1000 INJECTION, SOLUTION INTRAVENOUS
Refills: 0 | Status: DISCONTINUED | OUTPATIENT
Start: 2021-01-29 | End: 2021-02-03

## 2021-01-29 RX ORDER — DEXTROSE 50 % IN WATER 50 %
15 SYRINGE (ML) INTRAVENOUS ONCE
Refills: 0 | Status: DISCONTINUED | OUTPATIENT
Start: 2021-01-29 | End: 2021-02-03

## 2021-01-29 RX ORDER — DEXTROSE 50 % IN WATER 50 %
25 SYRINGE (ML) INTRAVENOUS ONCE
Refills: 0 | Status: DISCONTINUED | OUTPATIENT
Start: 2021-01-29 | End: 2021-02-03

## 2021-01-29 RX ORDER — DEXTROSE 50 % IN WATER 50 %
12.5 SYRINGE (ML) INTRAVENOUS ONCE
Refills: 0 | Status: DISCONTINUED | OUTPATIENT
Start: 2021-01-29 | End: 2021-02-03

## 2021-01-29 RX ORDER — INSULIN LISPRO 100/ML
VIAL (ML) SUBCUTANEOUS AT BEDTIME
Refills: 0 | Status: DISCONTINUED | OUTPATIENT
Start: 2021-01-29 | End: 2021-02-03

## 2021-01-29 RX ORDER — INSULIN GLARGINE 100 [IU]/ML
15 INJECTION, SOLUTION SUBCUTANEOUS AT BEDTIME
Refills: 0 | Status: DISCONTINUED | OUTPATIENT
Start: 2021-01-29 | End: 2021-02-03

## 2021-01-29 RX ORDER — GLUCAGON INJECTION, SOLUTION 0.5 MG/.1ML
1 INJECTION, SOLUTION SUBCUTANEOUS ONCE
Refills: 0 | Status: DISCONTINUED | OUTPATIENT
Start: 2021-01-29 | End: 2021-02-03

## 2021-01-29 RX ORDER — INSULIN LISPRO 100/ML
5 VIAL (ML) SUBCUTANEOUS
Refills: 0 | Status: DISCONTINUED | OUTPATIENT
Start: 2021-01-29 | End: 2021-02-03

## 2021-01-29 RX ADMIN — INSULIN GLARGINE 10 UNIT(S): 100 INJECTION, SOLUTION SUBCUTANEOUS at 08:52

## 2021-01-29 RX ADMIN — Medication 5 UNIT(S): at 11:54

## 2021-01-29 RX ADMIN — Medication 2: at 16:25

## 2021-01-29 RX ADMIN — Medication 200 MILLIGRAM(S): at 19:02

## 2021-01-29 RX ADMIN — AMLODIPINE BESYLATE 5 MILLIGRAM(S): 2.5 TABLET ORAL at 05:46

## 2021-01-29 RX ADMIN — Medication 8: at 08:02

## 2021-01-29 RX ADMIN — Medication 5 UNIT(S): at 16:25

## 2021-01-29 RX ADMIN — FAMOTIDINE 20 MILLIGRAM(S): 10 INJECTION INTRAVENOUS at 11:54

## 2021-01-29 RX ADMIN — INSULIN GLARGINE 15 UNIT(S): 100 INJECTION, SOLUTION SUBCUTANEOUS at 22:11

## 2021-01-29 RX ADMIN — Medication 81 MILLIGRAM(S): at 11:54

## 2021-01-29 RX ADMIN — Medication 8: at 11:54

## 2021-01-29 NOTE — PROGRESS NOTE ADULT - PROBLEM SELECTOR PLAN 1
Continue with the same regimen while inpatient   cause of hyperglycemia ?  may need more insulin   while inpatient Finger Sticks should be in 140-180 range, preferably <160

## 2021-01-29 NOTE — PROGRESS NOTE ADULT - SUBJECTIVE AND OBJECTIVE BOX
1/28/2021 - s/p successful bronchoscopy with findings significant for mucopurulent secretions.  Cultures are pending.       Pt is a 56 y/o female w/pmhx of  DM II, HTN, RA, and MI comes w/complain of sob x 2 days and marked fatigue and fever to tmax 100.2.          SUBJECTIVE & OBJECTIVE: Pt seen and examined at bedside. She iss/p bronch  PHYSICAL EXAM:   Vital Signs Last 24 Hrs  T(C): 36.7   HR: 88   BP: 133/82   RR: 18  SpO2: 98%         GENERAL: NAD, well-groomed, well-developed  HEAD:  Atraumatic, Normocephalic  EYES: EOMI, PERRLA, conjunctiva and sclera clear  ENMT: Moist mucous membranes  NECK: Supple, No JVD  NERVOUS SYSTEM:  Alert & Oriented X3, Motor Strength 5/5 B/L upper and lower extremities; DTRs 2+ intact and symmetric  CHEST/LUNG: poor air entry to right lung base  HEART: Regular rate and rhythm; No murmurs, rubs, or gallops  ABDOMEN: Soft, Nontender, Nondistended; Bowel sounds present  EXTREMITIES:  2+ Peripheral Pulses, No clubbing, cyanosis, or edema  LABS:  CAPILLARY BLOOD GLUCOSE    RECENT CULTURES:  RADIOLOGY & ADDITIONAL TESTS:            EXAM: CT CHEST IC      PROCEDURE DATE: 01/28/2021        INTERPRETATION: CLINICAL INFORMATION: Worsening pneumonia    COMPARISON: 1/23/2021    PROCEDURE:  CT of the Chest was performed with the use of 85 cc of Omnipaque 350 intravenously. 15 cc were discarded.  Sagittal and coronal reformats were performed.    FINDINGS:    LUNGS AND AIRWAYS: Patent central airways. Lungs are again remarkable for mild bilateral apical scarring. Mild peripheral bullous changes in the left upper lobe. There is new groundglass density in the right middle lobe with resolution of previously seen atelectasis. There is patchy infiltrate in the right lower lobe with multiple tree in bud opacities which is improved from the prior exam. Ossified granuloma is seen in the inferior right upper lobe again.  PLEURA: No pleural effusion.  MEDIASTINUM AND MASHA: Mild mediastinal lymphadenopathy.  VESSELS: Atherosclerotic calcification of the aorta and coronary arteries.  HEART: Heart size is normal. No pericardial effusion.  CHEST WALL AND LOWER NECK: Right hypodense thyroid nodule and punctate right thyroid calcifications are again seen.  VISUALIZED UPPER ABDOMEN: Cholelithiasis.  BONES: Within normal limits.    IMPRESSION:  Resolution of previously seen collapse of the right middle lobe with groundglass density now seen in the right middle lobe.  Persistent extensive right lower lobe infiltrate which is patchy air than on the prior study suggesting partial resolution.

## 2021-01-29 NOTE — PROGRESS NOTE ADULT - SUBJECTIVE AND OBJECTIVE BOX
Patient is a 55y old  Female who presents with a chief complaint of sob (29 Jan 2021 10:50)      Interval History: finger sticks are increased , not on any steroids , but antibiotics continued     MEDICATIONS  (STANDING):  amLODIPine   Tablet 5 milliGRAM(s) Oral daily  aspirin enteric coated 81 milliGRAM(s) Oral daily  dextrose 40% Gel 15 Gram(s) Oral once  dextrose 5%. 1000 milliLiter(s) (50 mL/Hr) IV Continuous <Continuous>  dextrose 5%. 1000 milliLiter(s) (100 mL/Hr) IV Continuous <Continuous>  dextrose 50% Injectable 25 Gram(s) IV Push once  dextrose 50% Injectable 12.5 Gram(s) IV Push once  dextrose 50% Injectable 25 Gram(s) IV Push once  famotidine    Tablet 20 milliGRAM(s) Oral daily  glucagon  Injectable 1 milliGRAM(s) IntraMuscular once  insulin glargine Injectable (LANTUS) 15 Unit(s) SubCutaneous at bedtime  insulin lispro (ADMELOG) corrective regimen sliding scale   SubCutaneous three times a day before meals  insulin lispro (ADMELOG) corrective regimen sliding scale   SubCutaneous at bedtime  insulin lispro Injectable (ADMELOG) 5 Unit(s) SubCutaneous three times a day before meals    MEDICATIONS  (PRN):  acetaminophen   Tablet .. 650 milliGRAM(s) Oral every 6 hours PRN Temp greater or equal to 38C (100.4F), Mild Pain (1 - 3)  albuterol/ipratropium for Nebulization 3 milliLiter(s) Nebulizer every 6 hours PRN Shortness of Breath and/or Wheezing  guaiFENesin   Syrup  (Sugar-Free) 200 milliGRAM(s) Oral every 6 hours PRN Cough  sodium chloride 0.65% Nasal 1 Spray(s) Both Nostrils three times a day PRN Nasal Congestion      Allergies    ACE inhibitors (Angioedema (Severe))  lisinopril (Anaphylaxis; Angioedema)    Intolerances        REVIEW OF SYSTEMS:  CONSTITUTIONAL: no changes      Vital Signs Last 24 Hrs  T(C): 36.3 (29 Jan 2021 16:20), Max: 36.6 (28 Jan 2021 23:19)  T(F): 97.3 (29 Jan 2021 16:20), Max: 97.8 (28 Jan 2021 23:19)  HR: 61 (29 Jan 2021 16:20) (60 - 72)  BP: 130/74 (29 Jan 2021 16:20) (122/70 - 140/83)  BP(mean): --  RR: 18 (29 Jan 2021 16:20) (16 - 18)  SpO2: 96% (29 Jan 2021 16:20) (96% - 98%)    PHYSICAL EXAM:  GENERAL: deferred , as per the progress notes of Primary Team   HEAD: Atraumatic, Normocephalic      LABS:        CAPILLARY BLOOD GLUCOSE      POCT Blood Glucose.: 243 mg/dL (29 Jan 2021 20:56)  POCT Blood Glucose.: 186 mg/dL (29 Jan 2021 15:49)  POCT Blood Glucose.: 338 mg/dL (29 Jan 2021 11:41)  POCT Blood Glucose.: 321 mg/dL (29 Jan 2021 07:53)  POCT Blood Glucose.: 398 mg/dL (28 Jan 2021 23:32)    Lipid panel:           Thyroid:  Diabetes Tests:  Parathyroid Panel:  Adrenals:  RADIOLOGY & ADDITIONAL TESTS:    Imaging Personally Reviewed:  [ ] YES  [ ] NO    Consultant(s) Notes Reviewed:  [ ] YES  [ ] NO    Care Discussed with Consultants/Other Providers [ ] YES  [ ] NO

## 2021-01-29 NOTE — PROGRESS NOTE ADULT - ASSESSMENT
Patient is being seen for non resolving community acquired pneumonia   immunosuppressed host   sp prolonged antibiotics    1/28 s/p Bronchoscopy---Mucopurulent secretions obstructing right mainstem bronchus.   Right lung lavage cx: collected  AFB: negative so far    No toxic looking  Afebrile  No leukocytosis    procal 0.11    QuantiFeron negative  HIV: NR    CXR: There is some improvement in the dense right lower lobe infiltrate.    Off Abx   -F/U right lung lavage cx  Thoracic sx following

## 2021-01-29 NOTE — PROGRESS NOTE ADULT - SUBJECTIVE AND OBJECTIVE BOX
HPI:  Pt is a 54 y/o female w/pmhx of  DM II, HTN, RA, and MI comes w/complain of sob x 2 days and marked fatigue and fever to tmax 100.2.  pt was diagnosed w/CAP on 1/5/21 was sent home on azithro and cefopodoxime x7days.  pt completed abx, she was covid neg then and again today, she returns w/increasing sob over the last 2 days and pleuritic pain on deep breaths.  pt denies any , chills, +non productive cough +pleuritic pain no cp, palpitations, n/v/d/c no travels. feels very fatigued, barely able to put on a blouse.    CTA in ed  No pulmonary emboli visualized. Consolidation in the right lower lobe with mediastinal and right hilar lymphadenopathy which may represent infection.  (13 Jan 2021 21:31)      Allergies    ACE inhibitors (Angioedema (Severe))  lisinopril (Anaphylaxis; Angioedema)    Intolerances        MEDICATIONS  (STANDING):  amLODIPine   Tablet 5 milliGRAM(s) Oral daily  aspirin enteric coated 81 milliGRAM(s) Oral daily  dextrose 40% Gel 15 Gram(s) Oral once  dextrose 5%. 1000 milliLiter(s) (50 mL/Hr) IV Continuous <Continuous>  dextrose 5%. 1000 milliLiter(s) (100 mL/Hr) IV Continuous <Continuous>  dextrose 50% Injectable 25 Gram(s) IV Push once  dextrose 50% Injectable 12.5 Gram(s) IV Push once  dextrose 50% Injectable 25 Gram(s) IV Push once  famotidine    Tablet 20 milliGRAM(s) Oral daily  glucagon  Injectable 1 milliGRAM(s) IntraMuscular once  insulin glargine Injectable (LANTUS) 15 Unit(s) SubCutaneous at bedtime  insulin lispro (ADMELOG) corrective regimen sliding scale   SubCutaneous three times a day before meals  insulin lispro (ADMELOG) corrective regimen sliding scale   SubCutaneous at bedtime  insulin lispro Injectable (ADMELOG) 5 Unit(s) SubCutaneous three times a day before meals    MEDICATIONS  (PRN):  acetaminophen   Tablet .. 650 milliGRAM(s) Oral every 6 hours PRN Temp greater or equal to 38C (100.4F), Mild Pain (1 - 3)  albuterol/ipratropium for Nebulization 3 milliLiter(s) Nebulizer every 6 hours PRN Shortness of Breath and/or Wheezing  guaiFENesin   Syrup  (Sugar-Free) 200 milliGRAM(s) Oral every 6 hours PRN Cough  sodium chloride 0.65% Nasal 1 Spray(s) Both Nostrils three times a day PRN Nasal Congestion      REVIEW OF SYSTEMS:    CONSTITUTIONAL: No fever, chills, weight loss, or fatigue  HEENT: No sore throat, runny nose, ear ache  RESPIRATORY: No cough, wheezing, No shortness of breath  CARDIOVASCULAR: No chest pain, palpitations, dizziness  GASTROINTESTINAL: No abdominal pain. No nausea, vomiting, diarrhea  GENITOURINARY: No dysuria, increase frequency, hematuria, or incontinence  NEUROLOGICAL: No headaches, memory loss, loss of strength, numbness, or tremors, no weakness  EXTREMITY: No pedal edema BLE  SKIN: No itching, burning, rashes, or lesions     VITAL SIGNS:  T(C): 36.2 (01-29-21 @ 05:24), Max: 36.8 (01-28-21 @ 11:44)  T(F): 97.2 (01-29-21 @ 05:24), Max: 98.3 (01-28-21 @ 11:44)  HR: 71 (01-29-21 @ 05:24) (60 - 92)  BP: 135/74 (01-29-21 @ 05:24) (107/70 - 140/83)  RR: 17 (01-29-21 @ 05:24) (14 - 20)  SpO2: 97% (01-29-21 @ 05:24) (95% - 98%)  Wt(kg): --    PHYSICAL EXAM:    GENERAL: not in any distress  HEENT: Neck is supple, normocephalic, atraumatic   CHEST/LUNG: Clear bilaterally; No rales, rhonchi, wheezing  HEART: Regular rate and rhythm; No murmurs, rubs, or gallops  ABDOMEN: Soft, Nontender, Nondistended; Bowel sounds present, no rebound   EXTREMITIES:  2+ Peripheral Pulses, No clubbing, cyanosis, or edema  GENITOURINARY:   SKIN: No rashes or lesions  BACK: no pressor sore   NERVOUS SYSTEM:  Alert & Oriented X3, Good concentration  PSYCH: normal affect     LABS:     Culture Results:   Testing in progress (01-29 @ 00:54)        Radiology:

## 2021-01-30 DIAGNOSIS — J98.11 ATELECTASIS: ICD-10-CM

## 2021-01-30 DIAGNOSIS — E11.9 TYPE 2 DIABETES MELLITUS WITHOUT COMPLICATIONS: ICD-10-CM

## 2021-01-30 LAB
CULTURE RESULTS: SIGNIFICANT CHANGE UP
GLUCOSE BLDC GLUCOMTR-MCNC: 133 MG/DL — HIGH (ref 70–99)
GLUCOSE BLDC GLUCOMTR-MCNC: 178 MG/DL — HIGH (ref 70–99)
GLUCOSE BLDC GLUCOMTR-MCNC: 184 MG/DL — HIGH (ref 70–99)
GLUCOSE BLDC GLUCOMTR-MCNC: 187 MG/DL — HIGH (ref 70–99)
GRAM STN FLD: SIGNIFICANT CHANGE UP
SPECIMEN SOURCE: SIGNIFICANT CHANGE UP

## 2021-01-30 PROCEDURE — 99232 SBSQ HOSP IP/OBS MODERATE 35: CPT

## 2021-01-30 RX ORDER — BENZOCAINE AND MENTHOL 5; 1 G/100ML; G/100ML
1 LIQUID ORAL EVERY 6 HOURS
Refills: 0 | Status: DISCONTINUED | OUTPATIENT
Start: 2021-01-30 | End: 2021-02-03

## 2021-01-30 RX ADMIN — Medication 2: at 08:05

## 2021-01-30 RX ADMIN — Medication 5 UNIT(S): at 08:04

## 2021-01-30 RX ADMIN — Medication 5 UNIT(S): at 16:28

## 2021-01-30 RX ADMIN — FAMOTIDINE 20 MILLIGRAM(S): 10 INJECTION INTRAVENOUS at 11:13

## 2021-01-30 RX ADMIN — Medication 81 MILLIGRAM(S): at 11:13

## 2021-01-30 RX ADMIN — BENZOCAINE AND MENTHOL 1 LOZENGE: 5; 1 LIQUID ORAL at 13:56

## 2021-01-30 RX ADMIN — INSULIN GLARGINE 15 UNIT(S): 100 INJECTION, SOLUTION SUBCUTANEOUS at 20:59

## 2021-01-30 RX ADMIN — Medication 5 UNIT(S): at 11:12

## 2021-01-30 RX ADMIN — Medication 2: at 11:11

## 2021-01-30 RX ADMIN — AMLODIPINE BESYLATE 5 MILLIGRAM(S): 2.5 TABLET ORAL at 05:29

## 2021-01-30 NOTE — PROGRESS NOTE ADULT - PROBLEM SELECTOR PLAN 5
Assessment and Plan:   · Assessment	  Patient is being seen for non resolving community acquired pneumonia   immunosuppressed host   sp prolonged antibiotics    1/28 s/p Bronchoscopy---Mucopurulent secretions obstructing right mainstem bronchus.   Right lung lavage cx: collected  CXR: There is some improvement in the dense right lower lobe infiltrate.  -F/U right lung lavage cx
sq lovenox

## 2021-01-30 NOTE — PROGRESS NOTE ADULT - ASSESSMENT
54 y/o female w/pmhx of  DM II, HTN, RA, and MI comes w/complain of sob x 2 days and recent pna worsening radiographically    Problem/Plan - 1:  ·  Problem: Pneumonia of right lower lobe due to infectious organism.  Plan: Covering for gram neg pna, Sepsis POA   cefepime/vanco, doxy was added  ID consult noted on board  pulm consult noted   urine legionella negative   hyponatremia improving  TTE wnl  WBC leukocytosis is improving    rheum  consult noted    1/21/2021 no fever since 1/19/2021 covid negative from 1/20/21, f/u imdium scan  1/23/2021 indium scna shows uptake right lobe . still with fever f/u ct chest per ID recommendations  may need vancomycin   QuantiFeron is negative  1/24/2021 vancomycin  started yesterday night . will monitor one more day of still febrile in am will repeat blood cx yet again  1/25/2021 per ID was started on vancomycin over weekend now it appears stopped per ID , will ask pulmonary for re evaluation   will ask thoracic for bonch  1/26/2021  per verbal d/w charmaineDre plunkett ( thoracic ) bronch for Thursday follow up HIV testing  1/27/2021 for bronch in am . npo midnight.   1/28/2021 - s/p successful bronchoscopy with findings significant for mucopurulent secretions.  cultures are pending.     Problem/Plan - 2:  ·  Problem: Rheumatoid arthritis, involving unspecified site, unspecified whether rheumatoid factor present.  Plan: tylenol prn   1/25/2021 reviewed [facility] re evaluation  1/26/20212 reviewed note.   1/28/2021 - stable at this time no acute interventions    Problem/Plan - 3:  ·  Problem: Type 2 diabetes mellitus with hyperglycemia, without long-term current use of insulin.  Plan: ss insulin coverage   hgba1c 9.2   get endocrine consult.   - 1/28/2021 aim for glucose control to < 200s while inpatient.  Should improve while off antibiotics     Problem/Plan - 4:  ·  Problem: Essential hypertension.  Plan: cont amlodipine.     Problem/Plan - 5:  ·  Problem: Preventive measure.  Plan: sq lovenox.     Problem/Plan - 6:  Problem: Hypokalemia. Plan: resolved.

## 2021-01-30 NOTE — PROGRESS NOTE ADULT - SUBJECTIVE AND OBJECTIVE BOX
Patient is a 55y old  Female who presents with a chief complaint of sob (28 Jan 2021 20:25)    HPI:  Pt is a 54 y/o female w/pmhx of  DM II, HTN, RA, and MI comes w/complain of sob x 2 days and marked fatigue and fever to tmax 100.2.  pt was diagnosed w/CAP on 1/5/21 was sent home on azithro and cefopodoxime x7days.  pt completed abx, she was covid neg then and again today, she returns w/increasing sob over the last 2 days and pleuritic pain on deep breaths.  pt denies any , chills, +non productive cough +pleuritic pain no cp, palpitations, n/v/d/c no travels. feels very fatigued, barely able to put on a blouse.    CTA in ed  No pulmonary emboli visualized. Consolidation in the right lower lobe with mediastinal and right hilar lymphadenopathy which may represent infection.  (13 Jan 2021 21:31)    SUBJECTIVE & OBJECTIVE: Pt seen and examined at bedside. She is for bronchoscopy today  PHYSICAL EXAM:  Vital Signs Last 24 Hrs  T(C): 36.1 (30 Jan 2021 11:17), Max: 36.6 (29 Jan 2021 23:53)  T(F): 97 (30 Jan 2021 11:17), Max: 97.9 (30 Jan 2021 05:13)  HR: 66 (30 Jan 2021 11:17) (60 - 66)  BP: 125/78 (30 Jan 2021 11:17) (125/78 - 134/80)  BP(mean): --  RR: 16 (30 Jan 2021 11:17) (16 - 18)  SpO2: 97% (30 Jan 2021 11:17) (97% - 99%)        GENERAL: NAD, well-groomed, well-developed  HEAD:  Atraumatic, Normocephalic  EYES: EOMI, PERRLA, conjunctiva and sclera clear  ENMT: Moist mucous membranes  NECK: Supple, No JVD  NERVOUS SYSTEM:  Alert & Oriented X3, Motor Strength 5/5 B/L upper and lower extremities; DTRs 2+ intact and symmetric  CHEST/LUNG: poor air entry to right lung base  HEART: Regular rate and rhythm; No murmurs, rubs, or gallops  ABDOMEN: Soft, Nontender, Nondistended; Bowel sounds present  EXTREMITIES:  2+ Peripheral Pulses, No clubbing, cyanosis, or edema  Specimen Source: .Bronchial RIGHT LUNG LAVAGE (01.29.21 @ 00:54)

## 2021-01-30 NOTE — PROGRESS NOTE ADULT - PROBLEM SELECTOR PROBLEM 5
Preventive measure
Pulmonary atelectasis
Preventive measure

## 2021-01-30 NOTE — PROGRESS NOTE ADULT - SUBJECTIVE AND OBJECTIVE BOX
S/P bronchoscopy - thick secretions suctioned out    Pt is a 54 y/o female w/pmhx of  DM II, HTN, RA, and MI comes w/complain of sob x 2 days and marked fatigue and fever to tmax 100.2.      Pt was diagnosed w/CAP on 1/5/21 was sent home on azithro and cefopodoxime x7days.  pt completed abx, she was covid neg then and again today, she returns w/increasing sob over the last 2 days and pleuritic pain on deep breaths.  pt denies any , chills, +non productive cough +pleuritic pain no cp, palpitations, n/v/d/c no travels. feels very fatigued, barely able to put on a blouse.    CTA in ed    No pulmonary emboli visualized. Consolidation in the right lower lobe with mediastinal and right hilar lymphadenopathy which may represent infection.  (13 Jan 2021 21:31)      Allergies    ACE inhibitors (Angioedema (Severe))  lisinopril (Anaphylaxis; Angioedema)    Intolerances        MEDICATIONS  (STANDING):  amLODIPine   Tablet 5 milliGRAM(s) Oral daily  aspirin enteric coated 81 milliGRAM(s) Oral daily  dextrose 40% Gel 15 Gram(s) Oral once  dextrose 5%. 1000 milliLiter(s) (50 mL/Hr) IV Continuous <Continuous>  dextrose 5%. 1000 milliLiter(s) (100 mL/Hr) IV Continuous <Continuous>  dextrose 50% Injectable 25 Gram(s) IV Push once  dextrose 50% Injectable 12.5 Gram(s) IV Push once  dextrose 50% Injectable 25 Gram(s) IV Push once  famotidine    Tablet 20 milliGRAM(s) Oral daily  glucagon  Injectable 1 milliGRAM(s) IntraMuscular once  insulin glargine Injectable (LANTUS) 15 Unit(s) SubCutaneous at bedtime  insulin lispro (ADMELOG) corrective regimen sliding scale   SubCutaneous three times a day before meals  insulin lispro (ADMELOG) corrective regimen sliding scale   SubCutaneous at bedtime  insulin lispro Injectable (ADMELOG) 5 Unit(s) SubCutaneous three times a day before meals    MEDICATIONS  (PRN):  acetaminophen   Tablet .. 650 milliGRAM(s) Oral every 6 hours PRN Temp greater or equal to 38C (100.4F), Mild Pain (1 - 3)  albuterol/ipratropium for Nebulization 3 milliLiter(s) Nebulizer every 6 hours PRN Shortness of Breath and/or Wheezing  guaiFENesin   Syrup  (Sugar-Free) 200 milliGRAM(s) Oral every 6 hours PRN Cough  sodium chloride 0.65% Nasal 1 Spray(s) Both Nostrils three times a day PRN Nasal Congestion      REVIEW OF SYSTEMS:    CONSTITUTIONAL: No fever, chills, weight loss, or fatigue  HEENT: No sore throat, runny nose, ear ache  RESPIRATORY: No cough, wheezing, No shortness of breath  CARDIOVASCULAR: No chest pain, palpitations, dizziness  GASTROINTESTINAL: No abdominal pain. No nausea, vomiting, diarrhea  GENITOURINARY: No dysuria, increase frequency, hematuria, or incontinence  NEUROLOGICAL: No headaches, memory loss, loss of strength, numbness, or tremors, no weakness  EXTREMITY: No pedal edema BLE  SKIN: No itching, burning, rashes, or lesions     VITAL SIGNS:  T(C): 36.2 (01-29-21 @ 05:24), Max: 36.8 (01-28-21 @ 11:44)  T(F): 97.2 (01-29-21 @ 05:24), Max: 98.3 (01-28-21 @ 11:44)  HR: 71 (01-29-21 @ 05:24) (60 - 92)  BP: 135/74 (01-29-21 @ 05:24) (107/70 - 140/83)  RR: 17 (01-29-21 @ 05:24) (14 - 20)  SpO2: 97% (01-29-21 @ 05:24) (95% - 98%)  Wt(kg): --    PHYSICAL EXAM:    GENERAL: not in any distress  HEENT: Neck is supple, normocephalic, atraumatic   CHEST/LUNG: Clear bilaterally; No rales, rhonchi, wheezing  HEART: Regular rate and rhythm; No murmurs, rubs, or gallops  ABDOMEN: Soft, Nontender, Nondistended; Bowel sounds present, no rebound   EXTREMITIES:  2+ Peripheral Pulses, No clubbing, cyanosis, or edema  GENITOURINARY:   SKIN: No rashes or lesions  BACK: no pressor sore   NERVOUS SYSTEM:  Alert & Oriented X3, Good concentration  PSYCH: normal affect     LABS:     Culture Results:   Testing in progress (01-29 @ 00:54)        Radiology:             S/P bronchoscopy - thick secretions suctioned out.  Expansion of the RML    Pt is a 56 y/o female w/pmhx of  DM II, HTN, RA, and MI comes w/complain of sob x 2 days and marked fatigue and fever to tmax 100.2.      Pt was diagnosed w/CAP on 1/5/21 was sent home on azithro and cefopodoxime x7days.  pt completed abx, she was covid neg then and again today, she returns w/increasing sob and pleuritic pain on deep breaths.  pt denies any , chills, +non productive cough +pleuritic pain no cp, palpitations, n/v/d/c no travels. feels very fatigued, barely able to put on a blouse.    CTA in ed    No pulmonary emboli visualized. Consolidation in the right lower lobe with mediastinal and right hilar lymphadenopathy which may represent infection.  (13 Jan 2021 21:31)      Allergies    ACE inhibitors (Angioedema (Severe))  lisinopril (Anaphylaxis; Angioedema)    Intolerances        MEDICATIONS  (STANDING):  amLODIPine   Tablet 5 milliGRAM(s) Oral daily  aspirin enteric coated 81 milliGRAM(s) Oral daily  dextrose 40% Gel 15 Gram(s) Oral once  dextrose 5%. 1000 milliLiter(s) (50 mL/Hr) IV Continuous <Continuous>  dextrose 5%. 1000 milliLiter(s) (100 mL/Hr) IV Continuous <Continuous>  dextrose 50% Injectable 25 Gram(s) IV Push once  dextrose 50% Injectable 12.5 Gram(s) IV Push once  dextrose 50% Injectable 25 Gram(s) IV Push once  famotidine    Tablet 20 milliGRAM(s) Oral daily  glucagon  Injectable 1 milliGRAM(s) IntraMuscular once  insulin glargine Injectable (LANTUS) 15 Unit(s) SubCutaneous at bedtime  insulin lispro (ADMELOG) corrective regimen sliding scale   SubCutaneous three times a day before meals  insulin lispro (ADMELOG) corrective regimen sliding scale   SubCutaneous at bedtime  insulin lispro Injectable (ADMELOG) 5 Unit(s) SubCutaneous three times a day before meals    MEDICATIONS  (PRN):  acetaminophen   Tablet .. 650 milliGRAM(s) Oral every 6 hours PRN Temp greater or equal to 38C (100.4F), Mild Pain (1 - 3)  albuterol/ipratropium for Nebulization 3 milliLiter(s) Nebulizer every 6 hours PRN Shortness of Breath and/or Wheezing  guaiFENesin   Syrup  (Sugar-Free) 200 milliGRAM(s) Oral every 6 hours PRN Cough  sodium chloride 0.65% Nasal 1 Spray(s) Both Nostrils three times a day PRN Nasal Congestion      REVIEW OF SYSTEMS:    CONSTITUTIONAL: No fever, chills, weight loss, or fatigue  HEENT: No sore throat, runny nose, ear ache  RESPIRATORY: No cough, wheezing, No shortness of breath  CARDIOVASCULAR: No chest pain, palpitations, dizziness  GASTROINTESTINAL: No abdominal pain. No nausea, vomiting, diarrhea  GENITOURINARY: No dysuria, increase frequency, hematuria, or incontinence  NEUROLOGICAL: No headaches, memory loss, loss of strength, numbness, or tremors, no weakness  EXTREMITY: No pedal edema BLE  SKIN: No itching, burning, rashes, or lesions     VITAL SIGNS:    T(F): 97.2  HR: 71   BP: 135/74   RR: 17   SpO2: 97%     PHYSICAL EXAM:    GENERAL: not in any distress  HEENT: Neck is supple, normocephalic, atraumatic   CHEST/LUNG: Clear bilaterally; No rales, rhonchi, wheezing  HEART: Regular rate and rhythm; No murmurs, rubs, or gallops  ABDOMEN: Soft, Nontender, Nondistended; Bowel sounds present, no rebound   EXTREMITIES:  2+ Peripheral Pulses, No clubbing, cyanosis, or edema  GENITOURINARY:   SKIN: No rashes or lesions  BACK: no pressor sore   NERVOUS SYSTEM:  Alert & Oriented X3, Good concentration  PSYCH: normal affect     LABS:     Culture Results:   Testing in progress (01-29 @ 00:54)        Radiology:

## 2021-01-30 NOTE — PROGRESS NOTE ADULT - SUBJECTIVE AND OBJECTIVE BOX
INTERVAL HPI:   Pt is a 56 y/o female w/pmhx of  DM II, HTN, RA, and MI comes w/complain of sob x 2 days and marked fatigue and fever to tmax 100.2.  pt was diagnosed w/CAP on 1/5/21 was sent home on azithro and cefopodoxime x7days.  pt completed abx, she was covid neg then and again today, she returns w/increasing sob over the last 2 days and pleuritic pain on deep breaths.  pt denies any , chills, +non productive cough +pleuritic pain no cp, palpitations, n/v/d/c no travels. feels very fatigued, barely able to put on a blouse.  Non smoker. Reports weight loss over few months,  CTA in ed  No pulmonary emboli visualized. Consolidation in the right lower lobe with mediastinal and right hilar lymphadenopathy which may represent infection.  (13 Jan 2021 21:31).  Admitted with Pneumonia, leukocytosis  01/29/30: Bronchoscopy:  ·  POST-OP DIAGNOSIS:  Pneumonia involving right lung 28-Jan-2021 15:46:24  Miladys Solano  ·  PROCEDURES:  Bronchoscopy with bronchoalveolar lavage and brush biopsy of right lung 28-Jan-2021 15:45:22  Miladys Solano       Operative Findings:  · Operative Findings	Mucopurulent secretions obstructing right mainstem bronchus.  No endobronchial lesion.  Patient with abnormal bronchial anatomy with presence of post-epiarterial right bronchus.  ?compression of superior segmental branch and RML from intermittent pneumonia    OVERNIGHT EVENTS:  Awake and comfortable.    Vital Signs Last 24 Hrs  T(C): 36.7 (30 Jan 2021 18:03), Max: 36.7 (30 Jan 2021 18:03)  T(F): 98 (30 Jan 2021 18:03), Max: 98 (30 Jan 2021 18:03)  HR: 78 (30 Jan 2021 18:03) (60 - 78)  BP: 145/79 (30 Jan 2021 18:03) (125/78 - 145/79)  BP(mean): --  RR: 16 (30 Jan 2021 18:03) (16 - 18)  SpO2: 96% (30 Jan 2021 18:03) (96% - 99%)    PHYSICAL EXAM:  GEN:         Awake, responsive and comfortable.  HEENT:    Normal.    RESP:        no distress  CVS:             Regular rate and rhythm.   ABD:         Soft, non-tender, non-distended;     MEDICATIONS  (STANDING):  amLODIPine   Tablet 5 milliGRAM(s) Oral daily  aspirin enteric coated 81 milliGRAM(s) Oral daily  dextrose 40% Gel 15 Gram(s) Oral once  dextrose 5%. 1000 milliLiter(s) (50 mL/Hr) IV Continuous <Continuous>  dextrose 5%. 1000 milliLiter(s) (100 mL/Hr) IV Continuous <Continuous>  dextrose 50% Injectable 25 Gram(s) IV Push once  dextrose 50% Injectable 12.5 Gram(s) IV Push once  dextrose 50% Injectable 25 Gram(s) IV Push once  famotidine    Tablet 20 milliGRAM(s) Oral daily  glucagon  Injectable 1 milliGRAM(s) IntraMuscular once  insulin glargine Injectable (LANTUS) 15 Unit(s) SubCutaneous at bedtime  insulin lispro (ADMELOG) corrective regimen sliding scale   SubCutaneous three times a day before meals  insulin lispro (ADMELOG) corrective regimen sliding scale   SubCutaneous at bedtime  insulin lispro Injectable (ADMELOG) 5 Unit(s) SubCutaneous three times a day before meals    MEDICATIONS  (PRN):  acetaminophen   Tablet .. 650 milliGRAM(s) Oral every 6 hours PRN Temp greater or equal to 38C (100.4F), Mild Pain (1 - 3)  albuterol/ipratropium for Nebulization 3 milliLiter(s) Nebulizer every 6 hours PRN Shortness of Breath and/or Wheezing  benzocaine 15 mG/menthol 3.6 mG (Sugar-Free) Lozenge 1 Lozenge Oral every 6 hours PRN Mouth Sores  guaiFENesin   Syrup  (Sugar-Free) 200 milliGRAM(s) Oral every 6 hours PRN Cough  sodium chloride 0.65% Nasal 1 Spray(s) Both Nostrils three times a day PRN Nasal Congestion    ASSESSMENT AND PLAN:  ·	SOB.  ·	RLL Pneumonia with Lymph adenopathy.  ·	Leukocytosis.  ·	DM.  ·	HTN.  ·	Arthritis.    Bronchoscopy findings noted.  Off antibiotics.  Follow culture and Cytology.  Repeat chest CT in 4-6 weeks.

## 2021-01-31 LAB
GLUCOSE BLDC GLUCOMTR-MCNC: 128 MG/DL — HIGH (ref 70–99)
GLUCOSE BLDC GLUCOMTR-MCNC: 136 MG/DL — HIGH (ref 70–99)
GLUCOSE BLDC GLUCOMTR-MCNC: 170 MG/DL — HIGH (ref 70–99)
GLUCOSE BLDC GLUCOMTR-MCNC: 194 MG/DL — HIGH (ref 70–99)

## 2021-01-31 PROCEDURE — 99232 SBSQ HOSP IP/OBS MODERATE 35: CPT

## 2021-01-31 RX ADMIN — Medication 2: at 11:27

## 2021-01-31 RX ADMIN — Medication 5 UNIT(S): at 11:27

## 2021-01-31 RX ADMIN — INSULIN GLARGINE 15 UNIT(S): 100 INJECTION, SOLUTION SUBCUTANEOUS at 21:07

## 2021-01-31 RX ADMIN — FAMOTIDINE 20 MILLIGRAM(S): 10 INJECTION INTRAVENOUS at 11:27

## 2021-01-31 RX ADMIN — Medication 5 UNIT(S): at 16:58

## 2021-01-31 RX ADMIN — Medication 5 UNIT(S): at 08:26

## 2021-01-31 RX ADMIN — AMLODIPINE BESYLATE 5 MILLIGRAM(S): 2.5 TABLET ORAL at 05:39

## 2021-01-31 RX ADMIN — Medication 81 MILLIGRAM(S): at 11:27

## 2021-01-31 NOTE — PROGRESS NOTE ADULT - SUBJECTIVE AND OBJECTIVE BOX
Patient is a 55y old  Female who presents with a chief complaint of sob (31 Jan 2021 16:37)      Interval History: finger sticks are in high 100's   on Lantus 15 units and prandial lispro 5 units     MEDICATIONS  (STANDING):  amLODIPine   Tablet 5 milliGRAM(s) Oral daily  aspirin enteric coated 81 milliGRAM(s) Oral daily  dextrose 40% Gel 15 Gram(s) Oral once  dextrose 5%. 1000 milliLiter(s) (50 mL/Hr) IV Continuous <Continuous>  dextrose 5%. 1000 milliLiter(s) (100 mL/Hr) IV Continuous <Continuous>  dextrose 50% Injectable 25 Gram(s) IV Push once  dextrose 50% Injectable 12.5 Gram(s) IV Push once  dextrose 50% Injectable 25 Gram(s) IV Push once  famotidine    Tablet 20 milliGRAM(s) Oral daily  glucagon  Injectable 1 milliGRAM(s) IntraMuscular once  insulin glargine Injectable (LANTUS) 15 Unit(s) SubCutaneous at bedtime  insulin lispro (ADMELOG) corrective regimen sliding scale   SubCutaneous three times a day before meals  insulin lispro (ADMELOG) corrective regimen sliding scale   SubCutaneous at bedtime  insulin lispro Injectable (ADMELOG) 5 Unit(s) SubCutaneous three times a day before meals    MEDICATIONS  (PRN):  acetaminophen   Tablet .. 650 milliGRAM(s) Oral every 6 hours PRN Temp greater or equal to 38C (100.4F), Mild Pain (1 - 3)  albuterol/ipratropium for Nebulization 3 milliLiter(s) Nebulizer every 6 hours PRN Shortness of Breath and/or Wheezing  benzocaine 15 mG/menthol 3.6 mG (Sugar-Free) Lozenge 1 Lozenge Oral every 6 hours PRN Mouth Sores  guaiFENesin   Syrup  (Sugar-Free) 200 milliGRAM(s) Oral every 6 hours PRN Cough  sodium chloride 0.65% Nasal 1 Spray(s) Both Nostrils three times a day PRN Nasal Congestion      Allergies    ACE inhibitors (Angioedema (Severe))  lisinopril (Anaphylaxis; Angioedema)    Intolerances        REVIEW OF SYSTEMS:  CONSTITUTIONAL: no changes    Vital Signs Last 24 Hrs  T(C): 36.5 (31 Jan 2021 17:46), Max: 36.8 (31 Jan 2021 11:53)  T(F): 97.7 (31 Jan 2021 17:46), Max: 98.3 (31 Jan 2021 11:53)  HR: 76 (31 Jan 2021 17:46) (57 - 91)  BP: 129/78 (31 Jan 2021 17:46) (126/72 - 138/84)  BP(mean): --  RR: 17 (31 Jan 2021 17:46) (16 - 17)  SpO2: 97% (31 Jan 2021 17:46) (97% - 99%)    PHYSICAL EXAM:  GENERAL: no changes   HEAD: Atraumatic, Normocephalic  EYES: PERRLA, conjunctiva and sclera clear  CHEST/LUNG: Clear to auscultaion bilaterally; No rales, rhonchi, wheezing, or rubs  HEART: Regular rate and rhythm; No murmurs, rubs, or gallops  ABDOMEN: Soft, Nontender, Nondistended; Bowel sounds present  EXTREMITIES:  2+ Peripheral Pulses, no edema  SKIN: No rashes or lesions    LABS:        CAPILLARY BLOOD GLUCOSE      POCT Blood Glucose.: 194 mg/dL (31 Jan 2021 21:06)  POCT Blood Glucose.: 136 mg/dL (31 Jan 2021 16:04)  POCT Blood Glucose.: 170 mg/dL (31 Jan 2021 10:51)  POCT Blood Glucose.: 128 mg/dL (31 Jan 2021 07:55)    Lipid panel:           Thyroid:  Diabetes Tests:  Parathyroid Panel:  Adrenals:  RADIOLOGY & ADDITIONAL TESTS:    Imaging Personally Reviewed:  [ ] YES  [ ] NO    Consultant(s) Notes Reviewed:  [ ] YES  [ ] NO    Care Discussed with Consultants/Other Providers [ ] YES  [ ] NO

## 2021-01-31 NOTE — PROGRESS NOTE ADULT - SUBJECTIVE AND OBJECTIVE BOX
Vital Signs Last 24 Hrs  T(C): 36.8 (31 Jan 2021 11:53), Max: 36.8 (31 Jan 2021 11:53)  T(F): 98.3 (31 Jan 2021 11:53), Max: 98.3 (31 Jan 2021 11:53)  HR: 91 (31 Jan 2021 11:53) (57 - 91)  BP: 132/76 (31 Jan 2021 11:53) (126/72 - 145/79)  BP(mean): --  RR: 16 (31 Jan 2021 11:53) (16 - 16)  SpO2: 97% (31 Jan 2021 11:53) (96% - 99%)Patient is a 55y old  Female who presents with a chief complaint of sob (28 Jan 2021 20:25)    HPI:  Pt is a 56 y/o female w/pmhx of  DM II, HTN, RA, and MI comes w/complain of sob x 2 days and marked fatigue and fever to tmax 100.2.  pt was diagnosed w/CAP on 1/5/21 was sent home on azithro and cefopodoxime x7days.  pt completed abx, she was covid neg then and again today, she returns w/increasing sob over the last 2 days and pleuritic pain on deep breaths.  pt denies any , chills, +non productive cough +pleuritic pain no cp, palpitations, n/v/d/c no travels. feels very fatigued, barely able to put on a blouse.    CTA in ed  No pulmonary emboli visualized. Consolidation in the right lower lobe with mediastinal and right hilar lymphadenopathy which may represent infection.  (13 Jan 2021 21:31)    SUBJECTIVE & OBJECTIVE: Pt seen and examined at bedside. She is for bronchoscopy today  PHYSICAL EXAM:          GENERAL: NAD, well-groomed, well-developed  HEAD:  Atraumatic, Normocephalic  EYES: EOMI, PERRLA, conjunctiva and sclera clear  ENMT: Moist mucous membranes  NECK: Supple, No JVD  NERVOUS SYSTEM:  Alert & Oriented X3, Motor Strength 5/5 B/L upper and lower extremities; DTRs 2+ intact and symmetric  CHEST/LUNG: poor air entry to right lung base  HEART: Regular rate and rhythm; No murmurs, rubs, or gallops  ABDOMEN: Soft, Nontender, Nondistended; Bowel sounds present  EXTREMITIES:  2+ Peripheral Pulses, No clubbing, cyanosis, or edema  Specimen Source: .Bronchial RIGHT LUNG LAVAGE (01.29.21 @ 00:54)

## 2021-01-31 NOTE — PROGRESS NOTE ADULT - PROBLEM SELECTOR PLAN 1
Continue with the same regimen while inpatient   patient can be discharged on the same regimen   while inpatient Finger Sticks should be in 140-180 range, preferably <160

## 2021-01-31 NOTE — PROGRESS NOTE ADULT - SUBJECTIVE AND OBJECTIVE BOX
HPI:  Pt is a 56 y/o female w/pmhx of  DM II, HTN, RA, and MI comes w/complain of sob x 2 days and marked fatigue and fever to tmax 100.2.  pt was diagnosed w/CAP on 1/5/21 was sent home on azithro and cefopodoxime x7days.  pt completed abx, she was covid neg then and again today, she returns w/increasing sob over the last 2 days and pleuritic pain on deep breaths.  pt denies any , chills, +non productive cough +pleuritic pain no cp, palpitations, n/v/d/c no travels. feels very fatigued, barely able to put on a blouse.    CTA in ed  No pulmonary emboli visualized. Consolidation in the right lower lobe with mediastinal and right hilar lymphadenopathy which may represent infection.  (13 Jan 2021 21:31)      Allergies    ACE inhibitors (Angioedema (Severe))  lisinopril (Anaphylaxis; Angioedema)    Intolerances        MEDICATIONS  (STANDING):  amLODIPine   Tablet 5 milliGRAM(s) Oral daily  aspirin enteric coated 81 milliGRAM(s) Oral daily  dextrose 40% Gel 15 Gram(s) Oral once  dextrose 5%. 1000 milliLiter(s) (50 mL/Hr) IV Continuous <Continuous>  dextrose 5%. 1000 milliLiter(s) (100 mL/Hr) IV Continuous <Continuous>  dextrose 50% Injectable 25 Gram(s) IV Push once  dextrose 50% Injectable 12.5 Gram(s) IV Push once  dextrose 50% Injectable 25 Gram(s) IV Push once  famotidine    Tablet 20 milliGRAM(s) Oral daily  glucagon  Injectable 1 milliGRAM(s) IntraMuscular once  insulin glargine Injectable (LANTUS) 15 Unit(s) SubCutaneous at bedtime  insulin lispro (ADMELOG) corrective regimen sliding scale   SubCutaneous three times a day before meals  insulin lispro (ADMELOG) corrective regimen sliding scale   SubCutaneous at bedtime  insulin lispro Injectable (ADMELOG) 5 Unit(s) SubCutaneous three times a day before meals    MEDICATIONS  (PRN):  acetaminophen   Tablet .. 650 milliGRAM(s) Oral every 6 hours PRN Temp greater or equal to 38C (100.4F), Mild Pain (1 - 3)  albuterol/ipratropium for Nebulization 3 milliLiter(s) Nebulizer every 6 hours PRN Shortness of Breath and/or Wheezing  benzocaine 15 mG/menthol 3.6 mG (Sugar-Free) Lozenge 1 Lozenge Oral every 6 hours PRN Mouth Sores  guaiFENesin   Syrup  (Sugar-Free) 200 milliGRAM(s) Oral every 6 hours PRN Cough  sodium chloride 0.65% Nasal 1 Spray(s) Both Nostrils three times a day PRN Nasal Congestion      REVIEW OF SYSTEMS:    CONSTITUTIONAL: No fever, chills, weight loss, or fatigue  HEENT: No sore throat, runny nose, ear ache  RESPIRATORY: No cough, wheezing, No shortness of breath  CARDIOVASCULAR: No chest pain, palpitations, dizziness  GASTROINTESTINAL: No abdominal pain. No nausea, vomiting, diarrhea  GENITOURINARY: No dysuria, increase frequency, hematuria, or incontinence  NEUROLOGICAL: No headaches, memory loss, loss of strength, numbness, or tremors, no weakness  EXTREMITY: No pedal edema BLE  SKIN: No itching, burning, rashes, or lesions     VITAL SIGNS:  T(C): 36.8 (01-31-21 @ 11:53), Max: 36.8 (01-31-21 @ 11:53)  T(F): 98.3 (01-31-21 @ 11:53), Max: 98.3 (01-31-21 @ 11:53)  HR: 91 (01-31-21 @ 11:53) (57 - 91)  BP: 132/76 (01-31-21 @ 11:53) (126/72 - 145/79)  RR: 16 (01-31-21 @ 11:53) (16 - 16)  SpO2: 97% (01-31-21 @ 11:53) (96% - 99%)  Wt(kg): --    PHYSICAL EXAM:    GENERAL: not in any distress  HEENT: Neck is supple, normocephalic, atraumatic   CHEST/LUNG: Clear to auscultation bilaterally; No rales, rhonchi, wheezing  HEART: Regular rate and rhythm; No murmurs, rubs, or gallops  ABDOMEN: Soft, Nontender, Nondistended; Bowel sounds present, no rebound   EXTREMITIES:  2+ Peripheral Pulses, No clubbing, cyanosis, or edema  GENITOURINARY:   SKIN: No rashes or lesions  BACK: no pressor sore   NERVOUS SYSTEM:  Alert & Oriented X3, Good concentration  PSYCH: normal affect     LABS:                                     Culture Results:   Culture is being performed. (01-29 @ 00:54)  Culture Results:   Testing in progress (01-29 @ 00:54)  Culture Results:   No growth at 48 hours (01-29 @ 00:53)                Radiology:       HPI:  Pt is a 54 y/o female w/pmhx of  DM II, HTN, RA, and MI comes w/complain of sob x 2 days and marked fatigue and fever to tmax 100.2.  pt was diagnosed w/CAP on 1/5/21 was sent home on azithro and cefopodoxime x7days.  pt completed abx, she was covid neg then and again today, she returns w/increasing sob over the last 2 days and pleuritic pain on deep breaths.  pt denies any , chills, +non productive cough +pleuritic pain no cp, palpitations, n/v/d/c no travels. feels very fatigued, barely able to put on a blouse.    CTA in ed  No pulmonary emboli visualized. Consolidation in the right lower lobe with mediastinal and right hilar lymphadenopathy which may represent infection.  (13 Jan 2021 21:31)  broad spectrum antibiotics and long term antibiotics were given   off antibiotics x 1 week   fevers disappeared off antibiotics   sp bronch  result pending   Allergies    ACE inhibitors (Angioedema (Severe))  lisinopril (Anaphylaxis; Angioedema)    Intolerances        MEDICATIONS  (STANDING):  amLODIPine   Tablet 5 milliGRAM(s) Oral daily  aspirin enteric coated 81 milliGRAM(s) Oral daily  dextrose 40% Gel 15 Gram(s) Oral once  dextrose 5%. 1000 milliLiter(s) (50 mL/Hr) IV Continuous <Continuous>  dextrose 5%. 1000 milliLiter(s) (100 mL/Hr) IV Continuous <Continuous>  dextrose 50% Injectable 25 Gram(s) IV Push once  dextrose 50% Injectable 12.5 Gram(s) IV Push once  dextrose 50% Injectable 25 Gram(s) IV Push once  famotidine    Tablet 20 milliGRAM(s) Oral daily  glucagon  Injectable 1 milliGRAM(s) IntraMuscular once  insulin glargine Injectable (LANTUS) 15 Unit(s) SubCutaneous at bedtime  insulin lispro (ADMELOG) corrective regimen sliding scale   SubCutaneous three times a day before meals  insulin lispro (ADMELOG) corrective regimen sliding scale   SubCutaneous at bedtime  insulin lispro Injectable (ADMELOG) 5 Unit(s) SubCutaneous three times a day before meals    MEDICATIONS  (PRN):  acetaminophen   Tablet .. 650 milliGRAM(s) Oral every 6 hours PRN Temp greater or equal to 38C (100.4F), Mild Pain (1 - 3)  albuterol/ipratropium for Nebulization 3 milliLiter(s) Nebulizer every 6 hours PRN Shortness of Breath and/or Wheezing  benzocaine 15 mG/menthol 3.6 mG (Sugar-Free) Lozenge 1 Lozenge Oral every 6 hours PRN Mouth Sores  guaiFENesin   Syrup  (Sugar-Free) 200 milliGRAM(s) Oral every 6 hours PRN Cough  sodium chloride 0.65% Nasal 1 Spray(s) Both Nostrils three times a day PRN Nasal Congestion      REVIEW OF SYSTEMS:    CONSTITUTIONAL: No fever, chills, weight loss, or fatigue  HEENT: No sore throat, runny nose, ear ache  RESPIRATORY: No cough, wheezing, No shortness of breath  CARDIOVASCULAR: No chest pain, palpitations, dizziness  GASTROINTESTINAL: No abdominal pain. No nausea, vomiting, diarrhea  GENITOURINARY: No dysuria, increase frequency, hematuria, or incontinence  NEUROLOGICAL: No headaches, memory loss, loss of strength, numbness, or tremors, no weakness  EXTREMITY: No pedal edema BLE  SKIN: No itching, burning, rashes, or lesions     VITAL SIGNS:  T(C): 36.8 (01-31-21 @ 11:53), Max: 36.8 (01-31-21 @ 11:53)  T(F): 98.3 (01-31-21 @ 11:53), Max: 98.3 (01-31-21 @ 11:53)  HR: 91 (01-31-21 @ 11:53) (57 - 91)  BP: 132/76 (01-31-21 @ 11:53) (126/72 - 145/79)  RR: 16 (01-31-21 @ 11:53) (16 - 16)  SpO2: 97% (01-31-21 @ 11:53) (96% - 99%)  Wt(kg): --    PHYSICAL EXAM:    GENERAL: not in any distress  HEENT: Neck is supple, normocephalic, atraumatic   CHEST/LUNG: Clear to auscultation bilaterally; No rales, rhonchi, wheezing  rhonchi on right   HEART: Regular rate and rhythm; No murmurs, rubs, or gallops  ABDOMEN: Soft, Nontender, Nondistended; Bowel sounds present, no rebound   EXTREMITIES:  2+ Peripheral Pulses, No clubbing, cyanosis, or edema  SKIN: No rashes or lesions  BACK: no pressor sore   NERVOUS SYSTEM:  Alert & Oriented X3, Good concentration  PSYCH: normal affect     LABS:                                     Culture Results:   Culture is being performed. (01-29 @ 00:54)  Culture Results:   Testing in progress (01-29 @ 00:54)  Culture Results:   No growth at 48 hours (01-29 @ 00:53)                Radiology:

## 2021-01-31 NOTE — PROGRESS NOTE ADULT - ASSESSMENT
54 y/o female w/pmhx of  DM II, HTN, RA, and MI comes w/complain of sob x 2 days and recent pna worsening radiographically    Problem/Plan - 1:  ·  Problem: Pneumonia of right lower lobe due to infectious organism.  Plan: Covering for gram neg pna, Sepsis POA   cefepime/vanco, doxy was added  ID consult noted on board  pulm consult noted   urine legionella negative   hyponatremia improving  TTE wnl  WBC leukocytosis is improving    rheum  consult noted    1/21/2021 no fever since 1/19/2021 covid negative from 1/20/21, f/u imdium scan  1/23/2021 indium scna shows uptake right lobe . still with fever f/u ct chest per ID recommendations  may need vancomycin   QuantiFeron is negative  1/24/2021 vancomycin  started yesterday night . will monitor one more day of still febrile in am will repeat blood cx yet again  1/25/2021 per ID was started on vancomycin over weekend now it appears stopped per ID , will ask pulmonary for re evaluation   will ask thoracic for bonch  1/26/2021  per verbal d/w kerri plunkett ( thoracic ) bronch for Thursday follow up HIV testing  1/27/2021 for bronch in am . npo midnight.   1/28/2021 - s/p successful bronchoscopy with findings significant for mucopurulent secretions.  cultures are pending.   1/31 aaiting cultures     Problem/Plan - 2:  ·  Problem: Rheumatoid arthritis, involving unspecified site, unspecified whether rheumatoid factor present.  Plan: tylenol prn   1/25/2021 reviewed [facility] re evaluation  1/26/20212 reviewed note.   1/28/2021 - stable at this time no acute interventions    Problem/Plan - 3:  ·  Problem: Type 2 diabetes mellitus with hyperglycemia, without long-term current use of insulin.  Plan: ss insulin coverage   hgba1c 9.2   get endocrine consult.   - 1/28/2021 aim for glucose control to < 200s while inpatient.  Should improve while off antibiotics     Problem/Plan - 4:  ·  Problem: Essential hypertension.  Plan: cont amlodipine.     Problem/Plan - 5:  ·  Problem: Preventive measure.  Plan: sq lovenox.     Problem/Plan - 6:  Problem: Hypokalemia. Plan: resolved.

## 2021-01-31 NOTE — PROGRESS NOTE ADULT - ASSESSMENT
non resolving community acquired pneumonia   immunosuppressed host   sp prolonged antibiotics; ongoing fevders   off all antibiotics x 4 days and clinically better    rheum follow up noted  steroids ?? patient vehemingly against steroids   for bronch tomorrow  hiv testing ; NEGATIVE patient notified  procal high today .24  will repeat am    non resolving community acquired pneumonia   immunosuppressed host   sp prolonged antibiotics; ongoing fevders   off all antibiotics x 4 days and clinically better    rheum follow up noted  sp bronch  result pending

## 2021-02-01 LAB
GLUCOSE BLDC GLUCOMTR-MCNC: 135 MG/DL — HIGH (ref 70–99)
GLUCOSE BLDC GLUCOMTR-MCNC: 174 MG/DL — HIGH (ref 70–99)
GLUCOSE BLDC GLUCOMTR-MCNC: 213 MG/DL — HIGH (ref 70–99)
GLUCOSE BLDC GLUCOMTR-MCNC: 237 MG/DL — HIGH (ref 70–99)
GLUCOSE BLDC GLUCOMTR-MCNC: 73 MG/DL — SIGNIFICANT CHANGE UP (ref 70–99)

## 2021-02-01 PROCEDURE — 99232 SBSQ HOSP IP/OBS MODERATE 35: CPT

## 2021-02-01 RX ADMIN — AMLODIPINE BESYLATE 5 MILLIGRAM(S): 2.5 TABLET ORAL at 05:07

## 2021-02-01 RX ADMIN — Medication 4: at 12:06

## 2021-02-01 RX ADMIN — Medication 5 UNIT(S): at 08:23

## 2021-02-01 RX ADMIN — Medication 81 MILLIGRAM(S): at 12:06

## 2021-02-01 RX ADMIN — Medication 5 UNIT(S): at 12:06

## 2021-02-01 RX ADMIN — INSULIN GLARGINE 15 UNIT(S): 100 INJECTION, SOLUTION SUBCUTANEOUS at 21:33

## 2021-02-01 RX ADMIN — FAMOTIDINE 20 MILLIGRAM(S): 10 INJECTION INTRAVENOUS at 12:07

## 2021-02-01 NOTE — PROGRESS NOTE ADULT - PROBLEM SELECTOR PLAN 1
Continue with the same regimen while inpatient   while inpatient Finger Sticks should be in 140-180 range, preferably <160   Patient can resume  home regimen upon discharge   Patient should have strict diet control and do exercise as tolerated upon discharge

## 2021-02-01 NOTE — PROGRESS NOTE ADULT - SUBJECTIVE AND OBJECTIVE BOX
Patient is a 55y old  Female who presents with a chief complaint of sob (31 Jan 2021 22:35)      Interval History: fingersticks of any good.  Element of gastroparesis may be present.  Occasionally low blood glucose is followed by high blood glucose as coverage is not given.    MEDICATIONS  (STANDING):  amLODIPine   Tablet 5 milliGRAM(s) Oral daily  aspirin enteric coated 81 milliGRAM(s) Oral daily  dextrose 40% Gel 15 Gram(s) Oral once  dextrose 5%. 1000 milliLiter(s) (50 mL/Hr) IV Continuous <Continuous>  dextrose 5%. 1000 milliLiter(s) (100 mL/Hr) IV Continuous <Continuous>  dextrose 50% Injectable 25 Gram(s) IV Push once  dextrose 50% Injectable 12.5 Gram(s) IV Push once  dextrose 50% Injectable 25 Gram(s) IV Push once  famotidine    Tablet 20 milliGRAM(s) Oral daily  glucagon  Injectable 1 milliGRAM(s) IntraMuscular once  insulin glargine Injectable (LANTUS) 15 Unit(s) SubCutaneous at bedtime  insulin lispro (ADMELOG) corrective regimen sliding scale   SubCutaneous three times a day before meals  insulin lispro (ADMELOG) corrective regimen sliding scale   SubCutaneous at bedtime  insulin lispro Injectable (ADMELOG) 5 Unit(s) SubCutaneous three times a day before meals    MEDICATIONS  (PRN):  acetaminophen   Tablet .. 650 milliGRAM(s) Oral every 6 hours PRN Temp greater or equal to 38C (100.4F), Mild Pain (1 - 3)  albuterol/ipratropium for Nebulization 3 milliLiter(s) Nebulizer every 6 hours PRN Shortness of Breath and/or Wheezing  benzocaine 15 mG/menthol 3.6 mG (Sugar-Free) Lozenge 1 Lozenge Oral every 6 hours PRN Mouth Sores  guaiFENesin   Syrup  (Sugar-Free) 200 milliGRAM(s) Oral every 6 hours PRN Cough  sodium chloride 0.65% Nasal 1 Spray(s) Both Nostrils three times a day PRN Nasal Congestion      Allergies    ACE inhibitors (Angioedema (Severe))  lisinopril (Anaphylaxis; Angioedema)    Intolerances        REVIEW OF SYSTEMS:  CONSTITUTIONAL: no changes  EYES: No eye pain, visual disturbances, or discharge  ENMT:  No difficulty hearing, No sinus or throat pain  NECK: No pain or stiffness  RESPIRATORY: No cough, wheezing, chills or hemoptysis; No shortness of breath  CARDIOVASCULAR: No chest pain, palpitations or leg swelling  GASTROINTESTINAL: No abdominal or epigastric pain. No nausea, vomiting, or hematemesis; No diarrhea or constipation. No melena or hematochezia.  GENITOURINARY: No dysuria, frequency, hematuria, or incontinence  NEUROLOGICAL: No headaches, memory loss, loss of strength, numbness, or tremors  SKIN: No itching, burning, rashes, or lesions   ENDOCRINE: No heat or cold intolerance; No hair loss  MUSCULOSKELETAL: No joint pain or swelling; No muscle, back, or extremity pain  PSYCHIATRIC: No depression, anxiety, mood swings, or difficulty sleeping  HEME/LYMPH: No easy bruising, or bleeding gums  ALLERY AND IMMUNOLOGIC: No hives or eczema    Vital Signs Last 24 Hrs  T(C): 36.7 (01 Feb 2021 18:41), Max: 36.7 (01 Feb 2021 11:07)  T(F): 98.1 (01 Feb 2021 18:41), Max: 98.1 (01 Feb 2021 11:07)  HR: 69 (01 Feb 2021 18:41) (69 - 90)  BP: 142/81 (01 Feb 2021 18:41) (123/79 - 142/81)  BP(mean): --  RR: 18 (01 Feb 2021 18:41) (16 - 18)  SpO2: 97% (01 Feb 2021 18:41) (96% - 97%)    PHYSICAL EXAM:  GENERAL:   HEAD: Atraumatic, Normocephalic  EYES: PERRLA, conjunctiva and sclera clear  ENMT: No tonsillar erythema, exudates, or enlargement; Moist mucous membranes, Good dentition, No lesions  NECK: Supple, No JVD, Normal thyroid  NERVOUS SYSTEM:  Alert & Oriented X3, Good concentration; Motor Strength 5/5 B/L upper and lower extremities  CHEST/LUNG: Clear to auscultaion bilaterally; No rales, rhonchi, wheezing, or rubs  HEART: Regular rate and rhythm; No murmurs, rubs, or gallops  ABDOMEN: Soft, Nontender, Nondistended; Bowel sounds present  EXTREMITIES:  2+ Peripheral Pulses, no edema  SKIN: No rashes or lesions    LABS:        CAPILLARY BLOOD GLUCOSE      POCT Blood Glucose.: 174 mg/dL (01 Feb 2021 17:17)  POCT Blood Glucose.: 73 mg/dL (01 Feb 2021 16:35)  POCT Blood Glucose.: 237 mg/dL (01 Feb 2021 11:10)  POCT Blood Glucose.: 135 mg/dL (01 Feb 2021 07:51)  POCT Blood Glucose.: 194 mg/dL (31 Jan 2021 21:06)    Lipid panel:           Thyroid:  Diabetes Tests:  Parathyroid Panel:  Adrenals:  RADIOLOGY & ADDITIONAL TESTS:    Imaging Personally Reviewed:  [ ] YES  [ ] NO    Consultant(s) Notes Reviewed:  [ ] YES  [ ] NO    Care Discussed with Consultants/Other Providers [ ] YES  [ ] NO

## 2021-02-01 NOTE — PROGRESS NOTE ADULT - SUBJECTIVE AND OBJECTIVE BOX
Vital Signs Last 24 Hrs  T(C): 36.8 (31 Jan 2021 11:53), Max: 36.8 (31 Jan 2021 11:53)  T(F): 98.3 (31 Jan 2021 11:53), Max: 98.3 (31 Jan 2021 11:53)  HR: 91 (31 Jan 2021 11:53) (57 - 91)  BP: 132/76 (31 Jan 2021 11:53) (126/72 - 145/79)  BP(mean): --  RR: 16 (31 Jan 2021 11:53) (16 - 16)  SpO2: 97% (31 Jan 2021 11:53) (96% - 99%)Patient is a 55y old  Female who presents with a chief complaint of sob (28 Jan 2021 20:25)    HPI:  Pt is a 54 y/o female w/pmhx of  DM II, HTN, RA, and MI comes w/complain of sob x 2 days and marked fatigue and fever to tmax 100.2.  pt was diagnosed w/CAP on 1/5/21 was sent home on azithro and cefopodoxime x7days.  pt completed abx, she was covid neg then and again today, she returns w/increasing sob over the last 2 days and pleuritic pain on deep breaths.  pt denies any , chills, +non productive cough +pleuritic pain no cp, palpitations, n/v/d/c no travels. feels very fatigued, barely able to put on a blouse.    CTA in ed  No pulmonary emboli visualized. Consolidation in the right lower lobe with mediastinal and right hilar lymphadenopathy which may represent infection.  (13 Jan 2021 21:31)    SUBJECTIVE & OBJECTIVE: Pt seen and examined at bedside. She is for bronchoscopy today  PHYSICAL EXAM:          GENERAL: NAD, well-groomed, well-developed  HEAD:  Atraumatic, Normocephalic  EYES: EOMI, PERRLA, conjunctiva and sclera clear  ENMT: Moist mucous membranes  NECK: Supple, No JVD  NERVOUS SYSTEM:  Alert & Oriented X3, Motor Strength 5/5 B/L upper and lower extremities; DTRs 2+ intact and symmetric  CHEST/LUNG: poor air entry to right lung base  HEART: Regular rate and rhythm; No murmurs, rubs, or gallops  ABDOMEN: Soft, Nontender, Nondistended; Bowel sounds present  EXTREMITIES:  2+ Peripheral Pulses, No clubbing, cyanosis, or edema  Specimen Source: .Bronchial RIGHT LUNG LAVAGE (01.29.21 @ 00:54)

## 2021-02-02 LAB
GLUCOSE BLDC GLUCOMTR-MCNC: 127 MG/DL — HIGH (ref 70–99)
GLUCOSE BLDC GLUCOMTR-MCNC: 159 MG/DL — HIGH (ref 70–99)
GLUCOSE BLDC GLUCOMTR-MCNC: 197 MG/DL — HIGH (ref 70–99)
GLUCOSE BLDC GLUCOMTR-MCNC: 216 MG/DL — HIGH (ref 70–99)

## 2021-02-02 PROCEDURE — 99232 SBSQ HOSP IP/OBS MODERATE 35: CPT

## 2021-02-02 RX ADMIN — Medication 2: at 16:00

## 2021-02-02 RX ADMIN — FAMOTIDINE 20 MILLIGRAM(S): 10 INJECTION INTRAVENOUS at 11:28

## 2021-02-02 RX ADMIN — Medication 81 MILLIGRAM(S): at 11:28

## 2021-02-02 RX ADMIN — INSULIN GLARGINE 15 UNIT(S): 100 INJECTION, SOLUTION SUBCUTANEOUS at 21:54

## 2021-02-02 RX ADMIN — AMLODIPINE BESYLATE 5 MILLIGRAM(S): 2.5 TABLET ORAL at 04:58

## 2021-02-02 RX ADMIN — Medication 2: at 11:28

## 2021-02-02 NOTE — PROGRESS NOTE ADULT - PROBLEM SELECTOR PLAN 1
improved control  continue lantus 15units  LIspro 5utnis with meals   CRISTÓBAL   can resume home regimen upon d/c

## 2021-02-02 NOTE — PROGRESS NOTE ADULT - SUBJECTIVE AND OBJECTIVE BOX
Patient is a 55y old  Female who presents with a chief complaint of sob (01 Feb 2021 20:46)      INTERVAL HPI/OVERNIGHT EVENTS:  pt with no complaints  eating well, good appetite    MEDICATIONS  (STANDING):  amLODIPine   Tablet 5 milliGRAM(s) Oral daily  aspirin enteric coated 81 milliGRAM(s) Oral daily  dextrose 40% Gel 15 Gram(s) Oral once  dextrose 5%. 1000 milliLiter(s) (100 mL/Hr) IV Continuous <Continuous>  dextrose 5%. 1000 milliLiter(s) (50 mL/Hr) IV Continuous <Continuous>  dextrose 50% Injectable 25 Gram(s) IV Push once  dextrose 50% Injectable 12.5 Gram(s) IV Push once  dextrose 50% Injectable 25 Gram(s) IV Push once  famotidine    Tablet 20 milliGRAM(s) Oral daily  glucagon  Injectable 1 milliGRAM(s) IntraMuscular once  insulin glargine Injectable (LANTUS) 15 Unit(s) SubCutaneous at bedtime  insulin lispro (ADMELOG) corrective regimen sliding scale   SubCutaneous three times a day before meals  insulin lispro (ADMELOG) corrective regimen sliding scale   SubCutaneous at bedtime  insulin lispro Injectable (ADMELOG) 5 Unit(s) SubCutaneous three times a day before meals    MEDICATIONS  (PRN):  acetaminophen   Tablet .. 650 milliGRAM(s) Oral every 6 hours PRN Temp greater or equal to 38C (100.4F), Mild Pain (1 - 3)  albuterol/ipratropium for Nebulization 3 milliLiter(s) Nebulizer every 6 hours PRN Shortness of Breath and/or Wheezing  benzocaine 15 mG/menthol 3.6 mG (Sugar-Free) Lozenge 1 Lozenge Oral every 6 hours PRN Mouth Sores  guaiFENesin   Syrup  (Sugar-Free) 200 milliGRAM(s) Oral every 6 hours PRN Cough  sodium chloride 0.65% Nasal 1 Spray(s) Both Nostrils three times a day PRN Nasal Congestion      REVIEW OF SYSTEMS:  CONSTITUTIONAL: No fever, weight loss, or fatigue  RESPIRATORY: No cough, wheezing, chills or hemoptysis; No shortness of breath  CARDIOVASCULAR: No chest pain, palpitations, dizziness, or leg swelling  GASTROINTESTINAL: No abdominal or epigastric pain. No nausea, vomiting, or hematemesis; No diarrhea or constipation. No melena or hematochezia.  ENDOCRINE: No heat or cold intolerance; No hair loss      Vital Signs Last 24 Hrs  T(C): 36.9 (02 Feb 2021 17:05), Max: 37.4 (02 Feb 2021 11:24)  T(F): 98.4 (02 Feb 2021 17:05), Max: 99.3 (02 Feb 2021 11:24)  HR: 69 (02 Feb 2021 17:05) (63 - 74)  BP: 126/78 (02 Feb 2021 17:05) (126/78 - 142/81)  BP(mean): --  RR: 16 (02 Feb 2021 17:05) (16 - 18)  SpO2: 97% (02 Feb 2021 17:05) (93% - 100%)    PHYSICAL EXAM:  GENERAL: NAD, well-groomed, well-developed        LABS:              CAPILLARY BLOOD GLUCOSE      POCT Blood Glucose.: 159 mg/dL (02 Feb 2021 15:55)  POCT Blood Glucose.: 197 mg/dL (02 Feb 2021 11:03)  POCT Blood Glucose.: 127 mg/dL (02 Feb 2021 07:39)  POCT Blood Glucose.: 213 mg/dL (01 Feb 2021 21:28)  POCT Blood Glucose.: 174 mg/dL (01 Feb 2021 17:17)    Lipid panel:               RADIOLOGY & ADDITIONAL TESTS:

## 2021-02-02 NOTE — PROGRESS NOTE ADULT - PROBLEM SELECTOR PROBLEM 2
Pneumonia of right lower lobe due to infectious organism
Rheumatoid arthritis, involving unspecified site, unspecified whether rheumatoid factor present
Pneumonia of right lower lobe due to infectious organism
Rheumatoid arthritis, involving unspecified site, unspecified whether rheumatoid factor present
Pneumonia of right lower lobe due to infectious organism
Pulmonary atelectasis
Rheumatoid arthritis, involving unspecified site, unspecified whether rheumatoid factor present
10

## 2021-02-02 NOTE — PROGRESS NOTE ADULT - PROBLEM SELECTOR PROBLEM 3
Rheumatoid arthritis, involving unspecified site, unspecified whether rheumatoid factor present
Rheumatoid arthritis, involving unspecified site, unspecified whether rheumatoid factor present
Type 2 diabetes mellitus with hyperglycemia, without long-term current use of insulin
Type 2 diabetes mellitus with hyperglycemia, without long-term current use of insulin
Rheumatoid arthritis, involving unspecified site, unspecified whether rheumatoid factor present
Type 2 diabetes mellitus with hyperglycemia, without long-term current use of insulin
Rheumatoid arthritis, involving unspecified site, unspecified whether rheumatoid factor present
Rheumatoid arthritis, involving unspecified site, unspecified whether rheumatoid factor present
Type 2 diabetes mellitus with hyperglycemia, without long-term current use of insulin
Type 2 diabetes mellitus with hyperglycemia, without long-term current use of insulin
Rheumatoid arthritis, involving unspecified site, unspecified whether rheumatoid factor present
Rheumatoid arthritis, involving unspecified site, unspecified whether rheumatoid factor present
Type 2 diabetes mellitus with hyperglycemia, without long-term current use of insulin
2019 novel coronavirus disease (COVID-19)
Diabetes
Diabetes
Rheumatoid arthritis, involving unspecified site, unspecified whether rheumatoid factor present
Type 2 diabetes mellitus with hyperglycemia, without long-term current use of insulin
Rheumatoid arthritis, involving unspecified site, unspecified whether rheumatoid factor present
Rheumatoid arthritis, involving unspecified site, unspecified whether rheumatoid factor present
Type 2 diabetes mellitus with hyperglycemia, without long-term current use of insulin

## 2021-02-02 NOTE — PROGRESS NOTE ADULT - ASSESSMENT
54 y/o female w/pmhx of  DM II, HTN, RA, and MI comes w/complain of sob x 2 days and recent pna worsening radiographically    Problem/Plan - 1:  ·  Problem: Pneumonia of right lower lobe due to infectious organism.  Plan: Covering for gram neg pna, Sepsis POA   cefepime/vanco, doxy was added  ID consult noted on board  pulm consult noted   urine legionella negative   hyponatremia improving  TTE wnl  WBC leukocytosis is improving    rheum  consult noted    1/21/2021 no fever since 1/19/2021 covid negative from 1/20/21, f/u imdium scan  1/23/2021 indium scna shows uptake right lobe . still with fever f/u ct chest per ID recommendations  may need vancomycin   QuantiFeron is negative  1/24/2021 vancomycin  started yesterday night . will monitor one more day of still febrile in am will repeat blood cx yet again  1/25/2021 per ID was started on vancomycin over weekend now it appears stopped per ID , will ask pulmonary for re evaluation   will ask thoracic for bonch  1/26/2021  per verbal d/w charmaine. dimitriso ( thoracic ) bronch for Thursday follow up HIV testing  1/27/2021 for bronch in am . npo midnight.   1/28/2021 - s/p successful bronchoscopy with findings significant for mucopurulent secretions.  cultures are pending.   2/2/21 awaiting cultures will dc in am and follow up   Problem/Plan - 2:  ·  Problem: Rheumatoid arthritis, involving unspecified site, unspecified whether rheumatoid factor present.  Plan: tylenol prn   1/25/2021 reviewed [facility] re evaluation  1/26/20212 reviewed note.   1/28/2021 - stable at this time no acute interventions    Problem/Plan - 3:  ·  Problem: Type 2 diabetes mellitus with hyperglycemia, without long-term current use of insulin.  Plan: ss insulin coverage   hgba1c 9.2   get endocrine consult.   - 1/28/2021 aim for glucose control to < 200s while inpatient.  Should improve while off antibiotics     Problem/Plan - 4:  ·  Problem: Essential hypertension.  Plan: cont amlodipine.     Problem/Plan - 5:  ·  Problem: Preventive measure.  Plan: sq lovenox.     Problem/Plan - 6:  Problem: Hypokalemia. Plan: resolved.

## 2021-02-02 NOTE — PROGRESS NOTE ADULT - SUBJECTIVE AND OBJECTIVE BOX
INTERVAL HPI:   Pt is a 56 y/o female w/pmhx of  DM II, HTN, RA, and MI comes w/complain of sob x 2 days and marked fatigue and fever to tmax 100.2.  pt was diagnosed w/CAP on 1/5/21 was sent home on azithro and cefopodoxime x7days.  pt completed abx, she was covid neg then and again today, she returns w/increasing sob over the last 2 days and pleuritic pain on deep breaths.  pt denies any , chills, +non productive cough +pleuritic pain no cp, palpitations, n/v/d/c no travels. feels very fatigued, barely able to put on a blouse.  Non smoker. Reports weight loss over few months,  CTA in ed  No pulmonary emboli visualized. Consolidation in the right lower lobe with mediastinal and right hilar lymphadenopathy which may represent infection.  (13 Jan 2021 21:31).  Admitted with Pneumonia, leukocytosis  01/29/30: Bronchoscopy:  ·  POST-OP DIAGNOSIS:  Pneumonia involving right lung 28-Jan-2021 15:46:24  Miladys Solano  ·  PROCEDURES:  Bronchoscopy with bronchoalveolar lavage and brush biopsy of right lung 28-Jan-2021 15:45:22  Miladys Solano       Operative Findings:  · Operative Findings	Mucopurulent secretions obstructing right mainstem bronchus.  No endobronchial lesion.  Patient with abnormal bronchial anatomy with presence of post-epiarterial right bronchus.  ?compression of superior segmental branch and RML from intermittent pneumonia      OVERNIGHT EVENTS:  Comfortable    Vital Signs Last 24 Hrs  T(C): 36.9 (02 Feb 2021 17:05), Max: 37.4 (02 Feb 2021 11:24)  T(F): 98.4 (02 Feb 2021 17:05), Max: 99.3 (02 Feb 2021 11:24)  HR: 69 (02 Feb 2021 17:05) (63 - 74)  BP: 126/78 (02 Feb 2021 17:05) (126/78 - 137/71)  BP(mean): --  RR: 16 (02 Feb 2021 17:05) (16 - 18)  SpO2: 97% (02 Feb 2021 17:05) (93% - 100%)    PHYSICAL EXAM:  GEN:         Awake, responsive and comfortable.  HEENT:    Normal.    RESP:        no distress  CVS:          Regular rate and rhythm.   ABD:         Soft, non-tender, non-distended;     MEDICATIONS  (STANDING):  amLODIPine   Tablet 5 milliGRAM(s) Oral daily  aspirin enteric coated 81 milliGRAM(s) Oral daily  dextrose 40% Gel 15 Gram(s) Oral once  dextrose 5%. 1000 milliLiter(s) (50 mL/Hr) IV Continuous <Continuous>  dextrose 5%. 1000 milliLiter(s) (100 mL/Hr) IV Continuous <Continuous>  dextrose 50% Injectable 25 Gram(s) IV Push once  dextrose 50% Injectable 12.5 Gram(s) IV Push once  dextrose 50% Injectable 25 Gram(s) IV Push once  famotidine    Tablet 20 milliGRAM(s) Oral daily  glucagon  Injectable 1 milliGRAM(s) IntraMuscular once  insulin glargine Injectable (LANTUS) 15 Unit(s) SubCutaneous at bedtime  insulin lispro (ADMELOG) corrective regimen sliding scale   SubCutaneous three times a day before meals  insulin lispro (ADMELOG) corrective regimen sliding scale   SubCutaneous at bedtime  insulin lispro Injectable (ADMELOG) 5 Unit(s) SubCutaneous three times a day before meals    MEDICATIONS  (PRN):  acetaminophen   Tablet .. 650 milliGRAM(s) Oral every 6 hours PRN Temp greater or equal to 38C (100.4F), Mild Pain (1 - 3)  albuterol/ipratropium for Nebulization 3 milliLiter(s) Nebulizer every 6 hours PRN Shortness of Breath and/or Wheezing  benzocaine 15 mG/menthol 3.6 mG (Sugar-Free) Lozenge 1 Lozenge Oral every 6 hours PRN Mouth Sores  guaiFENesin   Syrup  (Sugar-Free) 200 milliGRAM(s) Oral every 6 hours PRN Cough  sodium chloride 0.65% Nasal 1 Spray(s) Both Nostrils three times a day PRN Nasal Congestion    ASSESSMENT AND PLAN:  ·	SOB.  ·	RLL Pneumonia with Lymph adenopathy.  ·	Leukocytosis.  ·	DM.  ·	HTN.  ·	Arthritis.    Clinically better, off antibiotics.  Bronchoscopy cultures negative so for.  RLL biopsy shows acute and chronic inflammation. no malignancy.  Follow up chest CT in 6-8 weeks.

## 2021-02-02 NOTE — PROGRESS NOTE ADULT - PROBLEM SELECTOR PROBLEM 4
Essential hypertension
Type 2 diabetes mellitus with hyperglycemia, without long-term current use of insulin
Essential hypertension
Type 2 diabetes mellitus with hyperglycemia, without long-term current use of insulin
Pneumonia of right lower lobe due to infectious organism
Type 2 diabetes mellitus with hyperglycemia, without long-term current use of insulin
Essential hypertension
Essential hypertension
Pneumonia of right lower lobe due to infectious organism
Essential hypertension

## 2021-02-02 NOTE — PROGRESS NOTE ADULT - PROBLEM SELECTOR PLAN 4
cont amlodipine
cont amlodipine
Pneumonia of right lower lobe due to infectious organism.   WBC leukocytosis is improving    CT scan shows expansion of the RT ML
cont amlodipine
CXR    ABX    FU path
cont amlodipine

## 2021-02-02 NOTE — PROGRESS NOTE ADULT - SUBJECTIVE AND OBJECTIVE BOX
S/P Bronchoscopy    Thick mucus secretions suctioned    CXR and CT scan shows rexpansion of the Rt Middle lobe      SUBJECTIVE & OBJECTIVE: Pt seen and examined at bedside.   PHYSICAL EXAM:      Vital Signs Last 24 Hrs  T(C): 36.9 (02 Feb 2021 17:05), Max: 37.4 (02 Feb 2021 11:24)  T(F): 98.4 (02 Feb 2021 17:05), Max: 99.3 (02 Feb 2021 11:24)  HR: 69 (02 Feb 2021 17:05) (63 - 74)  BP: 126/78 (02 Feb 2021 17:05) (126/78 - 142/81)  BP(mean): --  RR: 16 (02 Feb 2021 17:05) (16 - 18)  SpO2: 97% (02 Feb 2021 17:05) (93% - 100%)    GENERAL: NAD, well-groomed, well-developed  HEAD:  Atraumatic, Normocephalic  EYES: EOMI, PERRLA, conjunctiva and sclera clear  ENMT: Moist mucous membranes  NECK: Supple, No JVD  NERVOUS SYSTEM:  Alert & Oriented X3, Motor Strength 5/5 B/L upper and lower extremities; DTRs 2+ intact and symmetric  CHEST/LUNG: poor air entry to right lung base  HEART: Regular rate and rhythm; No murmurs, rubs, or gallops  ABDOMEN: Soft, Nontender, Nondistended; Bowel sounds present  EXTREMITIES:  2+ Peripheral Pulses, No clubbing, cyanosis, or edema  Specimen Source: .Bronchial RIGHT LUNG LAVAGE (01.29.21 @ 00:54)        EXAM: XR CHEST PORTABLE ROUTINE 1V      PROCEDURE DATE: 01/28/2021        INTERPRETATION: CLINICAL STATEMENT: Follow-up chest pain.    TECHNIQUE: AP view of the chest.    COMPARISON: 1/27/2021    FINDINGS/  IMPRESSION:  Infiltrate right lung base slightly improving. No pleural effusion.    Heart size within normal limits.            RAMON AHUMADA MD; Attending Radiologist  This document has been electronically signed. Jan 28 2021 5:01PM      EXAM: XR CHEST PORTABLE ROUTINE 1V      PROCEDURE DATE: 01/28/2021        INTERPRETATION: CLINICAL STATEMENT: Follow-up chest pain.    TECHNIQUE: AP view of the chest.    COMPARISON: 1/27/2021    FINDINGS/  IMPRESSION:  Infiltrate right lung base slightly improving. No pleural effusion.    Heart size within normal limits.            RAMON AHUMADA MD; Attending Radiologist  This document has been electronically signed. Jan 28 2021 5:01PM

## 2021-02-02 NOTE — PROGRESS NOTE ADULT - SUBJECTIVE AND OBJECTIVE BOX
HPI:  Pt is a 54 y/o female w/pmhx of  DM II, HTN, RA, and MI comes w/complain of sob x 2 days and marked fatigue and fever to tmax 100.2.  pt was diagnosed w/CAP on 1/5/21 was sent home on azithro and cefopodoxime x7days.  pt completed abx, she was covid neg then and again today, she returns w/increasing sob over the last 2 days and pleuritic pain on deep breaths.  pt denies any , chills, +non productive cough +pleuritic pain no cp, palpitations, n/v/d/c no travels. feels very fatigued, barely able to put on a blouse.    CTA in ed  No pulmonary emboli visualized. Consolidation in the right lower lobe with mediastinal and right hilar lymphadenopathy which may represent infection.  (13 Jan 2021 21:31)    SUBJECTIVE & OBJECTIVE: Pt seen and examined at bedside. She is for bronchoscopy today  PHYSICAL EXAM:      Vital Signs Last 24 Hrs  T(C): 36.9 (02 Feb 2021 17:05), Max: 37.4 (02 Feb 2021 11:24)  T(F): 98.4 (02 Feb 2021 17:05), Max: 99.3 (02 Feb 2021 11:24)  HR: 69 (02 Feb 2021 17:05) (63 - 74)  BP: 126/78 (02 Feb 2021 17:05) (126/78 - 142/81)  BP(mean): --  RR: 16 (02 Feb 2021 17:05) (16 - 18)  SpO2: 97% (02 Feb 2021 17:05) (93% - 100%)    GENERAL: NAD, well-groomed, well-developed  HEAD:  Atraumatic, Normocephalic  EYES: EOMI, PERRLA, conjunctiva and sclera clear  ENMT: Moist mucous membranes  NECK: Supple, No JVD  NERVOUS SYSTEM:  Alert & Oriented X3, Motor Strength 5/5 B/L upper and lower extremities; DTRs 2+ intact and symmetric  CHEST/LUNG: poor air entry to right lung base  HEART: Regular rate and rhythm; No murmurs, rubs, or gallops  ABDOMEN: Soft, Nontender, Nondistended; Bowel sounds present  EXTREMITIES:  2+ Peripheral Pulses, No clubbing, cyanosis, or edema  Specimen Source: .Bronchial RIGHT LUNG LAVAGE (01.29.21 @ 00:54)

## 2021-02-03 ENCOUNTER — TRANSCRIPTION ENCOUNTER (OUTPATIENT)
Age: 56
End: 2021-02-03

## 2021-02-03 VITALS
HEART RATE: 70 BPM | DIASTOLIC BLOOD PRESSURE: 78 MMHG | OXYGEN SATURATION: 99 % | TEMPERATURE: 98 F | SYSTOLIC BLOOD PRESSURE: 132 MMHG | RESPIRATION RATE: 17 BRPM

## 2021-02-03 LAB
GLUCOSE BLDC GLUCOMTR-MCNC: 109 MG/DL — HIGH (ref 70–99)
GLUCOSE BLDC GLUCOMTR-MCNC: 114 MG/DL — HIGH (ref 70–99)

## 2021-02-03 PROCEDURE — 99239 HOSP IP/OBS DSCHRG MGMT >30: CPT

## 2021-02-03 RX ORDER — ASPIRIN/CALCIUM CARB/MAGNESIUM 324 MG
1 TABLET ORAL
Qty: 0 | Refills: 0 | DISCHARGE
Start: 2021-02-03

## 2021-02-03 RX ADMIN — Medication 81 MILLIGRAM(S): at 12:53

## 2021-02-03 RX ADMIN — Medication 5 UNIT(S): at 08:15

## 2021-02-03 RX ADMIN — FAMOTIDINE 20 MILLIGRAM(S): 10 INJECTION INTRAVENOUS at 12:53

## 2021-02-03 RX ADMIN — AMLODIPINE BESYLATE 5 MILLIGRAM(S): 2.5 TABLET ORAL at 04:36

## 2021-02-03 NOTE — DISCHARGE NOTE PROVIDER - NSDCFUSCHEDAPPT_GEN_ALL_CORE_FT
JES MCBRIDE ; 02/05/2021 ; NPP Rheum  [address]  JES MCBRIDE ; 03/02/2021 ; NPP Otolaryng [address] [place] [address]

## 2021-02-03 NOTE — DISCHARGE NOTE NURSING/CASE MANAGEMENT/SOCIAL WORK - PATIENT PORTAL LINK FT
You can access the FollowMyHealth Patient Portal offered by [facility] by registering at the following website: [email or web address]. By joining [facility]’s FollowMyHealth portal, you will also be able to view your health information using other applications (apps) compatible with our system.

## 2021-02-03 NOTE — DISCHARGE NOTE PROVIDER - NSDCMRMEDTOKEN_GEN_ALL_CORE_FT
aspirin 81 mg oral delayed release tablet: 1 tab(s) orally once a day  metFORMIN 1000 mg oral tablet: 1 tab(s) orally 2 times a day  Norvasc 5 mg oral tablet: 1 tab(s) orally once a day starting 11/3  Pepcid 20 mg oral tablet: 1 tab(s) orally 2 times a day, one dose to be taken tonight 11/2 and both doses (AM/PM) tomorrow 11/3.

## 2021-02-03 NOTE — DISCHARGE NOTE PROVIDER - CARE PROVIDER_API CALL
[place])  Medicine  [address], [address]  [place] [address]  Phone: [phone]  Fax: [phone]  Follow Up Time:     Anitra Paz  INFECTIOUS DISEASE  [address]  [place] [address]  Phone: [phone]  Fax: [phone]  Follow Up Time:     [place]  SURGERY  [address], [address]  [place] [address]  Phone: [phone]  Fax: [phone]  Follow Up Time:

## 2021-02-03 NOTE — DISCHARGE NOTE PROVIDER - PROVIDER TOKENS
PROVIDER:[TOKEN:[[id number]]],PROVIDER:[TOKEN:[[id number]]],PROVIDER:[TOKEN:[[id number]]]

## 2021-02-03 NOTE — PROGRESS NOTE ADULT - REASON FOR ADMISSION
EMERGENCY ROOM ADMISSION WITH PNEUMONIA,, SHORTNESS OF BREATH, H/O COVID PNA
sob
ED admission with Shortness of breath, Pneumonia
sob
ED admission with pulm infiltrates, SOB, cough
sob
Emergency Room Admission for sob, cough, pneumonia.  Prior Covid 19 PNA
sob

## 2021-02-03 NOTE — PROGRESS NOTE ADULT - SUBJECTIVE AND OBJECTIVE BOX
Patient is a 55y old  Female who presents with a chief complaint of sob (03 Feb 2021 11:12)      Interval History:  finger sticks are in low 100's   on Lantus 15 units and prandial lispro 5 units   discharge planning is on   MEDICATIONS  (STANDING):  amLODIPine   Tablet 5 milliGRAM(s) Oral daily  aspirin enteric coated 81 milliGRAM(s) Oral daily  dextrose 40% Gel 15 Gram(s) Oral once  dextrose 5%. 1000 milliLiter(s) (50 mL/Hr) IV Continuous <Continuous>  dextrose 5%. 1000 milliLiter(s) (100 mL/Hr) IV Continuous <Continuous>  dextrose 50% Injectable 25 Gram(s) IV Push once  dextrose 50% Injectable 12.5 Gram(s) IV Push once  dextrose 50% Injectable 25 Gram(s) IV Push once  famotidine    Tablet 20 milliGRAM(s) Oral daily  glucagon  Injectable 1 milliGRAM(s) IntraMuscular once  insulin glargine Injectable (LANTUS) 15 Unit(s) SubCutaneous at bedtime  insulin lispro (ADMELOG) corrective regimen sliding scale   SubCutaneous three times a day before meals  insulin lispro (ADMELOG) corrective regimen sliding scale   SubCutaneous at bedtime  insulin lispro Injectable (ADMELOG) 5 Unit(s) SubCutaneous three times a day before meals    MEDICATIONS  (PRN):  acetaminophen   Tablet .. 650 milliGRAM(s) Oral every 6 hours PRN Temp greater or equal to 38C (100.4F), Mild Pain (1 - 3)  albuterol/ipratropium for Nebulization 3 milliLiter(s) Nebulizer every 6 hours PRN Shortness of Breath and/or Wheezing  benzocaine 15 mG/menthol 3.6 mG (Sugar-Free) Lozenge 1 Lozenge Oral every 6 hours PRN Mouth Sores  guaiFENesin   Syrup  (Sugar-Free) 200 milliGRAM(s) Oral every 6 hours PRN Cough  sodium chloride 0.65% Nasal 1 Spray(s) Both Nostrils three times a day PRN Nasal Congestion      Allergies    ACE inhibitors (Angioedema (Severe))  lisinopril (Anaphylaxis; Angioedema)    Intolerances        REVIEW OF SYSTEMS:  CONSTITUTIONAL: no changes  EYES: No eye pain, visual disturbances, or discharge  ENMT:  No difficulty hearing, No sinus or throat pain  NECK: No pain or stiffness  RESPIRATORY: No cough, wheezing, chills or hemoptysis; No shortness of breath  CARDIOVASCULAR: No chest pain, palpitations or leg swelling  GASTROINTESTINAL: No abdominal or epigastric pain. No nausea, vomiting, or hematemesis; No diarrhea or constipation. No melena or hematochezia.  GENITOURINARY: No dysuria, frequency, hematuria, or incontinence  NEUROLOGICAL: No headaches, memory loss, loss of strength, numbness, or tremors  SKIN: No itching, burning, rashes, or lesions   ENDOCRINE: No heat or cold intolerance; No hair loss  MUSCULOSKELETAL: No joint pain or swelling; No muscle, back, or extremity pain  PSYCHIATRIC: No depression, anxiety, mood swings, or difficulty sleeping  HEME/LYMPH: No easy bruising, or bleeding gums  ALLERY AND IMMUNOLOGIC: No hives or eczema    Vital Signs Last 24 Hrs  T(C): 36.6 (03 Feb 2021 11:20), Max: 36.6 (03 Feb 2021 11:20)  T(F): 97.8 (03 Feb 2021 11:20), Max: 97.8 (03 Feb 2021 11:20)  HR: 70 (03 Feb 2021 11:20) (56 - 70)  BP: 132/78 (03 Feb 2021 11:20) (132/78 - 137/76)  BP(mean): --  RR: 17 (03 Feb 2021 11:20) (17 - 18)  SpO2: 99% (03 Feb 2021 11:20) (98% - 99%)    PHYSICAL EXAM:  GENERAL:   HEAD: Atraumatic, Normocephalic  EYES: PERRLA, conjunctiva and sclera clear  ENMT: No tonsillar erythema, exudates, or enlargement; Moist mucous membranes, Good dentition, No lesions  NECK: Supple, No JVD, Normal thyroid  NERVOUS SYSTEM:  Alert & Oriented X3, Good concentration; Motor Strength 5/5 B/L upper and lower extremities  CHEST/LUNG: Clear to auscultaion bilaterally; No rales, rhonchi, wheezing, or rubs  HEART: Regular rate and rhythm; No murmurs, rubs, or gallops  ABDOMEN: Soft, Nontender, Nondistended; Bowel sounds present  EXTREMITIES:  2+ Peripheral Pulses, no edema  SKIN: No rashes or lesions    LABS:        CAPILLARY BLOOD GLUCOSE      POCT Blood Glucose.: 114 mg/dL (03 Feb 2021 11:36)  POCT Blood Glucose.: 109 mg/dL (03 Feb 2021 08:02)  POCT Blood Glucose.: 216 mg/dL (02 Feb 2021 21:52)    Lipid panel:           Thyroid:  Diabetes Tests:  Parathyroid Panel:  Adrenals:  RADIOLOGY & ADDITIONAL TESTS:    Imaging Personally Reviewed:  [ ] YES  [ ] NO    Consultant(s) Notes Reviewed:  [ ] YES  [ ] NO    Care Discussed with Consultants/Other Providers [ ] YES  [ ] NO

## 2021-02-03 NOTE — PROGRESS NOTE ADULT - PROBLEM SELECTOR PROBLEM 1
Essential hypertension
Essential hypertension
Pulmonary atelectasis
Type 2 diabetes mellitus with hyperglycemia, without long-term current use of insulin
Essential hypertension
Type 2 diabetes mellitus with hyperglycemia, without long-term current use of insulin
Essential hypertension
Essential hypertension
Type 2 diabetes mellitus with hyperglycemia, without long-term current use of insulin
Essential hypertension
Type 2 diabetes mellitus with hyperglycemia, without long-term current use of insulin
2019 novel coronavirus disease (COVID-19)
Essential hypertension
Pneumonia of right lower lobe due to infectious organism
Pneumonia of right lower lobe due to infectious organism
2019 novel coronavirus disease (COVID-19)
Pneumonia of right lower lobe due to infectious organism
Essential hypertension
Pneumonia of right lower lobe due to infectious organism

## 2021-02-03 NOTE — DISCHARGE NOTE PROVIDER - HOSPITAL COURSE
HPI:  Pt is a 54 y/o female w/pmhx of  DM II, HTN, RA, and MI comes w/complain of sob x 2 days and marked fatigue and fever to tmax 100.2.  pt was diagnosed w/CAP on 1/5/21 was sent home on azithro and cefopodoxime x7days.  pt completed abx, she was covid neg then and again today, she returns w/increasing sob over the last 2 days and pleuritic pain on deep breaths.  pt denies any , chills, +non productive cough +pleuritic pain no cp, palpitations, n/v/d/c no travels. feels very fatigued, barely able to put on a blouse.    CTA in ed  No pulmonary emboli visualized. Consolidation in the right lower lobe with mediastinal and right hilar lymphadenopathy which may represent infection.  (13 Jan 2021 21:31)  Assessment and Plan:   · Assessment     54 y/o female w/pmhx of  DM II, HTN, RA, and MI comes w/complain of sob x 2 days and recent pna worsening radiographically     Problem/Plan - 1:  ·  Problem: Pneumonia of right lower lobe due to infectious organism.  Plan: Covering for gram neg pna, Sepsis POA   cefepime/vanco, doxy was added  ID consult noted on board  pulm consult noted   urine legionella negative   hyponatremia improving  TTE wnl  WBC leukocytosis is improving    rheum  consult noted    1/21/2021 no fever since 1/19/2021 covid negative from 1/20/21, f/u imdium scan  1/23/2021 indium scna shows uptake right lobe . still with fever f/u ct chest per ID recommendations  may need vancomycin   QuantiFeron is negative  1/24/2021 vancomycin  started yesterday night . will monitor one more day of still febrile in am will repeat blood cx yet again  1/25/2021 per ID was started on vancomycin over weekend now it appears stopped per ID , will ask pulmonary for re evaluation   will ask thoracic for bonch  1/26/2021  per verbal d/w charmaine. dimitrios ( thoracic ) bronch for Thursday follow up HIV testing  1/27/2021 for bronch in am . npo midnight.   1/28/2021 - s/p successful bronchoscopy with findings significant for mucopurulent secretions.  cultures are pending.   2/2/21 awaiting cultures will dc in am and follow up    Problem/Plan - 2:  ·  Problem: Rheumatoid arthritis, involving unspecified site, unspecified whether rheumatoid factor present.  Plan: tylenol prn   1/25/2021 reviewed [facility] re evaluation  1/26/20212 reviewed note.   1/28/2021 - stable at this time no acute interventions     Problem/Plan - 3:  ·  Problem: Type 2 diabetes mellitus with hyperglycemia, without long-term current use of insulin.  Plan: ss insulin coverage   hgba1c 9.2   get endocrine consult.   - 1/28/2021 aim for glucose control to < 200s while inpatient.  Should improve while off antibiotics      Problem/Plan - 4:  ·  Problem: Essential hypertension.  Plan: cont amlodipine.      Problem/Plan - 5:  ·  Problem: Preventive measure.  Plan: sq lovenox.      Problem/Plan - 6:  Problem: Hypokalemia. Plan: resolved.    Attending Attestation:   45 minutes spent on total discharge  more than 50% of the visit was spent counseling and/or coordinating care by the attending physician.

## 2021-02-03 NOTE — CHART NOTE - NSCHARTNOTEFT_GEN_A_CORE
Patient Lashell Welsh ( 1965) has been hospitalized at [facility] with acute illness. She  is clear to return to work and to activities, without restrictions   Please do not hesitate to call with any questions.     Darrin Platt MD   [phone]

## 2021-02-03 NOTE — PROGRESS NOTE ADULT - PROVIDER SPECIALTY LIST ADULT
Pulmonology
Pulmonology
Thoracic Surgery
Endocrinology
Infectious Disease
Pulmonology
Thoracic Surgery
Endocrinology
Hospitalist
Infectious Disease
Pulmonology
Rheumatology
Hospitalist
Infectious Disease
Thoracic Surgery
Endocrinology
Endocrinology
Thoracic Surgery
Endocrinology
Infectious Disease
Thoracic Surgery
Hospitalist
Infectious Disease
Infectious Disease
Hospitalist
Infectious Disease
Hospitalist

## 2021-02-03 NOTE — DISCHARGE NOTE PROVIDER - NSDCCPCAREPLAN_GEN_ALL_CORE_FT
PRINCIPAL DISCHARGE DIAGNOSIS  Diagnosis: Pneumonia of right lower lobe due to infectious organism  Assessment and Plan of Treatment: you had a bronchoscopy  final results are pending please follow up with the pulmonologist

## 2021-02-04 ENCOUNTER — NON-APPOINTMENT (OUTPATIENT)
Age: 56
End: 2021-02-04

## 2021-02-05 ENCOUNTER — APPOINTMENT (OUTPATIENT)
Dept: RHEUMATOLOGY | Facility: CLINIC | Age: 56
End: 2021-02-05
Payer: COMMERCIAL

## 2021-02-05 VITALS — DIASTOLIC BLOOD PRESSURE: 79 MMHG | HEART RATE: 106 BPM | SYSTOLIC BLOOD PRESSURE: 142 MMHG

## 2021-02-05 DIAGNOSIS — Z79.52 LONG TERM (CURRENT) USE OF SYSTEMIC STEROIDS: ICD-10-CM

## 2021-02-05 DIAGNOSIS — Z86.16 PERSONAL HISTORY OF COVID-19: ICD-10-CM

## 2021-02-05 DIAGNOSIS — R59.0 LOCALIZED ENLARGED LYMPH NODES: ICD-10-CM

## 2021-02-05 DIAGNOSIS — J98.11 ATELECTASIS: ICD-10-CM

## 2021-02-05 DIAGNOSIS — I25.2 OLD MYOCARDIAL INFARCTION: ICD-10-CM

## 2021-02-05 DIAGNOSIS — M67.449 GANGLION, UNSPECIFIED HAND: ICD-10-CM

## 2021-02-05 DIAGNOSIS — M06.9 RHEUMATOID ARTHRITIS, UNSPECIFIED: ICD-10-CM

## 2021-02-05 DIAGNOSIS — J15.6 PNEUMONIA DUE TO OTHER GRAM-NEGATIVE BACTERIA: ICD-10-CM

## 2021-02-05 DIAGNOSIS — M54.30 SCIATICA, UNSPECIFIED SIDE: ICD-10-CM

## 2021-02-05 DIAGNOSIS — K31.84 GASTROPARESIS: ICD-10-CM

## 2021-02-05 DIAGNOSIS — I25.10 ATHEROSCLEROTIC HEART DISEASE OF NATIVE CORONARY ARTERY WITHOUT ANGINA PECTORIS: ICD-10-CM

## 2021-02-05 DIAGNOSIS — E87.1 HYPO-OSMOLALITY AND HYPONATREMIA: ICD-10-CM

## 2021-02-05 DIAGNOSIS — M19.90 UNSPECIFIED OSTEOARTHRITIS, UNSPECIFIED SITE: ICD-10-CM

## 2021-02-05 DIAGNOSIS — I10 ESSENTIAL (PRIMARY) HYPERTENSION: ICD-10-CM

## 2021-02-05 DIAGNOSIS — Z88.8 ALLERGY STATUS TO OTHER DRUGS, MEDICAMENTS AND BIOLOGICAL SUBSTANCES: ICD-10-CM

## 2021-02-05 DIAGNOSIS — Z79.84 LONG TERM (CURRENT) USE OF ORAL HYPOGLYCEMIC DRUGS: ICD-10-CM

## 2021-02-05 DIAGNOSIS — A41.9 SEPSIS, UNSPECIFIED ORGANISM: ICD-10-CM

## 2021-02-05 DIAGNOSIS — E11.43 TYPE 2 DIABETES MELLITUS WITH DIABETIC AUTONOMIC (POLY)NEUROPATHY: ICD-10-CM

## 2021-02-05 DIAGNOSIS — Z95.5 PRESENCE OF CORONARY ANGIOPLASTY IMPLANT AND GRAFT: ICD-10-CM

## 2021-02-05 DIAGNOSIS — E87.6 HYPOKALEMIA: ICD-10-CM

## 2021-02-05 PROCEDURE — 99072 ADDL SUPL MATRL&STAF TM PHE: CPT

## 2021-02-05 PROCEDURE — 99496 TRANSJ CARE MGMT HIGH F2F 7D: CPT

## 2021-02-07 NOTE — DATA REVIEWED
[FreeTextEntry1] : 1/26/21:\par WBC 9.97,  Hgb 9.2, \par Na 133, GLUC 243, rest of BMP unremarkable\par \par 1/14/21:  \par 1/15/21:  CRP 25.57 mg/L\par \par Indium labeled leukocyte scan demonstrates:\par Focus of increased tracer uptake likely corresponds to right lower lung infiltrate.\par No other foci of abnormal tracer accumulation are identified.\par \par

## 2021-02-07 NOTE — PHYSICAL EXAM
[General Appearance - Alert] : alert [General Appearance - In No Acute Distress] : in no acute distress [Sclera] : the sclera and conjunctiva were normal [Outer Ear] : the ears and nose were normal in appearance [Hearing Threshold Finger Rub Not [place]] : hearing was normal [Oropharynx] : the oropharynx was normal [Neck Appearance] : the appearance of the neck was normal [Neck Cervical Mass (___cm)] : no neck mass was observed [Jugular Venous Distention Increased] : there was no jugular-venous distention [Thyroid Diffuse Enlargement] : the thyroid was not enlarged [Thyroid Nodule] : there were no palpable thyroid nodules [Respiration, Rhythm And Depth] : normal respiratory rhythm and effort [Exaggerated Use Of Accessory Muscles For Inspiration] : no accessory muscle use [Auscultation Breath Sounds / Voice Sounds] : lungs were clear to auscultation bilaterally [Heart Rate And Rhythm] : heart rate was normal and rhythm regular [Heart Sounds] : normal S1 and S2 [Heart Sounds Gallop] : no gallops [Murmurs] : no murmurs [Heart Sounds Pericardial Friction Rub] : no pericardial rub [Edema] : there was no peripheral edema [Bowel Sounds] : normal bowel sounds [Abdomen Soft] : soft [Abdomen Tenderness] : non-tender [] : no hepato-splenomegaly [Abdomen Mass (___ Cm)] : no abdominal mass palpated [Cervical Lymph Nodes Enlarged Posterior Bilaterally] : posterior cervical [Cervical Lymph Nodes Enlarged Anterior Bilaterally] : anterior cervical [Supraclavicular Lymph Nodes Enlarged Bilaterally] : supraclavicular [No CVA Tenderness] : no ~M costovertebral angle tenderness [No Spinal Tenderness] : no spinal tenderness [No Focal Deficits] : no focal deficits [Oriented To Time, Place, And Person] : oriented to person, place, and time [Impaired Insight] : insight and judgment were intact [Affect] : the affect was normal [FreeTextEntry1] : rash on feet - improved from previous exam

## 2021-02-07 NOTE — ASSESSMENT
[FreeTextEntry1] : 54 yo F with:\par 1) Rheumatoid Arthritis (+RF, +CCP) - clinically well controlled.  Arava currently being held due to recent PNA, awaiting results of bronch.\par   - Hold Arava for now.  Will restart once results of bronch return / can confirm infection has resolved.\par   - hepatitis panel negative 5/18.\par   - Flu vaccine (10/20) UTD.  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  Currently much improved.\par   - derm f/u.\par 3)  Osteopenia:  Bisphosphonate not indicated based on FRAX (5.2/0.7%).  Previously w/ vit D deficiency - now resolved.\par   - Cont calcium / vit D.\par   - weight-bearing exercise.\par   - Will plan order repeat DEXA at next visit.\par 4)  Mucous cyst over left 2nd PIP:\par   - Previously attempted aspiration but no fluid was aspirated\par   - Awaiting ortho/hand eval.\par 5)  OA - pt w/ mild pain in knees primarily with climbing stairs.\par   - Reiterated importance of exercise\par   - ibuprofen and/or Tylenol prn\par   - warm compresses\par   - OTC topical analgesics

## 2021-02-07 NOTE — HISTORY OF PRESENT ILLNESS
[FreeTextEntry1] : Pt was recently hospitalized for dyspnea and diagnosed with PNA.  Now much improved s/p abx.  She was taken off leflunomide, though RA remains well controlled.  No joint pain, swelling, or AM stiffness.  No other current complaints.

## 2021-02-08 ENCOUNTER — APPOINTMENT (OUTPATIENT)
Dept: INTERNAL MEDICINE | Facility: CLINIC | Age: 56
End: 2021-02-08
Payer: COMMERCIAL

## 2021-02-08 VITALS
TEMPERATURE: 97 F | WEIGHT: 126 LBS | HEART RATE: 83 BPM | HEIGHT: 68 IN | OXYGEN SATURATION: 98 % | BODY MASS INDEX: 19.1 KG/M2 | DIASTOLIC BLOOD PRESSURE: 63 MMHG | SYSTOLIC BLOOD PRESSURE: 127 MMHG

## 2021-02-08 DIAGNOSIS — J15.9 UNSPECIFIED BACTERIAL PNEUMONIA: ICD-10-CM

## 2021-02-08 PROCEDURE — 99496 TRANSJ CARE MGMT HIGH F2F 7D: CPT

## 2021-02-08 PROCEDURE — 99072 ADDL SUPL MATRL&STAF TM PHE: CPT

## 2021-02-08 RX ORDER — FELODIPINE 10 MG/1
10 TABLET, EXTENDED RELEASE ORAL
Qty: 90 | Refills: 3 | Status: DISCONTINUED | COMMUNITY
Start: 2020-12-11 | End: 2021-02-08

## 2021-02-08 RX ORDER — INSULIN HUMAN 100 [IU]/ML
100 INJECTION, SUSPENSION SUBCUTANEOUS 3 TIMES DAILY
Qty: 15 | Refills: 0 | Status: DISCONTINUED | COMMUNITY
Start: 2020-12-11 | End: 2021-02-08

## 2021-02-08 RX ORDER — ALBUTEROL SULFATE 90 UG/1
108 (90 BASE) AEROSOL, METERED RESPIRATORY (INHALATION) EVERY 4 HOURS
Qty: 1 | Refills: 3 | Status: ACTIVE | COMMUNITY
Start: 2021-02-08 | End: 1900-01-01

## 2021-02-08 NOTE — HEALTH RISK ASSESSMENT
[Intercurrent hospitalizations] : was admitted to the hospital  [No] : No [1 or 2 (0 pts)] : 1 or 2 (0 points) [Never (0 pts)] : Never (0 points) [No falls in past year] : Patient reported no falls in the past year [None] : None [With Family] : lives with family [Feels Safe at Home] : Feels safe at home [Fully functional (bathing, dressing, toileting, transferring, walking, feeding)] : Fully functional (bathing, dressing, toileting, transferring, walking, feeding) [Fully functional (using the telephone, shopping, preparing meals, housekeeping, doing laundry, using] : Fully functional and needs no help or supervision to perform IADLs (using the telephone, shopping, preparing meals, housekeeping, doing laundry, using transportation, managing medications and managing finances) [Reports normal functional visual acuity (ie: able to read med bottle)] : Reports normal functional visual acuity [Smoke Detector] : smoke detector [Carbon Monoxide Detector] : carbon monoxide detector [Safety elements used in home] : safety elements used in home [Seat Belt] :  uses seat belt [Sunscreen] : uses sunscreen [Reviewed no changes] : Reviewed no changes [] : No [Audit-CScore] : 0 [Change in mental status noted] : No change in mental status noted [Language] : denies difficulty with language [Behavior] : denies difficulty with behavior [Learning/Retaining New Information] : denies difficulty learning/retaining new information [Handling Complex Tasks] : denies difficulty handling complex tasks [Reasoning] : denies difficulty with reasoning [Spatial Ability and Orientation] : denies difficulty with spatial ability and orientation [High Risk Behavior] : no high risk behavior [Reports changes in hearing] : Reports no changes in hearing [Reports changes in vision] : Reports no changes in vision [Reports changes in dental health] : Reports no changes in dental health [Guns at Home] : no guns at home [Travel to Developing Areas] : does not  travel to developing areas [TB Exposure] : is not being exposed to tuberculosis [Caregiver Concerns] : does not have caregiver concerns [AdvancecareDate] : 02/21

## 2021-02-08 NOTE — HISTORY OF PRESENT ILLNESS
[Post-hospitalization from ___ Hospital] : Post-hospitalization from [unfilled] Hospital [Admitted on: ___] : The patient was admitted on [unfilled] [Discharged on ___] : discharged on [unfilled] [Discharge Summary] : discharge summary [Pertinent Labs] : pertinent labs [Discharge Med List] : discharge medication list [Patient Contacted By: ____] : and contacted by [unfilled] [FreeTextEntry2] : Admitted with pneumonia and sepsis. COVID neg x2. Suspect bacterial etiology. Improved with Abx, feeling better now.

## 2021-02-08 NOTE — ASSESSMENT
[FreeTextEntry1] : Pneumonia resolved. Arranged for pulm follow-up. Repeat labs today to ensure WBC trending down. Encouraged tight glycemic control given elevated A1c.

## 2021-02-12 LAB
ALBUMIN SERPL ELPH-MCNC: 4.5 G/DL
ALP BLD-CCNC: 113 U/L
ALT SERPL-CCNC: 19 U/L
ANION GAP SERPL CALC-SCNC: 17 MMOL/L
AST SERPL-CCNC: 20 U/L
BASOPHILS # BLD AUTO: 0.16 K/UL
BASOPHILS NFR BLD AUTO: 1.6 %
BILIRUB SERPL-MCNC: 0.2 MG/DL
BUN SERPL-MCNC: 16 MG/DL
CALCIUM SERPL-MCNC: 10.7 MG/DL
CHLORIDE SERPL-SCNC: 97 MMOL/L
CHOLEST SERPL-MCNC: 234 MG/DL
CO2 SERPL-SCNC: 20 MMOL/L
CREAT SERPL-MCNC: 0.74 MG/DL
EOSINOPHIL # BLD AUTO: 0.76 K/UL
EOSINOPHIL NFR BLD AUTO: 7.7 %
ESTIMATED AVERAGE GLUCOSE: 203 MG/DL
GLUCOSE SERPL-MCNC: 141 MG/DL
HBA1C MFR BLD HPLC: 8.7 %
HCT VFR BLD CALC: 41.6 %
HDLC SERPL-MCNC: 52 MG/DL
HGB BLD-MCNC: 12.7 G/DL
HIV1+2 AB SPEC QL IA.RAPID: NONREACTIVE
IMM GRANULOCYTES NFR BLD AUTO: 0.4 %
LDLC SERPL CALC-MCNC: 124 MG/DL
LYMPHOCYTES # BLD AUTO: 2.52 K/UL
LYMPHOCYTES NFR BLD AUTO: 25.5 %
MAN DIFF?: NORMAL
MCHC RBC-ENTMCNC: 26.9 PG
MCHC RBC-ENTMCNC: 30.5 GM/DL
MCV RBC AUTO: 88.1 FL
MONOCYTES # BLD AUTO: 0.52 K/UL
MONOCYTES NFR BLD AUTO: 5.3 %
NEUTROPHILS # BLD AUTO: 5.89 K/UL
NEUTROPHILS NFR BLD AUTO: 59.5 %
NONHDLC SERPL-MCNC: 182 MG/DL
PLATELET # BLD AUTO: 386 K/UL
POTASSIUM SERPL-SCNC: 5.2 MMOL/L
PROT SERPL-MCNC: 8.3 G/DL
RBC # BLD: 4.72 M/UL
RBC # FLD: 15.6 %
SODIUM SERPL-SCNC: 134 MMOL/L
TRIGL SERPL-MCNC: 288 MG/DL
TSH SERPL-ACNC: 0.62 UIU/ML
WBC # FLD AUTO: 9.89 K/UL

## 2021-02-27 LAB
CULTURE RESULTS: SIGNIFICANT CHANGE UP
SPECIMEN SOURCE: SIGNIFICANT CHANGE UP

## 2021-03-02 ENCOUNTER — APPOINTMENT (OUTPATIENT)
Dept: OTOLARYNGOLOGY | Facility: CLINIC | Age: 56
End: 2021-03-02

## 2021-03-15 NOTE — HISTORY OF PRESENT ILLNESS
[FreeTextEntry1] : She has no chest pain\par She has no more shortness of breath\par She has no palpitations\par She has no syncope\par She is neurologically intact\par She has no edema\par She has no skin rashes\par 
no

## 2021-03-20 LAB
CULTURE RESULTS: SIGNIFICANT CHANGE UP
NIGHT BLUE STAIN TISS: SIGNIFICANT CHANGE UP
SPECIMEN SOURCE: SIGNIFICANT CHANGE UP

## 2021-04-12 ENCOUNTER — RX RENEWAL (OUTPATIENT)
Age: 56
End: 2021-04-12

## 2021-04-14 ENCOUNTER — APPOINTMENT (OUTPATIENT)
Dept: CT IMAGING | Facility: HOSPITAL | Age: 56
End: 2021-04-14

## 2021-04-20 ENCOUNTER — APPOINTMENT (OUTPATIENT)
Dept: CT IMAGING | Facility: HOSPITAL | Age: 56
End: 2021-04-20

## 2021-04-20 ENCOUNTER — OUTPATIENT (OUTPATIENT)
Dept: OUTPATIENT SERVICES | Facility: HOSPITAL | Age: 56
LOS: 1 days | Discharge: HOME | End: 2021-04-20
Payer: COMMERCIAL

## 2021-04-20 DIAGNOSIS — R19.06 EPIGASTRIC SWELLING, MASS OR LUMP: ICD-10-CM

## 2021-04-20 DIAGNOSIS — Z95.5 PRESENCE OF CORONARY ANGIOPLASTY IMPLANT AND GRAFT: Chronic | ICD-10-CM

## 2021-04-20 PROCEDURE — 71250 CT THORAX DX C-: CPT | Mod: 26

## 2021-04-26 ENCOUNTER — LABORATORY RESULT (OUTPATIENT)
Age: 56
End: 2021-04-26

## 2021-04-26 ENCOUNTER — APPOINTMENT (OUTPATIENT)
Dept: ENDOCRINOLOGY | Facility: CLINIC | Age: 56
End: 2021-04-26
Payer: COMMERCIAL

## 2021-04-26 VITALS
OXYGEN SATURATION: 99 % | RESPIRATION RATE: 16 BRPM | BODY MASS INDEX: 21.98 KG/M2 | TEMPERATURE: 98.3 F | HEART RATE: 82 BPM | HEIGHT: 68 IN | SYSTOLIC BLOOD PRESSURE: 150 MMHG | DIASTOLIC BLOOD PRESSURE: 60 MMHG | WEIGHT: 145 LBS

## 2021-04-26 LAB
25(OH)D3 SERPL-MCNC: 39.1 NG/ML
ESTIMATED AVERAGE GLUCOSE: 200 MG/DL
FOLATE SERPL-MCNC: 9.4 NG/ML
GLUCOSE BLDC GLUCOMTR-MCNC: 265
HBA1C MFR BLD HPLC: 8.6 %
T3 SERPL-MCNC: 118 NG/DL
T4 FREE SERPL-MCNC: 1.2 NG/DL
TSH SERPL-ACNC: 1.12 UIU/ML
VIT B12 SERPL-MCNC: >2000 PG/ML

## 2021-04-26 PROCEDURE — 99215 OFFICE O/P EST HI 40 MIN: CPT | Mod: 25

## 2021-04-26 PROCEDURE — 95250 CONT GLUC MNTR PHYS/QHP EQP: CPT

## 2021-04-26 PROCEDURE — 95251 CONT GLUC MNTR ANALYSIS I&R: CPT

## 2021-04-26 PROCEDURE — 99072 ADDL SUPL MATRL&STAF TM PHE: CPT

## 2021-04-26 PROCEDURE — 36415 COLL VENOUS BLD VENIPUNCTURE: CPT

## 2021-04-26 RX ORDER — PEN NEEDLE, DIABETIC 32 GX 1/6"
32G X 4 MM NEEDLE, DISPOSABLE MISCELLANEOUS
Qty: 100 | Refills: 4 | Status: ACTIVE | COMMUNITY
Start: 2021-04-26 | End: 1900-01-01

## 2021-04-26 RX ORDER — GLIMEPIRIDE 2 MG/1
2 TABLET ORAL
Qty: 90 | Refills: 1 | Status: DISCONTINUED | COMMUNITY
Start: 2020-12-17 | End: 2021-04-26

## 2021-04-26 NOTE — REASON FOR VISIT
[Consultation] : a consultation visit [DM Type 2] : DM Type 2 [Thyroid nodule/ MNG] : thyroid nodule/ MNG

## 2021-04-26 NOTE — HISTORY OF PRESENT ILLNESS
[FreeTextEntry1] : HISTORY OF PRESENT ILLNESS. \par \par Ms. MCBRIDE was diagnosed with Diabetes Mellitus Type 2 in ??. She reports history HTN, dyslipidemia, toxic MNG, subclinical hyperthyroidism (s/p CIFUENTES with 26 mci in 01/18) , CAD (s/p MI and PTCI), CHF,  but denies  known complications of retinopathy, nephropathy, or neuropathy. She is presently on metformin 1000 mg bid, Amaryl 2 mg qd.\par ***  She was admitted for angioedema on ACE inhibitors (enalapril) requiring intubation\par At that time her Januvia was stopped b/o 'co-admin with ACE could've put her at the higher risk for bradykinin induced angioedema"\par \par Blood sugars are checked 2 times a day. \par Did not bring log book, but reported are typically as following: FBS- 160's, ppg- 200's\par Hypoglycemia frequency: \par Fingerstick glucose in the office today is 265 mg/dL 2 hours after eating. \par Diet: not following ADA\par Exercise: short walks\par \par Lab review: a1c- 8.7, TSH- 0.62\par Thyroid NM scan (12/17)- hot nodule in the right lobe\par \par Thyroid US (3/18/20)- RMP round 1.6x1.3x1.6; RLP hypo 1.2x1.1x1.3 with coarse calcs\par Thyroid US (8/14/15)- RMP complex 2.2x1.7x2.0 round (no halo, microcalcs), RLP hypo 1.6x1.5x1.6(thick disrupted halo, microcalc), LUP solid hyper 0.5x0.3x0.6; LLP solid hypo 0.8x0.5x0.6\par \par She does not recall any FNAs done in the past\par \par Last dilated eye - 2020\par Last podiatry visit  - 2020\par Last cardiology evaluation - 06/20\par Last stress test - 6/20\par Last 2-D Echo - 5/20, EF ~ 30%\par Last nephrology evaluation - none\par Last neurology evaluation-none\par

## 2021-04-26 NOTE — ASSESSMENT
[Diabetes Foot Care] : diabetes foot care [Long Term Vascular Complications] : long term vascular complications of diabetes [Carbohydrate Consistent Diet] : carbohydrate consistent diet [Importance of Diet and Exercise] : importance of diet and exercise to improve glycemic control, achieve weight loss and improve cardiovascular health [Exercise/Effect on Glucose] : exercise/effect on glucose [Hypoglycemia Management] : hypoglycemia management [Glucagon Use] : glucagon use [Ketone Testing] : ketone testing [Action and use of Insulin] : action and use of short and long-acting insulin [Self Monitoring of Blood Glucose] : self monitoring of blood glucose [Insulin Self-Administration] : insulin self-administration [Sick-Day Management] : sick-day management [Injection Technique, Storage, Sharps Disposal] : injection technique, storage, and sharps disposal [Retinopathy Screening] : Patient was referred to ophthalmology for retinopathy screening [Diabetic Medications] : Risks and benefits of diabetic medications were discussed [FreeTextEntry1] : Current approaches to diabetes management are discussed with the patient. \par Target ranges for blood sugar, blood pressure and cholesterol reviewed, and risk reduction strategies verified. \par Hypoglycemia precautions reviewed with the patient. \par Suggested extensive diabetes education program, including nutritional and diabetes teaching and evaluation. \par Proper dietary restrictions and exercise routines discussed. \par Glucometer/SMBG and log book charting discussed.\par - check pancreatic reserve, antibodies for DM1\par - Nolan PRO\par - decrease metformin 500 mg BID, monitor EF\par - will avoid DPP4 inhibitors since they can potentiate a bradykinin induced  angioedema, and I'd also avoid GLP1 agonists for now, given some increased risks in angioedema on some of them (incl Soliqua, adlyxin, Trulicity)\par - Tresiba 15 un HS\par - Jardiance 10 gm qd . Hydration\par - Prandin 1-1-2\par - monitor lipids, blood pressure, urine microalbumin\par - repeat thyroid US and decide if needs an FNA\par RTC 2- 3 weeks for sensor download and CDE evaluation\par

## 2021-04-27 LAB
ALBUMIN SERPL ELPH-MCNC: 4.8 G/DL
ALP BLD-CCNC: 77 U/L
ALT SERPL-CCNC: 13 U/L
ANION GAP SERPL CALC-SCNC: 20 MMOL/L
AST SERPL-CCNC: 13 U/L
BILIRUB SERPL-MCNC: 0.2 MG/DL
BUN SERPL-MCNC: 19 MG/DL
C PEPTIDE SERPL-MCNC: 3.5 NG/ML
CALCIUM SERPL-MCNC: 10.5 MG/DL
CHLORIDE SERPL-SCNC: 97 MMOL/L
CHOLEST SERPL-MCNC: 217 MG/DL
CO2 SERPL-SCNC: 20 MMOL/L
CREAT SERPL-MCNC: 0.68 MG/DL
CREAT SPEC-SCNC: 82 MG/DL
FRUCTOSAMINE SERPL-MCNC: 394 UMOL/L
GLUCOSE SERPL-MCNC: 245 MG/DL
HDLC SERPL-MCNC: 54 MG/DL
LDLC SERPL CALC-MCNC: 124 MG/DL
MICROALBUMIN 24H UR DL<=1MG/L-MCNC: 7.6 MG/DL
MICROALBUMIN/CREAT 24H UR-RTO: 93 MG/G
NONHDLC SERPL-MCNC: 162 MG/DL
POTASSIUM SERPL-SCNC: 4.7 MMOL/L
PROT SERPL-MCNC: 8.7 G/DL
SODIUM SERPL-SCNC: 138 MMOL/L
TRIGL SERPL-MCNC: 193 MG/DL

## 2021-04-28 LAB
TSH RECEPTOR AB: <1.1 IU/L
TSI ACT/NOR SER: <0.1 IU/L

## 2021-04-29 LAB — PANC ISLET CELL AB SER QL: NORMAL

## 2021-04-30 LAB — GAD65 AB SER-MCNC: 0 NMOL/L

## 2021-05-05 ENCOUNTER — APPOINTMENT (OUTPATIENT)
Dept: RHEUMATOLOGY | Facility: CLINIC | Age: 56
End: 2021-05-05

## 2021-05-06 LAB — ZINC TRANSPORTER 8 AB: <15 U/ML

## 2021-05-11 ENCOUNTER — APPOINTMENT (OUTPATIENT)
Dept: INTERNAL MEDICINE | Facility: CLINIC | Age: 56
End: 2021-05-11
Payer: MEDICAID

## 2021-05-11 PROCEDURE — 99443: CPT

## 2021-05-11 NOTE — HISTORY OF PRESENT ILLNESS
[Home] : at home, [unfilled] , at the time of the visit. [Medical Office: ([facility])___] : at the medical office located in  [Verbal consent obtained from patient] : the patient, [unfilled] [FreeTextEntry1] : Presents for concerns regarding diabetes, hyperlipidemia, hypertension and osteopenia. [de-identified] : \par Medical Issue 1: Diabetes: unstable and poorly controlled with last A1c above 9.0; she admits to not adhering to low carb diet; she tries to take all her meds as prescribed but may miss a dose here and there; she denies dizziness, episodes of hypoglycemia and polyuria and polydipsia; she follows with her Endo doctor\par \par Medical Issue 2: Hyperlipidemia: unstable and variably controlled with fluctuations in her LDL level; she tries to adhere to low fat diet but admits this is not always the case; this is complicated by heart disease, she follows with her cardiologist and is due to nuclear stress testing as well\par \par Medical Issue 3: Hypertension: unstable and poorly controlled with fluctuations in BP levels; she is on 3 different medications for BP control and yet is still having a hard time keeping it at goal; she eats salty foods at times but tries to limit this too\par \par Medical Issue 4: Osteopenia: unstable and variably controlled; trying to prevent progression to osteoporosis but she is not compliant with light weight-bearing exercise and she does not walk either; she denies bone pain

## 2021-05-12 ENCOUNTER — APPOINTMENT (OUTPATIENT)
Dept: ENDOCRINOLOGY | Facility: CLINIC | Age: 56
End: 2021-05-12

## 2021-06-04 ENCOUNTER — NON-APPOINTMENT (OUTPATIENT)
Age: 56
End: 2021-06-04

## 2021-06-14 ENCOUNTER — NON-APPOINTMENT (OUTPATIENT)
Age: 56
End: 2021-06-14

## 2021-06-18 ENCOUNTER — APPOINTMENT (OUTPATIENT)
Dept: RHEUMATOLOGY | Facility: CLINIC | Age: 56
End: 2021-06-18
Payer: MEDICAID

## 2021-06-18 VITALS
SYSTOLIC BLOOD PRESSURE: 152 MMHG | HEIGHT: 68 IN | DIASTOLIC BLOOD PRESSURE: 79 MMHG | TEMPERATURE: 98 F | BODY MASS INDEX: 21.98 KG/M2 | WEIGHT: 145 LBS | HEART RATE: 82 BPM | OXYGEN SATURATION: 99 %

## 2021-06-18 PROCEDURE — 20610 DRAIN/INJ JOINT/BURSA W/O US: CPT | Mod: LT

## 2021-06-18 PROCEDURE — 99214 OFFICE O/P EST MOD 30 MIN: CPT | Mod: 25

## 2021-06-18 RX ORDER — METHYLPRED ACET/NACL,ISO-OS/PF 40 MG/ML
40 VIAL (ML) INJECTION
Qty: 1 | Refills: 0 | Status: COMPLETED | OUTPATIENT
Start: 2021-06-18

## 2021-06-18 RX ADMIN — METHYLPREDNISOLONE ACETATE MG/ML: 40 INJECTION, SUSPENSION INTRA-ARTICULAR; INTRALESIONAL; INTRAMUSCULAR; SOFT TISSUE at 00:00

## 2021-06-18 NOTE — PROCEDURE
[Today's Date:] : Date: [unfilled] [Arthrocentesis] : arthrocentesis was performed [Patient] : the patient [Risks] : risks [Benefits] : benefits [Alternatives] : alternatives [Therapeutic] : therapeutic [Consent Obtained] : written consent was obtained prior to the procedure and is detailed in the patient's record [#1 Site: ______] : #1 site identified in the [unfilled] [Ethyl Chloride] : ethyl chloride [___ ml Inj] : [unfilled] ~Uml [1%] : 1%  [Without Epi] : without epinephrine [Betadine] : betadine solution [22 gauge 1.5 inch] : A 22 gauge 1.5 inch needle was used [Depomedrol ___ mg] : Depomedrol [unfilled] mg [Tolerated Well] : the patient tolerated the procedure well [Reports Improvement in Symptoms] : reports improvement in symptoms [No Complications] : there were no complications [Instructions Given] : handouts/patient instructions were given to patient [Patient Instructed to Call] : patient was instructed to call if redness at site, a decrease in range of motion or an increase in pain is noted after procedure.

## 2021-06-18 NOTE — PHYSICAL EXAM
[General Appearance - Alert] : alert [General Appearance - In No Acute Distress] : in no acute distress [Sclera] : the sclera and conjunctiva were normal [Outer Ear] : the ears and nose were normal in appearance [Hearing Threshold Finger Rub Not [place]] : hearing was normal [Oropharynx] : the oropharynx was normal [Neck Appearance] : the appearance of the neck was normal [Neck Cervical Mass (___cm)] : no neck mass was observed [Jugular Venous Distention Increased] : there was no jugular-venous distention [Thyroid Diffuse Enlargement] : the thyroid was not enlarged [Thyroid Nodule] : there were no palpable thyroid nodules [Respiration, Rhythm And Depth] : normal respiratory rhythm and effort [Exaggerated Use Of Accessory Muscles For Inspiration] : no accessory muscle use [Auscultation Breath Sounds / Voice Sounds] : lungs were clear to auscultation bilaterally [Heart Rate And Rhythm] : heart rate was normal and rhythm regular [Heart Sounds] : normal S1 and S2 [Heart Sounds Gallop] : no gallops [Murmurs] : no murmurs [Heart Sounds Pericardial Friction Rub] : no pericardial rub [Edema] : there was no peripheral edema [Bowel Sounds] : normal bowel sounds [Abdomen Soft] : soft [Abdomen Tenderness] : non-tender [Abdomen Mass (___ Cm)] : no abdominal mass palpated [Cervical Lymph Nodes Enlarged Posterior Bilaterally] : posterior cervical [Cervical Lymph Nodes Enlarged Anterior Bilaterally] : anterior cervical [Supraclavicular Lymph Nodes Enlarged Bilaterally] : supraclavicular [No CVA Tenderness] : no ~M costovertebral angle tenderness [No Spinal Tenderness] : no spinal tenderness [No Focal Deficits] : no focal deficits [Oriented To Time, Place, And Person] : oriented to person, place, and time [Impaired Insight] : insight and judgment were intact [Affect] : the affect was normal [FreeTextEntry1] : No synovitis; (+)multiple B/L Heberden's nodes; left knee w/ pain upon flexion;  normal ROM in rest of joints\par (+)multiple B/L Heberden's nodes; mucoid cyst overlying left 2nd PIP. [] : no rash

## 2021-06-18 NOTE — HISTORY OF PRESENT ILLNESS
[FreeTextEntry1] : Pt had been flaring as she had been off Arava for several months due to insurance issues.  She re-started it on 6/1, and her joint pains have begun to improve, but she then began to experience severe right knee pain, which has now resolved.  However, she is now experiencing severe left knee pain - wakes her up at nights.  It had been swollen initially, though the swelling has now improved but the pain persists - it improves w/ meloxicam but the relief usually wears off in the middle of the night.

## 2021-06-18 NOTE — REVIEW OF SYSTEMS
[Joint Pain] : joint pain [Joint Swelling] : joint swelling [Joint Stiffness] : joint stiffness [Fever] : no fever [Chills] : no chills [Eye Pain] : no eye pain [Eyesight Problems] : no eyesight problems [Earache] : no earache [Discharge From Eyes] : no purulent discharge from the eyes [Nosebleeds] : no nosebleeds [Chest Pain] : no chest pain [Palpitations] : no palpitations [Cough] : no cough [SOB on Exertion] : no shortness of breath during exertion [Abdominal Pain] : no abdominal pain [Constipation] : no constipation [Diarrhea] : no diarrhea [Dysuria] : no dysuria [Pelvic Pain] : no pelvic pain [Limb Pain] : no limb pain [Limb Swelling] : no limb swelling [Proptosis] : no proptosis [Easy Bleeding] : no tendency for easy bleeding [Easy Bruising] : no tendency for easy bruising

## 2021-06-18 NOTE — ASSESSMENT
[FreeTextEntry1] : 52 yo F with:\par 1) Rheumatoid Arthritis (+RF, +CCP) - had been flaring as she was off Arava for several months due to insurance issues.  Now improving since it was re-started, but currently w/ left knee pain.  \par   - Cont leflunomide 20mg daily..\par   - hepatitis panel negative 5/18.\par   - Flu vaccine (10/20) UTD.  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  Currently much improved.\par   - derm f/u.\par 3)  Osteopenia:  Bisphosphonate not indicated based on FRAX (5.2/0.7%).  Previously w/ vit D deficiency - now resolved.\par   - Cont calcium / vit D.\par   - weight-bearing exercise.\par   - Will plan order repeat DEXA at next visit.\par 4)  OA - pt w/ mild chronic pain in knees primarily with climbing stairs.\par   - Reiterated importance of exercise\par   - ibuprofen and/or Tylenol prn\par   - warm compresses\par   - OTC topical analgesics

## 2021-06-22 DIAGNOSIS — E87.1 HYPO-OSMOLALITY AND HYPONATREMIA: ICD-10-CM

## 2021-06-22 LAB
ALBUMIN SERPL ELPH-MCNC: 4.8 G/DL
ALP BLD-CCNC: 82 U/L
ALT SERPL-CCNC: 15 U/L
ANION GAP SERPL CALC-SCNC: 19 MMOL/L
AST SERPL-CCNC: 12 U/L
BASOPHILS # BLD AUTO: 0.06 K/UL
BASOPHILS NFR BLD AUTO: 0.5 %
BILIRUB SERPL-MCNC: 0.3 MG/DL
BUN SERPL-MCNC: 20 MG/DL
CALCIUM SERPL-MCNC: 10.3 MG/DL
CHLORIDE SERPL-SCNC: 92 MMOL/L
CO2 SERPL-SCNC: 23 MMOL/L
CREAT SERPL-MCNC: 0.71 MG/DL
CRP SERPL-MCNC: 74 MG/L
EOSINOPHIL # BLD AUTO: 0.08 K/UL
EOSINOPHIL NFR BLD AUTO: 0.7 %
ERYTHROCYTE [SEDIMENTATION RATE] IN BLOOD BY WESTERGREN METHOD: 108 MM/HR
GLUCOSE SERPL-MCNC: 249 MG/DL
HCT VFR BLD CALC: 38.7 %
HGB BLD-MCNC: 12.1 G/DL
IMM GRANULOCYTES NFR BLD AUTO: 0.2 %
LYMPHOCYTES # BLD AUTO: 1.08 K/UL
LYMPHOCYTES NFR BLD AUTO: 9.9 %
MAN DIFF?: NORMAL
MCHC RBC-ENTMCNC: 26.7 PG
MCHC RBC-ENTMCNC: 31.3 GM/DL
MCV RBC AUTO: 85.4 FL
MONOCYTES # BLD AUTO: 0.28 K/UL
MONOCYTES NFR BLD AUTO: 2.6 %
NEUTROPHILS # BLD AUTO: 9.43 K/UL
NEUTROPHILS NFR BLD AUTO: 86.1 %
PLATELET # BLD AUTO: 365 K/UL
POTASSIUM SERPL-SCNC: 4 MMOL/L
PROT SERPL-MCNC: 8.2 G/DL
RBC # BLD: 4.53 M/UL
RBC # FLD: 13.2 %
SODIUM SERPL-SCNC: 134 MMOL/L
WBC # FLD AUTO: 10.95 K/UL

## 2021-06-23 ENCOUNTER — NON-APPOINTMENT (OUTPATIENT)
Age: 56
End: 2021-06-23

## 2021-06-23 RX ORDER — OXYCODONE AND ACETAMINOPHEN 5; 325 MG/1; MG/1
5-325 TABLET ORAL 3 TIMES DAILY
Qty: 20 | Refills: 0 | Status: COMPLETED | COMMUNITY
Start: 2020-12-02 | End: 2021-06-30

## 2021-06-24 ENCOUNTER — NON-APPOINTMENT (OUTPATIENT)
Age: 56
End: 2021-06-24

## 2021-06-24 RX ORDER — BLOOD SUGAR DIAGNOSTIC
STRIP MISCELLANEOUS TWICE DAILY
Qty: 2 | Refills: 3 | Status: ACTIVE | COMMUNITY
Start: 2020-11-05 | End: 1900-01-01

## 2021-06-25 ENCOUNTER — NON-APPOINTMENT (OUTPATIENT)
Age: 56
End: 2021-06-25

## 2021-07-09 RX ORDER — BLOOD-GLUCOSE METER
EACH MISCELLANEOUS
Qty: 1 | Refills: 0 | Status: ACTIVE | COMMUNITY
Start: 2021-07-09 | End: 1900-01-01

## 2021-07-12 ENCOUNTER — RX RENEWAL (OUTPATIENT)
Age: 56
End: 2021-07-12

## 2021-07-27 NOTE — PROGRESS NOTE ADULT - SUBJECTIVE AND OBJECTIVE BOX
English I AM INHERITING THIS PATIENT ON TODAY 1/20/2021    Patient is a 55y old  Female who presents with a chief complaint of sob (19 Jan 2021 11:40)      INTERVAL HPI/OVERNIGHT EVENTS: no events       MEDICATIONS  (STANDING):  amLODIPine   Tablet 5 milliGRAM(s) Oral daily  aspirin enteric coated 81 milliGRAM(s) Oral daily  dextrose 40% Gel 15 Gram(s) Oral once  dextrose 5%. 1000 milliLiter(s) (50 mL/Hr) IV Continuous <Continuous>  dextrose 5%. 1000 milliLiter(s) (100 mL/Hr) IV Continuous <Continuous>  dextrose 50% Injectable 25 Gram(s) IV Push once  dextrose 50% Injectable 12.5 Gram(s) IV Push once  dextrose 50% Injectable 25 Gram(s) IV Push once  doxycycline hyclate Capsule 100 milliGRAM(s) Oral every 12 hours  famotidine    Tablet 20 milliGRAM(s) Oral daily  glucagon  Injectable 1 milliGRAM(s) IntraMuscular once  insulin lispro (ADMELOG) corrective regimen sliding scale   SubCutaneous three times a day before meals  insulin lispro (ADMELOG) corrective regimen sliding scale   SubCutaneous at bedtime  meropenem  IVPB 1000 milliGRAM(s) IV Intermittent every 8 hours    MEDICATIONS  (PRN):  acetaminophen   Tablet .. 650 milliGRAM(s) Oral every 6 hours PRN Temp greater or equal to 38C (100.4F), Mild Pain (1 - 3)  guaiFENesin   Syrup  (Sugar-Free) 200 milliGRAM(s) Oral every 6 hours PRN Cough  Allergies    ACE inhibitors (Angioedema (Severe))  lisinopril (Anaphylaxis; Angioedema)    Intolerances    Vital Signs Last 24 Hrs    T(F): 98.1  HR: 85  BP: 144/80  BP(mean): --  RR: 16   SpO2: 95%       PHYSICAL EXAM:  GENERAL: NAD, well-groomed, well-developed  HEAD:  Atraumatic, Normocephalic  EYES: EOMI, PERRLA, conjunctiva and sclera clear  ENMT: No tonsillar erythema, exudates, or enlargement; Moist mucous membranes, Good dentition, No lesions  NECK: Supple, No JVD, Normal thyroid  NERVOUS SYSTEM:  Alert & Oriented X3, Good concentration; Motor Strength 5/5 B/L upper and lower extremities; DTRs 2+ intact and symmetric  CHEST/LUNG: Clear to percussion bilaterally; No rales, rhonchi, wheezing, or rubs  HEART: Regular rate and rhythm; No murmurs, rubs, or gallops  ABDOMEN: Soft, Nontender, Nondistended; Bowel sounds present  EXTREMITIES:  2+ Peripheral Pulses, No clubbing, cyanosis, or edema  SKIN: No rashes or lesions    LABS:                                  9.9    14.00 )-----------( 530      ( 21 Jan 2021 08:12 )             31.1   01-21    130<L>  |  93<L>  |  9   ----------------------------<  251<H>  3.8   |  29  |  0.68    Ca    9.4      21 Jan 2021 08:12  Phos  2.7     01-21  Mg     1.8     01-21      A1C with Estimated Average Glucose (01.15.21 @ 11:26)    A1C with Estimated Average Glucose Result: 9.2: Method: Immunoassay       Reference Range                4.0-5.6%       High risk (prediabetic)        5.7-6.4%       Diabetic, diagnostic             >=6.5%       ADA diabetic treatment goal       <7.0%  The Hemoglobin A1c testing is NGSP-certified.Reference ranges are based  upon the 2010 recommendations of  the American Diabetes Association.  Interpretation may vary for children  and adolescents. %    Estimated Average Glucose: 217: The Estimated Average Glucose (eAG) or Mean Plasma Glucose (MPG) value is  calculated from the hemoglobin A1c value and covers the same time period.   The American Diabetes Association (ADA) and other professional  organizations recommend reporting the eAG with the HgbA1c. mg/dL        CAPILLARY BLOOD GLUCOSE            RADIOLOGY & ADDITIONAL TESTS:  < from: NM Inflammatory Loc Wholebody WBC, Single Day (01.22.21 @ 10:17) >    Focus of increased tracer uptake likely corresponds to right lower lung infiltrate.    No other foci of abnormal tracer accumulation are identified.        < end of copied text >      Imaging Personally Reviewed:  [ X] YES  [ ] NO    Consultant(s) Notes Reviewed:  [ X] YES  [ ] NO    Care Discussed with Consultants/Other Providers [X ] YES  [ ] NO

## 2021-07-28 ENCOUNTER — APPOINTMENT (OUTPATIENT)
Dept: NEUROSURGERY | Facility: CLINIC | Age: 56
End: 2021-07-28
Payer: MEDICAID

## 2021-07-28 VITALS
BODY MASS INDEX: 21.98 KG/M2 | HEART RATE: 75 BPM | WEIGHT: 145 LBS | DIASTOLIC BLOOD PRESSURE: 87 MMHG | TEMPERATURE: 97 F | HEIGHT: 68 IN | SYSTOLIC BLOOD PRESSURE: 161 MMHG | OXYGEN SATURATION: 99 %

## 2021-07-28 PROCEDURE — 99205 OFFICE O/P NEW HI 60 MIN: CPT

## 2021-07-28 RX ORDER — OXYCODONE HYDROCHLORIDE 15 MG/1
15 TABLET ORAL
Refills: 0 | Status: DISCONTINUED | COMMUNITY
End: 2021-07-28

## 2021-07-28 RX ORDER — GABAPENTIN 300 MG/1
300 CAPSULE ORAL 3 TIMES DAILY
Qty: 90 | Refills: 3 | Status: DISCONTINUED | COMMUNITY
Start: 2020-12-02 | End: 2021-07-28

## 2021-07-28 NOTE — HISTORY OF PRESENT ILLNESS
[Back] : back [___ yrs] : [unfilled] year(s) ago [10] : a maximum pain level of 10/10 [Sharp] : sharp [Bilateral] : bilateral [Toes] : toes [Numbness] : numbness [Sitting] : sitting [Standing] : standing [Laying] : laying [Insomnia] : insomnia [Gait Dysfunction] : gait dysfunction [Medications] : medications [FreeTextEntry2] : 3 toes of the right foot [FreeTextEntry6] : Oxycodone, Gabapentin, Meloxicam, cortisone shot in her back may have been trigger point injection

## 2021-07-28 NOTE — PHYSICAL EXAM
[General Appearance - Alert] : alert [Mood] : the mood was normal [4] : S1 ankle flexors 4/5 [Sensation Tactile Decrease] : light touch was intact [2+] : Ankle jerk left 2+ [Straight-Leg Raise Test - Left] : straight leg raise of the left leg was negative [Straight-Leg Raise Test - Right] : straight leg raise  of the right leg was negative [Able to toe walk] : the patient was not able to toe walk [Able to heel walk] : the patient was able to heel walk [No Spinal Tenderness] : no spinal tenderness [] : no rash

## 2021-07-28 NOTE — CONSULT LETTER
[Dear  ___] : Dear ~CALIXTO, [Consult Letter:] : I had the pleasure of evaluating your patient, [unfilled]. [Please see my note below.] : Please see my note below. [Consult Closing:] : Thank you very much for allowing me to participate in the care of this patient.  If you have any questions, please do not hesitate to contact me. [Sincerely,] : Sincerely, [FreeTextEntry2] : Fer Arce MD\par [address] [place] [address] \[place] [address] [FreeTextEntry3] : [place]

## 2021-07-28 NOTE — ASSESSMENT
[FreeTextEntry1] : 56 year old female with low back and lumbar radicular pain most likely secondary to stenosis.  Given the nature of the patient's complaints and lack of significant improvement following more conservative measures, we will obtain MRI lumbar spine to help delineate the exact etiology of the patient's pain.  The patient will return to see me once the study is complete so that we may review the results and discuss further treatment options.  I will also prescribe a course of medically supervised PT.  I will also increase her Gabapentin to 600mg three times a day.  Side effects discussed.\par

## 2021-07-28 NOTE — DATA REVIEWED
[de-identified] : EXAM: CT LUMBAR SPINE\par \par \par PROCEDURE DATE: 11/21/2020\par \par \par \par INTERPRETATION: CLINICAL HISTORY: Lower back pain. Sciatica. No report of trauma.\par \par TECHNIQUE: CT of the lumbar spine without IV contrast.\par Examination consists of transaxial images acquired from the lower thoracic region through to the sacrum without demonstration of IV contrast. Coronal and sagittal reformatted images are provided.\par \par COMPARISON: None available.\par \par FINDINGS:\par The lumbosacral alignment is intact. The vertebral body heights are maintained. There are no acute fractures or evidence of traumatic malalignment. Multilevel facet alignment is preserved. There is no suspicious osteoblastic or lytic lesion. Sclerotic changes in the lower aspect of the L4 and upper aspect of the L5 vertebral body with associated intervertebral disc space narrowing and disc phenomenon changes as secondary to chronic endplate degenerative changes. The sacrum and SI joints appear unremarkable.\par \par There is no no evidence of epidural fluid collection or hematoma. There is no paraspinal fluid or phlegmonous change.\par \par Intervertebral disc space narrowing most notable at L4/L5. The spinal canal is congenitally normal in AP and transverse diameters. Evaluation of the individual levels demonstrates:\par \par T12/L1 through L2/L3: There is no significant disc herniation, canal stenosis or neural foraminal narrowing by CT technique.\par \par L3/L4: There is a small circumferential disc bulge along with mild facet arthropathy and ligamentum flavum thickening contributes to mild to moderate canal stenosis. There is mild bilateral neural foraminal narrowing.\par \par L4/L5: Minimal circumferential disc bulge without significant canal or neural foraminal stenosis.\par \par L5/S1: Intervertebral disc space narrowing with disc vacuum phenomenon changes along with a small circumferential disc bulge with an asymmetric left paracentral and foraminal disc herniation and facet arthropathy with ligamentum flavum thickening contributes to moderate to severe canal. Suboptimal assessment of the intrathecal contents on this modality. There is moderate to severe left and mild to moderate right neural foraminal narrowing.\par \par L5/S1: Minimal broad-based disc bulge and mild facet arthropathy contributes to mild canal stenosis. There is moderate bilateral neural foraminal narrowing.\par \par Limited visualization of the intra-abdominal and pelvic compartment demonstrates no acute abnormality. Sigmoid diverticulosis without diverticulitis.\par \par The paraspinal musculature is of normal bulk and morphology for the patient's stated age.\par \par IMPRESSION:\par No acute fracture or evidence of traumatic malalignment.\par \par Degenerative changes notable at L4/L5 with the least moderate to perhaps severe canal stenosis. Suboptimal assessment of the intrathecal and foraminal contents on this modality. Consider further evaluation with lumbar spine MRI, if there are no contraindications, which better evaluates the nerve roots.\par \par \par \par \par \par KRISTOFER RAI MD; Attending Radiologist\par This document has been electronically signed. Nov 21 2020 7:46PM

## 2021-08-06 ENCOUNTER — RX RENEWAL (OUTPATIENT)
Age: 56
End: 2021-08-06

## 2021-08-06 ENCOUNTER — APPOINTMENT (OUTPATIENT)
Dept: RHEUMATOLOGY | Facility: CLINIC | Age: 56
End: 2021-08-06
Payer: MEDICAID

## 2021-08-06 VITALS
OXYGEN SATURATION: 100 % | HEART RATE: 71 BPM | TEMPERATURE: 98 F | DIASTOLIC BLOOD PRESSURE: 71 MMHG | SYSTOLIC BLOOD PRESSURE: 138 MMHG | HEIGHT: 68 IN | BODY MASS INDEX: 22.73 KG/M2 | WEIGHT: 150 LBS

## 2021-08-06 PROCEDURE — 99214 OFFICE O/P EST MOD 30 MIN: CPT

## 2021-08-06 NOTE — HISTORY OF PRESENT ILLNESS
[FreeTextEntry1] : Left knee pain much improved s/p C-S injection at last visit.  Currently w/ right knee pain.  She also c/o pain in her hand joints for the past several weeks w/ mild swelling.  (+)AM stiffness x 1-2 hours. Also still w/ LBP radiating down her B/L LE's.  She saw pain management who referred her for PT.

## 2021-08-06 NOTE — ASSESSMENT
[FreeTextEntry1] : 54 yo F with:\par 1) Rheumatoid Arthritis (+RF, +CCP) - had improved on  Arava, though now w/ mild-moderate disease activity. \par   - Cont leflunomide 20mg daily.  Will add Plaquenil 300mg/day.  Recommended baseline ophtho eval.\par   - hepatitis panel negative 5/18.\par   - Flu vaccine (10/20) UTD.  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  Currently much improved.\par   - derm f/u.\par 3)  Osteopenia:  Bisphosphonate not indicated based on FRAX (5.2/0.7%).  Previously w/ vit D deficiency - now resolved.\par   - Cont calcium / vit D.\par   - weight-bearing exercise.\par 4)  OA - pt w/ mild chronic pain in knees primarily with climbing stairs.   Left knee pain resolved s/p corticosteroid injection at last visit.  Currently w/ mild right knee pain\par   - Reiterated importance of exercise\par   - ibuprofen and/or Tylenol prn\par   - warm compresses\par   - OTC topical analgesics

## 2021-08-06 NOTE — PHYSICAL EXAM
[General Appearance - Alert] : alert [General Appearance - In No Acute Distress] : in no acute distress [Sclera] : the sclera and conjunctiva were normal [Outer Ear] : the ears and nose were normal in appearance [Hearing Threshold Finger Rub Not [place]] : hearing was normal [Oropharynx] : the oropharynx was normal [Neck Appearance] : the appearance of the neck was normal [Neck Cervical Mass (___cm)] : no neck mass was observed [Jugular Venous Distention Increased] : there was no jugular-venous distention [Thyroid Diffuse Enlargement] : the thyroid was not enlarged [Thyroid Nodule] : there were no palpable thyroid nodules [Respiration, Rhythm And Depth] : normal respiratory rhythm and effort [Exaggerated Use Of Accessory Muscles For Inspiration] : no accessory muscle use [Auscultation Breath Sounds / Voice Sounds] : lungs were clear to auscultation bilaterally [Heart Rate And Rhythm] : heart rate was normal and rhythm regular [Heart Sounds] : normal S1 and S2 [Heart Sounds Gallop] : no gallops [Murmurs] : no murmurs [Heart Sounds Pericardial Friction Rub] : no pericardial rub [Edema] : there was no peripheral edema [Bowel Sounds] : normal bowel sounds [Abdomen Soft] : soft [Abdomen Tenderness] : non-tender [Abdomen Mass (___ Cm)] : no abdominal mass palpated [Cervical Lymph Nodes Enlarged Posterior Bilaterally] : posterior cervical [Cervical Lymph Nodes Enlarged Anterior Bilaterally] : anterior cervical [Supraclavicular Lymph Nodes Enlarged Bilaterally] : supraclavicular [No CVA Tenderness] : no ~M costovertebral angle tenderness [No Spinal Tenderness] : no spinal tenderness [] : no rash [No Focal Deficits] : no focal deficits [Oriented To Time, Place, And Person] : oriented to person, place, and time [Impaired Insight] : insight and judgment were intact [Affect] : the affect was normal [FreeTextEntry1] : (+)mild swelling in B/[id number] MCP's; (+)multiple B/L [place]berden's nodes; left knee w/ pain upon flexion;  normal ROM in rest of joints\par (+)multiple B/L [place]ber[place]'s nodes; mucoid cyst overlying left 2nd PIP.

## 2021-08-24 NOTE — DISCHARGE NOTE NURSING/CASE MANAGEMENT/SOCIAL WORK - NSDCVIVACCINE_GEN_ALL_CORE_FT
If you are a smoker, it is important for your health to stop smoking. Please be aware that second hand smoke is also harmful. No Vaccines Administered.

## 2021-09-01 ENCOUNTER — RX RENEWAL (OUTPATIENT)
Age: 56
End: 2021-09-01

## 2021-09-15 ENCOUNTER — APPOINTMENT (OUTPATIENT)
Dept: MRI IMAGING | Facility: CLINIC | Age: 56
End: 2021-09-15
Payer: MEDICAID

## 2021-09-15 ENCOUNTER — OUTPATIENT (OUTPATIENT)
Dept: OUTPATIENT SERVICES | Facility: HOSPITAL | Age: 56
LOS: 1 days | End: 2021-09-15
Payer: MEDICAID

## 2021-09-15 ENCOUNTER — APPOINTMENT (OUTPATIENT)
Dept: ULTRASOUND IMAGING | Facility: CLINIC | Age: 56
End: 2021-09-15
Payer: MEDICAID

## 2021-09-15 DIAGNOSIS — Z00.8 ENCOUNTER FOR OTHER GENERAL EXAMINATION: ICD-10-CM

## 2021-09-15 DIAGNOSIS — Z95.5 PRESENCE OF CORONARY ANGIOPLASTY IMPLANT AND GRAFT: Chronic | ICD-10-CM

## 2021-09-15 PROCEDURE — 72148 MRI LUMBAR SPINE W/O DYE: CPT | Mod: 26

## 2021-09-15 PROCEDURE — 76536 US EXAM OF HEAD AND NECK: CPT

## 2021-09-15 PROCEDURE — 72148 MRI LUMBAR SPINE W/O DYE: CPT

## 2021-09-15 PROCEDURE — 76536 US EXAM OF HEAD AND NECK: CPT | Mod: 26

## 2021-09-29 ENCOUNTER — APPOINTMENT (OUTPATIENT)
Dept: ENDOCRINOLOGY | Facility: CLINIC | Age: 56
End: 2021-09-29
Payer: MEDICAID

## 2021-09-29 VITALS
RESPIRATION RATE: 17 BRPM | SYSTOLIC BLOOD PRESSURE: 150 MMHG | WEIGHT: 145 LBS | HEIGHT: 68 IN | TEMPERATURE: 98 F | DIASTOLIC BLOOD PRESSURE: 90 MMHG | HEART RATE: 102 BPM | OXYGEN SATURATION: 95 % | BODY MASS INDEX: 21.98 KG/M2

## 2021-09-29 LAB
C PEPTIDE SERPL-MCNC: 2.8 NG/ML
ESTIMATED AVERAGE GLUCOSE: 298 MG/DL
FRUCTOSAMINE SERPL-MCNC: 541 UMOL/L
GLUCOSE BLDC GLUCOMTR-MCNC: 339
HBA1C MFR BLD HPLC: 12 %

## 2021-09-29 PROCEDURE — 95251 CONT GLUC MNTR ANALYSIS I&R: CPT

## 2021-09-29 PROCEDURE — 95250 CONT GLUC MNTR PHYS/QHP EQP: CPT

## 2021-09-29 PROCEDURE — 99214 OFFICE O/P EST MOD 30 MIN: CPT | Mod: 25

## 2021-09-29 RX ORDER — DAPAGLIFLOZIN 5 MG/1
5 TABLET, FILM COATED ORAL DAILY
Qty: 25 | Refills: 0 | Status: DISCONTINUED | COMMUNITY
Start: 2021-08-02 | End: 2021-09-29

## 2021-09-29 RX ORDER — EMPAGLIFLOZIN 10 MG/1
10 TABLET, FILM COATED ORAL DAILY
Qty: 30 | Refills: 11 | Status: DISCONTINUED | COMMUNITY
Start: 2021-04-26 | End: 2021-09-29

## 2021-09-29 RX ORDER — REPAGLINIDE 1 MG/1
1 TABLET ORAL
Qty: 360 | Refills: 3 | Status: DISCONTINUED | COMMUNITY
Start: 2021-04-26 | End: 2021-09-29

## 2021-09-29 NOTE — REASON FOR VISIT
[Follow - Up] : a follow-up visit [DM Type 2] : DM Type 2 [Thyroid nodule/ MNG] : thyroid nodule/ MNG [PCOS] : PCOS

## 2021-09-29 NOTE — ASSESSMENT
[Diabetes Foot Care] : diabetes foot care [Long Term Vascular Complications] : long term vascular complications of diabetes [Carbohydrate Consistent Diet] : carbohydrate consistent diet [Importance of Diet and Exercise] : importance of diet and exercise to improve glycemic control, achieve weight loss and improve cardiovascular health [Exercise/Effect on Glucose] : exercise/effect on glucose [Hypoglycemia Management] : hypoglycemia management [Glucagon Use] : glucagon use [Ketone Testing] : ketone testing [Action and use of Insulin] : action and use of short and long-acting insulin [Self Monitoring of Blood Glucose] : self monitoring of blood glucose [Insulin Self-Administration] : insulin self-administration [Injection Technique, Storage, Sharps Disposal] : injection technique, storage, and sharps disposal [Sick-Day Management] : sick-day management [Retinopathy Screening] : Patient was referred to ophthalmology for retinopathy screening [Diabetic Medications] : Risks and benefits of diabetic medications were discussed [FreeTextEntry1] : 1. Severe uncontrolled T2DM\par - compliance is reiterated\par - Nolan PRO\par - increase Tresiba 22 un HS\par - d/c prandin\par - metformin 500 mg BID, monitor EF\par - will avoid DPP4 inhibitors since they can potentiate a bradykinin induced  angioedema, and I'd also avoid GLP1 agonists for now, given some increased risks in angioedema on some of them (incl Soliqua, adlyxin, Trulicity)\par - Steglatro 5 mg qd\par - Lyumjev 6-6-8\par - monitor lipids, blood pressure, urine microalbumin\par \par 2. MNG\par - refer to [facility] for FNA\par RTC 2- 3 weeks for sensor download and CDE evaluation\par

## 2021-09-29 NOTE — HISTORY OF PRESENT ILLNESS
[FreeTextEntry1] : F/u for multiple medical issues\par \par *** Sep 29, 2021 ***\par \par on Tresiba 15 un HS, metformin 500 mg bid\par never started novolog and ran out of prandin\par was taking farxiga and jardiance intermittently b/o insurance issues, not on any sghlt2 at present. was told by insurance that steglatro is covered\par reports fbs- 180's- 200's, ppg in 300's\par Thyr US (9/15/21)-  RMP 1.6x1.5x1.2 solid, RLP 1.2x1.2x1.4 with coarse calcs, left isthmic 0.8 partially calcified. All are stable\par \par HISTORY OF PRESENT ILLNESS. \par \par Ms. MCBRIDE was diagnosed with Diabetes Mellitus Type 2 in ??. She reports history HTN, dyslipidemia, toxic MNG, subclinical hyperthyroidism (s/p CIFUENTES with 26 mci in 01/18) , CAD (s/p MI and PTCI), CHF,  but denies  known complications of retinopathy, nephropathy, or neuropathy. She is presently on metformin 1000 mg bid, Amaryl 2 mg qd.\par ***  She was admitted for angioedema on ACE inhibitors (enalapril) requiring intubation\par At that time her Januvia was stopped b/o 'co-admin with ACE could've put her at the higher risk for bradykinin induced angioedema"\par \par Blood sugars are checked 2 times a day. \par Did not bring log book, but reported are typically as following: FBS- 160's, ppg- 200's\par Hypoglycemia frequency: \par Fingerstick glucose in the office today is 265 mg/dL 2 hours after eating. \par Diet: not following ADA\par Exercise: short walks\par \par Lab review: a1c- 8.7, TSH- 0.62\par Thyroid NM scan (12/17)- hot nodule in the right lobe\par \par Thyroid US (3/18/20)- RMP round 1.6x1.3x1.6; RLP hypo 1.2x1.1x1.3 with coarse calcs\par Thyroid US (8/14/15)- RMP complex 2.2x1.7x2.0 round (no halo, microcalcs), RLP hypo 1.6x1.5x1.6(thick disrupted halo, microcalc), LUP solid hyper 0.5x0.3x0.6; LLP solid hypo 0.8x0.5x0.6\par \par She does not recall any FNAs done in the past\par \par Last dilated eye - 2020\par Last podiatry visit  - 2020\par Last cardiology evaluation - 06/20\par Last stress test - 6/20\par Last 2-D Echo - 5/20, EF ~ 30%\par Last nephrology evaluation - none\par Last neurology evaluation-none\par

## 2021-10-01 LAB
25(OH)D3 SERPL-MCNC: 38.3 NG/ML
ALBUMIN SERPL ELPH-MCNC: 4.8 G/DL
ALP BLD-CCNC: 105 U/L
ALT SERPL-CCNC: 19 U/L
ANION GAP SERPL CALC-SCNC: 16 MMOL/L
AST SERPL-CCNC: 15 U/L
BILIRUB SERPL-MCNC: 0.3 MG/DL
BUN SERPL-MCNC: 12 MG/DL
CALCIUM SERPL-MCNC: 10.1 MG/DL
CHLORIDE SERPL-SCNC: 93 MMOL/L
CHOLEST SERPL-MCNC: 248 MG/DL
CO2 SERPL-SCNC: 25 MMOL/L
CREAT SERPL-MCNC: 0.61 MG/DL
CREAT SPEC-SCNC: 78 MG/DL
FOLATE SERPL-MCNC: 12.5 NG/ML
GLUCOSE SERPL-MCNC: 314 MG/DL
HDLC SERPL-MCNC: 51 MG/DL
LDLC SERPL CALC-MCNC: 133 MG/DL
MICROALBUMIN 24H UR DL<=1MG/L-MCNC: 16.6 MG/DL
MICROALBUMIN/CREAT 24H UR-RTO: 212 MG/G
NONHDLC SERPL-MCNC: 197 MG/DL
POTASSIUM SERPL-SCNC: 4.4 MMOL/L
PROT SERPL-MCNC: 7.9 G/DL
SODIUM SERPL-SCNC: 135 MMOL/L
T4 FREE SERPL-MCNC: 1.3 NG/DL
TRIGL SERPL-MCNC: 320 MG/DL
TSH SERPL-ACNC: 0.75 UIU/ML
VIT B12 SERPL-MCNC: >2000 PG/ML

## 2021-10-04 ENCOUNTER — APPOINTMENT (OUTPATIENT)
Dept: ENDOCRINOLOGY | Facility: CLINIC | Age: 56
End: 2021-10-04

## 2021-10-06 ENCOUNTER — APPOINTMENT (OUTPATIENT)
Dept: NEUROSURGERY | Facility: CLINIC | Age: 56
End: 2021-10-06
Payer: MEDICAID

## 2021-10-06 VITALS
SYSTOLIC BLOOD PRESSURE: 138 MMHG | BODY MASS INDEX: 21.98 KG/M2 | HEART RATE: 89 BPM | WEIGHT: 145 LBS | OXYGEN SATURATION: 97 % | TEMPERATURE: 97.5 F | HEIGHT: 68 IN | DIASTOLIC BLOOD PRESSURE: 83 MMHG

## 2021-10-06 PROCEDURE — 99214 OFFICE O/P EST MOD 30 MIN: CPT

## 2021-10-06 NOTE — DATA REVIEWED
[de-identified] : \par   MR Lumbar Spine No Cont             Final\par \par No Documents Attached\par \par \par \par \par   EXAM:  MR SPINE LUMBAR\par \par \par PROCEDURE DATE:  09/15/2021\par \par \par \par INTERPRETATION:  LUMBOSACRAL SPINE MRI\par \par CLINICAL INFORMATION: Low back pain and radiculopathy.\par TECHNIQUE: Multiplanar, multisequence MR imaging was obtained of the lumbosacral spine.\par COMPARISON: CT of the lumbar spine dated 11/21/2020.\par \par FINDINGS:\par \par DISC LEVEL EVALUATION:\par \par T11/T12: Evaluated on sagittal coronal sequences only. No central spinal canal or neural foraminal narrowing.\par T12/L1: No central spinal canal or neural foraminal narrowing.\par L1/L2: No central spinal canal or neural foraminal narrowing.\par L2/L3: No central spinal canal or neural foraminal narrowing.\par L3/L4: Mild bilateral facet arthrosis and trace retrolisthesis. Mild to moderate disc bulge. No central spinal canal or neural foraminal narrowing.\par L4/L5: Mild right lateral listhesis. Moderate disc height loss, worse on the left. Marginal osteophytosis and mild-to-moderate disc bulge, worse on the left. The findings result in mild-moderate central spinal canal stenosis, moderate bilateral lateral recess narrowing with contact of the descending bilateral L5 nerve roots, and moderate to severe left neural foraminal narrowing.\par L5/S1: Trace retrolisthesis. Mild disc bulge. The findings result in mild bilateral neural foraminal narrowing. No central spinal canal stenosis.\par \par SPINAL ALIGNMENT: Mild dextroscoliosis with apex at L4-5. Multilevel listheses, detailed above.\par DISTAL CORD AND CONUS: Normal signal. The conus terminates at L1-L2.\par SI JOINTS: Small subchondral cyst in the left iliac bone.\par MARROW: Pronounced reactive endplate sclerosis on the left at L4-L5.\par PARASPINAL MUSCLE AND SOFT TISSUES: Mild fatty replacement of the lower paraspinal musculature.\par INTRAABDOMINAL/INTRAPELVIC SOFT TISSUES: Cholelithiasis.\par \par IMPRESSION:\par \par Moderate severe degenerative disc disease at L4-L5 with disc space narrowing, disc bulging, osteophyte formation resulting in central canal and foraminal narrowing as detailed above and most prominent within the left foramen. Mild lumbar spondylosis elsewhere.\par \par --- End of Report ---\par \par \par \par \par \par MANJIT BUSCH M.D., RADIOLOGY FELLOW\par This document has been electronically signed.\par RENAN ESCALERA MD; Attending Radiologist\par This document has been electronically signed. Sep 21 2021  5:50PM\par \par  \par \par  Ordered by: MILAGROS BLAIR       Collected/Examined: 15Sep2021 01:07PM       \par Verified by: MILAGROS BLAIR 22Sep2021 10:03AM       \par  Result Communication: Schedule appointment to discuss results;\par Stage: Final       \par  Performed at: [facility]       Resulted: 21Sep2021 04:15PM       Last Updated: 22Sep2021 10:03AM       Accession: [id number]

## 2021-10-06 NOTE — REASON FOR VISIT
[Follow-Up: _____] : a [unfilled] follow-up visit [FreeTextEntry1] : Patient returns after completing MRI lumbar spine.  She also completed her PT.  She is here to discuss the results of her study.

## 2021-10-06 NOTE — ASSESSMENT
[FreeTextEntry1] : 56 year old female with low back and lumbar radicular pain.  Given the nature of the patient's complaints and lack of significant improvement following more conservative measures, we did discuss lumbar epidural steroid injection.  Risks, benefits, and expectations of the procedure were reviewed.  The patient was provided with an educational pamphlet outlining the details of the procedure so that he/she may have the ability to review the information prior to proceeding.  The patient has agreed to proceed and will follow up with me for the procedure.\par

## 2021-10-07 RX ORDER — INSULIN LISPRO 100 U/ML
100 INJECTION, SOLUTION SUBCUTANEOUS
Qty: 2 | Refills: 2 | Status: ACTIVE | COMMUNITY
Start: 2021-10-07 | End: 1900-01-01

## 2021-10-07 RX ORDER — INSULIN LISPRO-AABC 100 [IU]/ML
100 INJECTION, SOLUTION SUBCUTANEOUS
Qty: 3 | Refills: 1 | Status: DISCONTINUED | COMMUNITY
Start: 2021-09-29 | End: 2021-10-07

## 2021-10-08 DIAGNOSIS — Z01.818 ENCOUNTER FOR OTHER PREPROCEDURAL EXAMINATION: ICD-10-CM

## 2021-10-15 ENCOUNTER — APPOINTMENT (OUTPATIENT)
Dept: DISASTER EMERGENCY | Facility: CLINIC | Age: 56
End: 2021-10-15

## 2021-10-16 LAB — SARS-COV-2 N GENE NPH QL NAA+PROBE: NOT DETECTED

## 2021-10-18 ENCOUNTER — APPOINTMENT (OUTPATIENT)
Dept: NEUROSURGERY | Facility: CLINIC | Age: 56
End: 2021-10-18

## 2021-10-20 ENCOUNTER — APPOINTMENT (OUTPATIENT)
Dept: ENDOCRINOLOGY | Facility: CLINIC | Age: 56
End: 2021-10-20
Payer: MEDICAID

## 2021-10-20 PROCEDURE — G0108 DIAB MANAGE TRN  PER INDIV: CPT

## 2021-10-22 ENCOUNTER — APPOINTMENT (OUTPATIENT)
Dept: DISASTER EMERGENCY | Facility: CLINIC | Age: 56
End: 2021-10-22

## 2021-10-22 DIAGNOSIS — Z01.818 ENCOUNTER FOR OTHER PREPROCEDURAL EXAMINATION: ICD-10-CM

## 2021-10-23 LAB — SARS-COV-2 N GENE NPH QL NAA+PROBE: NOT DETECTED

## 2021-10-24 ENCOUNTER — TRANSCRIPTION ENCOUNTER (OUTPATIENT)
Age: 56
End: 2021-10-24

## 2021-10-25 ENCOUNTER — OUTPATIENT (OUTPATIENT)
Dept: OUTPATIENT SERVICES | Facility: HOSPITAL | Age: 56
LOS: 1 days | End: 2021-10-25
Payer: MEDICAID

## 2021-10-25 ENCOUNTER — APPOINTMENT (OUTPATIENT)
Dept: NEUROSURGERY | Facility: CLINIC | Age: 56
End: 2021-10-25
Payer: MEDICAID

## 2021-10-25 DIAGNOSIS — M54.16 RADICULOPATHY, LUMBAR REGION: ICD-10-CM

## 2021-10-25 DIAGNOSIS — Z95.5 PRESENCE OF CORONARY ANGIOPLASTY IMPLANT AND GRAFT: Chronic | ICD-10-CM

## 2021-10-25 PROCEDURE — 62323 NJX INTERLAMINAR LMBR/SAC: CPT

## 2021-11-04 ENCOUNTER — APPOINTMENT (OUTPATIENT)
Dept: INTERNAL MEDICINE | Facility: CLINIC | Age: 56
End: 2021-11-04
Payer: MEDICAID

## 2021-11-04 DIAGNOSIS — J06.9 ACUTE UPPER RESPIRATORY INFECTION, UNSPECIFIED: ICD-10-CM

## 2021-11-04 PROCEDURE — 99442: CPT

## 2021-11-04 NOTE — ASSESSMENT
[FreeTextEntry1] : \par Given acute URI symptoms, will send for COVID PCR testing and obtain chest X-ray.

## 2021-11-04 NOTE — HISTORY OF PRESENT ILLNESS
[Home] : at home, [unfilled] , at the time of the visit. [Medical Office: ([facility])___] : at the medical office located in  [Verbal consent obtained from patient] : the patient, [unfilled] [Cold Symptoms] : cold symptoms [Moderate] : moderate [___ Days ago] : [unfilled] days ago [Sudden] : suddenly [Constant] : constant [Congestion] : congestion [Cough] : cough [Sore Throat] : no sore throat [Wheezing] : no wheezing [Chills] : no chills [Anorexia] : no anorexia [Shortness Of Breath] : no shortness of breath [Earache] : no earache [Fatigue] : fatigue [Headache] : headache [Fever] : no fever [Worsening] : worsening

## 2021-11-05 ENCOUNTER — APPOINTMENT (OUTPATIENT)
Dept: RHEUMATOLOGY | Facility: CLINIC | Age: 56
End: 2021-11-05

## 2021-11-06 ENCOUNTER — TRANSCRIPTION ENCOUNTER (OUTPATIENT)
Age: 56
End: 2021-11-06

## 2021-11-09 ENCOUNTER — APPOINTMENT (OUTPATIENT)
Dept: PHYSICAL MEDICINE AND REHAB | Facility: CLINIC | Age: 56
End: 2021-11-09
Payer: MEDICAID

## 2021-11-09 VITALS
HEART RATE: 89 BPM | SYSTOLIC BLOOD PRESSURE: 130 MMHG | DIASTOLIC BLOOD PRESSURE: 83 MMHG | TEMPERATURE: 95 F | OXYGEN SATURATION: 99 %

## 2021-11-09 DIAGNOSIS — M51.36 OTHER INTERVERTEBRAL DISC DEGENERATION, LUMBAR REGION: ICD-10-CM

## 2021-11-09 DIAGNOSIS — M54.16 RADICULOPATHY, LUMBAR REGION: ICD-10-CM

## 2021-11-09 DIAGNOSIS — M54.30 SCIATICA, UNSPECIFIED SIDE: ICD-10-CM

## 2021-11-09 PROCEDURE — 99213 OFFICE O/P EST LOW 20 MIN: CPT

## 2021-11-09 NOTE — REASON FOR VISIT
[Follow-Up: _____] : a [unfilled] follow-up visit [FreeTextEntry1] : Patient returns after the first LESI a few weeks ago.  The patient reports 65% improvement in the symptoms.  The patient is pleased with the results and returns for a follow up visit today.\par

## 2021-11-09 NOTE — ASSESSMENT
[FreeTextEntry1] : 56 year old female with low back and lumbar radicular pain now moderately improved following her first LESI.  She will continue with her home exercises and return to see me at the earliest sign that her pain returns for further injection therapy as necessary.

## 2021-11-10 ENCOUNTER — APPOINTMENT (OUTPATIENT)
Dept: ENDOCRINOLOGY | Facility: CLINIC | Age: 56
End: 2021-11-10
Payer: MEDICAID

## 2021-11-10 PROCEDURE — G0108 DIAB MANAGE TRN  PER INDIV: CPT

## 2021-12-15 ENCOUNTER — APPOINTMENT (OUTPATIENT)
Dept: ENDOCRINOLOGY | Facility: CLINIC | Age: 56
End: 2021-12-15

## 2021-12-28 NOTE — PHYSICAL THERAPY INITIAL EVALUATION ADULT - DISCHARGE PLANNER MADE AWARE
Plan of care reviewed with patient and mother at bedside, verbalized understanding. All questions answered and emotional support provided. Pt maintaining adequate saturations, transitioned to HME on room air. No signs of respiratory distress. Respiratory culture sent. VBGs stable. Afebrile. Plan to give PRN tylenol before scheduled IVIG infusion. Denies pain. Added home regimen of Calcium Carb to eMAR. Changed eltrombopag to nightly in order to avoid giving too close to Calcium Carb. Pt had episode of hypotension 70s/40s, gave PO calcium carb dose early per MD instruction. Within the hour, BPs >80/40. Fondaparinux injections daily. Colace scheduled. Mag x1. KCL x1. No BM this shift. Tolerating regular diet. Voiding adequately to urinal. One episode of incontinence this morning, so I/Os not accurate, MD aware. See flowsheets and eMAR for details.    no

## 2021-12-30 ENCOUNTER — RX RENEWAL (OUTPATIENT)
Age: 56
End: 2021-12-30

## 2022-01-03 ENCOUNTER — APPOINTMENT (OUTPATIENT)
Dept: ENDOCRINOLOGY | Facility: CLINIC | Age: 57
End: 2022-01-03

## 2022-01-10 ENCOUNTER — RX RENEWAL (OUTPATIENT)
Age: 57
End: 2022-01-10

## 2022-01-12 ENCOUNTER — APPOINTMENT (OUTPATIENT)
Dept: INTERNAL MEDICINE | Facility: CLINIC | Age: 57
End: 2022-01-12
Payer: MEDICAID

## 2022-01-12 VITALS
WEIGHT: 134 LBS | DIASTOLIC BLOOD PRESSURE: 88 MMHG | HEART RATE: 74 BPM | HEIGHT: 68 IN | OXYGEN SATURATION: 98 % | BODY MASS INDEX: 20.31 KG/M2 | SYSTOLIC BLOOD PRESSURE: 152 MMHG | TEMPERATURE: 96.5 F

## 2022-01-12 DIAGNOSIS — K21.9 GASTRO-ESOPHAGEAL REFLUX DISEASE W/OUT ESOPHAGITIS: ICD-10-CM

## 2022-01-12 DIAGNOSIS — M05.79 RHEUMATOID ARTHRITIS WITH RHEUMATOID FACTOR OF MULTIPLE SITES W/OUT ORGAN OR SYSTEMS INVOLVEMENT: ICD-10-CM

## 2022-01-12 DIAGNOSIS — Z13.31 ENCOUNTER FOR SCREENING FOR DEPRESSION: ICD-10-CM

## 2022-01-12 DIAGNOSIS — I25.5 ISCHEMIC CARDIOMYOPATHY: ICD-10-CM

## 2022-01-12 DIAGNOSIS — Z13.39 ENCOUNTER FOR SCREENING EXAM FOR OTHER MENTAL HEALTH AND BEHAVIORAL DISORDERS: ICD-10-CM

## 2022-01-12 PROCEDURE — G0444 DEPRESSION SCREEN ANNUAL: CPT | Mod: 59

## 2022-01-12 PROCEDURE — 99215 OFFICE O/P EST HI 40 MIN: CPT | Mod: 25

## 2022-01-12 PROCEDURE — G0442 ANNUAL ALCOHOL SCREEN 15 MIN: CPT

## 2022-01-12 PROCEDURE — 99396 PREV VISIT EST AGE 40-64: CPT

## 2022-01-12 NOTE — PHYSICAL EXAM
[Well Nourished] : well nourished [Well Developed] : well developed [Well-Appearing] : well-appearing [Normal Sclera/Conjunctiva] : normal sclera/conjunctiva [PERRL] : pupils equal round and reactive to light [EOMI] : extraocular movements intact [No JVD] : no jugular venous distention [No Lymphadenopathy] : no lymphadenopathy [Supple] : supple [Thyroid Normal, No Nodules] : the thyroid was normal and there were no nodules present [No Respiratory Distress] : no respiratory distress  [No Accessory Muscle Use] : no accessory muscle use [Clear to Auscultation] : lungs were clear to auscultation bilaterally [Normal Rate] : normal rate  [Regular Rhythm] : with a regular rhythm [Normal S1, S2] : normal S1 and S2 [No Murmur] : no murmur heard [No Carotid Bruits] : no carotid bruits [No Abdominal Bruit] : a ~M bruit was not heard ~T in the abdomen [No Varicosities] : no varicosities [Pedal Pulses Present] : the pedal pulses are present [No Edema] : there was no peripheral edema [No Palpable Aorta] : no palpable aorta [No Extremity Clubbing/Cyanosis] : no extremity clubbing/cyanosis [Normal Appearance] : normal in appearance [No Nipple Discharge] : no nipple discharge [No Axillary Lymphadenopathy] : no axillary lymphadenopathy [Soft] : abdomen soft [Non Tender] : non-tender [Non-distended] : non-distended [No Masses] : no abdominal mass palpated [No HSM] : no HSM [Normal Bowel Sounds] : normal bowel sounds [Normal Posterior Cervical Nodes] : no posterior cervical lymphadenopathy [Normal Anterior Cervical Nodes] : no anterior cervical lymphadenopathy [No CVA Tenderness] : no CVA  tenderness [No Spinal Tenderness] : no spinal tenderness [No Joint Swelling] : no joint swelling [Grossly Normal Strength/Tone] : grossly normal strength/tone [No Rash] : no rash [Coordination Grossly Intact] : coordination grossly intact [No Focal Deficits] : no focal deficits [Normal Gait] : normal gait [Deep Tendon Reflexes (DTR)] : deep tendon reflexes were 2+ and symmetric [Normal Affect] : the affect was normal [Normal Insight/Judgement] : insight and judgment were intact

## 2022-01-12 NOTE — HISTORY OF PRESENT ILLNESS
[FreeTextEntry1] : Presents for preventive visit as well as concerns regarding diabetes, hyperlipidemia, hypertension and osteopenia. [de-identified] : \par Preventive visit: pt is up to date with vaccines; she is due for colon cancer screening and is due for mammogram screening; she does not smoke cigarettes and does not misuse alcohol; she feels safe at home and has smoke/CO detectors in the house; she wears seatbelts when in vehicles; she needs to see her GYN for PAP/pelvic exams\par \par Medical Issue 1: Diabetes: unstable and poorly controlled with last A1c above 9.0; she admits to not adhering to low carb diet; she tries to take all her meds as prescribed but may miss a dose here and there; she denies dizziness, episodes of hypoglycemia and polyuria and polydipsia; she follows with her Endo doctor\par \par Medical Issue 2: Hyperlipidemia: unstable and variably controlled with fluctuations in her LDL level; she tries to adhere to low fat diet but admits this is not always the case; this is complicated by heart disease, she follows with her cardiologist and is due to nuclear stress testing as well\par \par Medical Issue 3: Hypertension: unstable and poorly controlled with fluctuations in BP levels; she is on 3 different medications for BP control and yet is still having a hard time keeping it at goal; she eats salty foods at times but tries to limit this too\par \par Medical Issue 4: Osteopenia: unstable and variably controlled; trying to prevent progression to osteoporosis but she is not compliant with light weight-bearing exercise and she does not walk either; she denies bone pain

## 2022-01-12 NOTE — HEALTH RISK ASSESSMENT
[Good] : ~his/her~  mood as  good [No] : No [1 or 2 (0 pts)] : 1 or 2 (0 points) [Never (0 pts)] : Never (0 points) [No falls in past year] : Patient reported no falls in the past year [None] : None [With Family] : lives with family [Employed] : employed [Sexually Active] : sexually active [Feels Safe at Home] : Feels safe at home [Fully functional (bathing, dressing, toileting, transferring, walking, feeding)] : Fully functional (bathing, dressing, toileting, transferring, walking, feeding) [Fully functional (using the telephone, shopping, preparing meals, housekeeping, doing laundry, using] : Fully functional and needs no help or supervision to perform IADLs (using the telephone, shopping, preparing meals, housekeeping, doing laundry, using transportation, managing medications and managing finances) [Reports normal functional visual acuity (ie: able to read med bottle)] : Reports normal functional visual acuity [Smoke Detector] : smoke detector [Carbon Monoxide Detector] : carbon monoxide detector [Safety elements used in home] : safety elements used in home [Seat Belt] :  uses seat belt [Sunscreen] : uses sunscreen [Audit-CScore] : 0 [Change in mental status noted] : No change in mental status noted [Language] : denies difficulty with language [Behavior] : denies difficulty with behavior [Learning/Retaining New Information] : denies difficulty learning/retaining new information [Handling Complex Tasks] : denies difficulty handling complex tasks [Reasoning] : denies difficulty with reasoning [Spatial Ability and Orientation] : denies difficulty with spatial ability and orientation [High Risk Behavior] : no high risk behavior [Reports changes in hearing] : Reports no changes in hearing [Reports changes in vision] : Reports no changes in vision [Reports changes in dental health] : Reports no changes in dental health [Guns at Home] : no guns at home [Travel to Developing Areas] : does not  travel to developing areas [TB Exposure] : is not being exposed to tuberculosis [Caregiver Concerns] : does not have caregiver concerns

## 2022-01-12 NOTE — ASSESSMENT
[FreeTextEntry1] : \par Preventive visit: pt is up to date with vaccines; she is due for colon cancer screening, FIT kit given today and is due for mammogram screening, referral given; she does not smoke cigarettes and does not misuse alcohol; she feels safe at home and has smoke/CO detectors in the house; she wears seatbelts when in vehicles; she needs to see her GYN for PAP/pelvic exams, referral given\par \par Medical Issue 1: Diabetes: unstable and poorly controlled with last A1c above 9.0; she admits to not adhering to low carb diet; she tries to take all her meds as prescribed but may miss a dose here and there; she denies dizziness, episodes of hypoglycemia and polyuria and polydipsia; she follows with her Endo doctor; check A1c via blood draw today; counselled pt on lifestyle change including 30 mins walking daily and low-carb diet\par \par Medical Issue 2: Hyperlipidemia: unstable and variably controlled with fluctuations in her LDL level; she tries to adhere to low fat diet but admits this is not always the case; this is complicated by heart disease, she follows with her cardiologist and is due to nuclear stress testing as well; check lipid panel via blood draw today; prescribed statin medication to be taken every night; coordinated follow-up care with cardiology; ordered nuclear stress testing\par \par Medical Issue 3: Hypertension: unstable and poorly controlled with fluctuations in BP levels; she is on 3 different medications for BP control and yet is still having a hard time keeping it at goal; she eats salty foods at times but tries to limit this too; prescribed ACEi, thiazide diuretic and calcium channel blocker medications; coordinated follow-up care with cardiology\par \par Medical Issue 4: Osteopenia: unstable and variably controlled; trying to prevent progression to osteoporosis but she is not compliant with light weight-bearing exercise and she does not walk either; she denies bone pain; ordered DEXA scan and advised Vitamin D supplementation; check Vit D levels today, currently taking 2000iu daily\par \par Annual alcohol misuse screen, 15 mins, done; negative\par \par Depression screen performed today, PHQ2=0, negative.\par

## 2022-01-14 LAB
25(OH)D3 SERPL-MCNC: 39.5 NG/ML
ALBUMIN SERPL ELPH-MCNC: 5 G/DL
ALP BLD-CCNC: 87 U/L
ALT SERPL-CCNC: 19 U/L
ANION GAP SERPL CALC-SCNC: 16 MMOL/L
AST SERPL-CCNC: 13 U/L
BASOPHILS # BLD AUTO: 0.12 K/UL
BASOPHILS NFR BLD AUTO: 1.4 %
BILIRUB SERPL-MCNC: 0.3 MG/DL
BUN SERPL-MCNC: 20 MG/DL
CALCIUM SERPL-MCNC: 10.4 MG/DL
CHLORIDE SERPL-SCNC: 94 MMOL/L
CHOLEST SERPL-MCNC: 220 MG/DL
CO2 SERPL-SCNC: 26 MMOL/L
CREAT SERPL-MCNC: 0.7 MG/DL
EOSINOPHIL # BLD AUTO: 0.26 K/UL
EOSINOPHIL NFR BLD AUTO: 3 %
ESTIMATED AVERAGE GLUCOSE: 266 MG/DL
GLUCOSE SERPL-MCNC: 290 MG/DL
HBA1C MFR BLD HPLC: 10.9 %
HCT VFR BLD CALC: 45.1 %
HDLC SERPL-MCNC: 67 MG/DL
HGB BLD-MCNC: 14.7 G/DL
IMM GRANULOCYTES NFR BLD AUTO: 0.2 %
LDLC SERPL CALC-MCNC: 111 MG/DL
LYMPHOCYTES # BLD AUTO: 2.35 K/UL
LYMPHOCYTES NFR BLD AUTO: 27.5 %
MAN DIFF?: NORMAL
MCHC RBC-ENTMCNC: 26.8 PG
MCHC RBC-ENTMCNC: 32.6 GM/DL
MCV RBC AUTO: 82.1 FL
MONOCYTES # BLD AUTO: 0.59 K/UL
MONOCYTES NFR BLD AUTO: 6.9 %
NEUTROPHILS # BLD AUTO: 5.19 K/UL
NEUTROPHILS NFR BLD AUTO: 61 %
NONHDLC SERPL-MCNC: 153 MG/DL
PLATELET # BLD AUTO: 285 K/UL
POTASSIUM SERPL-SCNC: 3.9 MMOL/L
PROT SERPL-MCNC: 8.1 G/DL
RBC # BLD: 5.49 M/UL
RBC # FLD: 14.5 %
SODIUM SERPL-SCNC: 136 MMOL/L
TRIGL SERPL-MCNC: 212 MG/DL
TSH SERPL-ACNC: 0.66 UIU/ML
WBC # FLD AUTO: 8.53 K/UL

## 2022-01-26 ENCOUNTER — APPOINTMENT (OUTPATIENT)
Dept: ENDOCRINOLOGY | Facility: CLINIC | Age: 57
End: 2022-01-26
Payer: MEDICAID

## 2022-01-26 VITALS
WEIGHT: 135 LBS | SYSTOLIC BLOOD PRESSURE: 140 MMHG | TEMPERATURE: 97.8 F | BODY MASS INDEX: 20.46 KG/M2 | DIASTOLIC BLOOD PRESSURE: 70 MMHG | RESPIRATION RATE: 17 BRPM | OXYGEN SATURATION: 98 % | HEART RATE: 76 BPM | HEIGHT: 68 IN

## 2022-01-26 VITALS — BODY MASS INDEX: 21.22 KG/M2 | HEIGHT: 68 IN | WEIGHT: 140 LBS

## 2022-01-26 PROCEDURE — G0108 DIAB MANAGE TRN  PER INDIV: CPT

## 2022-01-26 PROCEDURE — 99214 OFFICE O/P EST MOD 30 MIN: CPT

## 2022-01-26 RX ORDER — ROSUVASTATIN CALCIUM 5 MG/1
5 TABLET, FILM COATED ORAL
Qty: 90 | Refills: 2 | Status: ACTIVE | COMMUNITY
Start: 2022-01-26 | End: 1900-01-01

## 2022-01-26 RX ORDER — PEN NEEDLE, DIABETIC 29 G X1/2"
32G X 4 MM NEEDLE, DISPOSABLE MISCELLANEOUS
Qty: 1 | Refills: 3 | Status: ACTIVE | COMMUNITY
Start: 2022-01-26 | End: 1900-01-01

## 2022-01-26 RX ORDER — PEN NEEDLE, DIABETIC 29 G X1/2"
32G X 4 MM NEEDLE, DISPOSABLE MISCELLANEOUS
Qty: 4 | Refills: 4 | Status: ACTIVE | COMMUNITY
Start: 2020-12-11 | End: 1900-01-01

## 2022-01-26 NOTE — HISTORY OF PRESENT ILLNESS
[FreeTextEntry1] : F/u for multiple medical issues\par \par *** Jan 26, 2022 ***\par \par taking Tresiba 22 un HS,  metformin 500 mg TID, Steglatro 5 mg qd,  Admelog 11 to 12 un ac meals, zetia 10 mg \par feels ok, but is concerned with weight loss since starting steglatro.\par received Omnipod- to schedule training soon. Dexcom is not approved\par never proceeded with thyroid FNA\par reports fbs - 159-170, ppg- 59-mid 200's\par \par *** Sep 29, 2021 ***\par \par on Tresiba 15 un HS, metformin 500 mg bid\par never started novolog and ran out of prandin\par was taking farxiga and jardiance intermittently b/o insurance issues, not on any sghlt2 at present. was told by insurance that steglatro is covered\par reports fbs- 180's- 200's, ppg in 300's\par Thyr US (9/15/21)-  RMP 1.6x1.5x1.2 solid, RLP 1.2x1.2x1.4 with coarse calcs, left isthmic 0.8 partially calcified. All are stable\par \par HISTORY OF PRESENT ILLNESS. \par \par Ms. MCBRIDE was diagnosed with Diabetes Mellitus Type 2 in ??. She reports history HTN, dyslipidemia, toxic MNG, subclinical hyperthyroidism (s/p CIFUENTES with 26 mci in 01/18) , CAD (s/p MI and PTCI), CHF,  but denies  known complications of retinopathy, nephropathy, or neuropathy. She is presently on metformin 1000 mg bid, Amaryl 2 mg qd.\par ***  She was admitted for angioedema on ACE inhibitors (enalapril) requiring intubation\par At that time her Januvia was stopped b/o 'co-admin with ACE could've put her at the higher risk for bradykinin induced angioedema"\par \par Blood sugars are checked 2 times a day. \par Did not bring log book, but reported are typically as following: FBS- 160's, ppg- 200's\par Hypoglycemia frequency: \par Fingerstick glucose in the office today is 265 mg/dL 2 hours after eating. \par Diet: not following ADA\par Exercise: short walks\par \par Lab review: a1c- 8.7, TSH- 0.62\par Thyroid NM scan (12/17)- hot nodule in the right lobe\par \par Thyroid US (3/18/20)- RMP round 1.6x1.3x1.6; RLP hypo 1.2x1.1x1.3 with coarse calcs\par Thyroid US (8/14/15)- RMP complex 2.2x1.7x2.0 round (no halo, microcalcs), RLP hypo 1.6x1.5x1.6(thick disrupted halo, microcalc), LUP solid hyper 0.5x0.3x0.6; LLP solid hypo 0.8x0.5x0.6\par \par She does not recall any FNAs done in the past\par \par Last dilated eye - 2020\par Last podiatry visit  - 2020\par Last cardiology evaluation - 06/20\par Last stress test - 6/20\par Last 2-D Echo - 5/20, EF ~ 30%\par Last nephrology evaluation - none\par Last neurology evaluation-none\par

## 2022-01-31 ENCOUNTER — RX RENEWAL (OUTPATIENT)
Age: 57
End: 2022-01-31

## 2022-02-06 NOTE — PHYSICAL EXAM
[Alert] : alert [No Acute Distress] : no acute distress [Well Nourished] : well nourished [No Proptosis] : no proptosis [Normal Hearing] : hearing was normal [Normal Lips/Gums] : the lips and gums were normal [Normal Oropharynx] : the oropharynx was normal [Supple] : the neck was supple [No LAD] : no lymphadenopathy [Thyroid Not Enlarged] : the thyroid was not enlarged [Normal Rate and Effort] : normal respiratory rhythm and effort [No Accessory Muscle Use] : no accessory muscle use [Clear to Auscultation] : lungs were clear to auscultation bilaterally [Normal Rate] : heart rate was normal  [Normal S1, S2] : normal S1 and S2 [Regular Rhythm] : with a regular rhythm [No Edema] : there was no peripheral edema [Normal Bowel Sounds] : normal bowel sounds [Not Tender] : non-tender [Soft] : abdomen soft [Not Distended] : not distended [No CVA Tenderness] : no ~M costovertebral angle tenderness [No Stigmata of Cushings Syndrome] : no stigmata of cushings syndrome [Normal Gait] : normal gait [No Involuntary Movements] : no involuntary movements were seen [No Rash] : no rash [Right Foot Was Examined] : right foot ~C was examined [Left Foot Was Examined] : left foot ~C was examined [Normal] : normal [Full ROM] : with full range of motion [No Tremors] : no tremors [Normal Sensation on Monofilament Testing] : normal sensation on monofilament testing of lower extremities [Oriented x3] : oriented to person, place, and time [Normal Affect] : the affect was normal [Diminished Throughout Both Feet] : normal tactile sensation with monofilament testing throughout both feet Never

## 2022-02-08 ENCOUNTER — APPOINTMENT (OUTPATIENT)
Dept: ENDOCRINOLOGY | Facility: CLINIC | Age: 57
End: 2022-02-08
Payer: MEDICAID

## 2022-02-08 PROCEDURE — G0108 DIAB MANAGE TRN  PER INDIV: CPT

## 2022-02-15 ENCOUNTER — APPOINTMENT (OUTPATIENT)
Dept: ENDOCRINOLOGY | Facility: CLINIC | Age: 57
End: 2022-02-15

## 2022-03-15 ENCOUNTER — APPOINTMENT (OUTPATIENT)
Dept: ENDOCRINOLOGY | Facility: CLINIC | Age: 57
End: 2022-03-15
Payer: COMMERCIAL

## 2022-03-15 PROCEDURE — P0004: CPT

## 2022-03-29 ENCOUNTER — APPOINTMENT (OUTPATIENT)
Dept: ENDOCRINOLOGY | Facility: CLINIC | Age: 57
End: 2022-03-29
Payer: MEDICAID

## 2022-03-29 PROCEDURE — G0108 DIAB MANAGE TRN  PER INDIV: CPT

## 2022-03-30 ENCOUNTER — APPOINTMENT (OUTPATIENT)
Dept: RHEUMATOLOGY | Facility: CLINIC | Age: 57
End: 2022-03-30
Payer: MEDICAID

## 2022-03-30 VITALS
DIASTOLIC BLOOD PRESSURE: 91 MMHG | HEIGHT: 68 IN | WEIGHT: 144 LBS | HEART RATE: 74 BPM | TEMPERATURE: 97.8 F | OXYGEN SATURATION: 97 % | SYSTOLIC BLOOD PRESSURE: 189 MMHG | BODY MASS INDEX: 21.82 KG/M2

## 2022-03-30 PROCEDURE — 99214 OFFICE O/P EST MOD 30 MIN: CPT

## 2022-03-30 NOTE — PHYSICAL EXAM
[General Appearance - Alert] : alert [General Appearance - In No Acute Distress] : in no acute distress [Sclera] : the sclera and conjunctiva were normal [Outer Ear] : the ears and nose were normal in appearance [Hearing Threshold Finger Rub Not [place]] : hearing was normal [Oropharynx] : the oropharynx was normal [Neck Appearance] : the appearance of the neck was normal [Neck Cervical Mass (___cm)] : no neck mass was observed [Jugular Venous Distention Increased] : there was no jugular-venous distention [Thyroid Diffuse Enlargement] : the thyroid was not enlarged [Thyroid Nodule] : there were no palpable thyroid nodules [Respiration, Rhythm And Depth] : normal respiratory rhythm and effort [Exaggerated Use Of Accessory Muscles For Inspiration] : no accessory muscle use [Heart Rate And Rhythm] : heart rate was normal and rhythm regular [Auscultation Breath Sounds / Voice Sounds] : lungs were clear to auscultation bilaterally [Heart Sounds] : normal S1 and S2 [Murmurs] : no murmurs [Heart Sounds Gallop] : no gallops [Heart Sounds Pericardial Friction Rub] : no pericardial rub [Edema] : there was no peripheral edema [Bowel Sounds] : normal bowel sounds [Abdomen Soft] : soft [Abdomen Tenderness] : non-tender [Abdomen Mass (___ Cm)] : no abdominal mass palpated [Cervical Lymph Nodes Enlarged Posterior Bilaterally] : posterior cervical [Cervical Lymph Nodes Enlarged Anterior Bilaterally] : anterior cervical [Supraclavicular Lymph Nodes Enlarged Bilaterally] : supraclavicular [No CVA Tenderness] : no ~M costovertebral angle tenderness [No Spinal Tenderness] : no spinal tenderness [] : no rash [No Focal Deficits] : no focal deficits [Oriented To Time, Place, And Person] : oriented to person, place, and time [Affect] : the affect was normal [Impaired Insight] : insight and judgment were intact [FreeTextEntry1] : No synovitis; (+)multiple B/L [place]berden's nodes; left knee w/ pain upon flexion;  normal ROM in rest of joints\par (+)multiple B/L Heberden's nodes; mucoid cyst overlying left 2nd PIP.

## 2022-03-30 NOTE — HISTORY OF PRESENT ILLNESS
[FreeTextEntry1] : Feeling OK overall.  Still w/ occasional B/L knee pain, franco w/ walking.  Also w/ pain in her hands, primarily in the DIP's.  AM stiffness has virtually resolved.  LBP has improved s/p epidural injections by pain management.  No new complaints.

## 2022-03-30 NOTE — ASSESSMENT
[FreeTextEntry1] : 54 yo F with:\par 1) Rheumatoid Arthritis (+RF, +CCP) - well controlled on  Arava + Plaquenil. \par   - Cont leflunomide 20mg daily, Plaquenil 300mg/day.  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  Currently much improved.\par   - derm f/u.\par 3)  Osteopenia:  Bisphosphonate not indicated based on FRAX (5.2/0.7%).  Previously w/ vit D deficiency - now resolved.\par   - Cont calcium / vit D.\par   - weight-bearing exercise.\par   - Repeat DEXA\par 4)  OA - pt w/ mild chronic pain in knees primarily with climbing stairs.   Left knee pain resolved s/p corticosteroid injection at last visit.  Currently w/ mild B/L knee pain - primarily w/ walking.  Also w/ OA in hands.\par   - Reiterated importance of exercise\par   - ibuprofen and/or Tylenol prn\par   - warm compresses\par   - OTC topical analgesics

## 2022-03-31 LAB
ALBUMIN SERPL ELPH-MCNC: 4.7 G/DL
ALP BLD-CCNC: 79 U/L
ALT SERPL-CCNC: 26 U/L
ANION GAP SERPL CALC-SCNC: 13 MMOL/L
AST SERPL-CCNC: 20 U/L
BASOPHILS # BLD AUTO: 0.1 K/UL
BASOPHILS NFR BLD AUTO: 1.3 %
BILIRUB SERPL-MCNC: 0.3 MG/DL
BUN SERPL-MCNC: 18 MG/DL
CALCIUM SERPL-MCNC: 10.4 MG/DL
CHLORIDE SERPL-SCNC: 98 MMOL/L
CO2 SERPL-SCNC: 28 MMOL/L
CREAT SERPL-MCNC: 0.74 MG/DL
CRP SERPL-MCNC: <3 MG/L
EGFR: 94 ML/MIN/1.73M2
EOSINOPHIL # BLD AUTO: 0.23 K/UL
EOSINOPHIL NFR BLD AUTO: 2.9 %
ERYTHROCYTE [SEDIMENTATION RATE] IN BLOOD BY WESTERGREN METHOD: 21 MM/HR
GLUCOSE SERPL-MCNC: 113 MG/DL
HCT VFR BLD CALC: 42.7 %
HGB BLD-MCNC: 13.6 G/DL
IMM GRANULOCYTES NFR BLD AUTO: 0.4 %
LYMPHOCYTES # BLD AUTO: 2.24 K/UL
LYMPHOCYTES NFR BLD AUTO: 28.3 %
MAN DIFF?: NORMAL
MCHC RBC-ENTMCNC: 27.4 PG
MCHC RBC-ENTMCNC: 31.9 GM/DL
MCV RBC AUTO: 85.9 FL
MONOCYTES # BLD AUTO: 0.56 K/UL
MONOCYTES NFR BLD AUTO: 7.1 %
NEUTROPHILS # BLD AUTO: 4.75 K/UL
NEUTROPHILS NFR BLD AUTO: 60 %
PLATELET # BLD AUTO: 297 K/UL
POTASSIUM SERPL-SCNC: 4.4 MMOL/L
PROT SERPL-MCNC: 7.5 G/DL
RBC # BLD: 4.97 M/UL
RBC # FLD: 13.2 %
SODIUM SERPL-SCNC: 139 MMOL/L
WBC # FLD AUTO: 7.91 K/UL

## 2022-04-13 ENCOUNTER — APPOINTMENT (OUTPATIENT)
Dept: ENDOCRINOLOGY | Facility: CLINIC | Age: 57
End: 2022-04-13
Payer: MEDICAID

## 2022-04-13 VITALS
OXYGEN SATURATION: 98 % | HEART RATE: 69 BPM | DIASTOLIC BLOOD PRESSURE: 80 MMHG | RESPIRATION RATE: 17 BRPM | BODY MASS INDEX: 22.46 KG/M2 | SYSTOLIC BLOOD PRESSURE: 122 MMHG | WEIGHT: 148.19 LBS | HEIGHT: 68 IN | TEMPERATURE: 97.9 F

## 2022-04-13 PROCEDURE — 95251 CONT GLUC MNTR ANALYSIS I&R: CPT

## 2022-04-13 PROCEDURE — 99215 OFFICE O/P EST HI 40 MIN: CPT | Mod: 25

## 2022-04-13 NOTE — ASSESSMENT
[Diabetes Foot Care] : diabetes foot care [Long Term Vascular Complications] : long term vascular complications of diabetes [Carbohydrate Consistent Diet] : carbohydrate consistent diet [Importance of Diet and Exercise] : importance of diet and exercise to improve glycemic control, achieve weight loss and improve cardiovascular health [Exercise/Effect on Glucose] : exercise/effect on glucose [Hypoglycemia Management] : hypoglycemia management [Glucagon Use] : glucagon use [Ketone Testing] : ketone testing [Action and use of Insulin] : action and use of short and long-acting insulin [Self Monitoring of Blood Glucose] : self monitoring of blood glucose [Insulin Self-Administration] : insulin self-administration [Injection Technique, Storage, Sharps Disposal] : injection technique, storage, and sharps disposal [Sick-Day Management] : sick-day management [Retinopathy Screening] : Patient was referred to ophthalmology for retinopathy screening [Diabetic Medications] : Risks and benefits of diabetic medications were discussed [FreeTextEntry1] : 1. Uncontrolled T2DM\par - compliance is reiterated\par - Nolan 2\par change setting\par Basal Rate: \par Start Time: 12:00 AM ----- 1.2 units/hour  \par Start Time: 7 AM ----- 1.0 units/hour \par Start Time: 6pm ----- 1.1 units/hour   \par Start Time: 10pm ----- 1.2 units/hour         \par \par Insulin to Carb Ration (I:C): \par Start Time: 12:00 AM ----- 7 gm units/hour        \par Start Time: 7:00 AM ----- 6 gm units/hour      \par Insulin Sensitivity Factor = 12 am- 30 mg/dl , 12 pm- 35, 4 pm- 30\par BG Target = 130-160 mg/dl     \par \par - metformin 500 mg TID, monitor EF\par - will avoid DPP4 inhibitors since they can potentiate a bradykinin induced  angioedema, and I'd also avoid GLP1 agonists for now, given some increased risks in angioedema on some of them (incl Soliqua, adlyxin, Trulicity)\par - Steglatro 5 mg qd. monitor weight\par - monitor lipids, blood pressure, urine microalbumin\par - Prev with myalgia on statins. cont zetia and  Crestor 5 mg HS\par \par 2. MNG\par - will FNA dominant nodule\par \par

## 2022-04-13 NOTE — HISTORY OF PRESENT ILLNESS
[FreeTextEntry1] : F/u for multiple medical issues\par \par *** Apr 13, 2022 ***\par \par feels well. did not proceed with FNA yet ([facility] is not participating in her plan)\par likes the pump but with skin irritaiton \par using Omnipod (admelog),  metformin 500 mg TID,  Steglatro 5 mg qd,  Crestor 5 mg HS, zetia 10 mg\par Basal Rate: \par Start Time: 12:00 AM ----- 1.2 units/hour  \par Start Time: 7 AM ----- 1.0 units/hour \par Start Time: 10pm ----- 1.2 units/hour         \par \par Insulin to Carb Ration (I:C): \par Start Time: 12:00 AM ----- 7 gm units/hour         \par Insulin Sensitivity Factor = 30 mg/dl \par BG Target = 130-160 mg/dl     \par \par Date of download:  4/13/22\par Diabetes Medications and Dosage: as above\par Indication for CGMS: verify a change in the treatment regimen, identify periods of hypoglycemia/ hyperglycemia. \par Modal day report: pattern. \par Pt with HYPO  0% of the time (NONE below 54),  74% in target range\par Hyperglycemia:  26% elevation \par Identified issues: carbohydrate counting\par dates analyzed: 3/31/22-4/13/22\par \par \par *** Jan 26, 2022 ***\par \par taking Tresiba 22 un HS,  metformin 500 mg TID, Steglatro 5 mg qd,  Admelog 11 to 12 un ac meals, zetia 10 mg \par feels ok, but is concerned with weight loss since starting steglatro.\par received Omnipod- to schedule training soon. Dexcom is not approved\par never proceeded with thyroid FNA\par reports fbs - 159-170, ppg- 59-mid 200's\par \par *** Sep 29, 2021 ***\par \par on Tresiba 15 un HS, metformin 500 mg bid\par never started novolog and ran out of prandin\par was taking farxiga and jardiance intermittently b/o insurance issues, not on any sghlt2 at present. was told by insurance that steglatro is covered\par reports fbs- 180's- 200's, ppg in 300's\par Thyr US (9/15/21)-  RMP 1.6x1.5x1.2 solid, RLP 1.2x1.2x1.4 with coarse calcs, left isthmic 0.8 partially calcified. All are stable\par \par HISTORY OF PRESENT ILLNESS. \par \par Ms. MCBRIDE was diagnosed with Diabetes Mellitus Type 2 in ??. She reports history HTN, dyslipidemia, toxic MNG, subclinical hyperthyroidism (s/p CIFUENTES with 26 mci in 01/18) , CAD (s/p MI and PTCI), CHF,  but denies  known complications of retinopathy, nephropathy, or neuropathy. She is presently on metformin 1000 mg bid, Amaryl 2 mg qd.\par ***  She was admitted for angioedema on ACE inhibitors (enalapril) requiring intubation\par At that time her Januvia was stopped b/o 'co-admin with ACE could've put her at the higher risk for bradykinin induced angioedema"\par \par Blood sugars are checked 2 times a day. \par Did not bring log book, but reported are typically as following: FBS- 160's, ppg- 200's\par Hypoglycemia frequency: \par Fingerstick glucose in the office today is 265 mg/dL 2 hours after eating. \par Diet: not following ADA\par Exercise: short walks\par \par Lab review: a1c- 8.7, TSH- 0.62\par Thyroid NM scan (12/17)- hot nodule in the right lobe\par \par Thyroid US (3/18/20)- RMP round 1.6x1.3x1.6; RLP hypo 1.2x1.1x1.3 with coarse calcs\par Thyroid US (8/14/15)- RMP complex 2.2x1.7x2.0 round (no halo, microcalcs), RLP hypo 1.6x1.5x1.6(thick disrupted halo, microcalc), LUP solid hyper 0.5x0.3x0.6; LLP solid hypo 0.8x0.5x0.6\par \par She does not recall any FNAs done in the past\par \par Last dilated eye - 2020\par Last podiatry visit  - 2020\par Last cardiology evaluation - 06/20\par Last stress test - 6/20\par Last 2-D Echo - 5/20, EF ~ 30%\par Last nephrology evaluation - none\par Last neurology evaluation-none\par

## 2022-04-14 LAB
25(OH)D3 SERPL-MCNC: 38.1 NG/ML
ALBUMIN SERPL ELPH-MCNC: 5.1 G/DL
ALP BLD-CCNC: 79 U/L
ALT SERPL-CCNC: 15 U/L
ANION GAP SERPL CALC-SCNC: 17 MMOL/L
AST SERPL-CCNC: 15 U/L
BILIRUB SERPL-MCNC: 0.3 MG/DL
BUN SERPL-MCNC: 23 MG/DL
CALCIUM SERPL-MCNC: 10.2 MG/DL
CHLORIDE SERPL-SCNC: 100 MMOL/L
CHOLEST SERPL-MCNC: 163 MG/DL
CO2 SERPL-SCNC: 22 MMOL/L
CREAT SERPL-MCNC: 0.76 MG/DL
CREAT SPEC-SCNC: 45 MG/DL
EGFR: 91 ML/MIN/1.73M2
ESTIMATED AVERAGE GLUCOSE: 214 MG/DL
FOLATE SERPL-MCNC: 11.6 NG/ML
FRUCTOSAMINE SERPL-MCNC: 344 UMOL/L
GLUCOSE SERPL-MCNC: 204 MG/DL
HBA1C MFR BLD HPLC: 9.1 %
HDLC SERPL-MCNC: 66 MG/DL
LDLC SERPL CALC-MCNC: 76 MG/DL
MICROALBUMIN 24H UR DL<=1MG/L-MCNC: <1.2 MG/DL
MICROALBUMIN/CREAT 24H UR-RTO: NORMAL MG/G
NONHDLC SERPL-MCNC: 97 MG/DL
POTASSIUM SERPL-SCNC: 4.1 MMOL/L
PROT SERPL-MCNC: 7.9 G/DL
SODIUM SERPL-SCNC: 139 MMOL/L
T4 FREE SERPL-MCNC: 1 NG/DL
TRIGL SERPL-MCNC: 106 MG/DL
TSH SERPL-ACNC: 0.67 UIU/ML
VIT B12 SERPL-MCNC: 1540 PG/ML

## 2022-04-14 RX ORDER — AMLODIPINE BESYLATE 10 MG/1
10 TABLET ORAL
Qty: 90 | Refills: 1 | Status: ACTIVE | COMMUNITY
Start: 2021-01-06 | End: 1900-01-01

## 2022-05-11 ENCOUNTER — RX RENEWAL (OUTPATIENT)
Age: 57
End: 2022-05-11

## 2022-05-16 ENCOUNTER — RX RENEWAL (OUTPATIENT)
Age: 57
End: 2022-05-16

## 2022-06-05 ENCOUNTER — NON-APPOINTMENT (OUTPATIENT)
Age: 57
End: 2022-06-05

## 2022-06-10 ENCOUNTER — LABORATORY RESULT (OUTPATIENT)
Age: 57
End: 2022-06-10

## 2022-06-10 ENCOUNTER — APPOINTMENT (OUTPATIENT)
Dept: ENDOCRINOLOGY | Facility: CLINIC | Age: 57
End: 2022-06-10
Payer: MEDICAID

## 2022-06-10 VITALS — DIASTOLIC BLOOD PRESSURE: 80 MMHG | OXYGEN SATURATION: 99 % | HEART RATE: 68 BPM | SYSTOLIC BLOOD PRESSURE: 157 MMHG

## 2022-06-10 PROCEDURE — 95251 CONT GLUC MNTR ANALYSIS I&R: CPT

## 2022-06-10 PROCEDURE — 10005 FNA BX W/US GDN 1ST LES: CPT

## 2022-06-10 PROCEDURE — 99214 OFFICE O/P EST MOD 30 MIN: CPT | Mod: 25

## 2022-06-10 NOTE — REASON FOR VISIT
[Follow - Up] : a follow-up visit [DM Type 2] : DM Type 2 [Thyroid nodule/ MNG] : thyroid nodule/ MNG [Insulin Pump] : insulin pump management [Procedure: _________] : a [unfilled] procedure visit

## 2022-06-10 NOTE — ASSESSMENT
[Diabetes Foot Care] : diabetes foot care [Long Term Vascular Complications] : long term vascular complications of diabetes [Carbohydrate Consistent Diet] : carbohydrate consistent diet [Importance of Diet and Exercise] : importance of diet and exercise to improve glycemic control, achieve weight loss and improve cardiovascular health [Exercise/Effect on Glucose] : exercise/effect on glucose [Hypoglycemia Management] : hypoglycemia management [Glucagon Use] : glucagon use [Ketone Testing] : ketone testing [Action and use of Insulin] : action and use of short and long-acting insulin [Self Monitoring of Blood Glucose] : self monitoring of blood glucose [Insulin Self-Administration] : insulin self-administration [Injection Technique, Storage, Sharps Disposal] : injection technique, storage, and sharps disposal [Sick-Day Management] : sick-day management [Retinopathy Screening] : Patient was referred to ophthalmology for retinopathy screening [Diabetic Medications] : Risks and benefits of diabetic medications were discussed [FreeTextEntry1] : 1. Uncontrolled T2DM\par - compliance is reiterated\par - Nolan 2\par patient did not have her PDA available and is not able to make changes on her own. She'll see Nayla next week for further adjustments\par \par Basal Rate: \par Start Time: 12:00 AM ----- 1.2 units/hour  \par Start Time: 7 AM ----- 1.0 units/hour \par Start Time: 6pm ----- 1.1 units/hour   \par Start Time: 10pm ----- 1.2 units/hour         \par \par Insulin to Carb Ration (I:C): \par Start Time: 12:00 AM ----- 7 gm units/hour        \par Start Time: 7:00 AM ----- 6 gm units/hour      \par Insulin Sensitivity Factor = 12 am- 30 mg/dl , 12 pm- 35, 4 pm- 30\par BG Target = 130-160 mg/dl     \par \par - metformin 500 mg TID, monitor EF\par - will avoid DPP4 inhibitors since they can potentiate a bradykinin induced  angioedema, and I'd also avoid GLP1 agonists for now, given some increased risks in angioedema on some of them (incl Soliqua, adlyxin, Trulicity)\par - Steglatro 5 mg qd. monitor weight\par - monitor lipids, blood pressure, urine microalbumin\par - Prev with myalgia on statins. cont zetia and  Crestor 5 mg HS\par - resume norvasc 10 mg, HCTZ 25 mg qd\par \par 2. MNG\par - will FNA dominant nodule today\par - see a separate procedure note \par \par

## 2022-06-10 NOTE — HISTORY OF PRESENT ILLNESS
[FreeTextEntry1] : F/u for multiple medical issues\par \par *** Gregorio 10, 2022 ***\par \par on  Omnipod (admelog),  metformin 500 mg TID,  Steglatro 5 mg qd,  Crestor 5 mg HS, zetia 10 mg, \par should be on norvasc 10 mg, HCTZ 25 mg qd but "was forgetting lately"\par Basal Rate: \par Start Time: 12:00 AM ----- 1.2 units/hour  \par Start Time: 7 AM ----- 1.0 units/hour \par Start Time: 6pm ----- 1.1 units/hour   \par Start Time: 10pm ----- 1.2 units/hour         \par \par Insulin to Carb Ration (I:C): \par Start Time: 12:00 AM ----- 7 gm units/hour        \par Start Time: 7:00 AM ----- 6 gm units/hour      \par Insulin Sensitivity Factor = 12 am- 30 mg/dl , 12 pm- 35, 4 pm- 30\par BG Target = 130-160 mg/dl    \par \par Date of download:  6/10/22\par Diabetes Medications and Dosage: as above\par Indication for CGMS: verify a change in the treatment regimen, identify periods of hypoglycemia/ hyperglycemia. \par Modal day report: pattern. \par Pt with HYPO  5% of the time (NONE below 54),  85% in target range\par Hyperglycemia:  10% elevation \par Identified issues: carbohydrate counting\par dates analyzed:5/27/22-6/9/22\par \par \par *** Apr 13, 2022 ***\par \par feels well. did not proceed with FNA yet ([facility] is not participating in her plan)\par likes the pump but with skin irritaiton \par using Omnipod (admelog),  metformin 500 mg TID,  Steglatro 5 mg qd,  Crestor 5 mg HS, zetia 10 mg\par Basal Rate: \par Start Time: 12:00 AM ----- 1.2 units/hour  \par Start Time: 7 AM ----- 1.0 units/hour \par Start Time: 10pm ----- 1.2 units/hour         \par \par Insulin to Carb Ration (I:C): \par Start Time: 12:00 AM ----- 7 gm units/hour         \par Insulin Sensitivity Factor = 30 mg/dl \par BG Target = 130-160 mg/dl     \par \par Date of download:  4/13/22\par Diabetes Medications and Dosage: as above\par Indication for CGMS: verify a change in the treatment regimen, identify periods of hypoglycemia/ hyperglycemia. \par Modal day report: pattern. \par Pt with HYPO  0% of the time (NONE below 54),  74% in target range\par Hyperglycemia:  26% elevation \par Identified issues: carbohydrate counting\par dates analyzed: 3/31/22-4/13/22\par \par \par *** Jan 26, 2022 ***\par \par taking Tresiba 22 un HS,  metformin 500 mg TID, Steglatro 5 mg qd,  Admelog 11 to 12 un ac meals, zetia 10 mg \par feels ok, but is concerned with weight loss since starting steglatro.\par received Omnipod- to schedule training soon. Dexcom is not approved\par never proceeded with thyroid FNA\par reports fbs - 159-170, ppg- 59-mid 200's\par \par *** Sep 29, 2021 ***\par \par on Tresiba 15 un HS, metformin 500 mg bid\par never started novolog and ran out of prandin\par was taking farxiga and jardiance intermittently b/o insurance issues, not on any sghlt2 at present. was told by insurance that steglatro is covered\par reports fbs- 180's- 200's, ppg in 300's\par Thyr US (9/15/21)-  RMP 1.6x1.5x1.2 solid, RLP 1.2x1.2x1.4 with coarse calcs, left isthmic 0.8 partially calcified. All are stable\par \par HISTORY OF PRESENT ILLNESS. \par \par Ms. MCBRIDE was diagnosed with Diabetes Mellitus Type 2 in ??. She reports history HTN, dyslipidemia, toxic MNG, subclinical hyperthyroidism (s/p CIFUENTES with 26 mci in 01/18) , CAD (s/p MI and PTCI), CHF,  but denies  known complications of retinopathy, nephropathy, or neuropathy. She is presently on metformin 1000 mg bid, Amaryl 2 mg qd.\par ***  She was admitted for angioedema on ACE inhibitors (enalapril) requiring intubation\par At that time her Januvia was stopped b/o 'co-admin with ACE could've put her at the higher risk for bradykinin induced angioedema"\par \par Blood sugars are checked 2 times a day. \par Did not bring log book, but reported are typically as following: FBS- 160's, ppg- 200's\par Hypoglycemia frequency: \par Fingerstick glucose in the office today is 265 mg/dL 2 hours after eating. \par Diet: not following ADA\par Exercise: short walks\par \par Lab review: a1c- 8.7, TSH- 0.62\par Thyroid NM scan (12/17)- hot nodule in the right lobe\par \par Thyroid US (3/18/20)- RMP round 1.6x1.3x1.6; RLP hypo 1.2x1.1x1.3 with coarse calcs\par Thyroid US (8/14/15)- RMP complex 2.2x1.7x2.0 round (no halo, microcalcs), RLP hypo 1.6x1.5x1.6(thick disrupted halo, microcalc), LUP solid hyper 0.5x0.3x0.6; LLP solid hypo 0.8x0.5x0.6\par \par She does not recall any FNAs done in the past\par \par Last dilated eye - 2020\par Last podiatry visit  - 2020\par Last cardiology evaluation - 06/20\par Last stress test - 6/20\par Last 2-D Echo - 5/20, EF ~ 30%\par Last nephrology evaluation - none\par Last neurology evaluation-none\par

## 2022-06-10 NOTE — PROCEDURE
[Fine Needle Aspiration] : Fine needle aspiration ~T ~C was performed. [Area of Mass: ______] : mass identified in the [unfilled] [Risks] : risks [Benefits] : benefits [Alternatives] : alternatives [Consent Obtained] : Written consent was obtained prior to the procedure and is detailed in the patient's record [Patient] : the patient [Ethyl Chloride] : ethyl chloride [EMLA Cream] : EMLA cream [Supine] : The patient was placed in the supine position with the neck extended as tolerated. [Betadine] : with betadine solution [Alcohol] : with alcohol [25 gauge 1.5 inch] : A 25 gauge 1.5 inch needle was used [3 Passes] : 3 passes were made through the mass [Ultrasonic Guidance] : ultrasound guidance was employed [Sent to Histology] : The specimens were prepared in the usual manner and sent to histology. [Tolerated Well] : the patient tolerated the procedure well [Vital Signs Stable] : the vital signs were stable [Hemostasis] : hemostasis was assured and the patient was discharged in satisfactory condition [No Complications] : There were no complications [Instructions Given] : Handouts/patient instructions were given to patient [___ Week(s)] : in [unfilled] week(s). [de-identified] : smears + Cytolyt

## 2022-06-10 NOTE — PROCEDURE
[Fine Needle Aspiration] : Fine needle aspiration ~T ~C was performed. [Area of Mass: ______] : mass identified in the [unfilled] [Risks] : risks [Benefits] : benefits [Alternatives] : alternatives [Consent Obtained] : Written consent was obtained prior to the procedure and is detailed in the patient's record [Patient] : the patient [Ethyl Chloride] : ethyl chloride [EMLA Cream] : EMLA cream [Supine] : The patient was placed in the supine position with the neck extended as tolerated. [Betadine] : with betadine solution [Alcohol] : with alcohol [25 gauge 1.5 inch] : A 25 gauge 1.5 inch needle was used [3 Passes] : 3 passes were made through the mass [Ultrasonic Guidance] : ultrasound guidance was employed [Sent to Histology] : The specimens were prepared in the usual manner and sent to histology. [Tolerated Well] : the patient tolerated the procedure well [Vital Signs Stable] : the vital signs were stable [Hemostasis] : hemostasis was assured and the patient was discharged in satisfactory condition [No Complications] : There were no complications [Instructions Given] : Handouts/patient instructions were given to patient [___ Week(s)] : in [unfilled] week(s). [de-identified] : smears + Cytolyt

## 2022-06-14 ENCOUNTER — APPOINTMENT (OUTPATIENT)
Dept: ENDOCRINOLOGY | Facility: CLINIC | Age: 57
End: 2022-06-14
Payer: MEDICAID

## 2022-06-14 ENCOUNTER — NON-APPOINTMENT (OUTPATIENT)
Age: 57
End: 2022-06-14

## 2022-06-14 VITALS — BODY MASS INDEX: 23.04 KG/M2 | HEIGHT: 68 IN | WEIGHT: 152 LBS

## 2022-06-14 PROCEDURE — G0108 DIAB MANAGE TRN  PER INDIV: CPT

## 2022-07-01 ENCOUNTER — APPOINTMENT (OUTPATIENT)
Dept: RHEUMATOLOGY | Facility: CLINIC | Age: 57
End: 2022-07-01

## 2022-08-01 ENCOUNTER — APPOINTMENT (OUTPATIENT)
Dept: ENDOCRINOLOGY | Facility: CLINIC | Age: 57
End: 2022-08-01

## 2022-08-01 VITALS — BODY MASS INDEX: 23.34 KG/M2 | HEIGHT: 68 IN | WEIGHT: 154 LBS

## 2022-08-01 PROCEDURE — G0108 DIAB MANAGE TRN  PER INDIV: CPT

## 2022-09-12 ENCOUNTER — RX RENEWAL (OUTPATIENT)
Age: 57
End: 2022-09-12

## 2022-09-30 ENCOUNTER — APPOINTMENT (OUTPATIENT)
Dept: RHEUMATOLOGY | Facility: CLINIC | Age: 57
End: 2022-09-30

## 2022-09-30 VITALS
HEART RATE: 77 BPM | WEIGHT: 153 LBS | SYSTOLIC BLOOD PRESSURE: 133 MMHG | TEMPERATURE: 98 F | DIASTOLIC BLOOD PRESSURE: 72 MMHG | BODY MASS INDEX: 23.19 KG/M2 | OXYGEN SATURATION: 95 % | HEIGHT: 68 IN

## 2022-09-30 DIAGNOSIS — Z23 ENCOUNTER FOR IMMUNIZATION: ICD-10-CM

## 2022-09-30 PROCEDURE — 90471 IMMUNIZATION ADMIN: CPT

## 2022-09-30 PROCEDURE — 90686 IIV4 VACC NO PRSV 0.5 ML IM: CPT

## 2022-09-30 PROCEDURE — 99214 OFFICE O/P EST MOD 30 MIN: CPT | Mod: 25

## 2022-09-30 RX ORDER — PREDNISONE 10 MG/1
10 TABLET ORAL
Qty: 30 | Refills: 1 | Status: COMPLETED | COMMUNITY
Start: 2021-05-20 | End: 2022-09-30

## 2022-09-30 NOTE — PHYSICAL EXAM
[General Appearance - Alert] : alert [General Appearance - In No Acute Distress] : in no acute distress [Sclera] : the sclera and conjunctiva were normal [Outer Ear] : the ears and nose were normal in appearance [Hearing Threshold Finger Rub Not [place]] : hearing was normal [Oropharynx] : the oropharynx was normal [Neck Appearance] : the appearance of the neck was normal [Neck Cervical Mass (___cm)] : no neck mass was observed [Jugular Venous Distention Increased] : there was no jugular-venous distention [Thyroid Diffuse Enlargement] : the thyroid was not enlarged [Thyroid Nodule] : there were no palpable thyroid nodules [Respiration, Rhythm And Depth] : normal respiratory rhythm and effort [Exaggerated Use Of Accessory Muscles For Inspiration] : no accessory muscle use [Auscultation Breath Sounds / Voice Sounds] : lungs were clear to auscultation bilaterally [Heart Rate And Rhythm] : heart rate was normal and rhythm regular [Heart Sounds] : normal S1 and S2 [Heart Sounds Gallop] : no gallops [Murmurs] : no murmurs [Heart Sounds Pericardial Friction Rub] : no pericardial rub [Edema] : there was no peripheral edema [Bowel Sounds] : normal bowel sounds [Abdomen Soft] : soft [Abdomen Tenderness] : non-tender [Abdomen Mass (___ Cm)] : no abdominal mass palpated [Cervical Lymph Nodes Enlarged Posterior Bilaterally] : posterior cervical [Cervical Lymph Nodes Enlarged Anterior Bilaterally] : anterior cervical [Supraclavicular Lymph Nodes Enlarged Bilaterally] : supraclavicular [No CVA Tenderness] : no ~M costovertebral angle tenderness [No Spinal Tenderness] : no spinal tenderness [] : no rash [No Focal Deficits] : no focal deficits [Oriented To Time, Place, And Person] : oriented to person, place, and time [Impaired Insight] : insight and judgment were intact [Affect] : the affect was normal [FreeTextEntry1] : No synovitis; (+)multiple B/L Heberden's nodes; normal ROM in all joints\par (+)multiple B/L Heberden's nodes; mucoid cyst overlying left 2nd PIP.

## 2022-09-30 NOTE — HISTORY OF PRESENT ILLNESS
[FreeTextEntry1] : Continues to feel OK overall.  Still w/ occasional B/L knee pain, franco w/ walking.  Also w/ pain in her hands, primarily in the DIP's, though "not too bad".  AM stiffness has virtually resolved.  LBP remains much improved.  No new complaints.

## 2022-09-30 NOTE — ASSESSMENT
[FreeTextEntry1] : 56 yo F with:\par 1) Rheumatoid Arthritis (+RF, +CCP) - well controlled on  Arava + Plaquenil. \par   - Cont leflunomide 20mg daily, Plaquenil 300mg/day.  Ophtho f/u\par   - hepatitis panel negative 5/18.\par   - Administered flu vaccine.\par   - Pt has received the COVID19 vaccine + booster.  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  Currently much improved.\par   - derm f/u.\par 3)  Osteopenia:  Bisphosphonate not indicated based on FRAX (5.2/0.7%).  Previously w/ vit D deficiency - now resolved.\par   - Cont calcium / vit D.\par   - weight-bearing exercise.\par   - Repeat DEXA\par 4)  OA - pt w/ mild chronic pain in knees primarily with walking / climbing stairs.   Left knee pain resolved s/p recent corticosteroid injection.  Also w/ OA in hands, though currently asymptomatic.\par   - Reiterated importance of exercise\par   - ibuprofen and/or Tylenol prn\par   - warm compresses\par   - OTC topical analgesics

## 2022-10-03 LAB
ALBUMIN SERPL ELPH-MCNC: 5.1 G/DL
ALP BLD-CCNC: 91 U/L
ALT SERPL-CCNC: 14 U/L
ANION GAP SERPL CALC-SCNC: 14 MMOL/L
AST SERPL-CCNC: 14 U/L
BASOPHILS # BLD AUTO: 0.08 K/UL
BASOPHILS NFR BLD AUTO: 1 %
BILIRUB SERPL-MCNC: 0.3 MG/DL
BUN SERPL-MCNC: 14 MG/DL
CALCIUM SERPL-MCNC: 10.1 MG/DL
CHLORIDE SERPL-SCNC: 96 MMOL/L
CO2 SERPL-SCNC: 28 MMOL/L
CREAT SERPL-MCNC: 0.72 MG/DL
CRP SERPL-MCNC: 10 MG/L
EGFR: 97 ML/MIN/1.73M2
EOSINOPHIL # BLD AUTO: 0.23 K/UL
EOSINOPHIL NFR BLD AUTO: 2.9 %
ERYTHROCYTE [SEDIMENTATION RATE] IN BLOOD BY WESTERGREN METHOD: 19 MM/HR
GLUCOSE SERPL-MCNC: 92 MG/DL
HCT VFR BLD CALC: 44.1 %
HGB BLD-MCNC: 14.1 G/DL
IMM GRANULOCYTES NFR BLD AUTO: 0.3 %
LYMPHOCYTES # BLD AUTO: 2.43 K/UL
LYMPHOCYTES NFR BLD AUTO: 30.9 %
MAN DIFF?: NORMAL
MCHC RBC-ENTMCNC: 27.3 PG
MCHC RBC-ENTMCNC: 32 GM/DL
MCV RBC AUTO: 85.5 FL
MONOCYTES # BLD AUTO: 0.64 K/UL
MONOCYTES NFR BLD AUTO: 8.1 %
NEUTROPHILS # BLD AUTO: 4.47 K/UL
NEUTROPHILS NFR BLD AUTO: 56.8 %
PLATELET # BLD AUTO: 369 K/UL
POTASSIUM SERPL-SCNC: 3.8 MMOL/L
PROT SERPL-MCNC: 8.2 G/DL
RBC # BLD: 5.16 M/UL
RBC # FLD: 13.1 %
SODIUM SERPL-SCNC: 139 MMOL/L
WBC # FLD AUTO: 7.87 K/UL

## 2022-10-12 ENCOUNTER — RX RENEWAL (OUTPATIENT)
Age: 57
End: 2022-10-12

## 2022-11-01 ENCOUNTER — APPOINTMENT (OUTPATIENT)
Dept: ENDOCRINOLOGY | Facility: CLINIC | Age: 57
End: 2022-11-01

## 2022-11-01 VITALS
HEIGHT: 68 IN | HEART RATE: 78 BPM | BODY MASS INDEX: 23.04 KG/M2 | TEMPERATURE: 98 F | OXYGEN SATURATION: 98 % | RESPIRATION RATE: 16 BRPM | WEIGHT: 152 LBS | SYSTOLIC BLOOD PRESSURE: 124 MMHG | DIASTOLIC BLOOD PRESSURE: 78 MMHG

## 2022-11-01 LAB — GLUCOSE BLDC GLUCOMTR-MCNC: 147

## 2022-11-01 PROCEDURE — 36415 COLL VENOUS BLD VENIPUNCTURE: CPT

## 2022-11-01 PROCEDURE — 95251 CONT GLUC MNTR ANALYSIS I&R: CPT

## 2022-11-01 PROCEDURE — 99214 OFFICE O/P EST MOD 30 MIN: CPT | Mod: 25

## 2022-11-01 PROCEDURE — 82962 GLUCOSE BLOOD TEST: CPT

## 2022-11-01 NOTE — ASSESSMENT
[Diabetes Foot Care] : diabetes foot care [Long Term Vascular Complications] : long term vascular complications of diabetes [Carbohydrate Consistent Diet] : carbohydrate consistent diet [Importance of Diet and Exercise] : importance of diet and exercise to improve glycemic control, achieve weight loss and improve cardiovascular health [Exercise/Effect on Glucose] : exercise/effect on glucose [Hypoglycemia Management] : hypoglycemia management [Glucagon Use] : glucagon use [Ketone Testing] : ketone testing [Action and use of Insulin] : action and use of short and long-acting insulin [Self Monitoring of Blood Glucose] : self monitoring of blood glucose [Insulin Self-Administration] : insulin self-administration [Injection Technique, Storage, Sharps Disposal] : injection technique, storage, and sharps disposal [Sick-Day Management] : sick-day management [Retinopathy Screening] : Patient was referred to ophthalmology for retinopathy screening [Diabetic Medications] : Risks and benefits of diabetic medications were discussed [FreeTextEntry1] : 1. Uncontrolled T2DM\par - compliance is reiterated\par - Nolan 2\par patient did not have her PDA available and is not able to make changes on her own. She'll see Nayla next week for further adjustments\par \par Basal Rate: \par Start Time: 12:00 AM ----- 1.05 units/hour  \par Start Time: 7 AM ----- 08 units/hour \par Start Time: 6pm ----- 1.05 units/hour       \par \par Insulin to Carb Ration (I:C): \par Start Time: 12:00 AM ----- 7 gm units/hour; 7 am- 5, 6 pm- 6, 6 pm- 5.5        \par Insulin Sensitivity Factor = 12 am- 30 mg/dl , 12 pm- 35, 4 pm- 30\par BG Target = 130-160 mg/dl     \par \par - metformin 500 mg TID, monitor EF\par - will avoid DPP4 inhibitors since they can potentiate a bradykinin induced  angioedema, and I'd also avoid GLP1 agonists for now, given some increased risks in angioedema on some of them (incl Soliqua, adlyxin, Trulicity)\par - Steglatro 5 mg qd. monitor weight\par - monitor lipids, blood pressure, urine microalbumin\par - Prev with myalgia on statins. cont zetia and  Crestor 5 mg HS\par - norvasc 10 mg, HCTZ 25 mg qd\par - resume Gabapentin 300 mg HS\par \par 2. MNG\par - FNA of the dominant nodule- AUS /FLUS with neg Thyroseq. \par - will repeat sono next month\par \par RTC 3 mos\par \par

## 2022-11-01 NOTE — REASON FOR VISIT
[Follow - Up] : a follow-up visit [DM Type 2] : DM Type 2 [Insulin Pump] : insulin pump management [Thyroid nodule/ MNG] : thyroid nodule/ MNG

## 2022-11-01 NOTE — HISTORY OF PRESENT ILLNESS
[FreeTextEntry1] : F/u for multiple medical issues\par \par *** Nov 01, 2022 ***\par \par doing well overall. some worsening night time feet numbness and tingling\par not taking neurontin ("ran out a while ago")\par s/p FNA (6/10/22)- of RMP 1.6 nodule- AUS/FLUS. Thyroseq- negative, low probability \par on  Omnipod (admelog),  metformin 500 mg TID,  Steglatro 5 mg qd,  Crestor 5 mg HS, zetia 10 mg, \par should be on norvasc 10 mg, HCTZ 25 mg qd\par \par Basal Rate: \par Start Time: 12:00 AM ----- 1.05 units/hour  \par Start Time: 7 AM ----- 08 units/hour \par Start Time: 6pm ----- 1.05 units/hour       \par \par Insulin to Carb Ration (I:C): \par Start Time: 12:00 AM ----- 7 gm units/hour        \par Start Time: 7:00 AM ----- 6 gm units/hour      \par Insulin Sensitivity Factor = 12 am- 30 mg/dl , 12 pm- 35, 4 pm- 30\par BG Target = 130-160 mg/dl     \par \par Date of download:  11/01/2022 \par Diabetes Medications and Dosage: as above\par Indication for CGMS: verify a change in the treatment regimen, identify periods of hypoglycemia/ hyperglycemia. \par Modal day report: pattern. \par Pt with HYPO  0% of the time ( NONE below 54),  79% in target range\par Hyperglycemia:  21% elevation \par Identified issues: carbohydrate counting. spikes post breakfast and dinner\par dates analyzed:  10/19/2022- 11/01/2022\par \par \par *** Gregorio 10, 2022 ***\par \par on  Omnipod (admelog),  metformin 500 mg TID,  Steglatro 5 mg qd,  Crestor 5 mg HS, zetia 10 mg, \par should be on norvasc 10 mg, HCTZ 25 mg qd but "was forgetting lately"\par Basal Rate: \par Start Time: 12:00 AM ----- 1.2 units/hour  \par Start Time: 7 AM ----- 1.0 units/hour \par Start Time: 6pm ----- 1.1 units/hour   \par Start Time: 10pm ----- 1.2 units/hour         \par \par Insulin to Carb Ration (I:C): \par Start Time: 12:00 AM ----- 7 gm units/hour        \par Start Time: 7:00 AM ----- 6 gm units/hour      \par Insulin Sensitivity Factor = 12 am- 30 mg/dl , 12 pm- 35, 4 pm- 30\par BG Target = 130-160 mg/dl    \par \par Date of download:  6/10/22\par Diabetes Medications and Dosage: as above\par Indication for CGMS: verify a change in the treatment regimen, identify periods of hypoglycemia/ hyperglycemia. \par Modal day report: pattern. \par Pt with HYPO  5% of the time (NONE below 54),  85% in target range\par Hyperglycemia:  10% elevation \par Identified issues: carbohydrate counting\par dates analyzed:5/27/22-6/9/22\par \par \par *** Apr 13, 2022 ***\par \par feels well. did not proceed with FNA yet ([facility] is not participating in her plan)\par likes the pump but with skin irritaiton \par using Omnipod (admelog),  metformin 500 mg TID,  Steglatro 5 mg qd,  Crestor 5 mg HS, zetia 10 mg\par Basal Rate: \par Start Time: 12:00 AM ----- 1.2 units/hour  \par Start Time: 7 AM ----- 1.0 units/hour \par Start Time: 10pm ----- 1.2 units/hour         \par \par Insulin to Carb Ration (I:C): \par Start Time: 12:00 AM ----- 7 gm units/hour         \par Insulin Sensitivity Factor = 30 mg/dl \par BG Target = 130-160 mg/dl     \par \par Date of download:  4/13/22\par Diabetes Medications and Dosage: as above\par Indication for CGMS: verify a change in the treatment regimen, identify periods of hypoglycemia/ hyperglycemia. \par Modal day report: pattern. \par Pt with HYPO  0% of the time (NONE below 54),  74% in target range\par Hyperglycemia:  26% elevation \par Identified issues: carbohydrate counting\par dates analyzed: 3/31/22-4/13/22\par \par \par *** Jan 26, 2022 ***\par \par taking Tresiba 22 un HS,  metformin 500 mg TID, Steglatro 5 mg qd,  Admelog 11 to 12 un ac meals, zetia 10 mg \par feels ok, but is concerned with weight loss since starting steglatro.\par received Omnipod- to schedule training soon. Dexcom is not approved\par never proceeded with thyroid FNA\par reports fbs - 159-170, ppg- 59-mid 200's\par \par *** Sep 29, 2021 ***\par \par on Tresiba 15 un HS, metformin 500 mg bid\par never started novolog and ran out of prandin\par was taking farxiga and jardiance intermittently b/o insurance issues, not on any sghlt2 at present. was told by insurance that steglatro is covered\par reports fbs- 180's- 200's, ppg in 300's\par Thyr US (9/15/21)-  RMP 1.6x1.5x1.2 solid, RLP 1.2x1.2x1.4 with coarse calcs, left isthmic 0.8 partially calcified. All are stable\par \par HISTORY OF PRESENT ILLNESS. \par \par Ms. MCBRIDE was diagnosed with Diabetes Mellitus Type 2 in ??. She reports history HTN, dyslipidemia, toxic MNG, subclinical hyperthyroidism (s/p CIFUENTES with 26 mci in 01/18) , CAD (s/p MI and PTCI), CHF,  but denies  known complications of retinopathy, nephropathy, or neuropathy. She is presently on metformin 1000 mg bid, Amaryl 2 mg qd.\par ***  She was admitted for angioedema on ACE inhibitors (enalapril) requiring intubation\par At that time her Januvia was stopped b/o 'co-admin with ACE could've put her at the higher risk for bradykinin induced angioedema"\par \par Blood sugars are checked 2 times a day. \par Did not bring log book, but reported are typically as following: FBS- 160's, ppg- 200's\par Hypoglycemia frequency: \par Fingerstick glucose in the office today is 265 mg/dL 2 hours after eating. \par Diet: not following ADA\par Exercise: short walks\par \par Lab review: a1c- 8.7, TSH- 0.62\par Thyroid NM scan (12/17)- hot nodule in the right lobe\par \par Thyroid US (3/18/20)- RMP round 1.6x1.3x1.6; RLP hypo 1.2x1.1x1.3 with coarse calcs\par Thyroid US (8/14/15)- RMP complex 2.2x1.7x2.0 round (no halo, microcalcs), RLP hypo 1.6x1.5x1.6(thick disrupted halo, microcalc), LUP solid hyper 0.5x0.3x0.6; LLP solid hypo 0.8x0.5x0.6\par \par She does not recall any FNAs done in the past\par \par Last dilated eye - 2020\par Last podiatry visit  - 2020\par Last cardiology evaluation - 06/20\par Last stress test - 6/20\par Last 2-D Echo - 5/20, EF ~ 30%\par Last nephrology evaluation - none\par Last neurology evaluation-none\par

## 2022-11-04 LAB
ALBUMIN SERPL ELPH-MCNC: 4.5 G/DL
ALP BLD-CCNC: 89 U/L
ALT SERPL-CCNC: 21 U/L
ANION GAP SERPL CALC-SCNC: 15 MMOL/L
AST SERPL-CCNC: 20 U/L
BILIRUB SERPL-MCNC: 0.2 MG/DL
BUN SERPL-MCNC: 14 MG/DL
CALCIUM SERPL-MCNC: 9.9 MG/DL
CHLORIDE SERPL-SCNC: 99 MMOL/L
CHOLEST SERPL-MCNC: 197 MG/DL
CO2 SERPL-SCNC: 26 MMOL/L
CREAT SERPL-MCNC: 0.79 MG/DL
CREAT SPEC-SCNC: 58 MG/DL
EGFR: 87 ML/MIN/1.73M2
ESTIMATED AVERAGE GLUCOSE: 151 MG/DL
FOLATE SERPL-MCNC: 15.2 NG/ML
GLUCOSE SERPL-MCNC: 116 MG/DL
HBA1C MFR BLD HPLC: 6.9 %
HDLC SERPL-MCNC: 49 MG/DL
LDLC SERPL CALC-MCNC: 78 MG/DL
MICROALBUMIN 24H UR DL<=1MG/L-MCNC: <1.2 MG/DL
MICROALBUMIN/CREAT 24H UR-RTO: NORMAL MG/G
NONHDLC SERPL-MCNC: 148 MG/DL
POTASSIUM SERPL-SCNC: 4.1 MMOL/L
PROT SERPL-MCNC: 7.4 G/DL
SODIUM SERPL-SCNC: 140 MMOL/L
TRIGL SERPL-MCNC: 348 MG/DL
TSH SERPL-ACNC: 0.78 UIU/ML
VIT B12 SERPL-MCNC: 1205 PG/ML

## 2022-11-10 ENCOUNTER — APPOINTMENT (OUTPATIENT)
Dept: ULTRASOUND IMAGING | Facility: CLINIC | Age: 57
End: 2022-11-10

## 2022-11-10 ENCOUNTER — APPOINTMENT (OUTPATIENT)
Dept: RADIOLOGY | Facility: CLINIC | Age: 57
End: 2022-11-10

## 2022-11-10 PROCEDURE — 73620 X-RAY EXAM OF FOOT: CPT | Mod: LT,RT

## 2022-11-10 PROCEDURE — 73130 X-RAY EXAM OF HAND: CPT | Mod: LT,RT

## 2022-11-10 PROCEDURE — 77080 DXA BONE DENSITY AXIAL: CPT

## 2022-11-10 PROCEDURE — 76536 US EXAM OF HEAD AND NECK: CPT

## 2022-11-16 ENCOUNTER — APPOINTMENT (OUTPATIENT)
Dept: ENDOCRINOLOGY | Facility: CLINIC | Age: 57
End: 2022-11-16

## 2022-11-16 PROCEDURE — G0108 DIAB MANAGE TRN  PER INDIV: CPT

## 2022-12-19 ENCOUNTER — RX RENEWAL (OUTPATIENT)
Age: 57
End: 2022-12-19

## 2022-12-22 RX ORDER — INSULIN PUMP CONTROLLER
EACH MISCELLANEOUS
Qty: 1 | Refills: 0 | Status: ACTIVE | COMMUNITY
Start: 2021-11-16

## 2022-12-25 NOTE — ED ADULT NURSE NOTE - NS PRO AD NO ADVANCE DIRECTIVE
Problem: Falls - Risk of  Goal: *Absence of Falls  Description: Document Conner Meirfelizshalini Fall Risk and appropriate interventions in the flowsheet.   Outcome: Progressing Towards Goal  Note: Fall Risk Interventions:  Mobility Interventions: Patient to call before getting OOB, PT Consult for mobility concerns         Medication Interventions: Patient to call before getting OOB                   Problem: Patient Education: Go to Patient Education Activity  Goal: Patient/Family Education  Outcome: Progressing Towards Goal No

## 2022-12-27 ENCOUNTER — RX RENEWAL (OUTPATIENT)
Age: 57
End: 2022-12-27

## 2022-12-27 RX ORDER — INSULIN PUMP CONTROLLER
EACH MISCELLANEOUS
Qty: 1 | Refills: 11 | Status: ACTIVE | COMMUNITY
Start: 2021-11-16

## 2023-01-06 ENCOUNTER — RX RENEWAL (OUTPATIENT)
Age: 58
End: 2023-01-06

## 2023-01-09 NOTE — ED CLERICAL - DIVISION
Last appointment: 8/31/2022  Next appointment: 2/3/2023  Last refill: 12/15/2022  #30 x0
[facility]...

## 2023-01-10 ENCOUNTER — RX RENEWAL (OUTPATIENT)
Age: 58
End: 2023-01-10

## 2023-01-13 ENCOUNTER — APPOINTMENT (OUTPATIENT)
Dept: RHEUMATOLOGY | Facility: CLINIC | Age: 58
End: 2023-01-13
Payer: MEDICAID

## 2023-01-13 VITALS
DIASTOLIC BLOOD PRESSURE: 81 MMHG | BODY MASS INDEX: 23.55 KG/M2 | HEART RATE: 69 BPM | OXYGEN SATURATION: 97 % | HEIGHT: 68 IN | WEIGHT: 155.4 LBS | SYSTOLIC BLOOD PRESSURE: 150 MMHG

## 2023-01-13 PROCEDURE — 99214 OFFICE O/P EST MOD 30 MIN: CPT

## 2023-01-13 RX ORDER — MELOXICAM 15 MG/1
15 TABLET ORAL DAILY
Qty: 30 | Refills: 1 | Status: COMPLETED | COMMUNITY
Start: 2020-12-11 | End: 2023-01-13

## 2023-01-13 NOTE — ASSESSMENT
[FreeTextEntry1] : 58 yo F with:\par 1) Rheumatoid Arthritis (+RF, +CCP) - well controlled on  Arava + Plaquenil. \par   - Cont leflunomide 20mg daily, Plaquenil 300mg/day.  Ophtho f/u\par   - hepatitis panel negative 5/18.\par   - Flu vaccine (9/22).  Pt to check her records re: pneumonia vaccination status\par   - Pt has received the COVID19 vaccine + booster.  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  Currently much improved.\par   - derm f/u.\par 3)  Osteopenia:  Bisphosphonate not indicated based on FRAX (12/2.3%).  Previously w/ vit D deficiency - now resolved.\par   - Cont calcium / vit D.\par   - weight-bearing exercise.\par 4)  OA - pt w/ mild chronic pain in knees primarily with walking / climbing stairs.  Also w/ OA in hands.\par   - Reiterated importance of exercise\par   - ibuprofen and/or Tylenol prn\par   - warm compresses\par   - OTC topical analgesics

## 2023-01-13 NOTE — PHYSICAL EXAM
[General Appearance - Alert] : alert [General Appearance - In No Acute Distress] : in no acute distress [Sclera] : the sclera and conjunctiva were normal [Outer Ear] : the ears and nose were normal in appearance [Hearing Threshold Finger Rub Not [place]] : hearing was normal [Oropharynx] : the oropharynx was normal [Neck Appearance] : the appearance of the neck was normal [Neck Cervical Mass (___cm)] : no neck mass was observed [Jugular Venous Distention Increased] : there was no jugular-venous distention [Thyroid Diffuse Enlargement] : the thyroid was not enlarged [Thyroid Nodule] : there were no palpable thyroid nodules [Respiration, Rhythm And Depth] : normal respiratory rhythm and effort [Exaggerated Use Of Accessory Muscles For Inspiration] : no accessory muscle use [Auscultation Breath Sounds / Voice Sounds] : lungs were clear to auscultation bilaterally [Heart Rate And Rhythm] : heart rate was normal and rhythm regular [Heart Sounds] : normal S1 and S2 [Heart Sounds Gallop] : no gallops [Murmurs] : no murmurs [Heart Sounds Pericardial Friction Rub] : no pericardial rub [Edema] : there was no peripheral edema [Bowel Sounds] : normal bowel sounds [Abdomen Soft] : soft [Abdomen Tenderness] : non-tender [Abdomen Mass (___ Cm)] : no abdominal mass palpated [Cervical Lymph Nodes Enlarged Posterior Bilaterally] : posterior cervical [Cervical Lymph Nodes Enlarged Anterior Bilaterally] : anterior cervical [Supraclavicular Lymph Nodes Enlarged Bilaterally] : supraclavicular [No CVA Tenderness] : no ~M costovertebral angle tenderness [No Spinal Tenderness] : no spinal tenderness [] : no rash [No Focal Deficits] : no focal deficits [Oriented To Time, Place, And Person] : oriented to person, place, and time [Impaired Insight] : insight and judgment were intact [Affect] : the affect was normal [FreeTextEntry1] : No synovitis; (+)multiple B/L Heberden's nodes, (+)tenderness in B/L 2nd-5th DIP's;  normal ROM in all joints\par (+)multiple B/L Heberden's nodes; mucoid cyst overlying left 2nd PIP.

## 2023-01-13 NOTE — HISTORY OF PRESENT ILLNESS
[FreeTextEntry1] : Continues to feel fine overall.  Still w/ occasional B/L knee pain "once in a blue moon", franco w/ walking.  Also w/ pain in her hands, primarily in the DIP's.  AM stiffness has virtually resolved.  LBP remains much improved.  No new complaints.

## 2023-01-17 LAB
ALBUMIN SERPL ELPH-MCNC: 4.7 G/DL
ALP BLD-CCNC: 100 U/L
ALT SERPL-CCNC: 20 U/L
ANION GAP SERPL CALC-SCNC: 12 MMOL/L
AST SERPL-CCNC: 16 U/L
BASOPHILS # BLD AUTO: 0.09 K/UL
BASOPHILS NFR BLD AUTO: 1 %
BILIRUB SERPL-MCNC: 0.4 MG/DL
BUN SERPL-MCNC: 15 MG/DL
CALCIUM SERPL-MCNC: 10.1 MG/DL
CHLORIDE SERPL-SCNC: 97 MMOL/L
CO2 SERPL-SCNC: 28 MMOL/L
CREAT SERPL-MCNC: 0.78 MG/DL
CRP SERPL-MCNC: 5 MG/L
EGFR: 88 ML/MIN/1.73M2
EOSINOPHIL # BLD AUTO: 0.54 K/UL
EOSINOPHIL NFR BLD AUTO: 6.2 %
ERYTHROCYTE [SEDIMENTATION RATE] IN BLOOD BY WESTERGREN METHOD: 25 MM/HR
GLUCOSE SERPL-MCNC: 152 MG/DL
HCT VFR BLD CALC: 44.1 %
HGB BLD-MCNC: 14.3 G/DL
IMM GRANULOCYTES NFR BLD AUTO: 0.2 %
LYMPHOCYTES # BLD AUTO: 2.23 K/UL
LYMPHOCYTES NFR BLD AUTO: 25.6 %
MAN DIFF?: NORMAL
MCHC RBC-ENTMCNC: 26.8 PG
MCHC RBC-ENTMCNC: 32.4 GM/DL
MCV RBC AUTO: 82.7 FL
MONOCYTES # BLD AUTO: 0.54 K/UL
MONOCYTES NFR BLD AUTO: 6.2 %
NEUTROPHILS # BLD AUTO: 5.3 K/UL
NEUTROPHILS NFR BLD AUTO: 60.8 %
PLATELET # BLD AUTO: 329 K/UL
POTASSIUM SERPL-SCNC: 4.6 MMOL/L
PROT SERPL-MCNC: 7.9 G/DL
RBC # BLD: 5.33 M/UL
RBC # FLD: 13.5 %
SODIUM SERPL-SCNC: 137 MMOL/L
WBC # FLD AUTO: 8.72 K/UL

## 2023-01-24 ENCOUNTER — NON-APPOINTMENT (OUTPATIENT)
Age: 58
End: 2023-01-24

## 2023-02-03 ENCOUNTER — RX RENEWAL (OUTPATIENT)
Age: 58
End: 2023-02-03

## 2023-02-08 DIAGNOSIS — J18.9 PNEUMONIA, UNSPECIFIED ORGANISM: ICD-10-CM

## 2023-02-08 RX ORDER — AZITHROMYCIN 250 MG/1
250 TABLET, FILM COATED ORAL
Qty: 1 | Refills: 0 | Status: COMPLETED | COMMUNITY
Start: 2023-02-08 | End: 2023-02-13

## 2023-02-09 ENCOUNTER — APPOINTMENT (OUTPATIENT)
Dept: INTERNAL MEDICINE | Facility: CLINIC | Age: 58
End: 2023-02-09

## 2023-02-15 ENCOUNTER — APPOINTMENT (OUTPATIENT)
Dept: ENDOCRINOLOGY | Facility: CLINIC | Age: 58
End: 2023-02-15
Payer: MEDICAID

## 2023-02-15 VITALS — HEIGHT: 68 IN | BODY MASS INDEX: 23.34 KG/M2 | WEIGHT: 154 LBS

## 2023-02-15 PROCEDURE — G0108 DIAB MANAGE TRN  PER INDIV: CPT

## 2023-02-17 ENCOUNTER — RX RENEWAL (OUTPATIENT)
Age: 58
End: 2023-02-17

## 2023-02-17 RX ORDER — INSULIN LISPRO 100 U/ML
100 INJECTION, SOLUTION INTRAVENOUS; SUBCUTANEOUS
Qty: 30 | Refills: 2 | Status: ACTIVE | COMMUNITY
Start: 2023-01-06 | End: 1900-01-01

## 2023-02-21 RX ORDER — INSULIN LISPRO 100 U/ML
100 INJECTION, SOLUTION INTRAVENOUS; SUBCUTANEOUS
Qty: 3 | Refills: 3 | Status: ACTIVE | COMMUNITY
Start: 2023-02-15 | End: 1900-01-01

## 2023-03-10 ENCOUNTER — RX RENEWAL (OUTPATIENT)
Age: 58
End: 2023-03-10

## 2023-03-14 ENCOUNTER — APPOINTMENT (OUTPATIENT)
Dept: INTERNAL MEDICINE | Facility: CLINIC | Age: 58
End: 2023-03-14
Payer: MEDICAID

## 2023-03-14 VITALS
TEMPERATURE: 97 F | OXYGEN SATURATION: 96 % | HEIGHT: 68 IN | WEIGHT: 150 LBS | HEART RATE: 80 BPM | SYSTOLIC BLOOD PRESSURE: 139 MMHG | BODY MASS INDEX: 22.73 KG/M2 | DIASTOLIC BLOOD PRESSURE: 75 MMHG

## 2023-03-14 DIAGNOSIS — U07.1 COVID-19: ICD-10-CM

## 2023-03-14 PROCEDURE — 99213 OFFICE O/P EST LOW 20 MIN: CPT

## 2023-03-14 NOTE — HISTORY OF PRESENT ILLNESS
[FreeTextEntry1] : Follow-up after COVID [de-identified] : \par Diagnosed with COVID at Urgent Care 1 month ago; all symptoms have since resolved except for lingering cough. Cough is dry but she feels congestion in her chest. Was given Albuterol inhaler which she takes as needed, this helps when she is having the cough. No more fevers or chills.

## 2023-03-14 NOTE — ASSESSMENT
[FreeTextEntry1] : \par Post-COVID cough with persistent chest congestion. Will send for chest X-ray to ensure no PNA. Continue Albuterol q4-6 hours. May take Mucinex prn as well.

## 2023-03-15 ENCOUNTER — APPOINTMENT (OUTPATIENT)
Dept: RADIOLOGY | Facility: CLINIC | Age: 58
End: 2023-03-15
Payer: MEDICAID

## 2023-03-15 ENCOUNTER — OUTPATIENT (OUTPATIENT)
Dept: OUTPATIENT SERVICES | Facility: HOSPITAL | Age: 58
LOS: 1 days | End: 2023-03-15
Payer: MEDICAID

## 2023-03-15 DIAGNOSIS — U07.1 COVID-19: ICD-10-CM

## 2023-03-15 DIAGNOSIS — Z95.5 PRESENCE OF CORONARY ANGIOPLASTY IMPLANT AND GRAFT: Chronic | ICD-10-CM

## 2023-03-15 PROCEDURE — 71046 X-RAY EXAM CHEST 2 VIEWS: CPT

## 2023-03-15 PROCEDURE — 71046 X-RAY EXAM CHEST 2 VIEWS: CPT | Mod: 26

## 2023-03-16 ENCOUNTER — RX RENEWAL (OUTPATIENT)
Age: 58
End: 2023-03-16

## 2023-04-06 ENCOUNTER — RX RENEWAL (OUTPATIENT)
Age: 58
End: 2023-04-06

## 2023-04-14 ENCOUNTER — APPOINTMENT (OUTPATIENT)
Dept: RHEUMATOLOGY | Facility: CLINIC | Age: 58
End: 2023-04-14

## 2023-04-17 ENCOUNTER — RX RENEWAL (OUTPATIENT)
Age: 58
End: 2023-04-17

## 2023-04-17 RX ORDER — FLASH GLUCOSE SENSOR
KIT MISCELLANEOUS
Qty: 2 | Refills: 12 | Status: ACTIVE | COMMUNITY
Start: 2023-02-15 | End: 1900-01-01

## 2023-04-18 ENCOUNTER — RX RENEWAL (OUTPATIENT)
Age: 58
End: 2023-04-18

## 2023-04-20 ENCOUNTER — EMERGENCY (EMERGENCY)
Facility: HOSPITAL | Age: 58
LOS: 0 days | Discharge: HOME | End: 2023-04-20
Attending: STUDENT IN AN ORGANIZED HEALTH CARE EDUCATION/TRAINING PROGRAM
Payer: MEDICARE

## 2023-04-20 VITALS
OXYGEN SATURATION: 95 % | DIASTOLIC BLOOD PRESSURE: 55 MMHG | SYSTOLIC BLOOD PRESSURE: 115 MMHG | RESPIRATION RATE: 16 BRPM | TEMPERATURE: 98 F | HEART RATE: 76 BPM

## 2023-04-20 VITALS
HEIGHT: 68 IN | WEIGHT: 149.91 LBS | DIASTOLIC BLOOD PRESSURE: 79 MMHG | OXYGEN SATURATION: 94 % | RESPIRATION RATE: 20 BRPM | SYSTOLIC BLOOD PRESSURE: 127 MMHG | TEMPERATURE: 99 F | HEART RATE: 121 BPM

## 2023-04-20 DIAGNOSIS — R06.02 SHORTNESS OF BREATH: ICD-10-CM

## 2023-04-20 DIAGNOSIS — R05.9 COUGH, UNSPECIFIED: ICD-10-CM

## 2023-04-20 DIAGNOSIS — J18.9 PNEUMONIA, UNSPECIFIED ORGANISM: ICD-10-CM

## 2023-04-20 DIAGNOSIS — I11.0 HYPERTENSIVE HEART DISEASE WITH HEART FAILURE: ICD-10-CM

## 2023-04-20 DIAGNOSIS — Z95.5 PRESENCE OF CORONARY ANGIOPLASTY IMPLANT AND GRAFT: ICD-10-CM

## 2023-04-20 DIAGNOSIS — R07.89 OTHER CHEST PAIN: ICD-10-CM

## 2023-04-20 DIAGNOSIS — Z88.8 ALLERGY STATUS TO OTHER DRUGS, MEDICAMENTS AND BIOLOGICAL SUBSTANCES: ICD-10-CM

## 2023-04-20 DIAGNOSIS — Z79.84 LONG TERM (CURRENT) USE OF ORAL HYPOGLYCEMIC DRUGS: ICD-10-CM

## 2023-04-20 DIAGNOSIS — Z95.5 PRESENCE OF CORONARY ANGIOPLASTY IMPLANT AND GRAFT: Chronic | ICD-10-CM

## 2023-04-20 DIAGNOSIS — R00.0 TACHYCARDIA, UNSPECIFIED: ICD-10-CM

## 2023-04-20 DIAGNOSIS — I50.9 HEART FAILURE, UNSPECIFIED: ICD-10-CM

## 2023-04-20 DIAGNOSIS — Z20.822 CONTACT WITH AND (SUSPECTED) EXPOSURE TO COVID-19: ICD-10-CM

## 2023-04-20 DIAGNOSIS — E11.9 TYPE 2 DIABETES MELLITUS WITHOUT COMPLICATIONS: ICD-10-CM

## 2023-04-20 DIAGNOSIS — Z98.51 TUBAL LIGATION STATUS: ICD-10-CM

## 2023-04-20 DIAGNOSIS — Z79.82 LONG TERM (CURRENT) USE OF ASPIRIN: ICD-10-CM

## 2023-04-20 DIAGNOSIS — I25.10 ATHEROSCLEROTIC HEART DISEASE OF NATIVE CORONARY ARTERY WITHOUT ANGINA PECTORIS: ICD-10-CM

## 2023-04-20 DIAGNOSIS — I25.2 OLD MYOCARDIAL INFARCTION: ICD-10-CM

## 2023-04-20 LAB
ALBUMIN SERPL ELPH-MCNC: 2.6 G/DL — LOW (ref 3.3–5)
ALP SERPL-CCNC: 123 U/L — HIGH (ref 40–120)
ALT FLD-CCNC: 36 U/L — SIGNIFICANT CHANGE UP (ref 12–78)
ANION GAP SERPL CALC-SCNC: 6 MMOL/L — SIGNIFICANT CHANGE UP (ref 5–17)
APPEARANCE UR: CLEAR — SIGNIFICANT CHANGE UP
APTT BLD: 30.6 SEC — SIGNIFICANT CHANGE UP (ref 27.5–35.5)
AST SERPL-CCNC: 31 U/L — SIGNIFICANT CHANGE UP (ref 15–37)
BACTERIA # UR AUTO: ABNORMAL
BASOPHILS # BLD AUTO: 0.08 K/UL — SIGNIFICANT CHANGE UP (ref 0–0.2)
BASOPHILS NFR BLD AUTO: 0.9 % — SIGNIFICANT CHANGE UP (ref 0–2)
BILIRUB SERPL-MCNC: 0.4 MG/DL — SIGNIFICANT CHANGE UP (ref 0.2–1.2)
BILIRUB UR-MCNC: NEGATIVE — SIGNIFICANT CHANGE UP
BUN SERPL-MCNC: 13 MG/DL — SIGNIFICANT CHANGE UP (ref 7–23)
CALCIUM SERPL-MCNC: 9.2 MG/DL — SIGNIFICANT CHANGE UP (ref 8.5–10.1)
CHLORIDE SERPL-SCNC: 102 MMOL/L — SIGNIFICANT CHANGE UP (ref 96–108)
CO2 SERPL-SCNC: 26 MMOL/L — SIGNIFICANT CHANGE UP (ref 22–31)
COLOR SPEC: YELLOW — SIGNIFICANT CHANGE UP
CREAT SERPL-MCNC: 0.98 MG/DL — SIGNIFICANT CHANGE UP (ref 0.5–1.3)
DIFF PNL FLD: ABNORMAL
EGFR: 66 ML/MIN/1.73M2 — SIGNIFICANT CHANGE UP
EOSINOPHIL # BLD AUTO: 0.67 K/UL — HIGH (ref 0–0.5)
EOSINOPHIL NFR BLD AUTO: 7.3 % — HIGH (ref 0–6)
EPI CELLS # UR: SIGNIFICANT CHANGE UP
FLUAV AG NPH QL: SIGNIFICANT CHANGE UP
FLUBV AG NPH QL: SIGNIFICANT CHANGE UP
GLUCOSE SERPL-MCNC: 166 MG/DL — HIGH (ref 70–99)
GLUCOSE UR QL: 1000 MG/DL
HCT VFR BLD CALC: 33.2 % — LOW (ref 34.5–45)
HGB BLD-MCNC: 10.6 G/DL — LOW (ref 11.5–15.5)
IMM GRANULOCYTES NFR BLD AUTO: 0.7 % — SIGNIFICANT CHANGE UP (ref 0–0.9)
INR BLD: 1.2 RATIO — HIGH (ref 0.88–1.16)
KETONES UR-MCNC: ABNORMAL
LACTATE SERPL-SCNC: 0.9 MMOL/L — SIGNIFICANT CHANGE UP (ref 0.7–2)
LEUKOCYTE ESTERASE UR-ACNC: ABNORMAL
LYMPHOCYTES # BLD AUTO: 1.41 K/UL — SIGNIFICANT CHANGE UP (ref 1–3.3)
LYMPHOCYTES # BLD AUTO: 15.3 % — SIGNIFICANT CHANGE UP (ref 13–44)
MCHC RBC-ENTMCNC: 25.2 PG — LOW (ref 27–34)
MCHC RBC-ENTMCNC: 31.9 G/DL — LOW (ref 32–36)
MCV RBC AUTO: 78.9 FL — LOW (ref 80–100)
MONOCYTES # BLD AUTO: 0.68 K/UL — SIGNIFICANT CHANGE UP (ref 0–0.9)
MONOCYTES NFR BLD AUTO: 7.4 % — SIGNIFICANT CHANGE UP (ref 2–14)
NEUTROPHILS # BLD AUTO: 6.31 K/UL — SIGNIFICANT CHANGE UP (ref 1.8–7.4)
NEUTROPHILS NFR BLD AUTO: 68.4 % — SIGNIFICANT CHANGE UP (ref 43–77)
NITRITE UR-MCNC: NEGATIVE — SIGNIFICANT CHANGE UP
NRBC # BLD: 0 /100 WBCS — SIGNIFICANT CHANGE UP (ref 0–0)
PH UR: 6 — SIGNIFICANT CHANGE UP (ref 5–8)
PLATELET # BLD AUTO: 492 K/UL — HIGH (ref 150–400)
POTASSIUM SERPL-MCNC: 4.3 MMOL/L — SIGNIFICANT CHANGE UP (ref 3.5–5.3)
POTASSIUM SERPL-SCNC: 4.3 MMOL/L — SIGNIFICANT CHANGE UP (ref 3.5–5.3)
PROT SERPL-MCNC: 7.8 GM/DL — SIGNIFICANT CHANGE UP (ref 6–8.3)
PROT UR-MCNC: 30 MG/DL
PROTHROM AB SERPL-ACNC: 14.3 SEC — HIGH (ref 10.5–13.4)
RBC # BLD: 4.21 M/UL — SIGNIFICANT CHANGE UP (ref 3.8–5.2)
RBC # FLD: 17 % — HIGH (ref 10.3–14.5)
RBC CASTS # UR COMP ASSIST: SIGNIFICANT CHANGE UP /HPF (ref 0–4)
SARS-COV-2 RNA SPEC QL NAA+PROBE: SIGNIFICANT CHANGE UP
SODIUM SERPL-SCNC: 134 MMOL/L — LOW (ref 135–145)
SP GR SPEC: 1.01 — SIGNIFICANT CHANGE UP (ref 1.01–1.02)
UROBILINOGEN FLD QL: NEGATIVE MG/DL — SIGNIFICANT CHANGE UP
WBC # BLD: 9.21 K/UL — SIGNIFICANT CHANGE UP (ref 3.8–10.5)
WBC # FLD AUTO: 9.21 K/UL — SIGNIFICANT CHANGE UP (ref 3.8–10.5)
WBC UR QL: NEGATIVE — SIGNIFICANT CHANGE UP

## 2023-04-20 PROCEDURE — 99285 EMERGENCY DEPT VISIT HI MDM: CPT

## 2023-04-20 PROCEDURE — 93010 ELECTROCARDIOGRAM REPORT: CPT

## 2023-04-20 PROCEDURE — 71045 X-RAY EXAM CHEST 1 VIEW: CPT | Mod: 26

## 2023-04-20 RX ORDER — SODIUM CHLORIDE 9 MG/ML
2000 INJECTION INTRAMUSCULAR; INTRAVENOUS; SUBCUTANEOUS ONCE
Refills: 0 | Status: COMPLETED | OUTPATIENT
Start: 2023-04-20 | End: 2023-04-20

## 2023-04-20 RX ORDER — CEFPODOXIME PROXETIL 100 MG
1 TABLET ORAL
Qty: 14 | Refills: 0
Start: 2023-04-20 | End: 2023-04-26

## 2023-04-20 RX ORDER — CEFTRIAXONE 500 MG/1
1000 INJECTION, POWDER, FOR SOLUTION INTRAMUSCULAR; INTRAVENOUS ONCE
Refills: 0 | Status: COMPLETED | OUTPATIENT
Start: 2023-04-20 | End: 2023-04-20

## 2023-04-20 RX ORDER — ACETAMINOPHEN 500 MG
975 TABLET ORAL ONCE
Refills: 0 | Status: COMPLETED | OUTPATIENT
Start: 2023-04-20 | End: 2023-04-20

## 2023-04-20 RX ADMIN — SODIUM CHLORIDE 2000 MILLILITER(S): 9 INJECTION INTRAMUSCULAR; INTRAVENOUS; SUBCUTANEOUS at 14:00

## 2023-04-20 RX ADMIN — CEFTRIAXONE 1000 MILLIGRAM(S): 500 INJECTION, POWDER, FOR SOLUTION INTRAMUSCULAR; INTRAVENOUS at 13:19

## 2023-04-20 RX ADMIN — Medication 975 MILLIGRAM(S): at 13:12

## 2023-04-20 RX ADMIN — CEFTRIAXONE 100 MILLIGRAM(S): 500 INJECTION, POWDER, FOR SOLUTION INTRAMUSCULAR; INTRAVENOUS at 12:42

## 2023-04-20 RX ADMIN — Medication 100 MILLIGRAM(S): at 14:30

## 2023-04-20 RX ADMIN — SODIUM CHLORIDE 2000 MILLILITER(S): 9 INJECTION INTRAMUSCULAR; INTRAVENOUS; SUBCUTANEOUS at 12:46

## 2023-04-20 RX ADMIN — Medication 110 MILLIGRAM(S): at 13:19

## 2023-04-20 RX ADMIN — Medication 975 MILLIGRAM(S): at 12:42

## 2023-04-20 NOTE — ED PROVIDER NOTE - ATTENDING APP SHARED VISIT CONTRIBUTION OF CARE
57yo female with pmh dm, htn, ra, cad, presenting with worsening cough and slight sob over past week.  Has had these symptoms for 4 months.  Went to pmd and had ct concerning for RLL pna and sent to ED.  Patient very well appearing in no resp distress.  Denies complaints currently except dry cough.  Plan for labs with cx, antibiotics and fluids.  Dispo pending results and clinical reassessment.

## 2023-04-20 NOTE — ED ADULT TRIAGE NOTE - CHIEF COMPLAINT QUOTE
sob x two weeks, prescribed z pack and inhaler. CT performed tuesday shows RLL PNA, sent from pcp for iv abx. + cough and sob

## 2023-04-20 NOTE — ED PROVIDER NOTE - NSFOLLOWUPINSTRUCTIONS_ED_ALL_ED_FT
Take antibiotics as prescribed  Rest, drink plenty of fluids  Advance activity as tolerated  Continue all previously prescribed medications as directed  Follow up with your PMD - bring copies of your results  Return to the ER for chest pain, difficulty breathing, persistent or worsening symptoms, or other new or concerning symptoms

## 2023-04-20 NOTE — ED PROVIDER NOTE - OBJECTIVE STATEMENT
57 y/o female w/pmhx of DM II, HTN, RA, and MI p/w sob x 2  and pleuritic chest pain upon cough x 2days. seen by PCP confirmed PNA on XR, states she feel fines other that the cough. no f/c/n/v/d. no other complaints at this time 57 y/o female w/pmhx of DM II, HTN, RA, and MI p/w sob x 2  and pleuritic chest pain upon cough x 2days. seen by PCP confirmed PNA on CT RLL, states she feel fines other that the cough. no f/c/n/v/d. no other complaints at this time

## 2023-04-20 NOTE — ED PROVIDER NOTE - CLINICAL SUMMARY MEDICAL DECISION MAKING FREE TEXT BOX
57 y/o female w/pmhx of DM II, HTN, RA, and MI p/w sob x 2  and pleuritic chest pain upon cough x 2days. seen by PCP confirmed PNA on XR, states she feel fines other that the cough. no f/c/n/v/d. no other complaints at this time     exam as above  work up for PNA , r/o sepsis  labs  XR   abx  sw attending  dispo pending

## 2023-04-20 NOTE — ED PROVIDER NOTE - PROGRESS NOTE DETAILS
Vito: Reviewed results with patient and daughter at bedside.  No leukocytosis, patient very well appearing in nad, ambulating to bathroom without assistance and no resp distress.  [id number].  Will treat with antibiotics and dc, able to follow up with pmd in next few days and verbalized understanding of return precautions.  Of note on ct report there is granulomatous disease with scarring in R middle lobe which may explain XR findings.

## 2023-04-20 NOTE — ED PROVIDER NOTE - PHYSICAL EXAMINATION
Gen:  alert, awake, no acute distress  HEENT:  atraumatic head, airway clear, pupils equal and round  CV:  rrr, nl S1, S2, no m/r/g  Pulm:  lungs adventitious sound bilaterally   Abd: s/nt/nd, +BS  Ext:  moving all extremities  Neuro:  grossly intact, no focal deficits  Skin:  clear, dry, intact  Psych: AOx3, normal affect, no apparent risk to self or others

## 2023-04-20 NOTE — ED PROVIDER NOTE - PATIENT PORTAL LINK FT
You can access the FollowMyHealth Patient Portal offered by [facility] by registering at the following website: [email or web address]. By joining [facility]’s FollowMyHealth portal, you will also be able to view your health information using other applications (apps) compatible with our system.

## 2023-04-20 NOTE — ED PROVIDER NOTE - NS ED ATTENDING STATEMENT MOD
This was a shared visit with the ALVINO. I reviewed and verified the documentation and independently performed the documented:

## 2023-04-20 NOTE — ED ADULT NURSE NOTE - OBJECTIVE STATEMENT
Pt AOx4, responsive, ambulatory. Pt c/o SOB on exertion, chills, midsternal chest pain upon coughing; dry cough since Feb. Pt states covid positive in Jan; follow up appt with pulmonologist on tuesday diagnosed with RLL PNA. Denies n/v/d, headache, light-headedness, dizziness. PMH covid-pna, asthma, DM, HTN, RA, MI. Pt post menopausal. pt placed on cardiac monitor; tachycardic 105, tolerating RA at 95% oxygen saturation, not in respiratory distress.

## 2023-04-25 LAB
CULTURE RESULTS: SIGNIFICANT CHANGE UP
CULTURE RESULTS: SIGNIFICANT CHANGE UP
SPECIMEN SOURCE: SIGNIFICANT CHANGE UP
SPECIMEN SOURCE: SIGNIFICANT CHANGE UP

## 2023-04-27 ENCOUNTER — APPOINTMENT (OUTPATIENT)
Dept: ENDOCRINOLOGY | Facility: CLINIC | Age: 58
End: 2023-04-27
Payer: MEDICAID

## 2023-04-27 VITALS
TEMPERATURE: 97 F | WEIGHT: 147 LBS | OXYGEN SATURATION: 96 % | DIASTOLIC BLOOD PRESSURE: 70 MMHG | RESPIRATION RATE: 16 BRPM | HEART RATE: 87 BPM | BODY MASS INDEX: 22.28 KG/M2 | HEIGHT: 68 IN | SYSTOLIC BLOOD PRESSURE: 140 MMHG

## 2023-04-27 LAB — GLUCOSE BLDC GLUCOMTR-MCNC: 138

## 2023-04-27 PROCEDURE — 99214 OFFICE O/P EST MOD 30 MIN: CPT | Mod: 25

## 2023-04-27 PROCEDURE — 95251 CONT GLUC MNTR ANALYSIS I&R: CPT

## 2023-04-27 PROCEDURE — 82962 GLUCOSE BLOOD TEST: CPT

## 2023-04-27 RX ORDER — BLOOD SUGAR DIAGNOSTIC
STRIP MISCELLANEOUS
Qty: 300 | Refills: 11 | Status: ACTIVE | COMMUNITY
Start: 2021-07-09 | End: 1900-01-01

## 2023-04-27 NOTE — ASSESSMENT
[Diabetes Foot Care] : diabetes foot care [Long Term Vascular Complications] : long term vascular complications of diabetes [Carbohydrate Consistent Diet] : carbohydrate consistent diet [Importance of Diet and Exercise] : importance of diet and exercise to improve glycemic control, achieve weight loss and improve cardiovascular health [Exercise/Effect on Glucose] : exercise/effect on glucose [Hypoglycemia Management] : hypoglycemia management [Glucagon Use] : glucagon use [Ketone Testing] : ketone testing [Action and use of Insulin] : action and use of short and long-acting insulin [Self Monitoring of Blood Glucose] : self monitoring of blood glucose [Insulin Self-Administration] : insulin self-administration [Injection Technique, Storage, Sharps Disposal] : injection technique, storage, and sharps disposal [Sick-Day Management] : sick-day management [Retinopathy Screening] : Patient was referred to ophthalmology for retinopathy screening [Diabetic Medications] : Risks and benefits of diabetic medications were discussed [FreeTextEntry1] : 1. T2DM, appears reasonably controlled\par - compliance is reiterated\par - resume Nolan 2\par \par Basal Rate: \par Start Time: 12:00 AM ----- 1.15 units/hour  \par Start Time: 7 AM ----- 0.8 units/hour \par Start Time: 6pm ----- 1.15 units/hour       \par \par \par Insulin to Carb Ration (I:C): \par Start Time: 12:00 AM ----- 7 gm units/hour; 7 am- 5, 6 pm- 6, 6 pm- 5.5        \par Insulin Sensitivity Factor = 12 am- 30 mg/dl , 12 pm- 35, 4 pm- 30\par BG Target = 130-160 mg/dl     \par \par - metformin 500 mg TID, monitor EF\par - will avoid DPP4 inhibitors since they can potentiate a bradykinin induced  angioedema, and I'd also avoid GLP1 agonists for now, given some increased risks in angioedema on some of them (incl Soliqua, adlyxin, Trulicity)\par - Steglatro 5 mg qd. monitor weight\par - monitor lipids, blood pressure, urine microalbumin\par - Prev with myalgia on statins. cont zetia and  Crestor 5 mg HS\par - norvasc 10 mg, HCTZ 25 mg qd\par - Gabapentin 300 mg HS\par \par 2. MNG\par - FNA of the dominant nodule- AUS /FLUS with neg Thyroseq. \par - overall stable sonogram\par - will repeat thyroid US in 11/23\par \par RTC 3 mos\par \par

## 2023-04-27 NOTE — HISTORY OF PRESENT ILLNESS
[FreeTextEntry1] : F/u for multiple medical issues\par \par *** Apr 27, 2023 ***\par \par dealing with PNA, bronchitis. taking a/b and inhalers , not on steroids\par on Omnipod Dash insulin pump, metformin 500 mg bid steglatro 5 mg\par no sensor x 2 weeks\par \par Basal Rate: \par Start Time: 12:00 AM ----- 1.15 units/hour  \par Start Time: 7 AM ----- 0.8 units/hour \par Start Time: 6pm ----- 1.15 units/hour       \par \par Insulin to Carb Ration (I:C): \par Start Time: 12:00 AM ----- 7 gm units/hour; 7 am- 5, 6 pm- 6, 6 pm- 5.5        \par Insulin Sensitivity Factor = 12 am- 30 mg/dl , 12 pm- 35, 4 pm- 30\par BG Target = 130-160 mg/dl  \par \par Date of download:  04/27/2023 \par Diabetes Medications and Dosage: as above\par Indication for CGMS: verify a change in the treatment regimen, identify periods of hypoglycemia/ hyperglycemia. \par Modal day report: pattern. \par Pt with HYPO  0% of the time ( NONE below 54),  86% in target range\par Hyperglycemia:  14% elevation \par Identified issues: carbohydrate counting\par dates analyzed:  04/04/2023- 04/17/2023\par \par Thyr US (11/10/22)- RMP 1.8x1.2x1.2 iso (TR3), RLP 1.2x1.2x1.2 iso with small central coarse calc (TR4), LLP 0.5 iso, isthmic 0.7 iso\par \par *** Nov 01, 2022 ***\par \par doing well overall. some worsening night time feet numbness and tingling\par not taking neurontin ("ran out a while ago")\par s/p FNA (6/10/22)- of RMP 1.6 nodule- AUS/FLUS. Thyroseq- negative, low probability \par on  Omnipod (admelog),  metformin 500 mg TID,  Steglatro 5 mg qd,  Crestor 5 mg HS, zetia 10 mg, \par should be on norvasc 10 mg, HCTZ 25 mg qd\par \par Basal Rate: \par Start Time: 12:00 AM ----- 1.05 units/hour  \par Start Time: 7 AM ----- 08 units/hour \par Start Time: 6pm ----- 1.05 units/hour       \par \par Insulin to Carb Ration (I:C): \par Start Time: 12:00 AM ----- 7 gm units/hour        \par Start Time: 7:00 AM ----- 6 gm units/hour      \par Insulin Sensitivity Factor = 12 am- 30 mg/dl , 12 pm- 35, 4 pm- 30\par BG Target = 130-160 mg/dl     \par \par Date of download:  11/01/2022 \par Diabetes Medications and Dosage: as above\par Indication for CGMS: verify a change in the treatment regimen, identify periods of hypoglycemia/ hyperglycemia. \par Modal day report: pattern. \par Pt with HYPO  0% of the time ( NONE below 54),  79% in target range\par Hyperglycemia:  21% elevation \par Identified issues: carbohydrate counting. spikes post breakfast and dinner\par dates analyzed:  10/19/2022- 11/01/2022\par \par \par *** Gregorio 10, 2022 ***\par \par on  Omnipod (admelog),  metformin 500 mg TID,  Steglatro 5 mg qd,  Crestor 5 mg HS, zetia 10 mg, \par should be on norvasc 10 mg, HCTZ 25 mg qd but "was forgetting lately"\par Basal Rate: \par Start Time: 12:00 AM ----- 1.2 units/hour  \par Start Time: 7 AM ----- 1.0 units/hour \par Start Time: 6pm ----- 1.1 units/hour   \par Start Time: 10pm ----- 1.2 units/hour         \par \par Insulin to Carb Ration (I:C): \par Start Time: 12:00 AM ----- 7 gm units/hour        \par Start Time: 7:00 AM ----- 6 gm units/hour      \par Insulin Sensitivity Factor = 12 am- 30 mg/dl , 12 pm- 35, 4 pm- 30\par BG Target = 130-160 mg/dl    \par \par Date of download:  6/10/22\par Diabetes Medications and Dosage: as above\par Indication for CGMS: verify a change in the treatment regimen, identify periods of hypoglycemia/ hyperglycemia. \par Modal day report: pattern. \par Pt with HYPO  5% of the time (NONE below 54),  85% in target range\par Hyperglycemia:  10% elevation \par Identified issues: carbohydrate counting\par dates analyzed:5/27/22-6/9/22\par \par \par *** Apr 13, 2022 ***\par \par feels well. did not proceed with FNA yet ([facility] is not participating in her plan)\par likes the pump but with skin irritaiton \par using Omnipod (admelog),  metformin 500 mg TID,  Steglatro 5 mg qd,  Crestor 5 mg HS, zetia 10 mg\par Basal Rate: \par Start Time: 12:00 AM ----- 1.2 units/hour  \par Start Time: 7 AM ----- 1.0 units/hour \par Start Time: 10pm ----- 1.2 units/hour         \par \par Insulin to Carb Ration (I:C): \par Start Time: 12:00 AM ----- 7 gm units/hour         \par Insulin Sensitivity Factor = 30 mg/dl \par BG Target = 130-160 mg/dl     \par \par Date of download:  4/13/22\par Diabetes Medications and Dosage: as above\par Indication for CGMS: verify a change in the treatment regimen, identify periods of hypoglycemia/ hyperglycemia. \par Modal day report: pattern. \par Pt with HYPO  0% of the time (NONE below 54),  74% in target range\par Hyperglycemia:  26% elevation \par Identified issues: carbohydrate counting\par dates analyzed: 3/31/22-4/13/22\par \par \par *** Jan 26, 2022 ***\par \par taking Tresiba 22 un HS,  metformin 500 mg TID, Steglatro 5 mg qd,  Admelog 11 to 12 un ac meals, zetia 10 mg \par feels ok, but is concerned with weight loss since starting steglatro.\par received Omnipod- to schedule training soon. Dexcom is not approved\par never proceeded with thyroid FNA\par reports fbs - 159-170, ppg- 59-mid 200's\par \par *** Sep 29, 2021 ***\par \par on Tresiba 15 un HS, metformin 500 mg bid\par never started novolog and ran out of prandin\par was taking farxiga and jardiance intermittently b/o insurance issues, not on any sghlt2 at present. was told by insurance that steglatro is covered\par reports fbs- 180's- 200's, ppg in 300's\par Thyr US (9/15/21)-  RMP 1.6x1.5x1.2 solid, RLP 1.2x1.2x1.4 with coarse calcs, left isthmic 0.8 partially calcified. All are stable\par \par HISTORY OF PRESENT ILLNESS. \par \par Ms. MCBRIDE was diagnosed with Diabetes Mellitus Type 2 in ??. She reports history HTN, dyslipidemia, toxic MNG, subclinical hyperthyroidism (s/p CIFUENTES with 26 mci in 01/18) , CAD (s/p MI and PTCI), CHF,  but denies  known complications of retinopathy, nephropathy, or neuropathy. She is presently on metformin 1000 mg bid, Amaryl 2 mg qd.\par ***  She was admitted for angioedema on ACE inhibitors (enalapril) requiring intubation\par At that time her Januvia was stopped b/o 'co-admin with ACE could've put her at the higher risk for bradykinin induced angioedema"\par \par Blood sugars are checked 2 times a day. \par Did not bring log book, but reported are typically as following: FBS- 160's, ppg- 200's\par Hypoglycemia frequency: \par Fingerstick glucose in the office today is 265 mg/dL 2 hours after eating. \par Diet: not following ADA\par Exercise: short walks\par \par Lab review: a1c- 8.7, TSH- 0.62\par Thyroid NM scan (12/17)- hot nodule in the right lobe\par \par Thyroid US (3/18/20)- RMP round 1.6x1.3x1.6; RLP hypo 1.2x1.1x1.3 with coarse calcs\par Thyroid US (8/14/15)- RMP complex 2.2x1.7x2.0 round (no halo, microcalcs), RLP hypo 1.6x1.5x1.6(thick disrupted halo, microcalc), LUP solid hyper 0.5x0.3x0.6; LLP solid hypo 0.8x0.5x0.6\par \par She does not recall any FNAs done in the past\par \par Last dilated eye - 2020\par Last podiatry visit  - 2020\par Last cardiology evaluation - 06/20\par Last stress test - 6/20\par Last 2-D Echo - 5/20, EF ~ 30%\par Last nephrology evaluation - none\par Last neurology evaluation-none\par

## 2023-04-28 ENCOUNTER — NON-APPOINTMENT (OUTPATIENT)
Age: 58
End: 2023-04-28

## 2023-04-28 LAB
ALBUMIN SERPL ELPH-MCNC: 3.9 G/DL
ALP BLD-CCNC: 116 U/L
ALT SERPL-CCNC: 28 U/L
ANION GAP SERPL CALC-SCNC: 14 MMOL/L
AST SERPL-CCNC: 28 U/L
BILIRUB SERPL-MCNC: <0.2 MG/DL
BUN SERPL-MCNC: 21 MG/DL
CALCIUM SERPL-MCNC: 9.9 MG/DL
CHLORIDE SERPL-SCNC: 98 MMOL/L
CHOLEST SERPL-MCNC: 166 MG/DL
CO2 SERPL-SCNC: 24 MMOL/L
CREAT SERPL-MCNC: 0.81 MG/DL
CREAT SPEC-SCNC: 48 MG/DL
EGFR: 84 ML/MIN/1.73M2
ESTIMATED AVERAGE GLUCOSE: 148 MG/DL
FOLATE SERPL-MCNC: 9.9 NG/ML
GLUCOSE SERPL-MCNC: 116 MG/DL
HBA1C MFR BLD HPLC: 6.8 %
HDLC SERPL-MCNC: 38 MG/DL
LDLC SERPL CALC-MCNC: 69 MG/DL
MICROALBUMIN 24H UR DL<=1MG/L-MCNC: 1.5 MG/DL
MICROALBUMIN/CREAT 24H UR-RTO: 32 MG/G
NONHDLC SERPL-MCNC: 128 MG/DL
POTASSIUM SERPL-SCNC: 4.5 MMOL/L
PROT SERPL-MCNC: 7.1 G/DL
SODIUM SERPL-SCNC: 135 MMOL/L
T4 FREE SERPL-MCNC: 1.2 NG/DL
TRIGL SERPL-MCNC: 298 MG/DL
TSH SERPL-ACNC: 0.84 UIU/ML
VIT B12 SERPL-MCNC: 1868 PG/ML

## 2023-05-08 RX ORDER — FLASH GLUCOSE SENSOR
KIT MISCELLANEOUS
Qty: 2 | Refills: 12 | Status: ACTIVE | COMMUNITY
Start: 2022-11-16

## 2023-05-15 ENCOUNTER — APPOINTMENT (OUTPATIENT)
Dept: ENDOCRINOLOGY | Facility: CLINIC | Age: 58
End: 2023-05-15

## 2023-05-25 ENCOUNTER — APPOINTMENT (OUTPATIENT)
Dept: INTERNAL MEDICINE | Facility: CLINIC | Age: 58
End: 2023-05-25
Payer: MEDICARE

## 2023-05-25 VITALS
WEIGHT: 148 LBS | TEMPERATURE: 97.1 F | BODY MASS INDEX: 22.43 KG/M2 | DIASTOLIC BLOOD PRESSURE: 69 MMHG | HEIGHT: 68 IN | OXYGEN SATURATION: 97 % | SYSTOLIC BLOOD PRESSURE: 151 MMHG | HEART RATE: 68 BPM

## 2023-05-25 PROCEDURE — G0439: CPT

## 2023-05-26 NOTE — HISTORY OF PRESENT ILLNESS
[FreeTextEntry1] : Presents for preventive visit  [de-identified] : \par Preventive visit: pt is up to date with vaccines; she is due for colon cancer screening and is due for mammogram screening; she does not smoke cigarettes and does not misuse alcohol; she feels safe at home and has smoke/CO detectors in the house; she wears seatbelts when in vehicles; she needs to see her GYN for PAP/pelvic exams

## 2023-05-26 NOTE — PHYSICAL EXAM
[Well Nourished] : well nourished [Well Developed] : well developed [Well-Appearing] : well-appearing [Normal Sclera/Conjunctiva] : normal sclera/conjunctiva [PERRL] : pupils equal round and reactive to light [EOMI] : extraocular movements intact [No JVD] : no jugular venous distention [No Lymphadenopathy] : no lymphadenopathy [Supple] : supple [Thyroid Normal, No Nodules] : the thyroid was normal and there were no nodules present [No Respiratory Distress] : no respiratory distress  [No Accessory Muscle Use] : no accessory muscle use [Clear to Auscultation] : lungs were clear to auscultation bilaterally [Normal Rate] : normal rate  [Regular Rhythm] : with a regular rhythm [Normal S1, S2] : normal S1 and S2 [No Murmur] : no murmur heard [No Carotid Bruits] : no carotid bruits [No Abdominal Bruit] : a ~M bruit was not heard ~T in the abdomen [No Varicosities] : no varicosities [Pedal Pulses Present] : the pedal pulses are present [No Edema] : there was no peripheral edema [No Palpable Aorta] : no palpable aorta [No Extremity Clubbing/Cyanosis] : no extremity clubbing/cyanosis [Normal Appearance] : normal in appearance [No Nipple Discharge] : no nipple discharge [No Axillary Lymphadenopathy] : no axillary lymphadenopathy [Soft] : abdomen soft [Non Tender] : non-tender [Non-distended] : non-distended [No Masses] : no abdominal mass palpated [No HSM] : no HSM [Normal Bowel Sounds] : normal bowel sounds [No CVA Tenderness] : no CVA  tenderness [No Spinal Tenderness] : no spinal tenderness [No Joint Swelling] : no joint swelling [Grossly Normal Strength/Tone] : grossly normal strength/tone [No Rash] : no rash [Coordination Grossly Intact] : coordination grossly intact [No Focal Deficits] : no focal deficits [Normal Gait] : normal gait [Deep Tendon Reflexes (DTR)] : deep tendon reflexes were 2+ and symmetric [Normal Affect] : the affect was normal [Normal Insight/Judgement] : insight and judgment were intact

## 2023-05-26 NOTE — HEALTH RISK ASSESSMENT
[Good] : ~his/her~  mood as  good [No] : No [1 or 2 (0 pts)] : 1 or 2 (0 points) [Never (0 pts)] : Never (0 points) [No falls in past year] : Patient reported no falls in the past year [Audit-CScore] : 0 [Change in mental status noted] : No change in mental status noted [Language] : denies difficulty with language [Behavior] : denies difficulty with behavior [Learning/Retaining New Information] : denies difficulty learning/retaining new information [Handling Complex Tasks] : denies difficulty handling complex tasks [Reasoning] : denies difficulty with reasoning [Spatial Ability and Orientation] : denies difficulty with spatial ability and orientation [None] : None [With Family] : lives with family [Employed] : employed [Sexually Active] : sexually active [High Risk Behavior] : no high risk behavior [Feels Safe at Home] : Feels safe at home [Fully functional (bathing, dressing, toileting, transferring, walking, feeding)] : Fully functional (bathing, dressing, toileting, transferring, walking, feeding) [Fully functional (using the telephone, shopping, preparing meals, housekeeping, doing laundry, using] : Fully functional and needs no help or supervision to perform IADLs (using the telephone, shopping, preparing meals, housekeeping, doing laundry, using transportation, managing medications and managing finances) [Reports changes in hearing] : Reports no changes in hearing [Reports changes in vision] : Reports no changes in vision [Reports normal functional visual acuity (ie: able to read med bottle)] : Reports normal functional visual acuity [Reports changes in dental health] : Reports no changes in dental health [Smoke Detector] : smoke detector [Carbon Monoxide Detector] : carbon monoxide detector [Guns at Home] : no guns at home [Safety elements used in home] : safety elements used in home [Seat Belt] :  uses seat belt [Sunscreen] : uses sunscreen [Travel to Developing Areas] : does not  travel to developing areas [TB Exposure] : is not being exposed to tuberculosis [Caregiver Concerns] : does not have caregiver concerns

## 2023-05-26 NOTE — ASSESSMENT
[FreeTextEntry1] : \par Preventive visit: pt is up to date with vaccines; she is due for colon cancer screening, FIT kit given today and is due for mammogram screening, referral given; she does not smoke cigarettes and does not misuse alcohol; she feels safe at home and has smoke/CO detectors in the house; she wears seatbelts when in vehicles; she needs to see her GYN for PAP/pelvic exams, referral given\par \par Medical Issue 1: Hypertension: unstable and poorly controlled with fluctuations in BP levels; she was on 3 different medications for BP control and yet still having a hard time keeping it at goal, had to stop ACEi due to allergy; she eats salty foods at times but tries to limit this too; prescribed thiazide diuretic and calcium channel blocker medications; coordinated follow-up care with cardiology; return in 1-2 months to monitor BP

## 2023-05-31 RX ORDER — INSULIN LISPRO 100 U/ML
100 INJECTION, SOLUTION INTRAVENOUS; SUBCUTANEOUS
Qty: 3 | Refills: 11 | Status: ACTIVE | COMMUNITY
Start: 2022-01-26

## 2023-06-06 ENCOUNTER — APPOINTMENT (OUTPATIENT)
Dept: ENDOCRINOLOGY | Facility: CLINIC | Age: 58
End: 2023-06-06
Payer: MEDICAID

## 2023-06-06 PROCEDURE — G0108 DIAB MANAGE TRN  PER INDIV: CPT

## 2023-06-08 ENCOUNTER — RX RENEWAL (OUTPATIENT)
Age: 58
End: 2023-06-08

## 2023-06-14 ENCOUNTER — RX RENEWAL (OUTPATIENT)
Age: 58
End: 2023-06-14

## 2023-06-16 ENCOUNTER — RX RENEWAL (OUTPATIENT)
Age: 58
End: 2023-06-16

## 2023-06-30 ENCOUNTER — RX RENEWAL (OUTPATIENT)
Age: 58
End: 2023-06-30

## 2023-07-04 ENCOUNTER — EMERGENCY (EMERGENCY)
Facility: HOSPITAL | Age: 58
LOS: 0 days | Discharge: HOME | End: 2023-07-04
Attending: STUDENT IN AN ORGANIZED HEALTH CARE EDUCATION/TRAINING PROGRAM
Payer: MEDICARE

## 2023-07-04 VITALS
HEART RATE: 100 BPM | RESPIRATION RATE: 18 BRPM | WEIGHT: 149.91 LBS | SYSTOLIC BLOOD PRESSURE: 174 MMHG | DIASTOLIC BLOOD PRESSURE: 84 MMHG | OXYGEN SATURATION: 97 % | TEMPERATURE: 99 F | HEIGHT: 68 IN

## 2023-07-04 VITALS
OXYGEN SATURATION: 97 % | HEART RATE: 69 BPM | RESPIRATION RATE: 17 BRPM | TEMPERATURE: 99 F | DIASTOLIC BLOOD PRESSURE: 84 MMHG | SYSTOLIC BLOOD PRESSURE: 163 MMHG

## 2023-07-04 DIAGNOSIS — Z88.8 ALLERGY STATUS TO OTHER DRUGS, MEDICAMENTS AND BIOLOGICAL SUBSTANCES: ICD-10-CM

## 2023-07-04 DIAGNOSIS — I25.2 OLD MYOCARDIAL INFARCTION: ICD-10-CM

## 2023-07-04 DIAGNOSIS — L53.9 ERYTHEMATOUS CONDITION, UNSPECIFIED: ICD-10-CM

## 2023-07-04 DIAGNOSIS — M06.9 RHEUMATOID ARTHRITIS, UNSPECIFIED: ICD-10-CM

## 2023-07-04 DIAGNOSIS — M79.672 PAIN IN LEFT FOOT: ICD-10-CM

## 2023-07-04 DIAGNOSIS — E11.9 TYPE 2 DIABETES MELLITUS WITHOUT COMPLICATIONS: ICD-10-CM

## 2023-07-04 DIAGNOSIS — L03.116 CELLULITIS OF LEFT LOWER LIMB: ICD-10-CM

## 2023-07-04 DIAGNOSIS — Z79.82 LONG TERM (CURRENT) USE OF ASPIRIN: ICD-10-CM

## 2023-07-04 DIAGNOSIS — Z95.5 PRESENCE OF CORONARY ANGIOPLASTY IMPLANT AND GRAFT: Chronic | ICD-10-CM

## 2023-07-04 DIAGNOSIS — Z98.51 TUBAL LIGATION STATUS: ICD-10-CM

## 2023-07-04 DIAGNOSIS — Z79.84 LONG TERM (CURRENT) USE OF ORAL HYPOGLYCEMIC DRUGS: ICD-10-CM

## 2023-07-04 DIAGNOSIS — I10 ESSENTIAL (PRIMARY) HYPERTENSION: ICD-10-CM

## 2023-07-04 PROCEDURE — 73620 X-RAY EXAM OF FOOT: CPT | Mod: 26,LT

## 2023-07-04 PROCEDURE — 99284 EMERGENCY DEPT VISIT MOD MDM: CPT

## 2023-07-04 RX ORDER — METFORMIN HYDROCHLORIDE 850 MG/1
1 TABLET ORAL
Qty: 0 | Refills: 0 | DISCHARGE

## 2023-07-04 RX ORDER — CEFUROXIME AXETIL 250 MG
1 TABLET ORAL
Qty: 14 | Refills: 0
Start: 2023-07-04 | End: 2023-07-10

## 2023-07-04 RX ORDER — CEFUROXIME AXETIL 250 MG
500 TABLET ORAL ONCE
Refills: 0 | Status: COMPLETED | OUTPATIENT
Start: 2023-07-04 | End: 2023-07-04

## 2023-07-04 RX ORDER — KETOROLAC TROMETHAMINE 30 MG/ML
30 SYRINGE (ML) INJECTION ONCE
Refills: 0 | Status: DISCONTINUED | OUTPATIENT
Start: 2023-07-04 | End: 2023-07-04

## 2023-07-04 RX ADMIN — Medication 30 MILLIGRAM(S): at 07:38

## 2023-07-04 RX ADMIN — Medication 30 MILLIGRAM(S): at 08:30

## 2023-07-04 RX ADMIN — Medication 500 MILLIGRAM(S): at 10:52

## 2023-07-04 NOTE — ED PROVIDER NOTE - OBJECTIVE STATEMENT
58F PMH HTN, DM, RA pw atraumatic L foot pain / redness / swelling since 7P last night. Pt reports feeling well yesterday, went out to run errands / shopping, wearing usual orthotic walking shoes. Pt reports w/ intense pain in L foot w/ attempting to stand s/p sitting and watching TV last night. Pt took Tylenol w/o relief, unable to sleep 2/2 pain. Pt unable to weight bear LLE 2/2 pain. Denies hx similar pain. Denies trauma / injury. ROS otherwise negative: Denies F/C, N/V, back pain, calf pain / swelling. Denies hx gout. Denies recent travel, insect bite / break in skin.     PMH as above, PSH none, Allergy ACE-I, meds as listed.

## 2023-07-04 NOTE — ED ADULT TRIAGE NOTE - PATIENT'S GENDER IDENTITY
Patient called and lvm to let me know that I should mail the lab order to her just 2 week prior to her appt.        ----- Message from Dali Wilkerson sent at 4/10/2018  2:57 PM EDT -----  Contact: PATIENT     MESSAGE FROM PATIENT   MESSAGE/ISSUE:   PATIENT  HAS CALLED IN REGARDS TO GETTING LAB ORDER  IN THE MAIL FOR HER.   SHE IS GOING TO LAB [facility] AT ANOTHER LOCATION    CALL BACK NUMBER: [phone]        
Female

## 2023-07-04 NOTE — ED ADULT NURSE NOTE - NSFALLHARMRISKINTERV_ED_ALL_ED
Assistance OOB with selected safe patient handling equipment if applicable/Communicate risk of Fall with Harm to all staff, patient, and family/Provide visual cue: red socks, yellow wristband, yellow gown, etc/Reinforce activity limits and safety measures with patient and family/Bed in lowest position, wheels locked, appropriate side rails in place/Call bell, personal items and telephone in reach/Instruct patient to call for assistance before getting out of bed/chair/stretcher/Non-slip footwear applied when patient is off stretcher/[place] to call system/Physically safe environment - no spills, clutter or unnecessary equipment/Purposeful Proactive Rounding/Room/bathroom lighting operational, light cord in reach

## 2023-07-04 NOTE — ED PROVIDER NOTE - PHYSICAL EXAMINATION
GEN: Awake, alert, interactive, NAD.  HEAD AND NECK: NC/AT. Airway patent. Neck supple.   EYES:  Clear b/l. EOMI. PERRL.   ENT: Moist mucus membranes.   CARDIAC: Regular rate, regular rhythm. No evident pedal edema.    RESP/CHEST: Normal respiratory effort with no use of accessory muscles or retractions. Clear throughout on auscultation.  ABD: Soft, non-distended, non-tender. No rebound, no guarding.   BACK: No midline spinal TTP. No CVAT.   EXTREMITIES: LLE NVI, + L dorsal lateral mid-foot w/ TTP / overlying erythema / warmth. No TTP BL malleoli / achilles / base of 5th / plantar fascia.   SKIN: Warm, dry, intact normal color. No rash.   NEURO: AOx3, CN II-XII grossly intact, no focal deficits.   PSYCH: Appropriate mood and affect.

## 2023-07-04 NOTE — ED ADULT NURSE REASSESSMENT NOTE - NS ED NURSE REASSESS COMMENT FT1
Received patient in stretcher; alert and oriented x4. Still complains of pain right foot pain. Meds given as ordered. Pending xray. Received patient in stretcher; alert and oriented x4. Still complains of pain left foot pain. Meds given as ordered. Pending xray.

## 2023-07-04 NOTE — ED PROVIDER NOTE - PATIENT PORTAL LINK FT
You can access the FollowMyHealth Patient Portal offered by [facility] by registering at the following website: [email or web address]. By joining [employer]’s FollowMyHealth portal, you will also be able to view your health information using other applications (apps) compatible with our system.

## 2023-07-04 NOTE — ED PROVIDER NOTE - CLINICAL SUMMARY MEDICAL DECISION MAKING FREE TEXT BOX
58F PMH HTN, DM, RA pw atraumatic L foot pain / swelling / redness x1 day. Afebrile, VSS. Well appearing, in NAD. Exam as noted in PE. Plan for XR imaging, pain control. Re-eval. Clinical presentation most c/w L foot cellulitis. 58F PMH HTN, DM, RA pw atraumatic L foot pain / swelling / redness x1 day. Afebrile, VSS. Well appearing, in NAD. Exam as noted in PE. Plan for XR imaging, pain control. Re-eval. Clinical presentation most c/w L foot cellulitis.  XR imaging negative for acute injury. Pt reports improvement in symptoms s/p ED meds. Stable for d/c home. Given 1st dose antibiotic in the ED & sent script for Cefuroxime. Instructed for close outpatient PCP f/u & wound check. Given cane for comfort / support. Return signs / symptoms d/w pt. She understands / agrees w/ this plan.

## 2023-07-04 NOTE — ED ADULT TRIAGE NOTE - AS HEIGHT TYPE
"Jorge reports that he took the cefdinir for 10 days; right ear got a little better. Used afrin nasal spray for 4 days; got a little better. Denies fever. Says he is still feel fatigued. Ear felt a little painful yesterday. Denies ear drainage.    Had a \"bad viral cold in the midst of this'; reports he had sinus headache--teeth hurt; hurt behind eyes. Sinus pain has resolved now Mostly better. Post nasal drainage. Ear plug \"Going on for 57 days; since [place] trip\". He said that it plugged up on the way back from [place] during the air flight. He said that he can hear a little better.   \"Overall I am about 25% better but my right ear is still plugged. What should I do now?\"     Santhosh Goss, RN    " stated

## 2023-07-05 ENCOUNTER — NON-APPOINTMENT (OUTPATIENT)
Age: 58
End: 2023-07-05

## 2023-07-07 ENCOUNTER — RX RENEWAL (OUTPATIENT)
Age: 58
End: 2023-07-07

## 2023-07-27 ENCOUNTER — APPOINTMENT (OUTPATIENT)
Dept: ENDOCRINOLOGY | Facility: CLINIC | Age: 58
End: 2023-07-27
Payer: MEDICAID

## 2023-07-27 VITALS
RESPIRATION RATE: 16 BRPM | BODY MASS INDEX: 23.04 KG/M2 | DIASTOLIC BLOOD PRESSURE: 70 MMHG | SYSTOLIC BLOOD PRESSURE: 128 MMHG | TEMPERATURE: 98 F | OXYGEN SATURATION: 98 % | HEIGHT: 68 IN | HEART RATE: 74 BPM | WEIGHT: 152 LBS

## 2023-07-27 DIAGNOSIS — M85.89 OTHER SPECIFIED DISORDERS OF BONE DENSITY AND STRUCTURE, MULTIPLE SITES: ICD-10-CM

## 2023-07-27 LAB — GLUCOSE BLDC GLUCOMTR-MCNC: 157

## 2023-07-27 PROCEDURE — 99214 OFFICE O/P EST MOD 30 MIN: CPT | Mod: 25

## 2023-07-27 PROCEDURE — 82962 GLUCOSE BLOOD TEST: CPT

## 2023-07-27 PROCEDURE — 95251 CONT GLUC MNTR ANALYSIS I&R: CPT

## 2023-07-27 NOTE — HISTORY OF PRESENT ILLNESS
[FreeTextEntry1] : F/u for multiple medical issues\par \par *** Jul 27, 2023 ***\par \par was in ER 3 wks for LLE cellulitis. completed a/b and feels fine after\par on OP Dash, Metformin 500 mg tid, Steglatro 5 mg qd\par \par Basal Rate: \par Start Time: 12:00 AM ----- 1.15 units/hour  \par Start Time: 7 AM ----- 0.8 units/hour \par Start Time: 6pm ----- 1.15 units/hour       \par \par Insulin to Carb Ration (I:C): \par Start Time: 12:00 AM ----- 7 gm; 7 am- 5, 6 pm- 6, 6 pm- 5.5        \par Insulin Sensitivity Factor = 12 am- 30 mg/dl , 12 pm- 35, 4 pm- 30\par BG Target = 130-160 mg/dl     \par \par Date of download:  07/27/2023 \par Diabetes Medications and Dosage: as above\par Indication for CGMS: verify a change in the treatment regimen, identify periods of hypoglycemia/ hyperglycemia. \par Modal day report: pattern. \par Pt with HYPO  0% of the time ( NONE below 54),  85% in target range\par Hyperglycemia:  15% elevation \par Identified issues: carbohydrate counting. post meal spikings\par dates analyzed:  07/13/2023- 07/27/2023\par \par *** Apr 27, 2023 ***\par \par dealing with PNA, bronchitis. taking a/b and inhalers , not on steroids\par on Omnipod Dash insulin pump, metformin 500 mg bid steglatro 5 mg\par no sensor x 2 weeks\par \par Basal Rate: \par Start Time: 12:00 AM ----- 1.15 units/hour  \par Start Time: 7 AM ----- 0.8 units/hour \par Start Time: 6pm ----- 1.15 units/hour       \par \par Insulin to Carb Ration (I:C): \par Start Time: 12:00 AM ----- 7 gm units/hour; 7 am- 5, 6 pm- 6, 6 pm- 5.5        \par Insulin Sensitivity Factor = 12 am- 30 mg/dl , 12 pm- 35, 4 pm- 30\par BG Target = 130-160 mg/dl  \par \par Date of download:  04/27/2023 \par Diabetes Medications and Dosage: as above\par Indication for CGMS: verify a change in the treatment regimen, identify periods of hypoglycemia/ hyperglycemia. \par Modal day report: pattern. \par Pt with HYPO  0% of the time ( NONE below 54),  86% in target range\par Hyperglycemia:  14% elevation \par Identified issues: carbohydrate counting\par dates analyzed:  04/04/2023- 04/17/2023\par \par Thyr US (11/10/22)- RMP 1.8x1.2x1.2 iso (TR3), RLP 1.2x1.2x1.2 iso with small central coarse calc (TR4), LLP 0.5 iso, isthmic 0.7 iso\par \par *** Nov 01, 2022 ***\par \par doing well overall. some worsening night time feet numbness and tingling\par not taking neurontin ("ran out a while ago")\par s/p FNA (6/10/22)- of RMP 1.6 nodule- AUS/FLUS. Thyroseq- negative, low probability \par on  Omnipod (admelog),  metformin 500 mg TID,  Steglatro 5 mg qd,  Crestor 5 mg HS, zetia 10 mg, \par should be on norvasc 10 mg, HCTZ 25 mg qd\par \par Basal Rate: \par Start Time: 12:00 AM ----- 1.05 units/hour  \par Start Time: 7 AM ----- 08 units/hour \par Start Time: 6pm ----- 1.05 units/hour       \par \par Insulin to Carb Ration (I:C): \par Start Time: 12:00 AM ----- 7 gm units/hour        \par Start Time: 7:00 AM ----- 6 gm units/hour      \par Insulin Sensitivity Factor = 12 am- 30 mg/dl , 12 pm- 35, 4 pm- 30\par BG Target = 130-160 mg/dl     \par \par Date of download:  11/01/2022 \par Diabetes Medications and Dosage: as above\par Indication for CGMS: verify a change in the treatment regimen, identify periods of hypoglycemia/ hyperglycemia. \par Modal day report: pattern. \par Pt with HYPO  0% of the time ( NONE below 54),  79% in target range\par Hyperglycemia:  21% elevation \par Identified issues: carbohydrate counting. spikes post breakfast and dinner\par dates analyzed:  10/19/2022- 11/01/2022\par \par \par *** Gregorio 10, 2022 ***\par \par on  Omnipod (admelog),  metformin 500 mg TID,  Steglatro 5 mg qd,  Crestor 5 mg HS, zetia 10 mg, \par should be on norvasc 10 mg, HCTZ 25 mg qd but "was forgetting lately"\par Basal Rate: \par Start Time: 12:00 AM ----- 1.2 units/hour  \par Start Time: 7 AM ----- 1.0 units/hour \par Start Time: 6pm ----- 1.1 units/hour   \par Start Time: 10pm ----- 1.2 units/hour         \par \par Insulin to Carb Ration (I:C): \par Start Time: 12:00 AM ----- 7 gm units/hour        \par Start Time: 7:00 AM ----- 6 gm units/hour      \par Insulin Sensitivity Factor = 12 am- 30 mg/dl , 12 pm- 35, 4 pm- 30\par BG Target = 130-160 mg/dl    \par \par Date of download:  6/10/22\par Diabetes Medications and Dosage: as above\par Indication for CGMS: verify a change in the treatment regimen, identify periods of hypoglycemia/ hyperglycemia. \par Modal day report: pattern. \par Pt with HYPO  5% of the time (NONE below 54),  85% in target range\par Hyperglycemia:  10% elevation \par Identified issues: carbohydrate counting\par dates analyzed:5/27/22-6/9/22\par \par \par *** Apr 13, 2022 ***\par \par feels well. did not proceed with FNA yet ([facility] is not participating in her plan)\par likes the pump but with skin irritaiton \par using Omnipod (admelog),  metformin 500 mg TID,  Steglatro 5 mg qd,  Crestor 5 mg HS, zetia 10 mg\par Basal Rate: \par Start Time: 12:00 AM ----- 1.2 units/hour  \par Start Time: 7 AM ----- 1.0 units/hour \par Start Time: 10pm ----- 1.2 units/hour         \par \par Insulin to Carb Ration (I:C): \par Start Time: 12:00 AM ----- 7 gm units/hour         \par Insulin Sensitivity Factor = 30 mg/dl \par BG Target = 130-160 mg/dl     \par \par Date of download:  4/13/22\par Diabetes Medications and Dosage: as above\par Indication for CGMS: verify a change in the treatment regimen, identify periods of hypoglycemia/ hyperglycemia. \par Modal day report: pattern. \par Pt with HYPO  0% of the time (NONE below 54),  74% in target range\par Hyperglycemia:  26% elevation \par Identified issues: carbohydrate counting\par dates analyzed: 3/31/22-4/13/22\par \par \par *** Jan 26, 2022 ***\par \par taking Tresiba 22 un HS,  metformin 500 mg TID, Steglatro 5 mg qd,  Admelog 11 to 12 un ac meals, zetia 10 mg \par feels ok, but is concerned with weight loss since starting steglatro.\par received Omnipod- to schedule training soon. Dexcom is not approved\par never proceeded with thyroid FNA\par reports fbs - 159-170, ppg- 59-mid 200's\par \par *** Sep 29, 2021 ***\par \par on Tresiba 15 un HS, metformin 500 mg bid\par never started novolog and ran out of prandin\par was taking farxiga and jardiance intermittently b/o insurance issues, not on any sghlt2 at present. was told by insurance that steglatro is covered\par reports fbs- 180's- 200's, ppg in 300's\par Thyr US (9/15/21)-  RMP 1.6x1.5x1.2 solid, RLP 1.2x1.2x1.4 with coarse calcs, left isthmic 0.8 partially calcified. All are stable\par \par HISTORY OF PRESENT ILLNESS. \par \par Ms. MCBRIDE was diagnosed with Diabetes Mellitus Type 2 in ??. She reports history HTN, dyslipidemia, toxic MNG, subclinical hyperthyroidism (s/p CIFUENTES with 26 mci in 01/18) , CAD (s/p MI and PTCI), CHF,  but denies  known complications of retinopathy, nephropathy, or neuropathy. She is presently on metformin 1000 mg bid, Amaryl 2 mg qd.\par ***  She was admitted for angioedema on ACE inhibitors (enalapril) requiring intubation\par At that time her Januvia was stopped b/o 'co-admin with ACE could've put her at the higher risk for bradykinin induced angioedema"\par \par Blood sugars are checked 2 times a day. \par Did not bring log book, but reported are typically as following: FBS- 160's, ppg- 200's\par Hypoglycemia frequency: \par Fingerstick glucose in the office today is 265 mg/dL 2 hours after eating. \par Diet: not following ADA\par Exercise: short walks\par \par Lab review: a1c- 8.7, TSH- 0.62\par Thyroid NM scan (12/17)- hot nodule in the right lobe\par \par Thyroid US (3/18/20)- RMP round 1.6x1.3x1.6; RLP hypo 1.2x1.1x1.3 with coarse calcs\par Thyroid US (8/14/15)- RMP complex 2.2x1.7x2.0 round (no halo, microcalcs), RLP hypo 1.6x1.5x1.6(thick disrupted halo, microcalc), LUP solid hyper 0.5x0.3x0.6; LLP solid hypo 0.8x0.5x0.6\par \par She does not recall any FNAs done in the past\par \par Last dilated eye - 2020\par Last podiatry visit  - 2020\par Last cardiology evaluation - 06/20\par Last stress test - 6/20\par Last 2-D Echo - 5/20, EF ~ 30%\par Last nephrology evaluation - none\par Last neurology evaluation-none\par

## 2023-07-27 NOTE — ASSESSMENT
[Diabetes Foot Care] : diabetes foot care [Long Term Vascular Complications] : long term vascular complications of diabetes [Carbohydrate Consistent Diet] : carbohydrate consistent diet [Importance of Diet and Exercise] : importance of diet and exercise to improve glycemic control, achieve weight loss and improve cardiovascular health [Exercise/Effect on Glucose] : exercise/effect on glucose [Hypoglycemia Management] : hypoglycemia management [Glucagon Use] : glucagon use [Ketone Testing] : ketone testing [Action and use of Insulin] : action and use of short and long-acting insulin [Self Monitoring of Blood Glucose] : self monitoring of blood glucose [Insulin Self-Administration] : insulin self-administration [Injection Technique, Storage, Sharps Disposal] : injection technique, storage, and sharps disposal [Sick-Day Management] : sick-day management [Retinopathy Screening] : Patient was referred to ophthalmology for retinopathy screening [Diabetic Medications] : Risks and benefits of diabetic medications were discussed [FreeTextEntry1] : 1. T2DM, appears reasonably controlled\par - compliance is reiterated\par - resume Nolan 2\par \par Basal Rate: \par Start Time: 12:00 AM ----- 1.15 units/hour  \par Start Time: 7 AM ----- 0.8 units/hour \par Start Time: 6pm ----- 1.15 units/hour       \par \par \par Insulin to Carb Ration (I:C): \par Start Time: 12:00 AM ----- 7 gmr; 7 am- 4.5, 12 pm- 6, 3 pm- 5.5, 6 pm- 5        \par Insulin Sensitivity Factor = 12 am- 30 mg/dl , 12 pm- 35, 4 pm- 30\par BG Target = 130-160 mg/dl     \par \par - metformin 500 mg TID, monitor EF\par - will avoid DPP4 inhibitors since they can potentiate a bradykinin induced  angioedema, and I'd also avoid GLP1 agonists for now, given some increased risks in angioedema on some of them (incl Soliqua, adlyxin, Trulicity)\par - Steglatro 5 mg qd. monitor weight\par - monitor lipids, blood pressure, urine microalbumin\par - Prev with myalgia on statins. cont zetia and  Crestor 5 mg HS\par - norvasc 10 mg, HCTZ 25 mg qd\par - Gabapentin 300 mg HS\par \par 2. MNG\par - FNA of the dominant nodule- AUS /FLUS with neg Thyroseq. \par - overall stable sonogram\par - will repeat thyroid US in 11/23\par \par RTC 3 mos\par \par

## 2023-07-28 LAB
ALBUMIN SERPL ELPH-MCNC: 4.8 G/DL
ALP BLD-CCNC: 97 U/L
ALT SERPL-CCNC: 16 U/L
ANION GAP SERPL CALC-SCNC: 16 MMOL/L
AST SERPL-CCNC: 16 U/L
BILIRUB SERPL-MCNC: 0.6 MG/DL
BUN SERPL-MCNC: 15 MG/DL
CALCIUM SERPL-MCNC: 10 MG/DL
CHLORIDE SERPL-SCNC: 99 MMOL/L
CHOLEST SERPL-MCNC: 136 MG/DL
CO2 SERPL-SCNC: 24 MMOL/L
CREAT SERPL-MCNC: 0.72 MG/DL
CREAT SPEC-SCNC: 129 MG/DL
EGFR: 96 ML/MIN/1.73M2
ESTIMATED AVERAGE GLUCOSE: 146 MG/DL
FOLATE SERPL-MCNC: 11.3 NG/ML
GLUCOSE SERPL-MCNC: 150 MG/DL
HBA1C MFR BLD HPLC: 6.7 %
HDLC SERPL-MCNC: 55 MG/DL
LDLC SERPL CALC-MCNC: 62 MG/DL
MICROALBUMIN 24H UR DL<=1MG/L-MCNC: 4.4 MG/DL
MICROALBUMIN/CREAT 24H UR-RTO: 35 MG/G
NONHDLC SERPL-MCNC: 80 MG/DL
POTASSIUM SERPL-SCNC: 3.7 MMOL/L
PROT SERPL-MCNC: 7.9 G/DL
SODIUM SERPL-SCNC: 139 MMOL/L
TRIGL SERPL-MCNC: 98 MG/DL
TSH SERPL-ACNC: 0.71 UIU/ML
VIT B12 SERPL-MCNC: 886 PG/ML

## 2023-07-31 ENCOUNTER — RX RENEWAL (OUTPATIENT)
Age: 58
End: 2023-07-31

## 2023-07-31 RX ORDER — MOMETASONE FUROATE 1 MG/G
0.1 CREAM TOPICAL
Qty: 45 | Refills: 0 | Status: ACTIVE | COMMUNITY
Start: 2023-03-28 | End: 1900-01-01

## 2023-08-09 NOTE — ED PROVIDER NOTE - NS ED ATTENDING STATEMENT MOD
[address] [facility], [address] [facility], [address] [facility], [address] [place] [address] Drive  PHONE: [phone]  Marguerite Washburn  1960    Chief Complant:    Chief Complaint   Patient presents with    Consultation    Atrial Fibrillation      Consultation is requested by [unfilled] for evaluation of Consultation and Atrial Fibrillation    Reason for Consultation: afib    History:  Marguerite Washburn is a very pleasant 58 y.o. female with a past medical and cardiac history significant for HTN, persistent atrial fibrillation and presents for consultation for afib. She was noted to be in AF by PCP several months ago. She has had worsening HARDIK, some fatigue, CORTEZ. She underwent DCCV that failed to maintain NSR. Cardiac PMH: (Old records have been reviewed and summarized below)  TTE (6/16/23): normal EF, mod LAE/RUFINO    Reviewed office note Dr. Rafaela Mcqueen 7/31/23    Past Medical History, Past Surgical History, Family history, Social History, and Medications were all reviewed with the patient today and updated as necessary. Current Outpatient Medications   Medication Sig Dispense Refill    potassium chloride (KLOR-CON M) 20 MEQ extended release tablet Take 1 tablet by mouth daily as needed (edema, shortness of breath) 90 tablet 3    torsemide (DEMADEX) 20 MG tablet Take 1 tablet by mouth daily as needed (edema, shortness of breath) 90 tablet 3    apixaban (ELIQUIS) 5 MG TABS tablet Take 1 tablet by mouth 2 times daily 60 tablet 11    metoprolol succinate (TOPROL XL) 25 MG extended release tablet Take 1 tablet by mouth daily 90 tablet 3     No current facility-administered medications for this visit.      No Known Allergies  [unfilled]    Past Medical History:   Diagnosis Date    Headache 1976    Migraine    Hearing loss 2021     Past Surgical History:   Procedure Laterality Date    OTHER SURGICAL HISTORY Right     right elbow tendonitis     Family History   Problem Relation Age of Onset    Stroke Mother     Stroke Father Attending Only

## 2023-08-15 ENCOUNTER — RX RENEWAL (OUTPATIENT)
Age: 58
End: 2023-08-15

## 2023-08-18 ENCOUNTER — APPOINTMENT (OUTPATIENT)
Dept: RHEUMATOLOGY | Facility: CLINIC | Age: 58
End: 2023-08-18
Payer: MEDICARE

## 2023-08-18 VITALS
WEIGHT: 154 LBS | SYSTOLIC BLOOD PRESSURE: 155 MMHG | BODY MASS INDEX: 23.34 KG/M2 | DIASTOLIC BLOOD PRESSURE: 80 MMHG | HEIGHT: 68 IN | HEART RATE: 76 BPM | OXYGEN SATURATION: 97 %

## 2023-08-18 PROCEDURE — 99214 OFFICE O/P EST MOD 30 MIN: CPT

## 2023-08-18 NOTE — ASSESSMENT
[FreeTextEntry1] : 59 yo F with: 1) Rheumatoid Arthritis (+RF, +CCP) - well controlled on Arava + Plaquenil.    - Cont leflunomide 20mg daily   - Holding Plaquenil.   - hepatitis panel negative 5/18.   - Flu vaccine (9/22).  Pt to check her records re: pneumonia vaccination status   - Pt has received the COVID19 vaccine + booster.  Previously recommended that she receive the bivalent booster.   - Check labs. 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  Currently much improved.   - derm f/u. 3)  Osteopenia:  Bisphosphonate not indicated based on FRAX (12/2.3%).  Previously w/ vit D deficiency - now resolved.   - Cont calcium / vit D.   - weight-bearing exercise.   - Will plan to repeat DEXA in 11/24. 4)  OA - pt w/ mild chronic pain in knees primarily with walking / climbing stairs.  Also w/ OA in hands.   - Reiterated importance of exercise   - ibuprofen and/or Tylenol prn   - warm compresses   - OTC topical analgesics

## 2023-08-18 NOTE — PHYSICAL EXAM
[General Appearance - Alert] : alert [General Appearance - In No Acute Distress] : in no acute distress [Sclera] : the sclera and conjunctiva were normal [Outer Ear] : the ears and nose were normal in appearance [Hearing Threshold Finger Rub Not [place]] : hearing was normal [Oropharynx] : the oropharynx was normal [Neck Appearance] : the appearance of the neck was normal [Neck Cervical Mass (___cm)] : no neck mass was observed [Jugular Venous Distention Increased] : there was no jugular-venous distention [Thyroid Diffuse Enlargement] : the thyroid was not enlarged [Thyroid Nodule] : there were no palpable thyroid nodules [Respiration, Rhythm And Depth] : normal respiratory rhythm and effort [Exaggerated Use Of Accessory Muscles For Inspiration] : no accessory muscle use [Auscultation Breath Sounds / Voice Sounds] : lungs were clear to auscultation bilaterally [Heart Rate And Rhythm] : heart rate was normal and rhythm regular [Heart Sounds] : normal S1 and S2 [Heart Sounds Gallop] : no gallops [Murmurs] : no murmurs [Heart Sounds Pericardial Friction Rub] : no pericardial rub [Edema] : there was no peripheral edema [Bowel Sounds] : normal bowel sounds [Abdomen Soft] : soft [Abdomen Tenderness] : non-tender [Abdomen Mass (___ Cm)] : no abdominal mass palpated [Cervical Lymph Nodes Enlarged Posterior Bilaterally] : posterior cervical [Cervical Lymph Nodes Enlarged Anterior Bilaterally] : anterior cervical [Supraclavicular Lymph Nodes Enlarged Bilaterally] : supraclavicular [No CVA Tenderness] : no ~M costovertebral angle tenderness [No Spinal Tenderness] : no spinal tenderness [] : no rash [No Focal Deficits] : no focal deficits [Oriented To Time, Place, And Person] : oriented to person, place, and time [Impaired Insight] : insight and judgment were intact [Affect] : the affect was normal [FreeTextEntry1] : No synovitis; (+)multiple B/L Heberden's nodes, (+)tenderness in B/L 2nd-5th DIP's;  normal ROM in all joints (+)multiple B/L Heberden's nodes; mucoid cyst overlying left 2nd PIP.

## 2023-08-18 NOTE — HISTORY OF PRESENT ILLNESS
[FreeTextEntry1] : Previous visit w/ Dr. Humphries (5/7/18):\par  54 yo F w/ hx of RA (on Arava and prednisone), HTN, CAD, DM2 p/w follow up regarding her RA\par  She has a history of non-compliance with visits.  On questioning today she reveals that getting to [place] for appointments is onerous and she wishes she could follow up on the [place]. \par  she ran out of Naprosyn a while back and she ran out of leflunomide about 2 months ago. \par  she was doing very well with no joint pain until this past weekend.  She developed pain and swelling in the hands, knees, wrist and elbows. 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 \par  \par  She has been taking advil for the arthritis but it isnt' helping. \par  she refuses to take steroids due to issues with blood sugar in the past \par  she has followed up with endocrine for a toxic goiter and had radiation since her last visit. \par  no other complaints.

## 2023-08-24 LAB
CRP SERPL-MCNC: 4 MG/L
ERYTHROCYTE [SEDIMENTATION RATE] IN BLOOD BY WESTERGREN METHOD: 8 MM/HR

## 2023-09-01 RX ORDER — FLUTICASONE PROPIONATE 50 UG/1
50 SPRAY, METERED NASAL TWICE DAILY
Qty: 1 | Refills: 3 | Status: ACTIVE | COMMUNITY
Start: 2023-03-28 | End: 1900-01-01

## 2023-09-07 ENCOUNTER — RX RENEWAL (OUTPATIENT)
Age: 58
End: 2023-09-07

## 2023-09-11 ENCOUNTER — RX RENEWAL (OUTPATIENT)
Age: 58
End: 2023-09-11

## 2023-10-01 PROBLEM — M54.16 LUMBAR RADICULAR PAIN: Status: ACTIVE | Noted: 2021-07-28

## 2023-10-04 ENCOUNTER — RX RENEWAL (OUTPATIENT)
Age: 58
End: 2023-10-04

## 2023-10-04 RX ORDER — GABAPENTIN 300 MG/1
300 CAPSULE ORAL
Qty: 90 | Refills: 3 | Status: ACTIVE | COMMUNITY
Start: 2022-11-01 | End: 1900-01-01

## 2023-10-05 NOTE — DIETITIAN INITIAL EVALUATION ADULT. - OTHER INFO
4 = No change - symptoms remain essentially unchanged Pt is intubated, as per NP, RN, pt w/o NGT/OGT due to angioedema, unable to feed pt enterally @ present.   Pt was on metformin for DM as per medication list.  Previous RD note 08/29/11 for elevated A1C%, lack of ability to self manage DM, knowledge deficit noted.  Pt's reported usual wt. 170 LBS, unintentional wt. loss of 30 LBS noted @ that time.

## 2023-10-27 ENCOUNTER — RX RENEWAL (OUTPATIENT)
Age: 58
End: 2023-10-27

## 2023-10-31 ENCOUNTER — APPOINTMENT (OUTPATIENT)
Dept: ENDOCRINOLOGY | Facility: CLINIC | Age: 58
End: 2023-10-31
Payer: MEDICAID

## 2023-10-31 VITALS
WEIGHT: 158 LBS | SYSTOLIC BLOOD PRESSURE: 148 MMHG | TEMPERATURE: 98.1 F | BODY MASS INDEX: 23.95 KG/M2 | RESPIRATION RATE: 16 BRPM | HEIGHT: 68 IN | OXYGEN SATURATION: 99 % | DIASTOLIC BLOOD PRESSURE: 77 MMHG | HEART RATE: 73 BPM

## 2023-10-31 DIAGNOSIS — E04.2 NONTOXIC MULTINODULAR GOITER: ICD-10-CM

## 2023-10-31 LAB — GLUCOSE BLDC GLUCOMTR-MCNC: 104

## 2023-10-31 PROCEDURE — 95251 CONT GLUC MNTR ANALYSIS I&R: CPT

## 2023-10-31 PROCEDURE — 99214 OFFICE O/P EST MOD 30 MIN: CPT | Mod: 25

## 2023-11-01 ENCOUNTER — APPOINTMENT (OUTPATIENT)
Dept: ENDOCRINOLOGY | Facility: CLINIC | Age: 58
End: 2023-11-01
Payer: MEDICAID

## 2023-11-01 PROCEDURE — G0108 DIAB MANAGE TRN  PER INDIV: CPT

## 2023-11-01 RX ORDER — INSULIN PMP CART,AUT,G6/7,CNTR
EACH SUBCUTANEOUS
Qty: 9 | Refills: 4 | Status: ACTIVE | COMMUNITY
Start: 2023-11-01 | End: 1900-01-01

## 2023-11-01 RX ORDER — INSULIN PMP CART,AUT,G6/7,CNTR
EACH SUBCUTANEOUS
Qty: 1 | Refills: 0 | Status: ACTIVE | COMMUNITY
Start: 2023-11-01 | End: 1900-01-01

## 2023-11-03 LAB
ALBUMIN SERPL ELPH-MCNC: 4.6 G/DL
ALP BLD-CCNC: 95 U/L
ALT SERPL-CCNC: 21 U/L
ANION GAP SERPL CALC-SCNC: 14 MMOL/L
AST SERPL-CCNC: 20 U/L
BILIRUB SERPL-MCNC: 0.2 MG/DL
BUN SERPL-MCNC: 20 MG/DL
CALCIUM SERPL-MCNC: 9.9 MG/DL
CHLORIDE SERPL-SCNC: 97 MMOL/L
CHOLEST SERPL-MCNC: 206 MG/DL
CO2 SERPL-SCNC: 26 MMOL/L
CREAT SERPL-MCNC: 0.76 MG/DL
EGFR: 90 ML/MIN/1.73M2
ESTIMATED AVERAGE GLUCOSE: 177 MG/DL
FOLATE SERPL-MCNC: 6.1 NG/ML
GLUCOSE SERPL-MCNC: 91 MG/DL
HBA1C MFR BLD HPLC: 7.8 %
HDLC SERPL-MCNC: 51 MG/DL
LDLC SERPL CALC-MCNC: 99 MG/DL
NONHDLC SERPL-MCNC: 155 MG/DL
POTASSIUM SERPL-SCNC: 3.9 MMOL/L
POTASSIUM SERPL-SCNC: 4 MMOL/L
PROT SERPL-MCNC: 7.6 G/DL
SODIUM SERPL-SCNC: 137 MMOL/L
T4 FREE SERPL-MCNC: 1 NG/DL
TRIGL SERPL-MCNC: 333 MG/DL
TSH SERPL-ACNC: 1.02 UIU/ML
VIT B12 SERPL-MCNC: 763 PG/ML

## 2023-11-03 RX ORDER — ICOSAPENT ETHYL 1 G/1
1 CAPSULE ORAL
Qty: 360 | Refills: 1 | Status: ACTIVE | COMMUNITY
Start: 2023-11-03 | End: 1900-01-01

## 2023-11-16 ENCOUNTER — APPOINTMENT (OUTPATIENT)
Dept: INTERNAL MEDICINE | Facility: CLINIC | Age: 58
End: 2023-11-16

## 2023-11-22 ENCOUNTER — APPOINTMENT (OUTPATIENT)
Dept: OBGYN | Facility: CLINIC | Age: 58
End: 2023-11-22

## 2023-11-30 ENCOUNTER — RX RENEWAL (OUTPATIENT)
Age: 58
End: 2023-11-30

## 2023-11-30 RX ORDER — ROSUVASTATIN CALCIUM 5 MG/1
5 TABLET, FILM COATED ORAL DAILY
Qty: 30 | Refills: 3 | Status: ACTIVE | COMMUNITY
Start: 2022-11-16 | End: 1900-01-01

## 2023-12-01 ENCOUNTER — APPOINTMENT (OUTPATIENT)
Dept: RHEUMATOLOGY | Facility: CLINIC | Age: 58
End: 2023-12-01

## 2023-12-04 ENCOUNTER — RX RENEWAL (OUTPATIENT)
Age: 58
End: 2023-12-04

## 2023-12-05 RX ORDER — METFORMIN HYDROCHLORIDE 500 MG/1
500 TABLET, COATED ORAL
Qty: 60 | Refills: 3 | Status: ACTIVE | COMMUNITY
Start: 2022-11-16 | End: 1900-01-01

## 2023-12-06 ENCOUNTER — APPOINTMENT (OUTPATIENT)
Dept: ENDOCRINOLOGY | Facility: CLINIC | Age: 58
End: 2023-12-06

## 2023-12-13 ENCOUNTER — APPOINTMENT (OUTPATIENT)
Dept: ENDOCRINOLOGY | Facility: CLINIC | Age: 58
End: 2023-12-13
Payer: MEDICAID

## 2023-12-13 PROCEDURE — G0108 DIAB MANAGE TRN  PER INDIV: CPT

## 2023-12-23 NOTE — H&P ADULT - NSHPSOCIALHISTORY_GEN_ALL_CORE
Pt ambulates to treatment area with Mom she states that last night her son began with a fever of 102  and c/o sore throat.   She treated him with Nyquil last night he continues with sore throat and sinus congestion
unable to assess 2/2 clinical condition patient intubated and sedated with critical airway

## 2024-01-10 ENCOUNTER — APPOINTMENT (OUTPATIENT)
Dept: ENDOCRINOLOGY | Facility: CLINIC | Age: 59
End: 2024-01-10
Payer: MEDICARE

## 2024-01-10 PROCEDURE — G0108 DIAB MANAGE TRN  PER INDIV: CPT

## 2024-01-17 ENCOUNTER — APPOINTMENT (OUTPATIENT)
Dept: ENDOCRINOLOGY | Facility: CLINIC | Age: 59
End: 2024-01-17
Payer: MEDICARE

## 2024-01-17 PROCEDURE — G0108 DIAB MANAGE TRN  PER INDIV: CPT

## 2024-01-17 NOTE — ASSESSMENT
[FreeTextEntry1] : \[facility] Medical Issue 1: Diabetes: unstable and poorly controlled with last A1c above 9.0; she admits to not adhering to low carb diet; she tries to take all her meds as prescribed but may miss a dose here and there; she denies dizziness, episodes of hypoglycemia and polyuria and polydipsia; she follows with her Endo doctor; check A1c via blood draw today; counselled pt on lifestyle change including 30 mins walking daily and low-carb diet\[facility] \[facility] Medical Issue 2: Hyperlipidemia: unstable and variably controlled with fluctuations in her LDL level; she tries to adhere to low fat diet but admits this is not always the case; this is complicated by heart disease, she follows with her cardiologist and is due to nuclear stress testing as well; check lipid panel via blood draw today; prescribed statin medication to be taken every night; coordinated follow-up care with cardiology; ordered nuclear stress testing\[facility] Medical Issue 3: Hypertension: unstable and poorly controlled with fluctuations in BP levels; she is on 3 different medications for BP control and yet is still having a hard time keeping it at goal; she eats salty foods at times but tries to limit this too; prescribed ACEi, thiazide diuretic and calcium channel blocker medications; coordinated follow-up care with cardiology\[facility] \[facility] Medical Issue 4: Osteopenia: unstable and variably controlled; trying to prevent progression to osteoporosis but she is not compliant with light weight-bearing exercise and she does not walk either; she denies bone pain; ordered DEXA scan and advised Vitamin D supplementation; check Vit D levels today, currently taking 2000iu daily
none

## 2024-02-02 RX ORDER — POTASSIUM CHLORIDE 750 MG/1
10 TABLET, FILM COATED, EXTENDED RELEASE ORAL
Qty: 30 | Refills: 2 | Status: ACTIVE | COMMUNITY
Start: 2023-04-06 | End: 1900-01-01

## 2024-03-03 ENCOUNTER — RX RENEWAL (OUTPATIENT)
Age: 59
End: 2024-03-03

## 2024-03-03 RX ORDER — AMLODIPINE BESYLATE 10 MG/1
10 TABLET ORAL
Qty: 90 | Refills: 1 | Status: ACTIVE | COMMUNITY
Start: 2022-11-16 | End: 1900-01-01

## 2024-03-04 RX ORDER — HYDROCHLOROTHIAZIDE 25 MG/1
25 TABLET ORAL
Qty: 90 | Refills: 3 | Status: ACTIVE | COMMUNITY
Start: 2021-01-06 | End: 1900-01-01

## 2024-03-12 ENCOUNTER — APPOINTMENT (OUTPATIENT)
Dept: ENDOCRINOLOGY | Facility: CLINIC | Age: 59
End: 2024-03-12

## 2024-04-04 ENCOUNTER — RX RENEWAL (OUTPATIENT)
Age: 59
End: 2024-04-04

## 2024-04-17 ENCOUNTER — APPOINTMENT (OUTPATIENT)
Dept: ENDOCRINOLOGY | Facility: CLINIC | Age: 59
End: 2024-04-17
Payer: MEDICARE

## 2024-04-17 PROCEDURE — G0108 DIAB MANAGE TRN  PER INDIV: CPT

## 2024-04-20 ENCOUNTER — NON-APPOINTMENT (OUTPATIENT)
Age: 59
End: 2024-04-20

## 2024-04-25 ENCOUNTER — RX RENEWAL (OUTPATIENT)
Age: 59
End: 2024-04-25

## 2024-05-07 ENCOUNTER — APPOINTMENT (OUTPATIENT)
Dept: INTERNAL MEDICINE | Facility: CLINIC | Age: 59
End: 2024-05-07
Payer: MEDICAID

## 2024-05-07 ENCOUNTER — NON-APPOINTMENT (OUTPATIENT)
Age: 59
End: 2024-05-07

## 2024-05-07 VITALS
BODY MASS INDEX: 24.71 KG/M2 | HEART RATE: 91 BPM | HEIGHT: 68 IN | SYSTOLIC BLOOD PRESSURE: 152 MMHG | DIASTOLIC BLOOD PRESSURE: 90 MMHG | WEIGHT: 163 LBS | OXYGEN SATURATION: 96 %

## 2024-05-07 DIAGNOSIS — Z12.11 ENCOUNTER FOR SCREENING FOR MALIGNANT NEOPLASM OF COLON: ICD-10-CM

## 2024-05-07 DIAGNOSIS — E11.9 TYPE 2 DIABETES MELLITUS W/OUT COMPLICATIONS: ICD-10-CM

## 2024-05-07 DIAGNOSIS — I10 ESSENTIAL (PRIMARY) HYPERTENSION: ICD-10-CM

## 2024-05-07 DIAGNOSIS — Z00.00 ENCOUNTER FOR GENERAL ADULT MEDICAL EXAMINATION W/OUT ABNORMAL FINDINGS: ICD-10-CM

## 2024-05-07 DIAGNOSIS — I25.10 ATHEROSCLEROTIC HEART DISEASE OF NATIVE CORONARY ARTERY W/OUT ANGINA PECTORIS: ICD-10-CM

## 2024-05-07 DIAGNOSIS — Z12.31 ENCOUNTER FOR SCREENING MAMMOGRAM FOR MALIGNANT NEOPLASM OF BREAST: ICD-10-CM

## 2024-05-07 DIAGNOSIS — E78.5 HYPERLIPIDEMIA, UNSPECIFIED: ICD-10-CM

## 2024-05-07 PROCEDURE — G0438: CPT | Mod: 25

## 2024-05-07 RX ORDER — ERTUGLIFLOZIN 5 MG/1
5 TABLET, FILM COATED ORAL
Qty: 30 | Refills: 6 | Status: DISCONTINUED | COMMUNITY
Start: 2021-09-29 | End: 2024-05-07

## 2024-05-07 RX ORDER — INSULIN DEGLUDEC INJECTION 100 U/ML
100 INJECTION, SOLUTION SUBCUTANEOUS
Qty: 2 | Refills: 3 | Status: DISCONTINUED | COMMUNITY
Start: 2021-04-26 | End: 2024-05-07

## 2024-05-07 RX ORDER — FLUTICASONE PROPIONATE 110 UG/1
110 AEROSOL, METERED RESPIRATORY (INHALATION)
Qty: 1 | Refills: 0 | Status: DISCONTINUED | COMMUNITY
Start: 2023-03-17 | End: 2024-05-07

## 2024-05-07 RX ORDER — HYDROXYCHLOROQUINE SULFATE 200 MG/1
200 TABLET, FILM COATED ORAL
Qty: 45 | Refills: 5 | Status: DISCONTINUED | COMMUNITY
Start: 2021-08-06 | End: 2024-05-07

## 2024-05-07 RX ORDER — OMEPRAZOLE 40 MG/1
40 CAPSULE, DELAYED RELEASE ORAL
Qty: 30 | Refills: 1 | Status: DISCONTINUED | COMMUNITY
Start: 2020-11-24 | End: 2024-05-07

## 2024-05-07 RX ORDER — POTASSIUM CHLORIDE 750 MG/1
10 TABLET, EXTENDED RELEASE ORAL
Qty: 90 | Refills: 3 | Status: DISCONTINUED | COMMUNITY
Start: 2020-12-11 | End: 2024-05-07

## 2024-05-07 RX ORDER — FLUTICASONE PROPIONATE 110 UG/1
110 AEROSOL, METERED RESPIRATORY (INHALATION)
Qty: 1 | Refills: 3 | Status: DISCONTINUED | COMMUNITY
Start: 2023-04-18 | End: 2024-05-07

## 2024-05-07 RX ORDER — GABAPENTIN 600 MG/1
600 TABLET, COATED ORAL 3 TIMES DAILY
Qty: 90 | Refills: 1 | Status: DISCONTINUED | COMMUNITY
Start: 2021-07-28 | End: 2024-05-07

## 2024-05-07 RX ORDER — INSULIN LISPRO 100 [IU]/ML
100 INJECTION, SOLUTION INTRAVENOUS; SUBCUTANEOUS
Qty: 7 | Refills: 3 | Status: DISCONTINUED | COMMUNITY
Start: 2023-05-19 | End: 2024-05-07

## 2024-05-09 NOTE — REVIEW OF SYSTEMS
[Joint Pain] : joint pain [Joint Swelling] : no joint swelling [Insomnia] : insomnia [Anxiety] : no anxiety [Depression] : no depression [Negative] : Heme/Lymph

## 2024-05-09 NOTE — HISTORY OF PRESENT ILLNESS
[FreeTextEntry1] : Annual physical [de-identified] : 60 y/o female with h/o DM, CAD, ,HTN, hyperlipidemia, RA who is here for an annual physical Sees endocrine every 3 months. She is on Lispro Sees rheumatology every 3months No longer sees cardiology She sleeps about 4-5 hours. She can fall asleep but wakes up and can't fall back asleep for a few hours. No sleep during the day GYN: not in years Mammo: not in years Colonoscopy:  never

## 2024-05-09 NOTE — PLAN
[FreeTextEntry1] : Diabetes Managed by endocrinology Check HbA1c today Continue current medications  HTN/CAD BP is not controlled Currently on amlodipine, HCTZ ASA Crestor on her medication list but she says she is not taking it. It is unclear why she is not on a statin. will check lipids and readdress Continue Vascepa and Zetia  RA F/u rheumatology Continue Leflunomide  Health Maintenance Depression screening negative  Check labs today, including CBC, CMP, TSH, HbA1c, lipids. Blood collected in office. Declines GYN Mammo Rx given today Cologuard ordered f/u 6 months

## 2024-05-09 NOTE — HEALTH RISK ASSESSMENT
[No] : No [With Family] : lives with family [On disability] : on disability [] :  [# Of Children ___] : has [unfilled] children [Former] : Former [0-4] : 0-4 [de-identified] : smoked 6-7 years, 1/2 ppd.Quit 2012

## 2024-05-13 LAB
ALBUMIN SERPL ELPH-MCNC: 4.7 G/DL
ALP BLD-CCNC: 90 U/L
ALT SERPL-CCNC: 22 U/L
ANION GAP SERPL CALC-SCNC: 15 MMOL/L
APPEARANCE: CLEAR
AST SERPL-CCNC: 23 U/L
BACTERIA: NEGATIVE /HPF
BASOPHILS # BLD AUTO: 0.09 K/UL
BASOPHILS NFR BLD AUTO: 1.1 %
BILIRUB SERPL-MCNC: 0.2 MG/DL
BILIRUBIN URINE: NEGATIVE
BLOOD URINE: NEGATIVE
BUN SERPL-MCNC: 18 MG/DL
CALCIUM SERPL-MCNC: 9.8 MG/DL
CAST: 1 /LPF
CHLORIDE SERPL-SCNC: 97 MMOL/L
CHOLEST SERPL-MCNC: 208 MG/DL
CO2 SERPL-SCNC: 26 MMOL/L
COLOR: YELLOW
CREAT SERPL-MCNC: 0.74 MG/DL
CREAT SPEC-SCNC: 67 MG/DL
EGFR: 93 ML/MIN/1.73M2
EOSINOPHIL # BLD AUTO: 0.51 K/UL
EOSINOPHIL NFR BLD AUTO: 6.2 %
EPITHELIAL CELLS: 1 /HPF
ESTIMATED AVERAGE GLUCOSE: 134 MG/DL
GLUCOSE QUALITATIVE U: NEGATIVE MG/DL
GLUCOSE SERPL-MCNC: 114 MG/DL
HBA1C MFR BLD HPLC: 6.3 %
HCT VFR BLD CALC: 39 %
HDLC SERPL-MCNC: 48 MG/DL
HGB BLD-MCNC: 12.5 G/DL
IMM GRANULOCYTES NFR BLD AUTO: 0.2 %
KETONES URINE: NEGATIVE MG/DL
LDLC SERPL CALC-MCNC: 97 MG/DL
LEUKOCYTE ESTERASE URINE: NEGATIVE
LYMPHOCYTES # BLD AUTO: 2.42 K/UL
LYMPHOCYTES NFR BLD AUTO: 29.3 %
MAN DIFF?: NORMAL
MCHC RBC-ENTMCNC: 27.4 PG
MCHC RBC-ENTMCNC: 32.1 GM/DL
MCV RBC AUTO: 85.3 FL
MICROALBUMIN 24H UR DL<=1MG/L-MCNC: 15.3 MG/DL
MICROALBUMIN/CREAT 24H UR-RTO: 230 MG/G
MICROSCOPIC-UA: NORMAL
MONOCYTES # BLD AUTO: 0.54 K/UL
MONOCYTES NFR BLD AUTO: 6.5 %
NEUTROPHILS # BLD AUTO: 4.69 K/UL
NEUTROPHILS NFR BLD AUTO: 56.7 %
NITRITE URINE: NEGATIVE
NONHDLC SERPL-MCNC: 160 MG/DL
PH URINE: 6
PLATELET # BLD AUTO: 326 K/UL
POTASSIUM SERPL-SCNC: 3.7 MMOL/L
PROT SERPL-MCNC: 8 G/DL
PROTEIN URINE: 30 MG/DL
RBC # BLD: 4.57 M/UL
RBC # FLD: 14.3 %
RED BLOOD CELLS URINE: 0 /HPF
SODIUM SERPL-SCNC: 137 MMOL/L
SPECIFIC GRAVITY URINE: 1.02
TRIGL SERPL-MCNC: 383 MG/DL
TSH SERPL-ACNC: 0.8 UIU/ML
UROBILINOGEN URINE: 0.2 MG/DL
WBC # FLD AUTO: 8.27 K/UL
WHITE BLOOD CELLS URINE: 0 /HPF

## 2024-05-14 ENCOUNTER — NON-APPOINTMENT (OUTPATIENT)
Age: 59
End: 2024-05-14

## 2024-05-15 ENCOUNTER — APPOINTMENT (OUTPATIENT)
Dept: RHEUMATOLOGY | Facility: CLINIC | Age: 59
End: 2024-05-15
Payer: MEDICARE

## 2024-05-15 VITALS
HEIGHT: 68 IN | DIASTOLIC BLOOD PRESSURE: 82 MMHG | HEART RATE: 91 BPM | OXYGEN SATURATION: 95 % | WEIGHT: 163 LBS | SYSTOLIC BLOOD PRESSURE: 146 MMHG | BODY MASS INDEX: 24.71 KG/M2

## 2024-05-15 DIAGNOSIS — M25.562 PAIN IN LEFT KNEE: ICD-10-CM

## 2024-05-15 DIAGNOSIS — M15.9 POLYOSTEOARTHRITIS, UNSPECIFIED: ICD-10-CM

## 2024-05-15 DIAGNOSIS — Z79.899 OTHER LONG TERM (CURRENT) DRUG THERAPY: ICD-10-CM

## 2024-05-15 DIAGNOSIS — E55.9 VITAMIN D DEFICIENCY, UNSPECIFIED: ICD-10-CM

## 2024-05-15 DIAGNOSIS — M05.79 RHEUMATOID ARTHRITIS WITH RHEUMATOID FACTOR OF MULTIPLE SITES W/OUT ORGAN OR SYSTEMS INVOLVEMENT: ICD-10-CM

## 2024-05-15 DIAGNOSIS — M48.07 SPINAL STENOSIS, LUMBOSACRAL REGION: ICD-10-CM

## 2024-05-15 PROCEDURE — G2211 COMPLEX E/M VISIT ADD ON: CPT

## 2024-05-15 PROCEDURE — 99214 OFFICE O/P EST MOD 30 MIN: CPT

## 2024-05-15 RX ORDER — PREDNISONE 10 MG/1
10 TABLET ORAL
Qty: 7 | Refills: 0 | Status: ACTIVE | COMMUNITY
Start: 2024-05-15 | End: 1900-01-01

## 2024-05-15 NOTE — HISTORY OF PRESENT ILLNESS
[FreeTextEntry1] : 5/15/2024:  Pt c/o pain in her B/L MCP's (R>L) that started yesterday, worst upon trying to close her fist.  Still w/ pain in her DIP's and left knee, unchanged since last visit.   8/18/2023:  Pt reports that on 7/4, she began to experience severe pain, swelling, and redness of her left foot.  She went to the ED, where she was diagnosed w/ cellulitis.  She was started on abx, and D/C'ed home, and her symptoms resolved after several days.  Otherwise, feeling fine overall since last visit.  She does still c/o interimttnet pain i nher hands and left knee.

## 2024-05-15 NOTE — ASSESSMENT
[FreeTextEntry1] : 57 yo F with: 1) Rheumatoid Arthritis (+RF, +CCP) - has been well controlled on Arava + Plaquenil, though currently w/ a flare in her hands (R>L)   - Rx prednisone 10mg daily x 1 week.   - Cont leflunomide 20mg daily   - Holding Plaquenil.  If symptoms recur after prednisone is completed, will likely restart Plaquenil.   - hepatitis panel negative 5/18.   - Flu vaccine (9/22).  Pt to check her records re: pneumonia vaccination status   - Pt has received the COVID19 vaccine + booster.    - Check labs. 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  Currently much improved.   - derm f/u. 3)  Osteopenia:  Bisphosphonate not indicated based on FRAX (12/2.3%).  Previously w/ vit D deficiency - now resolved.   - Cont calcium / vit D.   - weight-bearing exercise.   - Will plan to repeat DEXA in 11/24. 4)  OA - pt w/ mild chronic pain in knees primarily with walking / climbing stairs.  Also w/ OA in hands.   - Reiterated importance of exercise   - ibuprofen and/or Tylenol prn   - warm compresses   - OTC topical analgesics

## 2024-05-15 NOTE — PHYSICAL EXAM
[General Appearance - Alert] : alert [General Appearance - In No Acute Distress] : in no acute distress [Sclera] : the sclera and conjunctiva were normal [Outer Ear] : the ears and nose were normal in appearance [Hearing Threshold Finger Rub Not Frontier] : hearing was normal [Oropharynx] : the oropharynx was normal [Neck Appearance] : the appearance of the neck was normal [Neck Cervical Mass (___cm)] : no neck mass was observed [Thyroid Diffuse Enlargement] : the thyroid was not enlarged [Jugular Venous Distention Increased] : there was no jugular-venous distention [Thyroid Nodule] : there were no palpable thyroid nodules [Respiration, Rhythm And Depth] : normal respiratory rhythm and effort [Exaggerated Use Of Accessory Muscles For Inspiration] : no accessory muscle use [Auscultation Breath Sounds / Voice Sounds] : lungs were clear to auscultation bilaterally [Heart Rate And Rhythm] : heart rate was normal and rhythm regular [Heart Sounds] : normal S1 and S2 [Heart Sounds Gallop] : no gallops [Murmurs] : no murmurs [Heart Sounds Pericardial Friction Rub] : no pericardial rub [Edema] : there was no peripheral edema [Bowel Sounds] : normal bowel sounds [Abdomen Soft] : soft [Abdomen Tenderness] : non-tender [Abdomen Mass (___ Cm)] : no abdominal mass palpated [Cervical Lymph Nodes Enlarged Posterior Bilaterally] : posterior cervical [Cervical Lymph Nodes Enlarged Anterior Bilaterally] : anterior cervical [Supraclavicular Lymph Nodes Enlarged Bilaterally] : supraclavicular [No CVA Tenderness] : no ~M costovertebral angle tenderness [No Spinal Tenderness] : no spinal tenderness [] : no rash [No Focal Deficits] : no focal deficits [Oriented To Time, Place, And Person] : oriented to person, place, and time [Impaired Insight] : insight and judgment were intact [Affect] : the affect was normal [FreeTextEntry1] : No synovitis; (+)swelling/tenderness in right 2nd-5th MCP's;  (+)multiple B/L Heberden's nodes, (+)tenderness in B/L 2nd-5th DIP's;  normal ROM in all joints (+)multiple B/L Heberden's nodes; mucoid cyst overlying left 2nd PIP.

## 2024-05-16 ENCOUNTER — APPOINTMENT (OUTPATIENT)
Dept: ENDOCRINOLOGY | Facility: CLINIC | Age: 59
End: 2024-05-16
Payer: MEDICAID

## 2024-05-16 VITALS
HEART RATE: 97 BPM | RESPIRATION RATE: 16 BRPM | TEMPERATURE: 98 F | OXYGEN SATURATION: 96 % | WEIGHT: 161.31 LBS | BODY MASS INDEX: 24.45 KG/M2 | DIASTOLIC BLOOD PRESSURE: 90 MMHG | HEIGHT: 68 IN | SYSTOLIC BLOOD PRESSURE: 160 MMHG

## 2024-05-16 DIAGNOSIS — E05.90 THYROTOXICOSIS, UNSPECIFIED W/OUT THYROTOXIC CRISIS OR STORM: ICD-10-CM

## 2024-05-16 DIAGNOSIS — E11.65 TYPE 2 DIABETES MELLITUS WITH HYPERGLYCEMIA: ICD-10-CM

## 2024-05-16 DIAGNOSIS — M85.80 OTHER SPECIFIED DISORDERS OF BONE DENSITY AND STRUCTURE, UNSPECIFIED SITE: ICD-10-CM

## 2024-05-16 LAB
CRP SERPL-MCNC: 15 MG/L
ERYTHROCYTE [SEDIMENTATION RATE] IN BLOOD BY WESTERGREN METHOD: 34 MM/HR
GLUCOSE BLDC GLUCOMTR-MCNC: 72

## 2024-05-16 PROCEDURE — 99214 OFFICE O/P EST MOD 30 MIN: CPT | Mod: 25

## 2024-05-16 PROCEDURE — 95251 CONT GLUC MNTR ANALYSIS I&R: CPT

## 2024-05-16 PROCEDURE — 82962 GLUCOSE BLOOD TEST: CPT

## 2024-05-16 RX ORDER — EMPAGLIFLOZIN 10 MG/1
10 TABLET, FILM COATED ORAL
Qty: 30 | Refills: 5 | Status: ACTIVE | COMMUNITY
Start: 2024-05-16 | End: 1900-01-01

## 2024-05-16 NOTE — HISTORY OF PRESENT ILLNESS
[FreeTextEntry1] : F/u for multiple medical issues  *** May 16, 2024 ***  feels well, no new c/o on OP5, Dexcom G6,  metformin 500 mg TID off steglatro d/t insurance issues  Basal Rate: Start Time: 12:00 AM ----- 1.25 units/hour Start Time: 7 AM ----- 0.95 units/hour Start Time: 12 PM ----- 0.75 units/hour Start Time: 5 PM ----- 1.25 units/hour Start Time: 8pm ----- 1.2 units/hour  Insulin to Carb Ration (I:C): Start Time: 12:00 AM ----- 7 gm; 7 am- 3, 12 pm- 5, 3 pm- 4 Insulin Sensitivity Factor = 12 am- 40 BG Target = 120-130 mg/dl   *** Oct 31, 2023 ***  staying on OP Dash, Metformin 500 mg tid, Steglatro 5 mg qd  Basal Rate: Start Time: 12:00 AM ----- 1.15 units/hour Start Time: 7 AM ----- 0.8 units/hour Start Time: 6pm ----- 1.15 units/hour  Insulin to Carb Ration (I:C): Start Time: 12:00 AM ----- 7 gmr; 7 am- 4.5, 12 pm- 6, 3 pm- 5.5, 6 pm- 5 Insulin Sensitivity Factor = 12 am- 30 mg/dl , 12 pm- 35, 4 pm- 30 BG Target = 130-160 mg/dl  *** Jul 27, 2023 ***  was in ER 3 wks for LLE cellulitis. completed a/b and feels fine after on OP Dash, Metformin 500 mg tid, Steglatro 5 mg qd  Basal Rate:  Start Time: 12:00 AM ----- 1.15 units/hour   Start Time: 7 AM ----- 0.8 units/hour  Start Time: 6pm ----- 1.15 units/hour         Insulin to Carb Ration (I:C):  Start Time: 12:00 AM ----- 7 gm; 7 am- 5, 6 pm- 6, 6 pm- 5.5         Insulin Sensitivity Factor = 12 am- 30 mg/dl , 12 pm- 35, 4 pm- 30 BG Target = 130-160 mg/dl       Date of download:  07/27/2023  Diabetes Medications and Dosage: as above Indication for CGMS: verify a change in the treatment regimen, identify periods of hypoglycemia/ hyperglycemia.  Modal day report: pattern.  Pt with HYPO  0% of the time ( NONE below 54),  85% in target range Hyperglycemia:  15% elevation  Identified issues: carbohydrate counting. post meal spikings dates analyzed:  07/13/2023- 07/27/2023  *** Apr 27, 2023 ***  dealing with PNA, bronchitis. taking a/b and inhalers , not on steroids on Omnipod Dash insulin pump, metformin 500 mg bid steglatro 5 mg no sensor x 2 weeks  Basal Rate:  Start Time: 12:00 AM ----- 1.15 units/hour   Start Time: 7 AM ----- 0.8 units/hour  Start Time: 6pm ----- 1.15 units/hour         Insulin to Carb Ration (I:C):  Start Time: 12:00 AM ----- 7 gm units/hour; 7 am- 5, 6 pm- 6, 6 pm- 5.5         Insulin Sensitivity Factor = 12 am- 30 mg/dl , 12 pm- 35, 4 pm- 30 BG Target = 130-160 mg/dl    Date of download:  04/27/2023  Diabetes Medications and Dosage: as above Indication for CGMS: verify a change in the treatment regimen, identify periods of hypoglycemia/ hyperglycemia.  Modal day report: pattern.  Pt with HYPO  0% of the time ( NONE below 54),  86% in target range Hyperglycemia:  14% elevation  Identified issues: carbohydrate counting dates analyzed:  04/04/2023- 04/17/2023  Thyr [place] (11/10/22)- RMP 1.8x1.2x1.2 iso (TR3), RLP 1.2x1.2x1.2 iso with small central coarse calc (TR4), LLP 0.5 iso, isthmic 0.7 iso  *** Nov 01, 2022 ***  doing well overall. some worsening night time feet numbness and tingling not taking neurontin ("ran out a while ago") s/p FNA (6/10/22)- of RMP 1.6 nodule- AUS/FLUS. Thyroseq- negative, low probability  on  Omnipod (admelog),  metformin 500 mg TID,  Steglatro 5 mg qd,  Crestor 5 mg HS, zetia 10 mg,  should be on norvasc 10 mg, HCTZ 25 mg qd  Basal Rate:  Start Time: 12:00 AM ----- 1.05 units/hour   Start Time: 7 AM ----- 08 units/hour  Start Time: 6pm ----- 1.05 units/hour         Insulin to Carb Ration (I:C):  Start Time: 12:00 AM ----- 7 gm units/hour         Start Time: 7:00 AM ----- 6 gm units/hour       Insulin Sensitivity Factor = 12 am- 30 mg/dl , 12 pm- 35, 4 pm- 30 BG Target = 130-160 mg/dl       Date of download:  11/01/2022  Diabetes Medications and Dosage: as above Indication for CGMS: verify a change in the treatment regimen, identify periods of hypoglycemia/ hyperglycemia.  Modal day report: pattern.  Pt with HYPO  0% of the time ( NONE below 54),  79% in target range Hyperglycemia:  21% elevation  Identified issues: carbohydrate counting. spikes post breakfast and dinner dates analyzed:  10/19/2022- 11/01/2022   *** Gregorio 10, 2022 ***  on  Omnipod (admelog),  metformin 500 mg TID,  Steglatro 5 mg qd,  Crestor 5 mg HS, zetia 10 mg,  should be on norvasc 10 mg, HCTZ 25 mg qd but "was forgetting lately" Basal Rate:  Start Time: 12:00 AM ----- 1.2 units/hour   Start Time: 7 AM ----- 1.0 units/hour  Start Time: 6pm ----- 1.1 units/hour    Start Time: 10pm ----- 1.2 units/hour           Insulin to Carb Ration (I:C):  Start Time: 12:00 AM ----- 7 gm units/hour         Start Time: 7:00 AM ----- 6 gm units/hour       Insulin Sensitivity Factor = 12 am- 30 mg/dl , 12 pm- 35, 4 pm- 30 BG Target = 130-160 mg/dl      Date of download:  6/10/22 Diabetes Medications and Dosage: as above Indication for CGMS: verify a change in the treatment regimen, identify periods of hypoglycemia/ hyperglycemia.  Modal day report: pattern.  Pt with HYPO  5% of the time (NONE below 54),  85% in target range Hyperglycemia:  10% elevation  Identified issues: carbohydrate counting dates analyzed:5/27/22-6/9/22   *** Apr 13, 2022 ***  feels well. did not proceed with FNA yet ([facility] is not participating in her plan) likes the pump but with skin irritaiton  using Omnipod (admelog),  metformin 500 mg TID,  Steglatro 5 mg qd,  Crestor 5 mg HS, zetia 10 mg Basal Rate:  Start Time: 12:00 AM ----- 1.2 units/hour   Start Time: 7 AM ----- 1.0 units/hour  Start Time: 10pm ----- 1.2 units/hour           Insulin to Carb Ration (I:C):  Start Time: 12:00 AM ----- 7 gm units/hour          Insulin Sensitivity Factor = 30 mg/dl  BG Target = 130-160 mg/dl       Date of download:  4/13/22 Diabetes Medications and Dosage: as above Indication for CGMS: verify a change in the treatment regimen, identify periods of hypoglycemia/ hyperglycemia.  Modal day report: pattern.  Pt with HYPO  0% of the time (NONE below 54),  74% in target range Hyperglycemia:  26% elevation  Identified issues: carbohydrate counting dates analyzed: 3/31/22-4/13/22   *** Jan 26, 2022 ***  taking Tresiba 22 un HS,  metformin 500 mg TID, Steglatro 5 mg qd,  Admelog 11 to 12 un ac meals, zetia 10 mg  feels ok, but is concerned with weight loss since starting steglatro. received Omnipod- to schedule training soon. Dexcom is not approved never proceeded with thyroid FNA reports fbs - 159-170, ppg- 59-mid 200's  *** Sep 29, 2021 ***  on Tresiba 15 un HS, metformin 500 mg bid never started novolog and ran out of prandin was taking farxiga and jardiance intermittently b/o insurance issues, not on any sghlt2 at present. was told by insurance that steglatro is covered reports fbs- 180's- 200's, ppg in 300's Thyr US (9/15/21)-  RMP 1.6x1.5x1.2 solid, RLP 1.2x1.2x1.4 with coarse calcs, left isthmic 0.8 partially calcified. All are stable  HISTORY OF PRESENT ILLNESS.   Ms. MCBRIDE was diagnosed with Diabetes Mellitus Type 2 in ??. She reports history HTN, dyslipidemia, toxic MNG, subclinical hyperthyroidism (s/p CIFUENTES with 26 mci in 01/18) , CAD (s/p MI and PTCI), CHF,  but denies  known complications of retinopathy, nephropathy, or neuropathy. She is presently on metformin 1000 mg bid, Amaryl 2 mg qd. ***  She was admitted for angioedema on ACE inhibitors (enalapril) requiring intubation At that time her Januvia was stopped b/o 'co-admin with ACE could've put her at the higher risk for bradykinin induced angioedema"  Blood sugars are checked 2 times a day.  Did not bring log book, but reported are typically as following: FBS- 160's, ppg- 200's Hypoglycemia frequency:  Fingerstick glucose in the office today is 265 mg/dL 2 hours after eating.  Diet: not following ADA Exercise: short walks  Lab review: a1c- 8.7, TSH- 0.62 Thyroid NM scan (12/17)- hot nodule in the right lobe  Thyroid US (3/18/20)- RMP round 1.6x1.3x1.6; RLP hypo 1.2x1.1x1.3 with coarse calcs Thyroid US (8/14/15)- RMP complex 2.2x1.7x2.0 round (no halo, microcalcs), RLP hypo 1.6x1.5x1.6(thick disrupted halo, microcalc), LUP solid hyper 0.5x0.3x0.6; LLP solid hypo 0.8x0.5x0.6  She does not recall any FNAs done in the past  Last dilated eye - 2020 Last podiatry visit  - 2020 Last cardiology evaluation - 06/20 Last stress test - 6/20 Last 2-D Echo - 5/20, EF ~ 30% Last nephrology evaluation - none Last neurology evaluation-none

## 2024-05-16 NOTE — REASON FOR VISIT
PAST SURGICAL HISTORY:  No significant past surgical history     
[Follow-Up: _____] : a [unfilled] follow-up visit

## 2024-05-16 NOTE — ASSESSMENT
[Diabetes Foot Care] : diabetes foot care [Long Term Vascular Complications] : long term vascular complications of diabetes [Carbohydrate Consistent Diet] : carbohydrate consistent diet [Importance of Diet and Exercise] : importance of diet and exercise to improve glycemic control, achieve weight loss and improve cardiovascular health [Exercise/Effect on Glucose] : exercise/effect on glucose [Hypoglycemia Management] : hypoglycemia management [Glucagon Use] : glucagon use [Ketone Testing] : ketone testing [Action and use of Insulin] : action and use of short and long-acting insulin [Self Monitoring of Blood Glucose] : self monitoring of blood glucose [Insulin Self-Administration] : insulin self-administration [Injection Technique, Storage, Sharps Disposal] : injection technique, storage, and sharps disposal [Sick-Day Management] : sick-day management [Retinopathy Screening] : Patient was referred to ophthalmology for retinopathy screening [Diabetic Medications] : Risks and benefits of diabetic medications were discussed [FreeTextEntry1] : 1. T2DM, suboptimally controlled - compliance is reiterated  Basal Rate: Start Time: 12:00 AM ----- 1.25 units/hour Start Time: 7 AM ----- 0.95 units/hour Start Time: 12 PM ----- 0.75 units/hour Start Time: 5 PM ----- 1.25 units/hour Start Time: 8pm ----- 1.2 units/hour  Insulin to Carb Ration (I:C): Start Time: 12:00 AM ----- 7 gm; 7 am- 2.5, 12 pm- 5, 3 pm- 3.5 Insulin Sensitivity Factor = 12 am- 40 BG Target = 120-130 mg/dl - metformin 500 mg TID, monitor EF  - will avoid DPP4 inhibitors since they can potentiate a bradykinin induced angioedema, and I'd also avoid GLP1 agonists for now, given some increased risks in angioedema on some of them (incl Soliqua, adlyxin, Trulicity)  - Steglatro is no longer covered. Will reapply for Jardiance 10 mg qd. monitor weight - monitor lipids, blood pressure, urine microalbumin - Prev with myalgia on statins. cont zetia and Crestor 5 mg HS - norvasc 10 mg, HCTZ 25 mg qd - Gabapentin 300 mg HS  2. MNG - FNA of the dominant nodule- AUS /FLUS with neg Thyroseq. - overall stable sonogram - will repeat thyroid US this month  RTC 3 mos

## 2024-05-18 ENCOUNTER — APPOINTMENT (OUTPATIENT)
Dept: MAMMOGRAPHY | Facility: CLINIC | Age: 59
End: 2024-05-18
Payer: MEDICARE

## 2024-05-18 ENCOUNTER — APPOINTMENT (OUTPATIENT)
Dept: ULTRASOUND IMAGING | Facility: CLINIC | Age: 59
End: 2024-05-18
Payer: MEDICARE

## 2024-05-18 ENCOUNTER — OUTPATIENT (OUTPATIENT)
Dept: OUTPATIENT SERVICES | Facility: HOSPITAL | Age: 59
LOS: 1 days | End: 2024-05-18
Payer: MEDICAID

## 2024-05-18 ENCOUNTER — RESULT REVIEW (OUTPATIENT)
Age: 59
End: 2024-05-18

## 2024-05-18 DIAGNOSIS — Z95.5 PRESENCE OF CORONARY ANGIOPLASTY IMPLANT AND GRAFT: Chronic | ICD-10-CM

## 2024-05-18 DIAGNOSIS — E04.2 NONTOXIC MULTINODULAR GOITER: ICD-10-CM

## 2024-05-18 PROCEDURE — 77067 SCR MAMMO BI INCL CAD: CPT | Mod: 26

## 2024-05-18 PROCEDURE — 77063 BREAST TOMOSYNTHESIS BI: CPT | Mod: 26

## 2024-05-18 PROCEDURE — 76536 US EXAM OF HEAD AND NECK: CPT | Mod: 26

## 2024-05-18 PROCEDURE — 77063 BREAST TOMOSYNTHESIS BI: CPT

## 2024-05-18 PROCEDURE — 76536 US EXAM OF HEAD AND NECK: CPT

## 2024-05-18 PROCEDURE — 77067 SCR MAMMO BI INCL CAD: CPT

## 2024-05-21 NOTE — ED PROVIDER NOTE - OBJECTIVE STATEMENT
cane 54yo F w/ PMH HTN, DM, RA pw worsening SOB x 2 days. Pt reports diagnosis PNA last Tuesday / 1wk ago, COVID negative at that time. Pt d/c'd w/ PO abx, finished course. Pt reports chest discomfort w/ deep breath, mild dry cough. ROS otherwise neg: Denies f/c, abd pain, back pain, n/v/d, loss of sense of taste / smell, LE pain / swelling. Denies recent travel, trauma / surgery, immobilization, steroid / hormone use. SPO2 93% RA, RR 30s.       PMH as above, meds as listed, allergy enalapril - angioedema, no PSH. Non smoker. 54yo F w/ PMH HTN, DM, RA pw worsening SOB x 2 days. Pt reports diagnosis PNA last Tuesday / 1wk ago, COVID negative at that time. Pt d/c'd w/ PO abx, finished course. Pt reports chest discomfort w/ deep breath, mild dry cough. ROS otherwise neg: Denies f/c, abd pain, back pain, n/v/d, loss of sense of taste / smell, LE pain / swelling. Denies recent travel, trauma / surgery, immobilization, steroid / hormone use. SPO2 93% RA, RR 30s.     PMH as above, meds as listed, allergy enalapril - angioedema, no PSH. Non smoker. No

## 2024-05-28 ENCOUNTER — NON-APPOINTMENT (OUTPATIENT)
Age: 59
End: 2024-05-28

## 2024-05-29 ENCOUNTER — NON-APPOINTMENT (OUTPATIENT)
Age: 59
End: 2024-05-29

## 2024-06-03 ENCOUNTER — NON-APPOINTMENT (OUTPATIENT)
Age: 59
End: 2024-06-03

## 2024-06-10 ENCOUNTER — RX RENEWAL (OUTPATIENT)
Age: 59
End: 2024-06-10

## 2024-06-10 RX ORDER — EZETIMIBE 10 MG/1
10 TABLET ORAL
Qty: 30 | Refills: 11 | Status: ACTIVE | COMMUNITY
Start: 2021-10-01 | End: 1900-01-01

## 2024-06-10 RX ORDER — INSULIN LISPRO 100 [IU]/ML
100 INJECTION, SOLUTION INTRAVENOUS; SUBCUTANEOUS
Qty: 70 | Refills: 3 | Status: ACTIVE | COMMUNITY
Start: 2024-04-04 | End: 1900-01-01

## 2024-08-05 ENCOUNTER — NON-APPOINTMENT (OUTPATIENT)
Age: 59
End: 2024-08-05

## 2024-08-16 ENCOUNTER — APPOINTMENT (OUTPATIENT)
Dept: RHEUMATOLOGY | Facility: CLINIC | Age: 59
End: 2024-08-16

## 2024-08-29 ENCOUNTER — NON-APPOINTMENT (OUTPATIENT)
Age: 59
End: 2024-08-29

## 2024-08-29 ENCOUNTER — RX RENEWAL (OUTPATIENT)
Age: 59
End: 2024-08-29

## 2024-09-20 ENCOUNTER — APPOINTMENT (OUTPATIENT)
Dept: RHEUMATOLOGY | Facility: CLINIC | Age: 59
End: 2024-09-20

## 2024-09-20 VITALS
DIASTOLIC BLOOD PRESSURE: 84 MMHG | HEART RATE: 98 BPM | TEMPERATURE: 98 F | HEIGHT: 68 IN | WEIGHT: 160 LBS | SYSTOLIC BLOOD PRESSURE: 154 MMHG | OXYGEN SATURATION: 96 % | BODY MASS INDEX: 24.25 KG/M2

## 2024-09-20 DIAGNOSIS — M48.07 SPINAL STENOSIS, LUMBOSACRAL REGION: ICD-10-CM

## 2024-09-20 DIAGNOSIS — Z79.899 OTHER LONG TERM (CURRENT) DRUG THERAPY: ICD-10-CM

## 2024-09-20 DIAGNOSIS — E55.9 VITAMIN D DEFICIENCY, UNSPECIFIED: ICD-10-CM

## 2024-09-20 DIAGNOSIS — M25.562 PAIN IN LEFT KNEE: ICD-10-CM

## 2024-09-20 DIAGNOSIS — M05.79 RHEUMATOID ARTHRITIS WITH RHEUMATOID FACTOR OF MULTIPLE SITES W/OUT ORGAN OR SYSTEMS INVOLVEMENT: ICD-10-CM

## 2024-09-20 DIAGNOSIS — M15.0 PRIMARY GENERALIZED (OSTEO)ARTHRITIS: ICD-10-CM

## 2024-09-20 DIAGNOSIS — Z23 ENCOUNTER FOR IMMUNIZATION: ICD-10-CM

## 2024-09-20 PROCEDURE — 90662 IIV NO PRSV INCREASED AG IM: CPT

## 2024-09-20 PROCEDURE — G0008: CPT

## 2024-09-20 PROCEDURE — 99214 OFFICE O/P EST MOD 30 MIN: CPT

## 2024-09-20 PROCEDURE — G2211 COMPLEX E/M VISIT ADD ON: CPT

## 2024-09-20 NOTE — HISTORY OF PRESENT ILLNESS
[FreeTextEntry1] : 9/20/2024:  Pt reports that the flare at last visit resolved w/ prednisone.  She then felt fine until about 3 weeks ago, when she began to experience severe pain and swelling throughout her left hand and wrist.  She went to urgent care and was sent to the ED.  x-rays and Duplex were reportedly unremarkable.  She left AMA and was given prescriptions for prednisone and ketorolac, on which her symptoms resolved afdter several days.  Currently, feeling fine.  No joint pain/swelling or AM stiffness.  No current complaints. 5/15/2024:  Pt c/o pain in her B/L MCP's (R>L) that started yesterday, worst upon trying to close her fist.  Still w/ pain in her DIP's and left knee, unchanged since last visit.   8/18/2023:  Pt reports that on 7/4, she began to experience severe pain, swelling, and redness of her left foot.  She went to the ED, where she was diagnosed w/ cellulitis.  She was started on abx, and D/C'ed home, and her symptoms resolved after several days.  Otherwise, feeling fine overall since last visit.  She does still c/o interimttnet pain i nher hands and left knee.

## 2024-09-20 NOTE — PHYSICAL EXAM
[General Appearance - Alert] : alert [General Appearance - In No Acute Distress] : in no acute distress [Sclera] : the sclera and conjunctiva were normal [Outer Ear] : the ears and nose were normal in appearance [Hearing Threshold Finger Rub Not Cole] : hearing was normal [Oropharynx] : the oropharynx was normal [Neck Appearance] : the appearance of the neck was normal [Neck Cervical Mass (___cm)] : no neck mass was observed [Jugular Venous Distention Increased] : there was no jugular-venous distention [Thyroid Diffuse Enlargement] : the thyroid was not enlarged [Thyroid Nodule] : there were no palpable thyroid nodules [Respiration, Rhythm And Depth] : normal respiratory rhythm and effort [Exaggerated Use Of Accessory Muscles For Inspiration] : no accessory muscle use [Auscultation Breath Sounds / Voice Sounds] : lungs were clear to auscultation bilaterally [Heart Rate And Rhythm] : heart rate was normal and rhythm regular [Heart Sounds] : normal S1 and S2 [Heart Sounds Gallop] : no gallops [Murmurs] : no murmurs [Heart Sounds Pericardial Friction Rub] : no pericardial rub [Edema] : there was no peripheral edema [Bowel Sounds] : normal bowel sounds [Abdomen Soft] : soft [Abdomen Tenderness] : non-tender [Abdomen Mass (___ Cm)] : no abdominal mass palpated [Cervical Lymph Nodes Enlarged Posterior Bilaterally] : posterior cervical [Cervical Lymph Nodes Enlarged Anterior Bilaterally] : anterior cervical [Supraclavicular Lymph Nodes Enlarged Bilaterally] : supraclavicular [No CVA Tenderness] : no ~M costovertebral angle tenderness [No Spinal Tenderness] : no spinal tenderness [] : no rash [No Focal Deficits] : no focal deficits [Oriented To Time, Place, And Person] : oriented to person, place, and time [Impaired Insight] : insight and judgment were intact [Affect] : the affect was normal [FreeTextEntry1] : No synovitis;  (+)multiple B/L Heberden's nodes,  normal ROM in all joints (+)multiple B/L Heberden's nodes; mucoid cyst overlying left 2nd PIP.

## 2024-09-20 NOTE — ASSESSMENT
[FreeTextEntry1] : 60 yo F with: 1) Rheumatoid Arthritis (+RF, +CCP) - has been well controlled on Arava + Plaquenil.  Recent flare in her hands (R>L) resolved w/ prednisone.   - Cont leflunomide 20mg daily   - Holding Plaquenil as pt has been asymptomatic without it.  If symptoms recur, will likely restart it.   - hepatitis panel negative 5/18.   - Administered flu vaccine.  Recommended [id number], but pt prefers to wait until next visit.   - Pt has received the COVID19 vaccine + booster.    - Check labs. 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  Currently much improved.   - derm f/u. 3)  Osteopenia:  Bisphosphonate not indicated based on FRAX (12/2.3%).  Previously w/ vit D deficiency - now resolved.   - Cont calcium / vit D.   - weight-bearing exercise.   - Will plan to order repeat DEXA at next visit 4)  OA - pt w/ mild chronic pain in knees primarily with walking / climbing stairs.  Also w/ OA in hands.   - Reiterated importance of exercise   - ibuprofen and/or Tylenol prn   - warm compresses   - OTC topical analgesics 5)  Recent episode of pain/swelling in her left and/wrist:  unclear etiology.  ?crystal arthropathy   - Advised pt to call if symptoms recur.

## 2024-09-23 LAB
ALBUMIN SERPL ELPH-MCNC: 4.6 G/DL
ALP BLD-CCNC: 95 U/L
ALT SERPL-CCNC: 20 U/L
ANION GAP SERPL CALC-SCNC: 15 MMOL/L
AST SERPL-CCNC: 20 U/L
BASOPHILS # BLD AUTO: 0.06 K/UL
BASOPHILS NFR BLD AUTO: 0.6 %
BILIRUB SERPL-MCNC: 0.3 MG/DL
BUN SERPL-MCNC: 13 MG/DL
CALCIUM SERPL-MCNC: 10.3 MG/DL
CHLORIDE SERPL-SCNC: 97 MMOL/L
CO2 SERPL-SCNC: 25 MMOL/L
CREAT SERPL-MCNC: 0.68 MG/DL
CRP SERPL-MCNC: 7 MG/L
EGFR: 100 ML/MIN/1.73M2
EOSINOPHIL # BLD AUTO: 0.37 K/UL
EOSINOPHIL NFR BLD AUTO: 4 %
ERYTHROCYTE [SEDIMENTATION RATE] IN BLOOD BY WESTERGREN METHOD: 27 MM/HR
GLUCOSE SERPL-MCNC: 126 MG/DL
HCT VFR BLD CALC: 41.9 %
HGB BLD-MCNC: 13.1 G/DL
IMM GRANULOCYTES NFR BLD AUTO: 0.3 %
LYMPHOCYTES # BLD AUTO: 2.12 K/UL
LYMPHOCYTES NFR BLD AUTO: 22.8 %
MAN DIFF?: NORMAL
MCHC RBC-ENTMCNC: 26.6 PG
MCHC RBC-ENTMCNC: 31.3 GM/DL
MCV RBC AUTO: 85 FL
MONOCYTES # BLD AUTO: 0.63 K/UL
MONOCYTES NFR BLD AUTO: 6.8 %
NEUTROPHILS # BLD AUTO: 6.1 K/UL
NEUTROPHILS NFR BLD AUTO: 65.5 %
PLATELET # BLD AUTO: 329 K/UL
POTASSIUM SERPL-SCNC: 4.3 MMOL/L
PROT SERPL-MCNC: 8 G/DL
RBC # BLD: 4.93 M/UL
RBC # FLD: 14.3 %
SODIUM SERPL-SCNC: 137 MMOL/L
WBC # FLD AUTO: 9.31 K/UL

## 2024-10-02 ENCOUNTER — RX RENEWAL (OUTPATIENT)
Age: 59
End: 2024-10-02

## 2024-10-21 NOTE — ED PROVIDER NOTE - NS ED MD DISPO DISCHARGE
Follow up with primary clinic as scheduled Tuesday. Take reglan for any nausea. This may also help with stomach emptying. Take over the counter simethicone as directed/needed for gas.  
Home
No

## 2024-10-22 ENCOUNTER — APPOINTMENT (OUTPATIENT)
Dept: ENDOCRINOLOGY | Facility: CLINIC | Age: 59
End: 2024-10-22
Payer: MEDICARE

## 2024-10-22 VITALS
WEIGHT: 163 LBS | OXYGEN SATURATION: 98 % | BODY MASS INDEX: 24.71 KG/M2 | DIASTOLIC BLOOD PRESSURE: 78 MMHG | HEIGHT: 68 IN | TEMPERATURE: 98.8 F | RESPIRATION RATE: 16 BRPM | HEART RATE: 101 BPM | SYSTOLIC BLOOD PRESSURE: 154 MMHG

## 2024-10-22 DIAGNOSIS — E11.65 TYPE 2 DIABETES MELLITUS WITH HYPERGLYCEMIA: ICD-10-CM

## 2024-10-22 DIAGNOSIS — E78.5 HYPERLIPIDEMIA, UNSPECIFIED: ICD-10-CM

## 2024-10-22 DIAGNOSIS — I10 ESSENTIAL (PRIMARY) HYPERTENSION: ICD-10-CM

## 2024-10-22 DIAGNOSIS — M85.89 OTHER SPECIFIED DISORDERS OF BONE DENSITY AND STRUCTURE, MULTIPLE SITES: ICD-10-CM

## 2024-10-22 DIAGNOSIS — E04.2 NONTOXIC MULTINODULAR GOITER: ICD-10-CM

## 2024-10-22 LAB — GLUCOSE BLDC GLUCOMTR-MCNC: 186

## 2024-10-22 PROCEDURE — G2211 COMPLEX E/M VISIT ADD ON: CPT

## 2024-10-22 PROCEDURE — 99214 OFFICE O/P EST MOD 30 MIN: CPT

## 2024-10-22 PROCEDURE — 95251 CONT GLUC MNTR ANALYSIS I&R: CPT

## 2024-10-22 PROCEDURE — 36415 COLL VENOUS BLD VENIPUNCTURE: CPT

## 2024-10-22 PROCEDURE — 82962 GLUCOSE BLOOD TEST: CPT

## 2024-10-23 ENCOUNTER — RX RENEWAL (OUTPATIENT)
Age: 59
End: 2024-10-23

## 2024-10-23 LAB
CHOLEST SERPL-MCNC: 121 MG/DL
CREAT SPEC-SCNC: 121 MG/DL
ESTIMATED AVERAGE GLUCOSE: 134 MG/DL
FOLATE SERPL-MCNC: 8 NG/ML
HBA1C MFR BLD HPLC: 6.3 %
HDLC SERPL-MCNC: 48 MG/DL
LDLC SERPL CALC-MCNC: 36 MG/DL
MICROALBUMIN 24H UR DL<=1MG/L-MCNC: 8.1 MG/DL
MICROALBUMIN/CREAT 24H UR-RTO: 67 MG/G
NONHDLC SERPL-MCNC: 74 MG/DL
T4 FREE SERPL-MCNC: 1 NG/DL
TRIGL SERPL-MCNC: 242 MG/DL
TSH SERPL-ACNC: 0.72 UIU/ML
VIT B12 SERPL-MCNC: 788 PG/ML

## 2024-10-28 ENCOUNTER — RX RENEWAL (OUTPATIENT)
Age: 59
End: 2024-10-28

## 2024-11-12 ENCOUNTER — APPOINTMENT (OUTPATIENT)
Dept: INTERNAL MEDICINE | Facility: CLINIC | Age: 59
End: 2024-11-12
Payer: MEDICARE

## 2024-11-12 VITALS
WEIGHT: 162 LBS | HEIGHT: 68 IN | OXYGEN SATURATION: 97 % | SYSTOLIC BLOOD PRESSURE: 142 MMHG | HEART RATE: 91 BPM | DIASTOLIC BLOOD PRESSURE: 76 MMHG | BODY MASS INDEX: 24.55 KG/M2

## 2024-11-12 DIAGNOSIS — I10 ESSENTIAL (PRIMARY) HYPERTENSION: ICD-10-CM

## 2024-11-12 DIAGNOSIS — E11.9 TYPE 2 DIABETES MELLITUS W/OUT COMPLICATIONS: ICD-10-CM

## 2024-11-12 DIAGNOSIS — M05.79 RHEUMATOID ARTHRITIS WITH RHEUMATOID FACTOR OF MULTIPLE SITES W/OUT ORGAN OR SYSTEMS INVOLVEMENT: ICD-10-CM

## 2024-11-12 DIAGNOSIS — Z12.11 ENCOUNTER FOR SCREENING FOR MALIGNANT NEOPLASM OF COLON: ICD-10-CM

## 2024-11-12 DIAGNOSIS — E11.65 TYPE 2 DIABETES MELLITUS WITH HYPERGLYCEMIA: ICD-10-CM

## 2024-11-12 DIAGNOSIS — I21.09 ST ELEVATION (STEMI) MYOCARDIAL INFARCTION INVOLVING OTHER CORONARY ARTERY OF ANTERIOR WALL: ICD-10-CM

## 2024-11-12 DIAGNOSIS — E78.5 HYPERLIPIDEMIA, UNSPECIFIED: ICD-10-CM

## 2024-11-12 DIAGNOSIS — I25.10 ATHEROSCLEROTIC HEART DISEASE OF NATIVE CORONARY ARTERY W/OUT ANGINA PECTORIS: ICD-10-CM

## 2024-11-12 PROCEDURE — 99214 OFFICE O/P EST MOD 30 MIN: CPT

## 2024-11-13 ENCOUNTER — NON-APPOINTMENT (OUTPATIENT)
Age: 59
End: 2024-11-13

## 2024-11-14 ENCOUNTER — RX RENEWAL (OUTPATIENT)
Age: 59
End: 2024-11-14

## 2024-12-03 NOTE — PROGRESS NOTE ADULT - SUBJECTIVE AND OBJECTIVE BOX
[facility]  a part of [facility]   [address], [address]  [place] [address]  Phone: [phone]  Fax: [phone]    PHYSICAL THERAPY/OCCUPATIONAL THERAPY PROGRESS NOTE  Patient Name:  Kristin Ron :  1965   Treatment/Medical Diagnosis: Lymphedema, not elsewhere classified [I89.0]   Referral Source:  Brett Camarena MD     Date of Initial Visit:  2024 Attended Visits:  2 Missed Visits:  0     SUMMARY OF TREATMENT/ASSESSMENT:     Patient returns for first treatment visit since evaluation.  She has used the compression bandage and foam pads constructed for her last treatment session a few times since she was last seen in therapy and she reports she was able to tolerate the garment for hours in light of her claustrophobia.  The concern with using this bandage is a return of some of the facial swelling that she had experienced previously.    Reassessed head and neck measurements today to assess for these changes.  Reassessment of head and neck measurements yields decreases at 3 out of 13 measurements, increases at 4 out of 13 measurements and 6 measurements are unchanged.  The facial measurements have not increased in size and upper neck circumference has decreased since last assessment.  Added soft tissue mobilizations to address tightness in left upper trapezius today.  Will continue with treatment.      Patient will continue to benefit from skilled PT / OT services to address ROM deficits, address swelling, analyze compression product fit and use, instruct in home lymphedema management program, and measure for compression products to address functional deficits and attain remaining goals.    CURRENT STATUS/GOALS    Short Term Goals: To be accomplished in 6 treatments  Patient to perform 5/5 lymphedema remedial exercises/cervical range of motion exercises in session with modified independence utilizing HEP handout, in order to promote optimal independence  Patient is a 55y old  Female who presents with a chief complaint of sob (30 Jan 2021 19:32)      Interval History: finger sticks are stable today and in 100's   cause of yesterday increased finger sticks cause uncertain   on Lantus 15 units and prandial lispro 5 units   MEDICATIONS  (STANDING):  amLODIPine   Tablet 5 milliGRAM(s) Oral daily  aspirin enteric coated 81 milliGRAM(s) Oral daily  dextrose 40% Gel 15 Gram(s) Oral once  dextrose 5%. 1000 milliLiter(s) (50 mL/Hr) IV Continuous <Continuous>  dextrose 5%. 1000 milliLiter(s) (100 mL/Hr) IV Continuous <Continuous>  dextrose 50% Injectable 25 Gram(s) IV Push once  dextrose 50% Injectable 12.5 Gram(s) IV Push once  dextrose 50% Injectable 25 Gram(s) IV Push once  famotidine    Tablet 20 milliGRAM(s) Oral daily  glucagon  Injectable 1 milliGRAM(s) IntraMuscular once  insulin glargine Injectable (LANTUS) 15 Unit(s) SubCutaneous at bedtime  insulin lispro (ADMELOG) corrective regimen sliding scale   SubCutaneous three times a day before meals  insulin lispro (ADMELOG) corrective regimen sliding scale   SubCutaneous at bedtime  insulin lispro Injectable (ADMELOG) 5 Unit(s) SubCutaneous three times a day before meals    MEDICATIONS  (PRN):  acetaminophen   Tablet .. 650 milliGRAM(s) Oral every 6 hours PRN Temp greater or equal to 38C (100.4F), Mild Pain (1 - 3)  albuterol/ipratropium for Nebulization 3 milliLiter(s) Nebulizer every 6 hours PRN Shortness of Breath and/or Wheezing  benzocaine 15 mG/menthol 3.6 mG (Sugar-Free) Lozenge 1 Lozenge Oral every 6 hours PRN Mouth Sores  guaiFENesin   Syrup  (Sugar-Free) 200 milliGRAM(s) Oral every 6 hours PRN Cough  sodium chloride 0.65% Nasal 1 Spray(s) Both Nostrils three times a day PRN Nasal Congestion      Allergies    ACE inhibitors (Angioedema (Severe))  lisinopril (Anaphylaxis; Angioedema)    Intolerances        REVIEW OF SYSTEMS:  CONSTITUTIONAL: no changes  EYES: No eye pain, visual disturbances, or discharge  ENMT:  No difficulty hearing, No sinus or throat pain  NECK: No pain or stiffness  RESPIRATORY: No cough, wheezing, chills or hemoptysis; No shortness of breath  CARDIOVASCULAR: No chest pain, palpitations or leg swelling  GASTROINTESTINAL: No abdominal or epigastric pain. No nausea, vomiting, or hematemesis; No diarrhea or constipation. No melena or hematochezia.  GENITOURINARY: No dysuria, frequency, hematuria, or incontinence  NEUROLOGICAL: No headaches, memory loss, loss of strength, numbness, or tremors  SKIN: No itching, burning, rashes, or lesions   ENDOCRINE: No heat or cold intolerance; No hair loss  MUSCULOSKELETAL: No joint pain or swelling; No muscle, back, or extremity pain  PSYCHIATRIC: No depression, anxiety, mood swings, or difficulty sleeping  HEME/LYMPH: No easy bruising, or bleeding gums  ALLERY AND IMMUNOLOGIC: No hives or eczema    Vital Signs Last 24 Hrs  T(C): 36.7 (30 Jan 2021 18:03), Max: 36.7 (30 Jan 2021 18:03)  T(F): 98 (30 Jan 2021 18:03), Max: 98 (30 Jan 2021 18:03)  HR: 78 (30 Jan 2021 18:03) (60 - 78)  BP: 145/79 (30 Jan 2021 18:03) (125/78 - 145/79)  BP(mean): --  RR: 16 (30 Jan 2021 18:03) (16 - 18)  SpO2: 96% (30 Jan 2021 18:03) (96% - 99%)    PHYSICAL EXAM:  GENERAL:   HEAD: Atraumatic, Normocephalic  EYES: PERRLA, conjunctiva and sclera clear  ENMT: No tonsillar erythema, exudates, or enlargement; Moist mucous membranes, Good dentition, No lesions  NECK: Supple, No JVD, Normal thyroid  NERVOUS SYSTEM:  Alert & Oriented X3, Good concentration; Motor Strength 5/5 B/L upper and lower extremities  CHEST/LUNG: Clear to auscultaion bilaterally; No rales, rhonchi, wheezing, or rubs  HEART: Regular rate and rhythm; No murmurs, rubs, or gallops  ABDOMEN: Soft, Nontender, Nondistended; Bowel sounds present  EXTREMITIES:  2+ Peripheral Pulses, no edema  SKIN: No rashes or lesions    LABS:        CAPILLARY BLOOD GLUCOSE      POCT Blood Glucose.: 184 mg/dL (30 Jan 2021 20:58)  POCT Blood Glucose.: 133 mg/dL (30 Jan 2021 16:05)  POCT Blood Glucose.: 187 mg/dL (30 Jan 2021 10:50)  POCT Blood Glucose.: 178 mg/dL (30 Jan 2021 07:53)    Lipid panel:           Thyroid:  Diabetes Tests:  Parathyroid Panel:  Adrenals:  RADIOLOGY & ADDITIONAL TESTS:    Imaging Personally Reviewed:  [ ] YES  [ ] NO    Consultant(s) Notes Reviewed:  [ ] YES  [ ] NO    Care Discussed with Consultants/Other Providers [ ] YES  [ ] NO

## 2024-12-31 ENCOUNTER — RX RENEWAL (OUTPATIENT)
Age: 59
End: 2024-12-31

## 2025-01-08 ENCOUNTER — APPOINTMENT (OUTPATIENT)
Dept: RHEUMATOLOGY | Facility: CLINIC | Age: 60
End: 2025-01-08
Payer: MEDICARE

## 2025-01-08 ENCOUNTER — APPOINTMENT (OUTPATIENT)
Dept: CARDIOLOGY | Facility: CLINIC | Age: 60
End: 2025-01-08

## 2025-01-08 VITALS
DIASTOLIC BLOOD PRESSURE: 84 MMHG | WEIGHT: 162.13 LBS | HEART RATE: 94 BPM | HEIGHT: 68 IN | BODY MASS INDEX: 24.57 KG/M2 | SYSTOLIC BLOOD PRESSURE: 132 MMHG | OXYGEN SATURATION: 98 %

## 2025-01-08 DIAGNOSIS — M15.0 PRIMARY GENERALIZED (OSTEO)ARTHRITIS: ICD-10-CM

## 2025-01-08 DIAGNOSIS — M05.79 RHEUMATOID ARTHRITIS WITH RHEUMATOID FACTOR OF MULTIPLE SITES W/OUT ORGAN OR SYSTEMS INVOLVEMENT: ICD-10-CM

## 2025-01-08 DIAGNOSIS — M85.89 OTHER SPECIFIED DISORDERS OF BONE DENSITY AND STRUCTURE, MULTIPLE SITES: ICD-10-CM

## 2025-01-08 DIAGNOSIS — Z79.60 LONG TERM (CURRENT) USE OF UNSPECIFIED IMMUNOMODULATORS AND IMMUNOSUPPRESSANTS: ICD-10-CM

## 2025-01-08 DIAGNOSIS — M19.90 UNSPECIFIED OSTEOARTHRITIS, UNSPECIFIED SITE: ICD-10-CM

## 2025-01-08 DIAGNOSIS — M48.07 SPINAL STENOSIS, LUMBOSACRAL REGION: ICD-10-CM

## 2025-01-08 DIAGNOSIS — E55.9 VITAMIN D DEFICIENCY, UNSPECIFIED: ICD-10-CM

## 2025-01-08 DIAGNOSIS — M25.562 PAIN IN LEFT KNEE: ICD-10-CM

## 2025-01-08 PROCEDURE — 99214 OFFICE O/P EST MOD 30 MIN: CPT

## 2025-01-08 PROCEDURE — G2211 COMPLEX E/M VISIT ADD ON: CPT

## 2025-01-08 RX ORDER — PREDNISONE 10 MG/1
10 TABLET ORAL
Qty: 19 | Refills: 1 | Status: ACTIVE | COMMUNITY
Start: 2025-01-08 | End: 1900-01-01

## 2025-01-08 NOTE — PHYSICAL EXAM
[General Appearance - Well Developed] : well developed [Well Groomed] : well groomed [Normal Appearance] : normal appearance [General Appearance - Well Nourished] : well nourished [No Deformities] : no deformities Angiocath [General Appearance - In No Acute Distress] : no acute distress [Eyelids - No Xanthelasma] : the eyelids demonstrated no xanthelasmas [Normal Conjunctiva] : the conjunctiva exhibited no abnormalities [Normal Oral Mucosa] : normal oral mucosa [No Oral Pallor] : no oral pallor [Normal Jugular Venous V Waves Present] : normal jugular venous V waves present [Normal Jugular Venous A Waves Present] : normal jugular venous A waves present [No Oral Cyanosis] : no oral cyanosis [No Jugular Venous Blair A Waves] : no jugular venous blair A waves [Heart Rate And Rhythm] : heart rate and rhythm were normal [Murmurs] : no murmurs present [Heart Sounds] : normal S1 and S2 [Respiration, Rhythm And Depth] : normal respiratory rhythm and effort [Auscultation Breath Sounds / Voice Sounds] : lungs were clear to auscultation bilaterally [Exaggerated Use Of Accessory Muscles For Inspiration] : no accessory muscle use [Abdomen Tenderness] : non-tender [Abdomen Soft] : soft [Abdomen Mass (___ Cm)] : no abdominal mass palpated [Abnormal Walk] : normal gait [Nail Clubbing] : no clubbing of the fingernails [Gait - Sufficient For Exercise Testing] : the gait was sufficient for exercise testing [Petechial Hemorrhages (___cm)] : no petechial hemorrhages [Cyanosis, Localized] : no localized cyanosis [Skin Color & Pigmentation] : normal skin color and pigmentation [No Venous Stasis] : no venous stasis [] : no rash [Skin Lesions] : no skin lesions [No Skin Ulcers] : no skin ulcer [No Xanthoma] : no  xanthoma was observed [Affect] : the affect was normal [Oriented To Time, Place, And Person] : oriented to person, place, and time [No Anxiety] : not feeling anxious [Mood] : the mood was normal

## 2025-01-09 LAB
ALBUMIN SERPL ELPH-MCNC: 4.6 G/DL
ALP BLD-CCNC: 80 U/L
ALT SERPL-CCNC: 28 U/L
ANION GAP SERPL CALC-SCNC: 14 MMOL/L
AST SERPL-CCNC: 16 U/L
BASOPHILS # BLD AUTO: 0.08 K/UL
BASOPHILS NFR BLD AUTO: 0.6 %
BILIRUB SERPL-MCNC: 0.3 MG/DL
BUN SERPL-MCNC: 17 MG/DL
CALCIUM SERPL-MCNC: 10 MG/DL
CHLORIDE SERPL-SCNC: 98 MMOL/L
CO2 SERPL-SCNC: 28 MMOL/L
CREAT SERPL-MCNC: 0.93 MG/DL
CRP SERPL-MCNC: 13 MG/L
EGFR: 70 ML/MIN/1.73M2
EOSINOPHIL # BLD AUTO: 0.16 K/UL
EOSINOPHIL NFR BLD AUTO: 1.1 %
ERYTHROCYTE [SEDIMENTATION RATE] IN BLOOD BY WESTERGREN METHOD: 25 MM/HR
GLUCOSE SERPL-MCNC: 145 MG/DL
HCT VFR BLD CALC: 39.7 %
HGB BLD-MCNC: 12.4 G/DL
IMM GRANULOCYTES NFR BLD AUTO: 0.4 %
LYMPHOCYTES # BLD AUTO: 2.93 K/UL
LYMPHOCYTES NFR BLD AUTO: 20.9 %
MAN DIFF?: NORMAL
MCHC RBC-ENTMCNC: 26.3 PG
MCHC RBC-ENTMCNC: 31.2 G/DL
MCV RBC AUTO: 84.3 FL
MONOCYTES # BLD AUTO: 0.98 K/UL
MONOCYTES NFR BLD AUTO: 7 %
NEUTROPHILS # BLD AUTO: 9.83 K/UL
NEUTROPHILS NFR BLD AUTO: 70 %
PLATELET # BLD AUTO: 352 K/UL
POTASSIUM SERPL-SCNC: 3.9 MMOL/L
PROT SERPL-MCNC: 7.9 G/DL
RBC # BLD: 4.71 M/UL
RBC # FLD: 13.6 %
SODIUM SERPL-SCNC: 140 MMOL/L
URATE SERPL-MCNC: 3.3 MG/DL
WBC # FLD AUTO: 14.04 K/UL

## 2025-01-21 ENCOUNTER — APPOINTMENT (OUTPATIENT)
Dept: ENDOCRINOLOGY | Facility: CLINIC | Age: 60
End: 2025-01-21

## 2025-03-04 ENCOUNTER — OUTPATIENT (OUTPATIENT)
Dept: OUTPATIENT SERVICES | Facility: HOSPITAL | Age: 60
LOS: 1 days | End: 2025-03-04
Payer: MEDICARE

## 2025-03-04 ENCOUNTER — APPOINTMENT (OUTPATIENT)
Dept: RADIOLOGY | Facility: CLINIC | Age: 60
End: 2025-03-04
Payer: MEDICARE

## 2025-03-04 DIAGNOSIS — Z95.5 PRESENCE OF CORONARY ANGIOPLASTY IMPLANT AND GRAFT: Chronic | ICD-10-CM

## 2025-03-04 DIAGNOSIS — M85.89 OTHER SPECIFIED DISORDERS OF BONE DENSITY AND STRUCTURE, MULTIPLE SITES: ICD-10-CM

## 2025-03-04 PROCEDURE — 77080 DXA BONE DENSITY AXIAL: CPT

## 2025-03-04 PROCEDURE — 77080 DXA BONE DENSITY AXIAL: CPT | Mod: 26

## 2025-03-06 RX ORDER — INSULIN LISPRO 100 [IU]/ML
100 INJECTION, SOLUTION INTRAVENOUS; SUBCUTANEOUS
Qty: 70 | Refills: 3 | Status: ACTIVE | COMMUNITY
Start: 2025-03-06 | End: 1900-01-01

## 2025-03-26 ENCOUNTER — APPOINTMENT (OUTPATIENT)
Dept: RHEUMATOLOGY | Facility: CLINIC | Age: 60
End: 2025-03-26
Payer: MEDICARE

## 2025-03-26 VITALS
BODY MASS INDEX: 24.86 KG/M2 | HEIGHT: 68 IN | WEIGHT: 164 LBS | SYSTOLIC BLOOD PRESSURE: 160 MMHG | HEART RATE: 81 BPM | OXYGEN SATURATION: 98 % | DIASTOLIC BLOOD PRESSURE: 80 MMHG

## 2025-03-26 DIAGNOSIS — M48.07 SPINAL STENOSIS, LUMBOSACRAL REGION: ICD-10-CM

## 2025-03-26 DIAGNOSIS — M19.90 UNSPECIFIED OSTEOARTHRITIS, UNSPECIFIED SITE: ICD-10-CM

## 2025-03-26 DIAGNOSIS — M05.79 RHEUMATOID ARTHRITIS WITH RHEUMATOID FACTOR OF MULTIPLE SITES W/OUT ORGAN OR SYSTEMS INVOLVEMENT: ICD-10-CM

## 2025-03-26 DIAGNOSIS — E55.9 VITAMIN D DEFICIENCY, UNSPECIFIED: ICD-10-CM

## 2025-03-26 DIAGNOSIS — Z79.60 LONG TERM (CURRENT) USE OF UNSPECIFIED IMMUNOMODULATORS AND IMMUNOSUPPRESSANTS: ICD-10-CM

## 2025-03-26 DIAGNOSIS — M25.562 PAIN IN LEFT KNEE: ICD-10-CM

## 2025-03-26 DIAGNOSIS — M85.89 OTHER SPECIFIED DISORDERS OF BONE DENSITY AND STRUCTURE, MULTIPLE SITES: ICD-10-CM

## 2025-03-26 DIAGNOSIS — M15.0 PRIMARY GENERALIZED (OSTEO)ARTHRITIS: ICD-10-CM

## 2025-03-26 PROCEDURE — G2211 COMPLEX E/M VISIT ADD ON: CPT

## 2025-03-26 PROCEDURE — 99214 OFFICE O/P EST MOD 30 MIN: CPT

## 2025-03-27 LAB
ALBUMIN SERPL ELPH-MCNC: 4.3 G/DL
ALP BLD-CCNC: 85 U/L
ALT SERPL-CCNC: 27 U/L
ANION GAP SERPL CALC-SCNC: 12 MMOL/L
AST SERPL-CCNC: 26 U/L
BASOPHILS # BLD AUTO: 0.1 K/UL
BASOPHILS NFR BLD AUTO: 1 %
BILIRUB SERPL-MCNC: 0.3 MG/DL
BUN SERPL-MCNC: 19 MG/DL
CALCIUM SERPL-MCNC: 9.7 MG/DL
CHLORIDE SERPL-SCNC: 98 MMOL/L
CO2 SERPL-SCNC: 28 MMOL/L
CREAT SERPL-MCNC: 0.7 MG/DL
CRP SERPL-MCNC: 3 MG/L
EGFRCR SERPLBLD CKD-EPI 2021: 98 ML/MIN/1.73M2
EOSINOPHIL # BLD AUTO: 0.39 K/UL
EOSINOPHIL NFR BLD AUTO: 4 %
ERYTHROCYTE [SEDIMENTATION RATE] IN BLOOD BY WESTERGREN METHOD: 23 MM/HR
GLUCOSE SERPL-MCNC: 134 MG/DL
HCT VFR BLD CALC: 38.6 %
HGB BLD-MCNC: 12.3 G/DL
IMM GRANULOCYTES NFR BLD AUTO: 0.3 %
LYMPHOCYTES # BLD AUTO: 2.6 K/UL
LYMPHOCYTES NFR BLD AUTO: 26.7 %
MAN DIFF?: NORMAL
MCHC RBC-ENTMCNC: 26.3 PG
MCHC RBC-ENTMCNC: 31.9 G/DL
MCV RBC AUTO: 82.5 FL
MONOCYTES # BLD AUTO: 0.67 K/UL
MONOCYTES NFR BLD AUTO: 6.9 %
NEUTROPHILS # BLD AUTO: 5.93 K/UL
NEUTROPHILS NFR BLD AUTO: 61.1 %
PLATELET # BLD AUTO: 322 K/UL
POTASSIUM SERPL-SCNC: 4.6 MMOL/L
PROT SERPL-MCNC: 7.3 G/DL
RBC # BLD: 4.68 M/UL
RBC # FLD: 15 %
SODIUM SERPL-SCNC: 138 MMOL/L
WBC # FLD AUTO: 9.72 K/UL

## 2025-03-31 ENCOUNTER — RX RENEWAL (OUTPATIENT)
Age: 60
End: 2025-03-31

## 2025-05-13 ENCOUNTER — APPOINTMENT (OUTPATIENT)
Dept: ENDOCRINOLOGY | Facility: CLINIC | Age: 60
End: 2025-05-13
Payer: MEDICARE

## 2025-05-13 VITALS
DIASTOLIC BLOOD PRESSURE: 69 MMHG | WEIGHT: 165 LBS | HEART RATE: 83 BPM | HEIGHT: 68 IN | RESPIRATION RATE: 16 BRPM | OXYGEN SATURATION: 97 % | BODY MASS INDEX: 25.01 KG/M2 | SYSTOLIC BLOOD PRESSURE: 158 MMHG

## 2025-05-13 DIAGNOSIS — E11.65 TYPE 2 DIABETES MELLITUS WITH HYPERGLYCEMIA: ICD-10-CM

## 2025-05-13 DIAGNOSIS — I10 ESSENTIAL (PRIMARY) HYPERTENSION: ICD-10-CM

## 2025-05-13 DIAGNOSIS — E05.90 THYROTOXICOSIS, UNSPECIFIED W/OUT THYROTOXIC CRISIS OR STORM: ICD-10-CM

## 2025-05-13 DIAGNOSIS — E78.5 HYPERLIPIDEMIA, UNSPECIFIED: ICD-10-CM

## 2025-05-13 DIAGNOSIS — E04.2 NONTOXIC MULTINODULAR GOITER: ICD-10-CM

## 2025-05-13 LAB — GLUCOSE BLDC GLUCOMTR-MCNC: 96

## 2025-05-13 PROCEDURE — 99214 OFFICE O/P EST MOD 30 MIN: CPT

## 2025-05-13 PROCEDURE — 95251 CONT GLUC MNTR ANALYSIS I&R: CPT

## 2025-05-13 PROCEDURE — 82962 GLUCOSE BLOOD TEST: CPT

## 2025-05-13 PROCEDURE — 36415 COLL VENOUS BLD VENIPUNCTURE: CPT

## 2025-05-14 ENCOUNTER — RX RENEWAL (OUTPATIENT)
Age: 60
End: 2025-05-14

## 2025-05-14 LAB
25(OH)D3 SERPL-MCNC: 38.1 NG/ML
ALBUMIN SERPL ELPH-MCNC: 4.4 G/DL
ALP BLD-CCNC: 90 U/L
ALT SERPL-CCNC: 31 U/L
ANION GAP SERPL CALC-SCNC: 18 MMOL/L
AST SERPL-CCNC: 31 U/L
BILIRUB SERPL-MCNC: 0.3 MG/DL
BUN SERPL-MCNC: 20 MG/DL
CALCIUM SERPL-MCNC: 10 MG/DL
CHLORIDE SERPL-SCNC: 99 MMOL/L
CHOLEST SERPL-MCNC: 135 MG/DL
CO2 SERPL-SCNC: 24 MMOL/L
CREAT SERPL-MCNC: 0.77 MG/DL
CREAT SPEC-SCNC: 132 MG/DL
EGFRCR SERPLBLD CKD-EPI 2021: 88 ML/MIN/1.73M2
ESTIMATED AVERAGE GLUCOSE: 148 MG/DL
FOLATE SERPL-MCNC: 9.7 NG/ML
GLUCOSE SERPL-MCNC: 85 MG/DL
HBA1C MFR BLD HPLC: 6.8 %
HDLC SERPL-MCNC: 45 MG/DL
LDLC SERPL-MCNC: 54 MG/DL
MICROALBUMIN 24H UR DL<=1MG/L-MCNC: 10.2 MG/DL
MICROALBUMIN/CREAT 24H UR-RTO: 77 MG/G
NONHDLC SERPL-MCNC: 91 MG/DL
POTASSIUM SERPL-SCNC: 4.3 MMOL/L
PROT SERPL-MCNC: 7.7 G/DL
SODIUM SERPL-SCNC: 140 MMOL/L
T3 SERPL-MCNC: 169 NG/DL
T4 FREE SERPL-MCNC: 0.9 NG/DL
TRIGL SERPL-MCNC: 232 MG/DL
TSH RECEPTOR AB: <1.1 IU/L
TSH SERPL-ACNC: 0.9 UIU/ML
VIT B12 SERPL-MCNC: 748 PG/ML

## 2025-05-16 ENCOUNTER — RX RENEWAL (OUTPATIENT)
Age: 60
End: 2025-05-16

## 2025-05-18 LAB — TSI ACT/NOR SER: <0.1 IU/L

## 2025-06-16 ENCOUNTER — NON-APPOINTMENT (OUTPATIENT)
Age: 60
End: 2025-06-16

## 2025-06-17 ENCOUNTER — APPOINTMENT (OUTPATIENT)
Dept: CARDIOLOGY | Facility: CLINIC | Age: 60
End: 2025-06-17
Payer: MEDICARE

## 2025-06-17 ENCOUNTER — NON-APPOINTMENT (OUTPATIENT)
Age: 60
End: 2025-06-17

## 2025-06-17 VITALS
DIASTOLIC BLOOD PRESSURE: 66 MMHG | HEIGHT: 68 IN | HEART RATE: 71 BPM | BODY MASS INDEX: 24.55 KG/M2 | OXYGEN SATURATION: 97 % | WEIGHT: 162 LBS | SYSTOLIC BLOOD PRESSURE: 132 MMHG

## 2025-06-17 PROBLEM — I25.5 ISCHEMIC CARDIOMYOPATHY: Status: ACTIVE | Noted: 2025-06-17

## 2025-06-17 PROCEDURE — G2211 COMPLEX E/M VISIT ADD ON: CPT

## 2025-06-17 PROCEDURE — 93000 ELECTROCARDIOGRAM COMPLETE: CPT

## 2025-06-17 PROCEDURE — 99204 OFFICE O/P NEW MOD 45 MIN: CPT

## 2025-07-14 ENCOUNTER — APPOINTMENT (OUTPATIENT)
Dept: INTERNAL MEDICINE | Facility: CLINIC | Age: 60
End: 2025-07-14

## 2025-07-21 DIAGNOSIS — E11.65 TYPE 2 DIABETES MELLITUS WITH HYPERGLYCEMIA: ICD-10-CM

## 2025-07-25 ENCOUNTER — RX RENEWAL (OUTPATIENT)
Age: 60
End: 2025-07-25

## 2025-07-30 ENCOUNTER — OUTPATIENT (OUTPATIENT)
Dept: OUTPATIENT SERVICES | Facility: HOSPITAL | Age: 60
LOS: 1 days | End: 2025-07-30
Payer: MEDICARE

## 2025-07-30 ENCOUNTER — APPOINTMENT (OUTPATIENT)
Dept: ULTRASOUND IMAGING | Facility: CLINIC | Age: 60
End: 2025-07-30
Payer: MEDICARE

## 2025-07-30 DIAGNOSIS — Z95.5 PRESENCE OF CORONARY ANGIOPLASTY IMPLANT AND GRAFT: Chronic | ICD-10-CM

## 2025-07-30 DIAGNOSIS — I10 ESSENTIAL (PRIMARY) HYPERTENSION: ICD-10-CM

## 2025-07-30 DIAGNOSIS — E78.5 HYPERLIPIDEMIA, UNSPECIFIED: ICD-10-CM

## 2025-07-30 DIAGNOSIS — Z00.8 ENCOUNTER FOR OTHER GENERAL EXAMINATION: ICD-10-CM

## 2025-07-30 PROCEDURE — 76536 US EXAM OF HEAD AND NECK: CPT | Mod: 26

## 2025-07-30 PROCEDURE — 76536 US EXAM OF HEAD AND NECK: CPT

## 2025-08-01 ENCOUNTER — APPOINTMENT (OUTPATIENT)
Dept: RHEUMATOLOGY | Facility: CLINIC | Age: 60
End: 2025-08-01
Payer: MEDICARE

## 2025-08-01 VITALS
HEART RATE: 76 BPM | DIASTOLIC BLOOD PRESSURE: 70 MMHG | BODY MASS INDEX: 24.55 KG/M2 | OXYGEN SATURATION: 95 % | SYSTOLIC BLOOD PRESSURE: 124 MMHG | WEIGHT: 162 LBS | HEIGHT: 68 IN

## 2025-08-01 DIAGNOSIS — M48.07 SPINAL STENOSIS, LUMBOSACRAL REGION: ICD-10-CM

## 2025-08-01 DIAGNOSIS — M85.89 OTHER SPECIFIED DISORDERS OF BONE DENSITY AND STRUCTURE, MULTIPLE SITES: ICD-10-CM

## 2025-08-01 DIAGNOSIS — Z23 ENCOUNTER FOR IMMUNIZATION: ICD-10-CM

## 2025-08-01 DIAGNOSIS — M05.79 RHEUMATOID ARTHRITIS WITH RHEUMATOID FACTOR OF MULTIPLE SITES W/OUT ORGAN OR SYSTEMS INVOLVEMENT: ICD-10-CM

## 2025-08-01 DIAGNOSIS — E55.9 VITAMIN D DEFICIENCY, UNSPECIFIED: ICD-10-CM

## 2025-08-01 DIAGNOSIS — Z79.60 LONG TERM (CURRENT) USE OF UNSPECIFIED IMMUNOMODULATORS AND IMMUNOSUPPRESSANTS: ICD-10-CM

## 2025-08-01 DIAGNOSIS — M25.562 PAIN IN LEFT KNEE: ICD-10-CM

## 2025-08-01 DIAGNOSIS — M15.0 PRIMARY GENERALIZED (OSTEO)ARTHRITIS: ICD-10-CM

## 2025-08-01 PROCEDURE — 99214 OFFICE O/P EST MOD 30 MIN: CPT | Mod: 25

## 2025-08-01 PROCEDURE — G0009: CPT

## 2025-08-01 PROCEDURE — 90684 PCV21 VACCINE IM: CPT

## 2025-08-01 PROCEDURE — G2211 COMPLEX E/M VISIT ADD ON: CPT

## 2025-08-04 LAB
ALBUMIN SERPL ELPH-MCNC: 4.4 G/DL
ALP BLD-CCNC: 97 U/L
ALT SERPL-CCNC: 27 U/L
ANION GAP SERPL CALC-SCNC: 17 MMOL/L
AST SERPL-CCNC: 28 U/L
BASOPHILS # BLD AUTO: 0.07 K/UL
BASOPHILS NFR BLD AUTO: 0.8 %
BILIRUB SERPL-MCNC: 0.2 MG/DL
BUN SERPL-MCNC: 18 MG/DL
CALCIUM SERPL-MCNC: 9.8 MG/DL
CHLORIDE SERPL-SCNC: 97 MMOL/L
CO2 SERPL-SCNC: 24 MMOL/L
CREAT SERPL-MCNC: 0.72 MG/DL
CRP SERPL-MCNC: 5 MG/L
EGFRCR SERPLBLD CKD-EPI 2021: 95 ML/MIN/1.73M2
EOSINOPHIL # BLD AUTO: 0.39 K/UL
EOSINOPHIL NFR BLD AUTO: 4.3 %
ERYTHROCYTE [SEDIMENTATION RATE] IN BLOOD BY WESTERGREN METHOD: 33 MM/HR
GLUCOSE SERPL-MCNC: 223 MG/DL
HCT VFR BLD CALC: 38.8 %
HGB BLD-MCNC: 12.3 G/DL
IMM GRANULOCYTES NFR BLD AUTO: 0.2 %
LYMPHOCYTES # BLD AUTO: 2.46 K/UL
LYMPHOCYTES NFR BLD AUTO: 27.2 %
MAN DIFF?: NORMAL
MCHC RBC-ENTMCNC: 25.9 PG
MCHC RBC-ENTMCNC: 31.7 G/DL
MCV RBC AUTO: 81.7 FL
MONOCYTES # BLD AUTO: 0.49 K/UL
MONOCYTES NFR BLD AUTO: 5.4 %
NEUTROPHILS # BLD AUTO: 5.63 K/UL
NEUTROPHILS NFR BLD AUTO: 62.1 %
PLATELET # BLD AUTO: 301 K/UL
POTASSIUM SERPL-SCNC: 4.1 MMOL/L
PROT SERPL-MCNC: 7.3 G/DL
RBC # BLD: 4.75 M/UL
RBC # FLD: 14.5 %
SODIUM SERPL-SCNC: 137 MMOL/L
WBC # FLD AUTO: 9.06 K/UL

## 2025-09-05 ENCOUNTER — APPOINTMENT (OUTPATIENT)
Dept: OBGYN | Facility: CLINIC | Age: 60
End: 2025-09-05
Payer: MEDICARE

## 2025-09-05 DIAGNOSIS — Z23 ENCOUNTER FOR IMMUNIZATION: ICD-10-CM

## 2025-09-05 PROCEDURE — 90715 TDAP VACCINE 7 YRS/> IM: CPT

## 2025-09-05 PROCEDURE — 90471 IMMUNIZATION ADMIN: CPT

## 2025-09-07 PROBLEM — Z23 NEED FOR TDAP VACCINATION: Status: ACTIVE | Noted: 2025-09-07

## 2025-09-09 ENCOUNTER — APPOINTMENT (OUTPATIENT)
Dept: INTERNAL MEDICINE | Facility: CLINIC | Age: 60
End: 2025-09-09
Payer: MEDICARE

## 2025-09-09 PROCEDURE — 93306 TTE W/DOPPLER COMPLETE: CPT

## 2025-09-16 ENCOUNTER — APPOINTMENT (OUTPATIENT)
Dept: ENDOCRINOLOGY | Facility: CLINIC | Age: 60
End: 2025-09-16